# Patient Record
Sex: MALE | Race: WHITE | Employment: OTHER | URBAN - METROPOLITAN AREA
[De-identification: names, ages, dates, MRNs, and addresses within clinical notes are randomized per-mention and may not be internally consistent; named-entity substitution may affect disease eponyms.]

---

## 2017-07-18 PROBLEM — K64.4 EXTERNAL HEMORRHOID: Status: ACTIVE | Noted: 2017-07-18

## 2017-10-16 PROBLEM — J40 BRONCHITIS: Status: ACTIVE | Noted: 2017-10-16

## 2017-10-16 PROBLEM — J01.00 ACUTE NON-RECURRENT MAXILLARY SINUSITIS: Status: ACTIVE | Noted: 2017-10-16

## 2018-04-16 ENCOUNTER — TELEPHONE (OUTPATIENT)
Dept: FAMILY MEDICINE CLINIC | Age: 48
End: 2018-04-16

## 2018-04-16 DIAGNOSIS — Z95.2 S/P AVR (AORTIC VALVE REPLACEMENT): ICD-10-CM

## 2018-04-16 DIAGNOSIS — Z95.2 S/P MVR (MITRAL VALVE REPLACEMENT): Primary | ICD-10-CM

## 2018-04-17 ENCOUNTER — NURSE ONLY (OUTPATIENT)
Dept: FAMILY MEDICINE CLINIC | Age: 48
End: 2018-04-17
Payer: COMMERCIAL

## 2018-04-17 DIAGNOSIS — Z95.2 S/P AVR (AORTIC VALVE REPLACEMENT): ICD-10-CM

## 2018-04-17 DIAGNOSIS — Z95.2 S/P MVR (MITRAL VALVE REPLACEMENT): Primary | ICD-10-CM

## 2018-04-17 LAB
INTERNATIONAL NORMALIZATION RATIO, POC: 2.4
PROTHROMBIN TIME, POC: NORMAL

## 2018-04-17 PROCEDURE — 85610 PROTHROMBIN TIME: CPT | Performed by: FAMILY MEDICINE

## 2018-05-21 ENCOUNTER — TELEPHONE (OUTPATIENT)
Dept: FAMILY MEDICINE CLINIC | Age: 48
End: 2018-05-21

## 2018-05-21 RX ORDER — MOXIFLOXACIN 5 MG/ML
1 SOLUTION/ DROPS OPHTHALMIC 3 TIMES DAILY
Qty: 3 ML | Refills: 0 | Status: SHIPPED | OUTPATIENT
Start: 2018-05-21 | End: 2018-05-28

## 2018-06-13 ENCOUNTER — TELEPHONE (OUTPATIENT)
Dept: FAMILY MEDICINE CLINIC | Age: 48
End: 2018-06-13

## 2018-06-20 ENCOUNTER — NURSE ONLY (OUTPATIENT)
Dept: FAMILY MEDICINE CLINIC | Age: 48
End: 2018-06-20
Payer: COMMERCIAL

## 2018-06-20 DIAGNOSIS — Z95.2 S/P MVR (MITRAL VALVE REPLACEMENT): Primary | ICD-10-CM

## 2018-06-20 DIAGNOSIS — Z95.2 S/P AVR (AORTIC VALVE REPLACEMENT): ICD-10-CM

## 2018-06-20 LAB
INTERNATIONAL NORMALIZATION RATIO, POC: 1.9
PROTHROMBIN TIME, POC: NORMAL

## 2018-06-20 PROCEDURE — 85610 PROTHROMBIN TIME: CPT | Performed by: FAMILY MEDICINE

## 2018-06-20 PROCEDURE — 93793 ANTICOAG MGMT PT WARFARIN: CPT | Performed by: FAMILY MEDICINE

## 2018-07-27 ENCOUNTER — TELEPHONE (OUTPATIENT)
Dept: FAMILY MEDICINE CLINIC | Age: 48
End: 2018-07-27

## 2018-08-28 ENCOUNTER — TELEPHONE (OUTPATIENT)
Dept: FAMILY MEDICINE CLINIC | Age: 48
End: 2018-08-28

## 2018-09-19 ENCOUNTER — NURSE ONLY (OUTPATIENT)
Dept: FAMILY MEDICINE CLINIC | Age: 48
End: 2018-09-19
Payer: COMMERCIAL

## 2018-09-19 DIAGNOSIS — Z95.2 S/P MVR (MITRAL VALVE REPLACEMENT): Primary | ICD-10-CM

## 2018-09-19 DIAGNOSIS — Z95.2 S/P AVR (AORTIC VALVE REPLACEMENT): ICD-10-CM

## 2018-09-19 LAB
INTERNATIONAL NORMALIZATION RATIO, POC: 2.3
PROTHROMBIN TIME, POC: 27.1

## 2018-09-19 PROCEDURE — 85610 PROTHROMBIN TIME: CPT | Performed by: FAMILY MEDICINE

## 2018-09-19 PROCEDURE — 93793 ANTICOAG MGMT PT WARFARIN: CPT | Performed by: FAMILY MEDICINE

## 2018-10-04 ENCOUNTER — TELEPHONE (OUTPATIENT)
Dept: FAMILY MEDICINE CLINIC | Age: 48
End: 2018-10-04

## 2018-10-04 RX ORDER — AMOXICILLIN 875 MG/1
875 TABLET, COATED ORAL 2 TIMES DAILY
Qty: 20 TABLET | Refills: 0 | Status: SHIPPED | OUTPATIENT
Start: 2018-10-04 | End: 2018-10-14

## 2018-10-16 ENCOUNTER — NURSE ONLY (OUTPATIENT)
Dept: FAMILY MEDICINE CLINIC | Age: 48
End: 2018-10-16
Payer: COMMERCIAL

## 2018-10-16 DIAGNOSIS — Z95.2 S/P MVR (MITRAL VALVE REPLACEMENT): ICD-10-CM

## 2018-10-16 DIAGNOSIS — Z95.2 S/P AVR (AORTIC VALVE REPLACEMENT): ICD-10-CM

## 2018-10-16 LAB
INTERNATIONAL NORMALIZATION RATIO, POC: 3.3
PROTHROMBIN TIME, POC: NORMAL

## 2018-10-16 PROCEDURE — 85610 PROTHROMBIN TIME: CPT | Performed by: FAMILY MEDICINE

## 2018-10-16 PROCEDURE — 93793 ANTICOAG MGMT PT WARFARIN: CPT | Performed by: FAMILY MEDICINE

## 2018-11-14 ENCOUNTER — HOSPITAL ENCOUNTER (OUTPATIENT)
Age: 48
Discharge: HOME | End: 2018-11-16
Payer: COMMERCIAL

## 2018-11-14 ENCOUNTER — NURSE ONLY (OUTPATIENT)
Dept: FAMILY MEDICINE CLINIC | Age: 48
End: 2018-11-14
Payer: COMMERCIAL

## 2018-11-14 DIAGNOSIS — Z23 NEED FOR INFLUENZA VACCINATION: Primary | ICD-10-CM

## 2018-11-14 DIAGNOSIS — I10 ESSENTIAL HYPERTENSION: ICD-10-CM

## 2018-11-14 DIAGNOSIS — Z95.2 S/P AVR (AORTIC VALVE REPLACEMENT): ICD-10-CM

## 2018-11-14 DIAGNOSIS — Z12.5 SCREENING FOR PROSTATE CANCER: ICD-10-CM

## 2018-11-14 DIAGNOSIS — E78.5 HYPERLIPIDEMIA, UNSPECIFIED HYPERLIPIDEMIA TYPE: ICD-10-CM

## 2018-11-14 DIAGNOSIS — Z95.2 S/P MVR (MITRAL VALVE REPLACEMENT): ICD-10-CM

## 2018-11-14 LAB
ALBUMIN SERPL-MCNC: 4.2 G/DL (ref 3.5–5.2)
ALP BLD-CCNC: 57 U/L (ref 40–129)
ALT SERPL-CCNC: 30 U/L (ref 0–40)
ANION GAP SERPL CALCULATED.3IONS-SCNC: 13 MMOL/L (ref 7–16)
AST SERPL-CCNC: 28 U/L (ref 0–39)
BILIRUB SERPL-MCNC: 0.4 MG/DL (ref 0–1.2)
BUN BLDV-MCNC: 16 MG/DL (ref 6–20)
CALCIUM SERPL-MCNC: 9 MG/DL (ref 8.6–10.2)
CHLORIDE BLD-SCNC: 105 MMOL/L (ref 98–107)
CHOLESTEROL, TOTAL: 196 MG/DL (ref 0–199)
CO2: 24 MMOL/L (ref 22–29)
CREAT SERPL-MCNC: 1.1 MG/DL (ref 0.7–1.2)
GFR AFRICAN AMERICAN: >60
GFR NON-AFRICAN AMERICAN: >60 ML/MIN/1.73
GLUCOSE BLD-MCNC: 94 MG/DL (ref 74–99)
HCT VFR BLD CALC: 47.7 % (ref 37–54)
HDLC SERPL-MCNC: 59 MG/DL
HEMOGLOBIN: 15.1 G/DL (ref 12.5–16.5)
INTERNATIONAL NORMALIZATION RATIO, POC: 2.5
LDL CHOLESTEROL CALCULATED: 120 MG/DL (ref 0–99)
MCH RBC QN AUTO: 28.1 PG (ref 26–35)
MCHC RBC AUTO-ENTMCNC: 31.7 % (ref 32–34.5)
MCV RBC AUTO: 88.8 FL (ref 80–99.9)
PDW BLD-RTO: 13.4 FL (ref 11.5–15)
PLATELET # BLD: 280 E9/L (ref 130–450)
PMV BLD AUTO: 11 FL (ref 7–12)
POTASSIUM SERPL-SCNC: 4.5 MMOL/L (ref 3.5–5)
PROSTATE SPECIFIC ANTIGEN: 1.3 NG/ML (ref 0–4)
PROTHROMBIN TIME, POC: NORMAL
RBC # BLD: 5.37 E12/L (ref 3.8–5.8)
SODIUM BLD-SCNC: 142 MMOL/L (ref 132–146)
TOTAL PROTEIN: 7.3 G/DL (ref 6.4–8.3)
TRIGL SERPL-MCNC: 84 MG/DL (ref 0–149)
TSH SERPL DL<=0.05 MIU/L-ACNC: 0.81 UIU/ML (ref 0.27–4.2)
VLDLC SERPL CALC-MCNC: 17 MG/DL
WBC # BLD: 7.2 E9/L (ref 4.5–11.5)

## 2018-11-14 PROCEDURE — 80053 COMPREHEN METABOLIC PANEL: CPT

## 2018-11-14 PROCEDURE — 84443 ASSAY THYROID STIM HORMONE: CPT

## 2018-11-14 PROCEDURE — 90471 IMMUNIZATION ADMIN: CPT | Performed by: FAMILY MEDICINE

## 2018-11-14 PROCEDURE — 36415 COLL VENOUS BLD VENIPUNCTURE: CPT | Performed by: FAMILY MEDICINE

## 2018-11-14 PROCEDURE — 85610 PROTHROMBIN TIME: CPT | Performed by: FAMILY MEDICINE

## 2018-11-14 PROCEDURE — 90686 IIV4 VACC NO PRSV 0.5 ML IM: CPT | Performed by: FAMILY MEDICINE

## 2018-11-14 PROCEDURE — G0103 PSA SCREENING: HCPCS

## 2018-11-14 PROCEDURE — 85027 COMPLETE CBC AUTOMATED: CPT

## 2018-11-14 PROCEDURE — 80061 LIPID PANEL: CPT

## 2018-11-14 NOTE — PROGRESS NOTES
Vaccine Information Sheet, \"Influenza - Inactivated\"  given to Kingsley Navas, or parent/legal guardian of  Kingsley Navas and verbalized understanding. Patient responses:    Have you ever had a reaction to a flu vaccine? No  Are you able to eat eggs without adverse effects? Yes  Do you have any current illness? No  Have you ever had [place] Syndrome? No    Flu vaccine given per order. Please see immunization tab.

## 2018-12-18 ENCOUNTER — OFFICE VISIT (OUTPATIENT)
Dept: FAMILY MEDICINE CLINIC | Age: 48
End: 2018-12-18
Payer: COMMERCIAL

## 2018-12-18 VITALS
RESPIRATION RATE: 16 BRPM | SYSTOLIC BLOOD PRESSURE: 134 MMHG | HEART RATE: 77 BPM | BODY MASS INDEX: 30.61 KG/M2 | HEIGHT: 72 IN | OXYGEN SATURATION: 97 % | DIASTOLIC BLOOD PRESSURE: 72 MMHG | WEIGHT: 226 LBS

## 2018-12-18 DIAGNOSIS — Z12.11 SCREENING FOR MALIGNANT NEOPLASM OF COLON: ICD-10-CM

## 2018-12-18 DIAGNOSIS — Z00.01 ENCOUNTER FOR GENERAL ADULT MEDICAL EXAMINATION WITH ABNORMAL FINDINGS: Primary | ICD-10-CM

## 2018-12-18 DIAGNOSIS — I10 ESSENTIAL HYPERTENSION: ICD-10-CM

## 2018-12-18 DIAGNOSIS — Z95.2 S/P MVR (MITRAL VALVE REPLACEMENT): ICD-10-CM

## 2018-12-18 DIAGNOSIS — Z95.2 S/P AVR (AORTIC VALVE REPLACEMENT): ICD-10-CM

## 2018-12-18 PROBLEM — J01.00 ACUTE NON-RECURRENT MAXILLARY SINUSITIS: Status: RESOLVED | Noted: 2017-10-16 | Resolved: 2018-12-18

## 2018-12-18 PROBLEM — K64.4 EXTERNAL HEMORRHOID: Status: RESOLVED | Noted: 2017-07-18 | Resolved: 2018-12-18

## 2018-12-18 PROBLEM — J40 BRONCHITIS: Status: RESOLVED | Noted: 2017-10-16 | Resolved: 2018-12-18

## 2018-12-18 LAB
INTERNATIONAL NORMALIZATION RATIO, POC: 2.6
PROTHROMBIN TIME, POC: NORMAL

## 2018-12-18 PROCEDURE — 85610 PROTHROMBIN TIME: CPT | Performed by: FAMILY MEDICINE

## 2018-12-18 PROCEDURE — 99396 PREV VISIT EST AGE 40-64: CPT | Performed by: FAMILY MEDICINE

## 2018-12-18 ASSESSMENT — ENCOUNTER SYMPTOMS
WHEEZING: 0
CONSTIPATION: 0
SORE THROAT: 0
EYE REDNESS: 0
DIARRHEA: 0
BLOOD IN STOOL: 0
VOMITING: 0
EYE ITCHING: 0
EYE PAIN: 0
RHINORRHEA: 0
SHORTNESS OF BREATH: 0
APNEA: 0
NAUSEA: 0
ABDOMINAL PAIN: 0
COLOR CHANGE: 0
SINUS PRESSURE: 0
BACK PAIN: 0
COUGH: 0
CHEST TIGHTNESS: 0

## 2018-12-18 ASSESSMENT — PATIENT HEALTH QUESTIONNAIRE - PHQ9
SUM OF ALL RESPONSES TO PHQ QUESTIONS 1-9: 0
SUM OF ALL RESPONSES TO PHQ9 QUESTIONS 1 & 2: 0
1. LITTLE INTEREST OR PLEASURE IN DOING THINGS: 0
SUM OF ALL RESPONSES TO PHQ QUESTIONS 1-9: 0
2. FEELING DOWN, DEPRESSED OR HOPELESS: 0

## 2018-12-18 NOTE — PROGRESS NOTES
to person, place, and time. He appears well-developed and well-nourished. HENT:   Head: Normocephalic and atraumatic. Right Ear: External ear normal.   Left Ear: External ear normal.   Nose: Nose normal.   Mouth/Throat: Oropharynx is clear and moist.   Eyes: Pupils are equal, round, and reactive to light. Conjunctivae and EOM are normal. No scleral icterus. Neck: Normal range of motion. Neck supple. No thyromegaly present. Cardiovascular: Normal rate and regular rhythm. Murmur (with audible click) heard. Pulmonary/Chest: Effort normal and breath sounds normal. No respiratory distress. He has no wheezes. He has no rales. Abdominal: Soft. Bowel sounds are normal. He exhibits no distension. There is no tenderness. Musculoskeletal: Normal range of motion. He exhibits no edema or tenderness. Lymphadenopathy:     He has no cervical adenopathy. Neurological: He is alert and oriented to person, place, and time. He has normal reflexes. He displays normal reflexes. No cranial nerve deficit. Skin: Skin is warm and dry. No rash noted. No erythema. Psychiatric: He has a normal mood and affect. Nursing note and vitals reviewed. ASSESSMENT/PLAN:    Patient Active Problem List   Diagnosis    S/P MVR (mitral valve replacement)    S/P AVR (aortic valve replacement)    Hypertension    Hyperlipidemia    Encounter for general adult medical examination with abnormal findings       Braeden Wadsworth was seen today for hypertension.     Diagnoses and all orders for this visit:    Encounter for general adult medical examination with abnormal findings    S/P MVR (mitral valve replacement)  -     POCT INR    S/P AVR (aortic valve replacement)  -     POCT INR    Essential hypertension    Screening for malignant neoplasm of colon  -     Pardeep Lynch MD          Return in about 6 months (around 6/18/2019) for Follow up HTN, Recheck labs, Recheck

## 2019-01-14 ENCOUNTER — NURSE ONLY (OUTPATIENT)
Dept: FAMILY MEDICINE CLINIC | Age: 49
End: 2019-01-14
Payer: COMMERCIAL

## 2019-01-14 DIAGNOSIS — Z95.2 S/P MVR (MITRAL VALVE REPLACEMENT): ICD-10-CM

## 2019-01-14 DIAGNOSIS — Z95.2 S/P AVR (AORTIC VALVE REPLACEMENT): ICD-10-CM

## 2019-01-14 LAB
INTERNATIONAL NORMALIZATION RATIO, POC: 2.3
PROTHROMBIN TIME, POC: NORMAL

## 2019-01-14 PROCEDURE — 85610 PROTHROMBIN TIME: CPT | Performed by: FAMILY MEDICINE

## 2019-01-15 ENCOUNTER — OFFICE VISIT (OUTPATIENT)
Dept: SURGERY | Age: 49
End: 2019-01-15

## 2019-01-15 VITALS
HEIGHT: 72 IN | DIASTOLIC BLOOD PRESSURE: 87 MMHG | HEART RATE: 76 BPM | RESPIRATION RATE: 18 BRPM | WEIGHT: 233 LBS | SYSTOLIC BLOOD PRESSURE: 135 MMHG | OXYGEN SATURATION: 97 % | TEMPERATURE: 98.7 F | BODY MASS INDEX: 31.56 KG/M2

## 2019-01-15 DIAGNOSIS — Z12.11 ENCOUNTER FOR SCREENING COLONOSCOPY: Primary | ICD-10-CM

## 2019-01-15 PROCEDURE — 99999 PR OFFICE/OUTPT VISIT,PROCEDURE ONLY: CPT | Performed by: SURGERY

## 2019-01-16 ENCOUNTER — TELEPHONE (OUTPATIENT)
Dept: SURGERY | Age: 49
End: 2019-01-16

## 2019-01-17 PROBLEM — Z00.01 ENCOUNTER FOR GENERAL ADULT MEDICAL EXAMINATION WITH ABNORMAL FINDINGS: Status: RESOLVED | Noted: 2018-12-18 | Resolved: 2019-01-17

## 2019-01-29 ENCOUNTER — NURSE ONLY (OUTPATIENT)
Dept: FAMILY MEDICINE CLINIC | Age: 49
End: 2019-01-29
Payer: COMMERCIAL

## 2019-01-29 DIAGNOSIS — Z95.2 S/P AVR (AORTIC VALVE REPLACEMENT): ICD-10-CM

## 2019-01-29 DIAGNOSIS — Z95.2 S/P MVR (MITRAL VALVE REPLACEMENT): ICD-10-CM

## 2019-01-29 LAB
INTERNATIONAL NORMALIZATION RATIO, POC: 3.1
PROTHROMBIN TIME, POC: NORMAL

## 2019-01-29 PROCEDURE — 85610 PROTHROMBIN TIME: CPT | Performed by: FAMILY MEDICINE

## 2019-01-29 PROCEDURE — 93793 ANTICOAG MGMT PT WARFARIN: CPT | Performed by: FAMILY MEDICINE

## 2019-01-30 ENCOUNTER — OFFICE VISIT (OUTPATIENT)
Dept: FAMILY MEDICINE CLINIC | Age: 49
End: 2019-01-30
Payer: COMMERCIAL

## 2019-01-30 VITALS
DIASTOLIC BLOOD PRESSURE: 80 MMHG | HEART RATE: 85 BPM | SYSTOLIC BLOOD PRESSURE: 138 MMHG | BODY MASS INDEX: 31.56 KG/M2 | RESPIRATION RATE: 16 BRPM | TEMPERATURE: 99 F | HEIGHT: 72 IN | WEIGHT: 233 LBS | OXYGEN SATURATION: 97 %

## 2019-01-30 DIAGNOSIS — B96.89 ACUTE BACTERIAL SINUSITIS: Primary | ICD-10-CM

## 2019-01-30 DIAGNOSIS — J01.90 ACUTE BACTERIAL SINUSITIS: Primary | ICD-10-CM

## 2019-01-30 PROCEDURE — 99213 OFFICE O/P EST LOW 20 MIN: CPT | Performed by: FAMILY MEDICINE

## 2019-01-30 RX ORDER — AZITHROMYCIN 250 MG/1
TABLET, FILM COATED ORAL
Qty: 6 TABLET | Refills: 0 | Status: SHIPPED | OUTPATIENT
Start: 2019-01-30 | End: 2019-02-13 | Stop reason: ALTCHOICE

## 2019-01-30 ASSESSMENT — PATIENT HEALTH QUESTIONNAIRE - PHQ9
SUM OF ALL RESPONSES TO PHQ QUESTIONS 1-9: 0
1. LITTLE INTEREST OR PLEASURE IN DOING THINGS: 0
SUM OF ALL RESPONSES TO PHQ QUESTIONS 1-9: 0
SUM OF ALL RESPONSES TO PHQ9 QUESTIONS 1 & 2: 0
2. FEELING DOWN, DEPRESSED OR HOPELESS: 0

## 2019-01-30 ASSESSMENT — ENCOUNTER SYMPTOMS
RESPIRATORY NEGATIVE: 1
RHINORRHEA: 1
EYES NEGATIVE: 1
SORE THROAT: 1
SINUS COMPLAINT: 1
TROUBLE SWALLOWING: 0
SINUS PRESSURE: 1
FACIAL SWELLING: 0

## 2019-02-07 ENCOUNTER — TELEPHONE (OUTPATIENT)
Dept: SURGERY | Age: 49
End: 2019-02-07

## 2019-02-12 ENCOUNTER — OFFICE VISIT (OUTPATIENT)
Dept: CARDIOLOGY CLINIC | Age: 49
End: 2019-02-12
Payer: COMMERCIAL

## 2019-02-12 VITALS
HEART RATE: 66 BPM | RESPIRATION RATE: 16 BRPM | HEIGHT: 72 IN | WEIGHT: 230 LBS | DIASTOLIC BLOOD PRESSURE: 88 MMHG | SYSTOLIC BLOOD PRESSURE: 132 MMHG | BODY MASS INDEX: 31.15 KG/M2

## 2019-02-12 DIAGNOSIS — I10 ESSENTIAL HYPERTENSION: Primary | ICD-10-CM

## 2019-02-12 PROCEDURE — 99213 OFFICE O/P EST LOW 20 MIN: CPT | Performed by: INTERNAL MEDICINE

## 2019-02-12 PROCEDURE — 93000 ELECTROCARDIOGRAM COMPLETE: CPT | Performed by: INTERNAL MEDICINE

## 2019-02-26 ENCOUNTER — NURSE ONLY (OUTPATIENT)
Dept: FAMILY MEDICINE CLINIC | Age: 49
End: 2019-02-26
Payer: COMMERCIAL

## 2019-02-26 DIAGNOSIS — Z95.2 S/P MVR (MITRAL VALVE REPLACEMENT): ICD-10-CM

## 2019-02-26 DIAGNOSIS — Z95.2 S/P AVR (AORTIC VALVE REPLACEMENT): ICD-10-CM

## 2019-02-26 LAB
INTERNATIONAL NORMALIZATION RATIO, POC: 3.1
PROTHROMBIN TIME, POC: 37.2

## 2019-02-26 PROCEDURE — 85610 PROTHROMBIN TIME: CPT | Performed by: FAMILY MEDICINE

## 2019-03-13 ENCOUNTER — TELEPHONE (OUTPATIENT)
Dept: SURGERY | Age: 49
End: 2019-03-13

## 2019-03-26 ENCOUNTER — NURSE ONLY (OUTPATIENT)
Dept: FAMILY MEDICINE CLINIC | Age: 49
End: 2019-03-26
Payer: COMMERCIAL

## 2019-03-26 DIAGNOSIS — Z95.2 S/P AVR (AORTIC VALVE REPLACEMENT): ICD-10-CM

## 2019-03-26 DIAGNOSIS — Z95.2 S/P MVR (MITRAL VALVE REPLACEMENT): ICD-10-CM

## 2019-03-26 LAB
INTERNATIONAL NORMALIZATION RATIO, POC: 2.2
PROTHROMBIN TIME, POC: NORMAL

## 2019-03-26 PROCEDURE — 85610 PROTHROMBIN TIME: CPT | Performed by: FAMILY MEDICINE

## 2019-03-26 PROCEDURE — 93793 ANTICOAG MGMT PT WARFARIN: CPT | Performed by: FAMILY MEDICINE

## 2019-04-30 ENCOUNTER — NURSE ONLY (OUTPATIENT)
Dept: FAMILY MEDICINE CLINIC | Age: 49
End: 2019-04-30
Payer: COMMERCIAL

## 2019-04-30 DIAGNOSIS — Z95.2 S/P MVR (MITRAL VALVE REPLACEMENT): ICD-10-CM

## 2019-04-30 DIAGNOSIS — Z95.2 S/P AVR (AORTIC VALVE REPLACEMENT): ICD-10-CM

## 2019-04-30 LAB
INTERNATIONAL NORMALIZATION RATIO, POC: 2.4
PROTHROMBIN TIME, POC: NORMAL

## 2019-04-30 PROCEDURE — 85610 PROTHROMBIN TIME: CPT | Performed by: FAMILY MEDICINE

## 2019-04-30 PROCEDURE — 93793 ANTICOAG MGMT PT WARFARIN: CPT | Performed by: FAMILY MEDICINE

## 2019-06-05 ENCOUNTER — NURSE ONLY (OUTPATIENT)
Dept: FAMILY MEDICINE CLINIC | Age: 49
End: 2019-06-05
Payer: COMMERCIAL

## 2019-06-05 DIAGNOSIS — E78.5 HYPERLIPIDEMIA, UNSPECIFIED HYPERLIPIDEMIA TYPE: ICD-10-CM

## 2019-06-05 DIAGNOSIS — Z95.2 S/P MVR (MITRAL VALVE REPLACEMENT): ICD-10-CM

## 2019-06-05 DIAGNOSIS — I10 ESSENTIAL HYPERTENSION: Primary | ICD-10-CM

## 2019-06-05 DIAGNOSIS — Z00.01 ENCOUNTER FOR GENERAL ADULT MEDICAL EXAMINATION WITH ABNORMAL FINDINGS: ICD-10-CM

## 2019-06-05 DIAGNOSIS — Z95.2 S/P AVR (AORTIC VALVE REPLACEMENT): ICD-10-CM

## 2019-06-05 LAB
INTERNATIONAL NORMALIZATION RATIO, POC: 2.4
PROTHROMBIN TIME, POC: ABNORMAL

## 2019-06-05 PROCEDURE — 85610 PROTHROMBIN TIME: CPT | Performed by: FAMILY MEDICINE

## 2019-10-09 ENCOUNTER — NURSE ONLY (OUTPATIENT)
Dept: PRIMARY CARE CLINIC | Age: 49
End: 2019-10-09
Payer: COMMERCIAL

## 2019-10-09 DIAGNOSIS — Z95.2 S/P AVR (AORTIC VALVE REPLACEMENT): ICD-10-CM

## 2019-10-09 DIAGNOSIS — Z95.2 S/P MVR (MITRAL VALVE REPLACEMENT): ICD-10-CM

## 2019-10-09 DIAGNOSIS — Z23 NEED FOR INFLUENZA VACCINATION: Primary | ICD-10-CM

## 2019-10-09 LAB
INTERNATIONAL NORMALIZATION RATIO, POC: 2.6
PROTHROMBIN TIME, POC: NORMAL

## 2019-10-09 PROCEDURE — 85610 PROTHROMBIN TIME: CPT | Performed by: FAMILY MEDICINE

## 2019-10-09 PROCEDURE — 90471 IMMUNIZATION ADMIN: CPT | Performed by: FAMILY MEDICINE

## 2019-10-09 PROCEDURE — 90686 IIV4 VACC NO PRSV 0.5 ML IM: CPT | Performed by: FAMILY MEDICINE

## 2019-10-09 PROCEDURE — 93793 ANTICOAG MGMT PT WARFARIN: CPT | Performed by: FAMILY MEDICINE

## 2019-10-21 RX ORDER — LISINOPRIL 10 MG/1
TABLET ORAL
Qty: 30 TABLET | Refills: 3 | Status: SHIPPED
Start: 2019-10-21 | End: 2020-03-04

## 2019-10-23 ENCOUNTER — TELEPHONE (OUTPATIENT)
Dept: PRIMARY CARE CLINIC | Age: 49
End: 2019-10-23

## 2019-10-23 RX ORDER — AMOXICILLIN 875 MG/1
875 TABLET, COATED ORAL 2 TIMES DAILY
Qty: 20 TABLET | Refills: 0 | Status: SHIPPED | OUTPATIENT
Start: 2019-10-23 | End: 2019-11-02

## 2019-11-18 RX ORDER — WARFARIN SODIUM 10 MG/1
TABLET ORAL
Qty: 60 TABLET | Refills: 0 | Status: SHIPPED
Start: 2019-11-18 | End: 2020-02-11 | Stop reason: SDUPTHER

## 2020-01-31 ENCOUNTER — NURSE ONLY (OUTPATIENT)
Dept: PRIMARY CARE CLINIC | Age: 50
End: 2020-01-31
Payer: COMMERCIAL

## 2020-01-31 LAB
INTERNATIONAL NORMALIZATION RATIO, POC: 2.1
PROTHROMBIN TIME, POC: NORMAL

## 2020-01-31 PROCEDURE — 85610 PROTHROMBIN TIME: CPT | Performed by: FAMILY MEDICINE

## 2020-02-11 RX ORDER — WARFARIN SODIUM 10 MG/1
TABLET ORAL
Qty: 30 TABLET | Refills: 1 | Status: SHIPPED
Start: 2020-02-11 | End: 2021-04-12

## 2020-03-04 ENCOUNTER — NURSE ONLY (OUTPATIENT)
Dept: PRIMARY CARE CLINIC | Age: 50
End: 2020-03-04
Payer: COMMERCIAL

## 2020-03-04 LAB
INTERNATIONAL NORMALIZATION RATIO, POC: 2.1
PROTHROMBIN TIME, POC: 25.5

## 2020-03-04 PROCEDURE — 93793 ANTICOAG MGMT PT WARFARIN: CPT | Performed by: FAMILY MEDICINE

## 2020-03-04 PROCEDURE — 85610 PROTHROMBIN TIME: CPT | Performed by: FAMILY MEDICINE

## 2020-03-04 RX ORDER — LISINOPRIL 10 MG/1
TABLET ORAL
Qty: 30 TABLET | Refills: 0 | Status: SHIPPED
Start: 2020-03-04 | End: 2020-04-23

## 2020-04-23 RX ORDER — LISINOPRIL 10 MG/1
TABLET ORAL
Qty: 30 TABLET | Refills: 0 | Status: SHIPPED
Start: 2020-04-23 | End: 2020-05-01

## 2020-05-01 ENCOUNTER — OFFICE VISIT (OUTPATIENT)
Dept: PRIMARY CARE CLINIC | Age: 50
End: 2020-05-01
Payer: COMMERCIAL

## 2020-05-01 VITALS
HEIGHT: 73 IN | OXYGEN SATURATION: 96 % | WEIGHT: 239 LBS | TEMPERATURE: 97.4 F | RESPIRATION RATE: 20 BRPM | SYSTOLIC BLOOD PRESSURE: 168 MMHG | DIASTOLIC BLOOD PRESSURE: 116 MMHG | BODY MASS INDEX: 31.68 KG/M2 | HEART RATE: 82 BPM

## 2020-05-01 LAB
INTERNATIONAL NORMALIZATION RATIO, POC: 2.9
PROTHROMBIN TIME, POC: 34.7

## 2020-05-01 PROCEDURE — 85610 PROTHROMBIN TIME: CPT | Performed by: FAMILY MEDICINE

## 2020-05-01 PROCEDURE — 99213 OFFICE O/P EST LOW 20 MIN: CPT | Performed by: FAMILY MEDICINE

## 2020-05-01 RX ORDER — LISINOPRIL 20 MG/1
20 TABLET ORAL DAILY
Qty: 90 TABLET | Refills: 1 | Status: SHIPPED
Start: 2020-05-01 | End: 2020-10-06 | Stop reason: SDUPTHER

## 2020-05-01 ASSESSMENT — PATIENT HEALTH QUESTIONNAIRE - PHQ9
SUM OF ALL RESPONSES TO PHQ9 QUESTIONS 1 & 2: 0
SUM OF ALL RESPONSES TO PHQ QUESTIONS 1-9: 0
1. LITTLE INTEREST OR PLEASURE IN DOING THINGS: 0
SUM OF ALL RESPONSES TO PHQ QUESTIONS 1-9: 0
2. FEELING DOWN, DEPRESSED OR HOPELESS: 0

## 2020-05-01 ASSESSMENT — ENCOUNTER SYMPTOMS
BACK PAIN: 0
CONSTIPATION: 0
SORE THROAT: 0
CHEST TIGHTNESS: 0
NAUSEA: 0
RHINORRHEA: 0
COUGH: 0
WHEEZING: 0
SWOLLEN GLANDS: 0
BLOOD IN STOOL: 0
DIARRHEA: 0
ABDOMINAL PAIN: 0
SINUS PRESSURE: 0
EYE REDNESS: 0
EYE PAIN: 0
VOMITING: 0
EYE ITCHING: 0
COLOR CHANGE: 0
SHORTNESS OF BREATH: 0
APNEA: 0
VISUAL CHANGE: 0
CHANGE IN BOWEL HABIT: 0

## 2020-05-01 NOTE — PROGRESS NOTES
Chief Complaint:     Chief Complaint   Patient presents with    Office Visit for Anticoagulation Management     ck up    Heart Problem    Hypertension         Heart Problem   This is a chronic (VALVULAR HEART DISEASE) problem. The current episode started more than 1 year ago. The problem occurs daily. The problem has been unchanged. Pertinent negatives include no abdominal pain, arthralgias, change in bowel habit, chest pain, chills, congestion, coughing, diaphoresis, fatigue, fever, headaches, joint swelling, myalgias, nausea, neck pain, numbness, rash, sore throat, swollen glands, urinary symptoms, vertigo, visual change, vomiting or weakness. Nothing aggravates the symptoms. He has tried nothing for the symptoms. The treatment provided no relief. Hypertension   This is a chronic problem. The current episode started more than 1 year ago. The problem has been waxing and waning since onset. The problem is controlled. Pertinent negatives include no anxiety, chest pain, headaches, malaise/fatigue, neck pain, palpitations, peripheral edema or shortness of breath. There are no associated agents to hypertension. Risk factors for coronary artery disease include male gender. Past treatments include ACE inhibitors. The current treatment provides significant improvement. There are no compliance problems. There is no history of CAD/MI, CVA or PVD.        Patient Active Problem List   Diagnosis    S/P MVR (mitral valve replacement)    S/P AVR (aortic valve replacement)    Hypertension    Hyperlipidemia       Past Medical History:   Diagnosis Date    Chest pain 2/21/2013    Hypertension     LONG TERM ANTICOAGULENT USE     Psoriasis     S/P AVR (aortic valve replacement) 2/21/2013    S/P MVR (mitral valve replacement) 2/21/2013       Past Surgical History:   Procedure Laterality Date    AORTIC VALVE REPLACEMENT  1/2006    Dr Cassie Coats  01--2006    Dr Collet[facility]       Current Outpatient Medications   Medication Sig Dispense Refill    lisinopril (PRINIVIL;ZESTRIL) 20 MG tablet Take 1 tablet by mouth daily 90 tablet 1    warfarin (JANTOVEN) 10 MG tablet Take one tablet by mouth daily or as directed by doctor 30 tablet 1    Multiple Vitamins-Minerals (THERAPEUTIC MULTIVITAMIN-MINERALS) tablet Take 1 tablet by mouth daily  2-22-19       No current facility-administered medications for this visit. No Known Allergies    Social History     Socioeconomic History    Marital status:      Spouse name: None    Number of children: None    Years of education: None    Highest education level: None   Occupational History     Comment: [email or web address]   Social Needs    Financial resource strain: None    Food insecurity     Worry: None     Inability: None    Transportation needs     Medical: None     Non-medical: None   Tobacco Use    Smoking status: Never Smoker    Smokeless tobacco: Never Used   Substance and Sexual Activity    Alcohol use: Yes     Comment: 1-2 beers per month    Drug use: No    Sexual activity: None   Lifestyle    Physical activity     Days per week: None     Minutes per session: None    Stress: None   Relationships    Social connections     Talks on phone: None     Gets together: None     Attends Orthodoxy service: None     Active member of club or organization: None     Attends meetings of clubs or organizations: None     Relationship status: None    Intimate partner violence     Fear of current or ex partner: None     Emotionally abused: None     Physically abused: None     Forced sexual activity: None   Other Topics Concern    None   Social History Narrative    None       History reviewed. No pertinent family history. Review of Systems   Constitutional: Negative for activity change, appetite change, chills, diaphoresis, fatigue, fever and malaise/fatigue.    HENT: Negative for congestion, ear pain, hearing loss, nosebleeds, rhinorrhea, sinus pressure and sore throat. Eyes: Negative for pain, redness, itching and visual disturbance. Respiratory: Negative for apnea, cough, chest tightness, shortness of breath and wheezing. Cardiovascular: Negative for chest pain, palpitations and leg swelling. Gastrointestinal: Negative for abdominal pain, blood in stool, change in bowel habit, constipation, diarrhea, nausea and vomiting. Endocrine: Negative. Genitourinary: Negative for decreased urine volume, difficulty urinating, dysuria, frequency, hematuria and urgency. Musculoskeletal: Negative for arthralgias, back pain, gait problem, joint swelling, myalgias and neck pain. Skin: Negative for color change and rash. Allergic/Immunologic: Negative for environmental allergies and food allergies. Neurological: Negative for dizziness, vertigo, weakness, light-headedness, numbness and headaches. Hematological: Negative for adenopathy. Does not bruise/bleed easily. Psychiatric/Behavioral: Negative for behavioral problems, dysphoric mood and sleep disturbance. The patient is not nervous/anxious and is not hyperactive. All other systems reviewed and are negative. BP (!) 168/116   Pulse 82   Temp 97.4 °F (36.3 °C) (Temporal)   Resp 20   Ht 6' 1\" (1.854 m)   Wt 239 lb (108.4 kg)   SpO2 96%   BMI 31.53 kg/m²     Physical Exam  Vitals signs and nursing note reviewed. Constitutional:       General: He is not in acute distress. Appearance: Normal appearance. He is well-developed. HENT:      Head: Normocephalic and atraumatic. Right Ear: Hearing, tympanic membrane and external ear normal. No tenderness. No middle ear effusion. Left Ear: Hearing, tympanic membrane and external ear normal. No tenderness. No middle ear effusion. Nose: Nose normal. No congestion or rhinorrhea. Right Turbinates: Not enlarged. Left Turbinates: Not enlarged.       Mouth/Throat:      Mouth: Mucous membranes are moist.      Tongue: No lesions. Pharynx: Oropharynx is clear. No oropharyngeal exudate or posterior oropharyngeal erythema. Eyes:      General: No scleral icterus. Conjunctiva/sclera: Conjunctivae normal.      Pupils: Pupils are equal, round, and reactive to light. Neck:      Musculoskeletal: Normal range of motion and neck supple. No neck rigidity or muscular tenderness. Thyroid: No thyromegaly. Cardiovascular:      Rate and Rhythm: Normal rate and regular rhythm. Heart sounds: No murmur. Pulmonary:      Effort: Pulmonary effort is normal. No respiratory distress. Breath sounds: Normal breath sounds. No wheezing or rales. Abdominal:      General: Bowel sounds are normal. There is no distension. Palpations: Abdomen is soft. Tenderness: There is no abdominal tenderness. Musculoskeletal: Normal range of motion. General: No tenderness. Lymphadenopathy:      Cervical: No cervical adenopathy. Skin:     General: Skin is warm and dry. Findings: No erythema or rash. Neurological:      General: No focal deficit present. Mental Status: He is alert and oriented to person, place, and time. Cranial Nerves: No cranial nerve deficit. Deep Tendon Reflexes: Reflexes are normal and symmetric. Reflexes normal.   Psychiatric:         Mood and Affect: Mood normal.                                 ASSESSMENT/PLAN:    Patient Active Problem List   Diagnosis    S/P MVR (mitral valve replacement)    S/P AVR (aortic valve replacement)    Hypertension    Hyperlipidemia       Joeline Fillers was seen today for office visit for anticoagulation management, heart problem and hypertension. Diagnoses and all orders for this visit:    Anticoagulation goal of INR 2 to 3  -     POCT INR    S/P AVR (aortic valve replacement)  -     POCT INR    S/P MVR (mitral valve replacement)  -     POCT INR    Other orders  -     lisinopril (PRINIVIL;ZESTRIL) 20 MG tablet;  Take 1 tablet by mouth

## 2020-06-02 ENCOUNTER — HOSPITAL ENCOUNTER (OUTPATIENT)
Age: 50
Discharge: HOME | End: 2020-06-04
Payer: COMMERCIAL

## 2020-06-02 ENCOUNTER — OFFICE VISIT (OUTPATIENT)
Dept: PRIMARY CARE CLINIC | Age: 50
End: 2020-06-02
Payer: COMMERCIAL

## 2020-06-02 VITALS
TEMPERATURE: 97.7 F | HEART RATE: 85 BPM | SYSTOLIC BLOOD PRESSURE: 138 MMHG | BODY MASS INDEX: 32.05 KG/M2 | HEIGHT: 73 IN | WEIGHT: 241.8 LBS | RESPIRATION RATE: 16 BRPM | DIASTOLIC BLOOD PRESSURE: 84 MMHG | OXYGEN SATURATION: 96 %

## 2020-06-02 LAB
ALBUMIN SERPL-MCNC: 4.6 G/DL (ref 3.5–5.2)
ALP BLD-CCNC: 63 U/L (ref 40–129)
ALT SERPL-CCNC: 32 U/L (ref 0–40)
ANION GAP SERPL CALCULATED.3IONS-SCNC: 20 MMOL/L (ref 7–16)
AST SERPL-CCNC: 37 U/L (ref 0–39)
BILIRUB SERPL-MCNC: 0.6 MG/DL (ref 0–1.2)
BUN BLDV-MCNC: 17 MG/DL (ref 6–20)
CALCIUM SERPL-MCNC: 9.6 MG/DL (ref 8.6–10.2)
CHLORIDE BLD-SCNC: 102 MMOL/L (ref 98–107)
CHOLESTEROL, TOTAL: 225 MG/DL (ref 0–199)
CO2: 20 MMOL/L (ref 22–29)
CREAT SERPL-MCNC: 1.1 MG/DL (ref 0.7–1.2)
GFR AFRICAN AMERICAN: >60
GFR NON-AFRICAN AMERICAN: >60 ML/MIN/1.73
GLUCOSE BLD-MCNC: 115 MG/DL (ref 74–99)
HCT VFR BLD CALC: 49.4 % (ref 37–54)
HDLC SERPL-MCNC: 55 MG/DL
HEMOGLOBIN: 16 G/DL (ref 12.5–16.5)
INTERNATIONAL NORMALIZATION RATIO, POC: 2.6
LDL CHOLESTEROL CALCULATED: 140 MG/DL (ref 0–99)
MCH RBC QN AUTO: 28.6 PG (ref 26–35)
MCHC RBC AUTO-ENTMCNC: 32.4 % (ref 32–34.5)
MCV RBC AUTO: 88.2 FL (ref 80–99.9)
PDW BLD-RTO: 13.5 FL (ref 11.5–15)
PLATELET # BLD: 272 E9/L (ref 130–450)
PMV BLD AUTO: 10.8 FL (ref 7–12)
POTASSIUM SERPL-SCNC: 3.7 MMOL/L (ref 3.5–5)
PROTHROMBIN TIME, POC: NORMAL
RBC # BLD: 5.6 E12/L (ref 3.8–5.8)
SODIUM BLD-SCNC: 142 MMOL/L (ref 132–146)
TOTAL PROTEIN: 8.3 G/DL (ref 6.4–8.3)
TRIGL SERPL-MCNC: 149 MG/DL (ref 0–149)
TSH SERPL DL<=0.05 MIU/L-ACNC: 0.73 UIU/ML (ref 0.27–4.2)
VLDLC SERPL CALC-MCNC: 30 MG/DL
WBC # BLD: 8.6 E9/L (ref 4.5–11.5)

## 2020-06-02 PROCEDURE — 36415 COLL VENOUS BLD VENIPUNCTURE: CPT

## 2020-06-02 PROCEDURE — 80061 LIPID PANEL: CPT

## 2020-06-02 PROCEDURE — 85610 PROTHROMBIN TIME: CPT | Performed by: FAMILY MEDICINE

## 2020-06-02 PROCEDURE — 93793 ANTICOAG MGMT PT WARFARIN: CPT | Performed by: FAMILY MEDICINE

## 2020-06-02 PROCEDURE — 85027 COMPLETE CBC AUTOMATED: CPT

## 2020-06-02 PROCEDURE — 99214 OFFICE O/P EST MOD 30 MIN: CPT | Performed by: FAMILY MEDICINE

## 2020-06-02 PROCEDURE — 84443 ASSAY THYROID STIM HORMONE: CPT

## 2020-06-02 PROCEDURE — 80053 COMPREHEN METABOLIC PANEL: CPT

## 2020-06-02 ASSESSMENT — ENCOUNTER SYMPTOMS
EYE ITCHING: 0
VOMITING: 0
SWOLLEN GLANDS: 0
EYE REDNESS: 0
BLOOD IN STOOL: 0
EYE PAIN: 0
BACK PAIN: 0
WHEEZING: 0
NAUSEA: 0
CHEST TIGHTNESS: 0
COUGH: 0
DIARRHEA: 0
CONSTIPATION: 0
SHORTNESS OF BREATH: 0
SORE THROAT: 0
CHANGE IN BOWEL HABIT: 0
ABDOMINAL PAIN: 0
COLOR CHANGE: 0
RHINORRHEA: 0
APNEA: 0
VISUAL CHANGE: 0
SINUS PRESSURE: 0

## 2020-06-02 NOTE — PROGRESS NOTES
Chief Complaint:     Chief Complaint   Patient presents with    Blood Pressure Check    Hypertension    Heart Problem         Hypertension   This is a chronic problem. The current episode started more than 1 year ago. The problem has been waxing and waning since onset. The problem is controlled. Pertinent negatives include no anxiety, chest pain, headaches, malaise/fatigue, neck pain, palpitations, peripheral edema or shortness of breath. There are no associated agents to hypertension. Risk factors for coronary artery disease include male gender. Past treatments include ACE inhibitors. The current treatment provides significant improvement. There are no compliance problems. There is no history of CAD/MI, CVA or PVD. There is no history of a hypertension causing med, pheochromocytoma, renovascular disease, sleep apnea or a thyroid problem. Heart Problem   This is a chronic (VALVULAR HEART DISEASE) problem. The current episode started more than 1 year ago. The problem occurs daily. The problem has been unchanged. Pertinent negatives include no abdominal pain, arthralgias, change in bowel habit, chest pain, chills, congestion, coughing, diaphoresis, fatigue, fever, headaches, joint swelling, myalgias, nausea, neck pain, numbness, rash, sore throat, swollen glands, urinary symptoms, vertigo, visual change, vomiting or weakness. Nothing aggravates the symptoms. He has tried nothing for the symptoms. The treatment provided no relief.        Patient Active Problem List   Diagnosis    S/P MVR (mitral valve replacement)    S/P AVR (aortic valve replacement)    Hypertension    Hyperlipidemia       Past Medical History:   Diagnosis Date    Chest pain 2/21/2013    Hypertension     LONG TERM ANTICOAGULENT USE     Psoriasis     S/P AVR (aortic valve replacement) 2/21/2013    S/P MVR (mitral valve replacement) 2/21/2013       Past Surgical History:   Procedure Laterality Date    AORTIC VALVE REPLACEMENT  1/2006

## 2020-08-21 ENCOUNTER — APPOINTMENT (OUTPATIENT)
Dept: CT IMAGING | Age: 50
DRG: 314 | End: 2020-08-21
Payer: COMMERCIAL

## 2020-08-21 ENCOUNTER — HOSPITAL ENCOUNTER (INPATIENT)
Age: 50
LOS: 3 days | Discharge: ANOTHER HOSPITAL | DRG: 314 | End: 2020-08-25
Attending: EMERGENCY MEDICINE | Admitting: INTERNAL MEDICINE
Payer: COMMERCIAL

## 2020-08-21 ENCOUNTER — APPOINTMENT (OUTPATIENT)
Dept: GENERAL RADIOLOGY | Age: 50
DRG: 314 | End: 2020-08-21
Payer: COMMERCIAL

## 2020-08-21 LAB
ACETAMINOPHEN LEVEL: <5 MCG/ML (ref 10–30)
APTT: 27.3 SEC (ref 24.5–35.1)
CHP ED QC CHECK: NORMAL
ETHANOL: <10 MG/DL (ref 0–0.08)
GFR AFRICAN AMERICAN: 51
GFR NON-AFRICAN AMERICAN: 43 ML/MIN/1.73
GLUCOSE BLD-MCNC: 156 MG/DL (ref 74–99)
INR BLD: 2.2
LACTIC ACID, SEPSIS: 1.8 MMOL/L (ref 0.5–1.9)
PERFORMED ON: ABNORMAL
POC CHLORIDE: 104 MMOL/L (ref 100–108)
POC CREATININE: 1.7 MG/DL (ref 0.7–1.2)
POC POTASSIUM: 3.8 MMOL/L (ref 3.5–5)
POC SODIUM: 134 MMOL/L (ref 132–146)
PROTHROMBIN TIME: 24.8 SEC (ref 9.3–12.4)
SALICYLATE, SERUM: <0.3 MG/DL (ref 0–30)
TRICYCLIC ANTIDEPRESSANTS SCREEN SERUM: NEGATIVE NG/ML

## 2020-08-21 PROCEDURE — 99283 EMERGENCY DEPT VISIT LOW MDM: CPT

## 2020-08-21 PROCEDURE — 84132 ASSAY OF SERUM POTASSIUM: CPT

## 2020-08-21 PROCEDURE — 71045 X-RAY EXAM CHEST 1 VIEW: CPT

## 2020-08-21 PROCEDURE — 93005 ELECTROCARDIOGRAM TRACING: CPT | Performed by: STUDENT IN AN ORGANIZED HEALTH CARE EDUCATION/TRAINING PROGRAM

## 2020-08-21 PROCEDURE — 82947 ASSAY GLUCOSE BLOOD QUANT: CPT

## 2020-08-21 PROCEDURE — 85025 COMPLETE CBC W/AUTO DIFF WBC: CPT

## 2020-08-21 PROCEDURE — G0480 DRUG TEST DEF 1-7 CLASSES: HCPCS

## 2020-08-21 PROCEDURE — 87040 BLOOD CULTURE FOR BACTERIA: CPT

## 2020-08-21 PROCEDURE — 82550 ASSAY OF CK (CPK): CPT

## 2020-08-21 PROCEDURE — 83605 ASSAY OF LACTIC ACID: CPT

## 2020-08-21 PROCEDURE — 84295 ASSAY OF SERUM SODIUM: CPT

## 2020-08-21 PROCEDURE — 70450 CT HEAD/BRAIN W/O DYE: CPT

## 2020-08-21 PROCEDURE — 82435 ASSAY OF BLOOD CHLORIDE: CPT

## 2020-08-21 PROCEDURE — 85730 THROMBOPLASTIN TIME PARTIAL: CPT

## 2020-08-21 PROCEDURE — 87186 SC STD MICRODIL/AGAR DIL: CPT

## 2020-08-21 PROCEDURE — 85610 PROTHROMBIN TIME: CPT

## 2020-08-21 PROCEDURE — 84484 ASSAY OF TROPONIN QUANT: CPT

## 2020-08-21 PROCEDURE — 87150 DNA/RNA AMPLIFIED PROBE: CPT

## 2020-08-21 PROCEDURE — 82565 ASSAY OF CREATININE: CPT

## 2020-08-21 PROCEDURE — 99284 EMERGENCY DEPT VISIT MOD MDM: CPT

## 2020-08-21 PROCEDURE — 80053 COMPREHEN METABOLIC PANEL: CPT

## 2020-08-21 PROCEDURE — 80307 DRUG TEST PRSMV CHEM ANLYZR: CPT

## 2020-08-21 RX ORDER — SODIUM CHLORIDE 9 MG/ML
INJECTION, SOLUTION INTRAVENOUS CONTINUOUS
Status: DISCONTINUED | OUTPATIENT
Start: 2020-08-21 | End: 2020-08-26 | Stop reason: HOSPADM

## 2020-08-21 RX ORDER — ACETAMINOPHEN 650 MG/1
650 SUPPOSITORY RECTAL ONCE
Status: DISCONTINUED | OUTPATIENT
Start: 2020-08-21 | End: 2020-08-26 | Stop reason: HOSPADM

## 2020-08-22 ENCOUNTER — APPOINTMENT (OUTPATIENT)
Dept: ULTRASOUND IMAGING | Age: 50
DRG: 314 | End: 2020-08-22
Payer: COMMERCIAL

## 2020-08-22 PROBLEM — N17.9 AKI (ACUTE KIDNEY INJURY) (HCC): Status: ACTIVE | Noted: 2020-08-22

## 2020-08-22 PROBLEM — I63.9 CVA (CEREBRAL VASCULAR ACCIDENT) (HCC): Status: ACTIVE | Noted: 2020-08-22

## 2020-08-22 LAB
ACINETOBACTER BAUMANNII BY PCR: NOT DETECTED
ALBUMIN SERPL-MCNC: 3.7 G/DL (ref 3.5–5.2)
ALBUMIN SERPL-MCNC: 3.8 G/DL (ref 3.5–5.2)
ALP BLD-CCNC: 53 U/L (ref 40–129)
ALP BLD-CCNC: 61 U/L (ref 40–129)
ALT SERPL-CCNC: 39 U/L (ref 0–40)
ALT SERPL-CCNC: 42 U/L (ref 0–40)
AMPHETAMINE SCREEN, URINE: NOT DETECTED
ANION GAP SERPL CALCULATED.3IONS-SCNC: 16 MMOL/L (ref 7–16)
ANION GAP SERPL CALCULATED.3IONS-SCNC: 16 MMOL/L (ref 7–16)
APTT: 27.3 SEC (ref 24.5–35.1)
APTT: 27.8 SEC (ref 24.5–35.1)
AST SERPL-CCNC: 46 U/L (ref 0–39)
AST SERPL-CCNC: 56 U/L (ref 0–39)
BACTERIA: ABNORMAL /HPF
BARBITURATE SCREEN URINE: NOT DETECTED
BASOPHILS ABSOLUTE: 0 E9/L (ref 0–0.2)
BASOPHILS ABSOLUTE: 0.02 E9/L (ref 0–0.2)
BASOPHILS RELATIVE PERCENT: 0.1 % (ref 0–2)
BASOPHILS RELATIVE PERCENT: 0.1 % (ref 0–2)
BENZODIAZEPINE SCREEN, URINE: NOT DETECTED
BILIRUB SERPL-MCNC: 1.2 MG/DL (ref 0–1.2)
BILIRUB SERPL-MCNC: 1.3 MG/DL (ref 0–1.2)
BILIRUBIN DIRECT: 0.4 MG/DL (ref 0–0.3)
BILIRUBIN URINE: NEGATIVE
BILIRUBIN, INDIRECT: 0.8 MG/DL (ref 0–1)
BLOOD, URINE: ABNORMAL
BOTTLE TYPE: ABNORMAL
BUN BLDV-MCNC: 30 MG/DL (ref 6–20)
BUN BLDV-MCNC: 38 MG/DL (ref 6–20)
BURR CELLS: ABNORMAL
BURR CELLS: ABNORMAL
CALCIUM SERPL-MCNC: 8.6 MG/DL (ref 8.6–10.2)
CALCIUM SERPL-MCNC: 9.1 MG/DL (ref 8.6–10.2)
CANDIDA ALBICANS BY PCR: NOT DETECTED
CANDIDA GLABRATA BY PCR: NOT DETECTED
CANDIDA KRUSEI BY PCR: NOT DETECTED
CANDIDA PARAPSILOSIS BY PCR: NOT DETECTED
CANDIDA TROPICALIS BY PCR: NOT DETECTED
CANNABINOID SCREEN URINE: NOT DETECTED
CHLORIDE BLD-SCNC: 98 MMOL/L (ref 98–107)
CHLORIDE BLD-SCNC: 99 MMOL/L (ref 98–107)
CLARITY: CLEAR
CO2: 19 MMOL/L (ref 22–29)
CO2: 20 MMOL/L (ref 22–29)
COCAINE METABOLITE SCREEN URINE: NOT DETECTED
COLOR: YELLOW
CREAT SERPL-MCNC: 1.6 MG/DL (ref 0.7–1.2)
CREAT SERPL-MCNC: 1.8 MG/DL (ref 0.7–1.2)
EKG ATRIAL RATE: 88 BPM
EKG P AXIS: 63 DEGREES
EKG P-R INTERVAL: 200 MS
EKG Q-T INTERVAL: 378 MS
EKG QRS DURATION: 92 MS
EKG QTC CALCULATION (BAZETT): 457 MS
EKG R AXIS: 59 DEGREES
EKG T AXIS: 81 DEGREES
EKG VENTRICULAR RATE: 88 BPM
ENTEROBACTER CLOACAE COMPLEX BY PCR: NOT DETECTED
ENTEROBACTERALES BY PCR: NOT DETECTED
ENTEROCOCCUS BY PCR: NOT DETECTED
EOSINOPHILS ABSOLUTE: 0 E9/L (ref 0.05–0.5)
EOSINOPHILS ABSOLUTE: 0 E9/L (ref 0.05–0.5)
EOSINOPHILS RELATIVE PERCENT: 0 % (ref 0–6)
EOSINOPHILS RELATIVE PERCENT: 0 % (ref 0–6)
ESCHERICHIA COLI BY PCR: NOT DETECTED
FENTANYL SCREEN, URINE: NOT DETECTED
GFR AFRICAN AMERICAN: 48
GFR AFRICAN AMERICAN: 55
GFR NON-AFRICAN AMERICAN: 40 ML/MIN/1.73
GFR NON-AFRICAN AMERICAN: 46 ML/MIN/1.73
GLUCOSE BLD-MCNC: 145 MG/DL (ref 74–99)
GLUCOSE BLD-MCNC: 151 MG/DL (ref 74–99)
GLUCOSE URINE: NEGATIVE MG/DL
HAEMOPHILUS INFLUENZAE BY PCR: NOT DETECTED
HCT VFR BLD CALC: 40.2 % (ref 37–54)
HCT VFR BLD CALC: 42.4 % (ref 37–54)
HCT VFR BLD CALC: 43.6 % (ref 37–54)
HEMOGLOBIN: 13.5 G/DL (ref 12.5–16.5)
HEMOGLOBIN: 14.4 G/DL (ref 12.5–16.5)
HEMOGLOBIN: 14.5 G/DL (ref 12.5–16.5)
IMMATURE GRANULOCYTES #: 0.26 E9/L
IMMATURE GRANULOCYTES %: 1.3 % (ref 0–5)
INR BLD: 1.9
KETONES, URINE: NEGATIVE MG/DL
KLEBSIELLA OXYTOCA BY PCR: NOT DETECTED
KLEBSIELLA PNEUMONIAE GROUP BY PCR: NOT DETECTED
LACTIC ACID: 1.9 MMOL/L (ref 0.5–2.2)
LEUKOCYTE ESTERASE, URINE: NEGATIVE
LISTERIA MONOCYTOGENES BY PCR: NOT DETECTED
LYMPHOCYTES ABSOLUTE: 0.17 E9/L (ref 1.5–4)
LYMPHOCYTES ABSOLUTE: 0.32 E9/L (ref 1.5–4)
LYMPHOCYTES RELATIVE PERCENT: 0.8 % (ref 20–42)
LYMPHOCYTES RELATIVE PERCENT: 1.7 % (ref 20–42)
Lab: NORMAL
MCH RBC QN AUTO: 28.8 PG (ref 26–35)
MCH RBC QN AUTO: 29.1 PG (ref 26–35)
MCH RBC QN AUTO: 29.3 PG (ref 26–35)
MCHC RBC AUTO-ENTMCNC: 33 % (ref 32–34.5)
MCHC RBC AUTO-ENTMCNC: 33.6 % (ref 32–34.5)
MCHC RBC AUTO-ENTMCNC: 34.2 % (ref 32–34.5)
MCV RBC AUTO: 85.1 FL (ref 80–99.9)
MCV RBC AUTO: 87.2 FL (ref 80–99.9)
MCV RBC AUTO: 87.4 FL (ref 80–99.9)
METAMYELOCYTES RELATIVE PERCENT: 0.9 % (ref 0–1)
METHADONE SCREEN, URINE: NOT DETECTED
METHICILLIN RESISTANCE MECA/C  BY PCR: NOT DETECTED
MONOCYTES ABSOLUTE: 0.69 E9/L (ref 0.1–0.95)
MONOCYTES ABSOLUTE: 1.11 E9/L (ref 0.1–0.95)
MONOCYTES RELATIVE PERCENT: 3.4 % (ref 2–12)
MONOCYTES RELATIVE PERCENT: 7 % (ref 2–12)
NEISSERIA MENINGITIDIS BY PCR: NOT DETECTED
NEUTROPHILS ABSOLUTE: 14.38 E9/L (ref 1.8–7.3)
NEUTROPHILS ABSOLUTE: 19.05 E9/L (ref 1.8–7.3)
NEUTROPHILS RELATIVE PERCENT: 90.4 % (ref 43–80)
NEUTROPHILS RELATIVE PERCENT: 94.4 % (ref 43–80)
NITRITE, URINE: POSITIVE
OPIATE SCREEN URINE: NOT DETECTED
ORDER NUMBER: ABNORMAL
OVALOCYTES: ABNORMAL
OXYCODONE URINE: NOT DETECTED
PDW BLD-RTO: 13.5 FL (ref 11.5–15)
PDW BLD-RTO: 13.7 FL (ref 11.5–15)
PDW BLD-RTO: 13.8 FL (ref 11.5–15)
PH UA: 6 (ref 5–9)
PHENCYCLIDINE SCREEN URINE: NOT DETECTED
PLATELET # BLD: 115 E9/L (ref 130–450)
PLATELET # BLD: 138 E9/L (ref 130–450)
PLATELET # BLD: 161 E9/L (ref 130–450)
PMV BLD AUTO: 10.5 FL (ref 7–12)
PMV BLD AUTO: 11 FL (ref 7–12)
PMV BLD AUTO: 11.1 FL (ref 7–12)
POIKILOCYTES: ABNORMAL
POIKILOCYTES: ABNORMAL
POLYCHROMASIA: ABNORMAL
POTASSIUM REFLEX MAGNESIUM: 3.7 MMOL/L (ref 3.5–5)
POTASSIUM SERPL-SCNC: 3.9 MMOL/L (ref 3.5–5)
PROTEIN UA: 100 MG/DL
PROTEUS BY PCR: NOT DETECTED
PROTHROMBIN TIME: 21.3 SEC (ref 9.3–12.4)
PSEUDOMONAS AERUGINOSA BY PCR: NOT DETECTED
RBC # BLD: 4.6 E12/L (ref 3.8–5.8)
RBC # BLD: 4.98 E12/L (ref 3.8–5.8)
RBC # BLD: 5 E12/L (ref 3.8–5.8)
RBC UA: ABNORMAL /HPF (ref 0–2)
SERRATIA MARCESCENS BY PCR: NOT DETECTED
SODIUM BLD-SCNC: 134 MMOL/L (ref 132–146)
SODIUM BLD-SCNC: 134 MMOL/L (ref 132–146)
SOURCE OF BLOOD CULTURE: ABNORMAL
SPECIFIC GRAVITY UA: >=1.03 (ref 1–1.03)
STAPHYLOCOCCUS AUREUS BY PCR: DETECTED
STAPHYLOCOCCUS SPECIES BY PCR: DETECTED
STREPTOCOCCUS AGALACTIAE BY PCR: NOT DETECTED
STREPTOCOCCUS PNEUMONIAE BY PCR: NOT DETECTED
STREPTOCOCCUS PYOGENES  BY PCR: NOT DETECTED
STREPTOCOCCUS SPECIES BY PCR: NOT DETECTED
TOTAL CK: 1331 U/L (ref 20–200)
TOTAL CK: 1791 U/L (ref 20–200)
TOTAL CK: 596 U/L (ref 20–200)
TOTAL PROTEIN: 7 G/DL (ref 6.4–8.3)
TOTAL PROTEIN: 7.1 G/DL (ref 6.4–8.3)
TROPONIN: 0.01 NG/ML (ref 0–0.03)
TROPONIN: 0.04 NG/ML (ref 0–0.03)
TROPONIN: <0.01 NG/ML (ref 0–0.03)
UROBILINOGEN, URINE: 1 E.U./DL
VACUOLATED NEUTROPHILS: ABNORMAL
WBC # BLD: 15.6 E9/L (ref 4.5–11.5)
WBC # BLD: 15.8 E9/L (ref 4.5–11.5)
WBC # BLD: 20.2 E9/L (ref 4.5–11.5)
WBC UA: ABNORMAL /HPF (ref 0–5)

## 2020-08-22 PROCEDURE — 2060000000 HC ICU INTERMEDIATE R&B

## 2020-08-22 PROCEDURE — 93880 EXTRACRANIAL BILAT STUDY: CPT

## 2020-08-22 PROCEDURE — 2580000003 HC RX 258: Performed by: INTERNAL MEDICINE

## 2020-08-22 PROCEDURE — 97162 PT EVAL MOD COMPLEX 30 MIN: CPT

## 2020-08-22 PROCEDURE — 87040 BLOOD CULTURE FOR BACTERIA: CPT

## 2020-08-22 PROCEDURE — 99221 1ST HOSP IP/OBS SF/LOW 40: CPT | Performed by: PSYCHIATRY & NEUROLOGY

## 2020-08-22 PROCEDURE — 6360000002 HC RX W HCPCS: Performed by: INTERNAL MEDICINE

## 2020-08-22 PROCEDURE — 6360000002 HC RX W HCPCS: Performed by: EMERGENCY MEDICINE

## 2020-08-22 PROCEDURE — 51702 INSERT TEMP BLADDER CATH: CPT

## 2020-08-22 PROCEDURE — 82550 ASSAY OF CK (CPK): CPT

## 2020-08-22 PROCEDURE — 85730 THROMBOPLASTIN TIME PARTIAL: CPT

## 2020-08-22 PROCEDURE — 97530 THERAPEUTIC ACTIVITIES: CPT

## 2020-08-22 PROCEDURE — 97166 OT EVAL MOD COMPLEX 45 MIN: CPT

## 2020-08-22 PROCEDURE — 80048 BASIC METABOLIC PNL TOTAL CA: CPT

## 2020-08-22 PROCEDURE — 92523 SPEECH SOUND LANG COMPREHEN: CPT

## 2020-08-22 PROCEDURE — 2500000003 HC RX 250 WO HCPCS: Performed by: INTERNAL MEDICINE

## 2020-08-22 PROCEDURE — 85027 COMPLETE CBC AUTOMATED: CPT

## 2020-08-22 PROCEDURE — 83605 ASSAY OF LACTIC ACID: CPT

## 2020-08-22 PROCEDURE — 36415 COLL VENOUS BLD VENIPUNCTURE: CPT

## 2020-08-22 PROCEDURE — 2580000003 HC RX 258: Performed by: EMERGENCY MEDICINE

## 2020-08-22 PROCEDURE — 80307 DRUG TEST PRSMV CHEM ANLYZR: CPT

## 2020-08-22 PROCEDURE — 2580000003 HC RX 258: Performed by: NURSE PRACTITIONER

## 2020-08-22 PROCEDURE — 81001 URINALYSIS AUTO W/SCOPE: CPT

## 2020-08-22 PROCEDURE — 80076 HEPATIC FUNCTION PANEL: CPT

## 2020-08-22 PROCEDURE — 85610 PROTHROMBIN TIME: CPT

## 2020-08-22 PROCEDURE — 97535 SELF CARE MNGMENT TRAINING: CPT

## 2020-08-22 PROCEDURE — 85025 COMPLETE CBC W/AUTO DIFF WBC: CPT

## 2020-08-22 PROCEDURE — 93010 ELECTROCARDIOGRAM REPORT: CPT | Performed by: INTERNAL MEDICINE

## 2020-08-22 PROCEDURE — 84484 ASSAY OF TROPONIN QUANT: CPT

## 2020-08-22 RX ORDER — HEPARIN SODIUM 1000 [USP'U]/ML
60 INJECTION, SOLUTION INTRAVENOUS; SUBCUTANEOUS PRN
Status: DISCONTINUED | OUTPATIENT
Start: 2020-08-22 | End: 2020-08-26 | Stop reason: HOSPADM

## 2020-08-22 RX ORDER — WARFARIN SODIUM 10 MG/1
10 TABLET ORAL
Status: DISPENSED | OUTPATIENT
Start: 2020-08-22 | End: 2020-08-23

## 2020-08-22 RX ORDER — KETOROLAC TROMETHAMINE 30 MG/ML
15 INJECTION, SOLUTION INTRAMUSCULAR; INTRAVENOUS ONCE
Status: COMPLETED | OUTPATIENT
Start: 2020-08-22 | End: 2020-08-22

## 2020-08-22 RX ORDER — ACETAMINOPHEN 325 MG/1
650 TABLET ORAL EVERY 6 HOURS PRN
Status: DISCONTINUED | OUTPATIENT
Start: 2020-08-22 | End: 2020-08-26 | Stop reason: HOSPADM

## 2020-08-22 RX ORDER — HEPARIN SODIUM 1000 [USP'U]/ML
60 INJECTION, SOLUTION INTRAVENOUS; SUBCUTANEOUS ONCE
Status: COMPLETED | OUTPATIENT
Start: 2020-08-22 | End: 2020-08-22

## 2020-08-22 RX ORDER — SODIUM CHLORIDE 0.9 % (FLUSH) 0.9 %
10 SYRINGE (ML) INJECTION PRN
Status: DISCONTINUED | OUTPATIENT
Start: 2020-08-22 | End: 2020-08-26 | Stop reason: HOSPADM

## 2020-08-22 RX ORDER — M-VIT,TX,IRON,MINS/CALC/FOLIC 27MG-0.4MG
1 TABLET ORAL DAILY
Status: DISCONTINUED | OUTPATIENT
Start: 2020-08-22 | End: 2020-08-26 | Stop reason: HOSPADM

## 2020-08-22 RX ORDER — HEPARIN SODIUM 1000 [USP'U]/ML
30 INJECTION, SOLUTION INTRAVENOUS; SUBCUTANEOUS PRN
Status: DISCONTINUED | OUTPATIENT
Start: 2020-08-22 | End: 2020-08-26 | Stop reason: HOSPADM

## 2020-08-22 RX ORDER — PROMETHAZINE HYDROCHLORIDE 25 MG/1
12.5 TABLET ORAL EVERY 6 HOURS PRN
Status: DISCONTINUED | OUTPATIENT
Start: 2020-08-22 | End: 2020-08-26 | Stop reason: HOSPADM

## 2020-08-22 RX ORDER — POLYETHYLENE GLYCOL 3350 17 G/17G
17 POWDER, FOR SOLUTION ORAL DAILY PRN
Status: DISCONTINUED | OUTPATIENT
Start: 2020-08-22 | End: 2020-08-26 | Stop reason: HOSPADM

## 2020-08-22 RX ORDER — ATORVASTATIN CALCIUM 40 MG/1
40 TABLET, FILM COATED ORAL NIGHTLY
Status: DISCONTINUED | OUTPATIENT
Start: 2020-08-22 | End: 2020-08-26 | Stop reason: HOSPADM

## 2020-08-22 RX ORDER — ONDANSETRON 2 MG/ML
4 INJECTION INTRAMUSCULAR; INTRAVENOUS EVERY 6 HOURS PRN
Status: DISCONTINUED | OUTPATIENT
Start: 2020-08-22 | End: 2020-08-26 | Stop reason: HOSPADM

## 2020-08-22 RX ORDER — HEPARIN SODIUM 10000 [USP'U]/100ML
12 INJECTION, SOLUTION INTRAVENOUS CONTINUOUS
Status: DISCONTINUED | OUTPATIENT
Start: 2020-08-22 | End: 2020-08-26 | Stop reason: HOSPADM

## 2020-08-22 RX ORDER — SODIUM CHLORIDE 0.9 % (FLUSH) 0.9 %
10 SYRINGE (ML) INJECTION EVERY 12 HOURS SCHEDULED
Status: DISCONTINUED | OUTPATIENT
Start: 2020-08-22 | End: 2020-08-26 | Stop reason: HOSPADM

## 2020-08-22 RX ORDER — ACETAMINOPHEN 650 MG/1
650 SUPPOSITORY RECTAL EVERY 6 HOURS PRN
Status: DISCONTINUED | OUTPATIENT
Start: 2020-08-22 | End: 2020-08-26 | Stop reason: HOSPADM

## 2020-08-22 RX ADMIN — VANCOMYCIN HYDROCHLORIDE 2000 MG: 10 INJECTION, POWDER, LYOPHILIZED, FOR SOLUTION INTRAVENOUS at 09:51

## 2020-08-22 RX ADMIN — NAFCILLIN SODIUM 2 G: 2 INJECTION, POWDER, LYOPHILIZED, FOR SOLUTION INTRAMUSCULAR; INTRAVENOUS at 19:54

## 2020-08-22 RX ADMIN — SODIUM CHLORIDE 3 G: 900 INJECTION INTRAVENOUS at 03:26

## 2020-08-22 RX ADMIN — SODIUM CHLORIDE: 9 INJECTION, SOLUTION INTRAVENOUS at 00:07

## 2020-08-22 RX ADMIN — HEPARIN SODIUM 6550 UNITS: 1000 INJECTION INTRAVENOUS; SUBCUTANEOUS at 19:58

## 2020-08-22 RX ADMIN — SODIUM CHLORIDE: 9 INJECTION, SOLUTION INTRAVENOUS at 02:54

## 2020-08-22 RX ADMIN — SODIUM CHLORIDE 3 G: 900 INJECTION INTRAVENOUS at 08:00

## 2020-08-22 RX ADMIN — HEPARIN SODIUM 12 UNITS/KG/HR: 10000 INJECTION, SOLUTION INTRAVENOUS at 19:58

## 2020-08-22 RX ADMIN — SODIUM CHLORIDE, PRESERVATIVE FREE 10 ML: 5 INJECTION INTRAVENOUS at 19:56

## 2020-08-22 RX ADMIN — GENTAMICIN SULFATE 300 MG: 40 INJECTION, SOLUTION INTRAMUSCULAR; INTRAVENOUS at 17:19

## 2020-08-22 RX ADMIN — NAFCILLIN SODIUM 2 G: 2 INJECTION, POWDER, LYOPHILIZED, FOR SOLUTION INTRAMUSCULAR; INTRAVENOUS at 15:46

## 2020-08-22 RX ADMIN — KETOROLAC TROMETHAMINE 15 MG: 30 INJECTION, SOLUTION INTRAMUSCULAR; INTRAVENOUS at 00:23

## 2020-08-22 ASSESSMENT — PAIN SCALES - GENERAL
PAINLEVEL_OUTOF10: 0
PAINLEVEL_OUTOF10: 0

## 2020-08-22 NOTE — H&P
[address] [facility]  History and Physical      CHIEF COMPLAINT: AMS      HISTORY OF PRESENT ILLNESS:      The patient is a 44 y.o. male patient of Dr. Linh Armstrong history of MVR, AVR, chronic Coumadin who presents with AMS. Brought to ED by EMS. Last known well time was 24 hours ago. Patient was found facedown by his girlfriend. Reported to have right-sided weakness. Pt is a poor historian. History is largely obtained from chart review and discussions with staff/family as available. In ED patient's found to have suspected stroke on CT scan. He is admitted for further evaluation and treatment. Patient denies any fevers or chills. Denies IV drug use. Past Medical History:    Past Medical History:   Diagnosis Date    Chest pain 2/21/2013    Hypertension     LONG TERM ANTICOAGULENT USE     Psoriasis     S/P AVR (aortic valve replacement) 2/21/2013    S/P MVR (mitral valve replacement) 2/21/2013       Past Surgical History:    Past Surgical History:   Procedure Laterality Date    AORTIC VALVE REPLACEMENT  1/2006    Dr Ousmane Noel   [address] [place] [address]  01--2006    Dr Ousmane Noel  [facility]       Medications Prior to Admission:    Medications Prior to Admission: lisinopril (PRINIVIL;ZESTRIL) 20 MG tablet, Take 1 tablet by mouth daily  warfarin (JANTOVEN) 10 MG tablet, Take one tablet by mouth daily or as directed by doctor  Multiple Vitamins-Minerals (THERAPEUTIC MULTIVITAMIN-MINERALS) tablet, Take 1 tablet by mouth daily  2-22-19    Allergies:    Patient has no known allergies. Social History:    reports that he has never smoked. He has never used smokeless tobacco. He reports current alcohol use. He reports that he does not use drugs.     Family History:   limited by mental status     REVIEW OF SYSTEMS:  As above in the HPI, otherwise negative    PHYSICAL EXAM:    Vitals:  /74   Pulse 94   Temp 98.4 °F (36.9 °C) (Temporal)   Resp 18   Ht 6' 1\" (1.854 m)   Wt 241 lb 12.8 oz (109.7 kg) SpO2 94%   BMI 31.90 kg/m²     General:  Awake, alert, oriented X 3. Well developed, well nourished, well groomed. No apparent distress. HEENT:  Normocephalic, atraumatic. Pupils equal, round, reactive to light. No scleral icterus. No conjunctival injection. Normal lips, teeth, and gums. No nasal discharge. Neck:  Supple  Heart:  RRR, no murmurs, gallops, rubs  Lungs:  CTA bilaterally, bilat symmetrical expansion, no wheeze, rales, or rhonchi  Abdomen:   Bowel sounds present, soft, nontender, no masses, no organomegaly, no peritoneal signs  Extremities:  No clubbing, cyanosis, or edema  Skin:  Warm and dry, no open lesions or rash  Neuro: Decreased mental status, right-sided weakness  Breast: deferred  Rectal: deferred  Genitalia:  deferred    LABS:    CBC with Differential:    Lab Results   Component Value Date    WBC 15.8 08/22/2020    RBC 5.00 08/22/2020    HGB 14.4 08/22/2020    HCT 43.6 08/22/2020     08/22/2020    MCV 87.2 08/22/2020    MCH 28.8 08/22/2020    MCHC 33.0 08/22/2020    RDW 13.7 08/22/2020    SEGSPCT 76 02/26/2011    METASPCT 0.9 08/22/2020    LYMPHOPCT 1.7 08/22/2020    MONOPCT 7.0 08/22/2020    BASOPCT 0.1 08/22/2020    MONOSABS 1.11 08/22/2020    LYMPHSABS 0.32 08/22/2020    EOSABS 0.00 08/22/2020    BASOSABS 0.00 08/22/2020     CMP:    Lab Results   Component Value Date     08/22/2020    K 3.7 08/22/2020    CL 99 08/22/2020    CO2 19 08/22/2020    BUN 38 08/22/2020    CREATININE 1.8 08/22/2020    GFRAA 48 08/22/2020    LABGLOM 40 08/22/2020    GLUCOSE 145 08/22/2020    GLUCOSE 105 02/26/2011    PROT 7.0 08/22/2020    LABALBU 3.7 08/22/2020    CALCIUM 8.6 08/22/2020    BILITOT 1.2 08/22/2020    ALKPHOS 61 08/22/2020    AST 56 08/22/2020    ALT 39 08/22/2020     Magnesium:  No results found for: MG  Phosphorus:  No results found for: PHOS  PT/INR:    Lab Results   Component Value Date    PROTIME 21.3 08/22/2020    PROTIME 34.7 05/01/2020    INR 1.9 08/22/2020     Last 3 Troponin:    Lab Results   Component Value Date    TROPONINI 0.01 08/22/2020    TROPONINI <0.01 08/21/2020    TROPONINI <0.01 12/17/2017     U/A:  No results found for: NITRITE, COLORU, PROTEINU, PHUR, LABCAST, WBCUA, RBCUA, MUCUS, TRICHOMONAS, YEAST, BACTERIA, CLARITYU, SPECGRAV, LEUKOCYTESUR, UROBILINOGEN, BILIRUBINUR, BLOODU, GLUCOSEU, AMORPHOUS  ABG:  No results found for: PH, PCO2, PO2, HCO3, BE, THGB, TCO2, O2SAT  HgBA1c:  No results found for: LABA1C  FLP:    Lab Results   Component Value Date    TRIG 149 06/02/2020    HDL 55 06/02/2020    LDLCALC 140 06/02/2020    LABVLDL 30 06/02/2020     TSH:    Lab Results   Component Value Date    TSH 0.732 06/02/2020       CT HEAD WO CONTRAST   Final Result      Subacute infarct in the left anterior cerebral artery territory. This critical findings were relayed to the referring physician in the   emergency room at [facility] August 22, 2020. XR CHEST PORTABLE   Final Result      Elevated hemidiaphragm with atelectasis versus infiltrate of the right   lung base. US CAROTID ARTERY BILATERAL    (Results Pending)       ASSESSMENT:      Principal Problem:    CVA (cerebral vascular accident) ([address]  Active Problems:    S/P MVR (mitral valve replacement)    S/P AVR (aortic valve replacement)    Hypertension    Hyperlipidemia    MAIRA (acute kidney injury) ([address]    LONG TERM ANTICOAGULENT USE    Aspiration pneumonitis (HCC)    Sepsis (HCC)    Gram-positive bacteremia  Resolved Problems:    * No resolved hospital problems.  *      PLAN:    Admit Tele  IV antibiotics  2 of 2 blood cultures + GPC in clusters  Repeat blood culture  ID consult  Continue warfarin  Statin  MRI brain  carotid US  Echo  Medications for other co morbidities cont as appropriate w dosage adjustments as necessary   PT/OT  D/C planning        Electronically signed by Harsha Morales MD on 8/22/2020 at 7:32 AM

## 2020-08-22 NOTE — PROGRESS NOTES
Pharmacy Consultation Note  (Antibiotic Dosing and Monitoring)    Initial consult date: 8/22/2020  Consulting physician: Dr. Rianna Davalos  Drug(s): Vancomycin  Indication: Bacteremia    Ht Readings from Last 1 Encounters:   08/21/20 6' 1\" (1.854 m)     Wt Readings from Last 1 Encounters:   08/21/20 241 lb 12.8 oz (109.7 kg)     Age/  Gender IBW DW  Allergy Information   44 y.o.   male 59.9 kg 79.8 kg  Patient has no known allergies. Date  Tmax WBC BUN/CR UOP  (mL/kg/hr) Drug/Dose Time   Given Level(s)   (Time) Comments   8/22  (#1) afebrile 15.8 38/1.8 -- Vancomycin 2000 mg IV x 1 <0930>     8/23  (#2)       Rm level @ <0600> =       (#3)             (#4)             (#5)             (#6)             (#7)             Estimated Creatinine Clearance: 62 mL/min (A) (based on SCr of 1.8 mg/dL (H)). UOP over the past 24 hours:     No intake or output data in the 24 hours ending 08/22/20 0817    Other anti-infectives: Anti-infective Dose Date Initiated Date Stopped   Amp/sulb 3g IV q6hr 8/22      Cultures:  available culture and sensitivity results were reviewed in EPIC  Cultures sent and are pending. Culture Date Result    Blood cx 1 8/21 GPC in clusters   Blood cx 2 8/21 GPC in clusters                    Assessment:  · Consulted by Dr. Rianna Davalos to dose/monitor vancomycin  · Goal trough level:  15-20 mcg/mL  · Pt is a 44 yoM being initiated on vancomycin for positive blood cultures. ID also consulted.   · Serum creatinine today: 1.8 mg/dL; CrCl ~ 62 mL/min; baseline Scr ~ 1.1 mg/dL    Plan:  · Vancomycin 2000 mg IV x 1  · Random level tomorrow with AM labs  · Follow renal function  · Pharmacist will follow and monitor/adjust dosing as necessary      Thank you for the consult,    Rosalind Gresham, PharmD, BCPS 8/22/2020 8:17 AM

## 2020-08-22 NOTE — PROGRESS NOTES
SPOKE WITH RN,ROXANN.  WAS TOLD PATIENT IS NOT A STROKE TEAM AND JUST SEND FOR PATIENT THRU TRANSPORT. PT PUT IN TRANSPORT AT THIS TIME.

## 2020-08-22 NOTE — PROGRESS NOTES
Low Complexity PT evaluation 27083  [x] Moderate Complexity PT evaluation 79038  [] High Complexity PT evaluation [id number]  [] PT Re-evaluation [id number]  [] Gait training 00457 - minutes  [] Manual therapy 89183 - minutes  [x] Therapeutic activities 25406 10 minutes  [] Therapeutic exercises 21477 - minutes  [] Neuromuscular reeducation 63601 - minutes     Tao Kruse, PT, DPT  [id number]

## 2020-08-22 NOTE — PROGRESS NOTES
Perfect serve out to cardiology regarding patient being NPO and unable to receive coumadin. Orders for heparin gtt.

## 2020-08-22 NOTE — CONSULTS
Department of Internal Medicine  Infectious Diseases   Consult Note      Reason for Consult:  MSSA bacteremia , sepsis       Requesting Physician:  Dr Alise Bergeron:                The patient is a 44 y.o. male with hx of mechanical aortic valve and mitral valve replacement surgery presented to the ER with change in mental status X 1 day - he had fever 101.4 F . No respiratory distress . WBC was 20 K .   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       Past Medical History:      Past Medical History:   Diagnosis Date    Chest pain 2/21/2013    Hypertension     LONG TERM ANTICOAGULENT USE     Psoriasis     S/P AVR (aortic valve replacement) 2/21/2013    S/P MVR (mitral valve replacement) 2/21/2013         Past Surgical History:      Past Surgical History:   Procedure Laterality Date    AORTIC VALVE REPLACEMENT  1/2006    Dr Jaycob Cooper   [address] [place] [address]  01--2006    Dr Jaycob Cooper  [facility]         Current Medications:      Current Facility-Administered Medications   Medication Dose Route Frequency Provider Last Rate Last Dose    therapeutic multivitamin-minerals 1 tablet  1 tablet Oral Daily CELSO Torres - CNP        sodium chloride flush 0.9 % injection 10 mL  10 mL Intravenous 2 times per day [facility], CELSO - CNP        sodium chloride flush 0.9 % injection 10 mL  10 mL Intravenous PRN CELSO Torres - RAY        acetaminophen (TYLENOL) tablet 650 mg  650 mg Oral Q6H PRN CELSO Torres - RAY        Or    acetaminophen (TYLENOL) suppository 650 mg  650 mg Rectal Q6H PRN Abi Ybarra APRN - RAY        polyethylene glycol (GLYCOLAX) packet 17 g  17 g Oral Daily PRN Abi Ybarra APRN - RAY        promethazine (PHENERGAN) tablet 12.5 mg  12.5 mg Oral Q6H PRN CELSO Torres - RAY        Or    ondansetron (ZOFRAN) injection 4 mg  4 mg Intravenous Q6H PRN Jennifer Massing Masters, APRN - CNP        perflutren lipid microspheres (DEFINITY) injection 1.65 mg  1.5 mL Intravenous ONCE PRN Paolo Anne MD        vancomycin [facility]) intermittent dosing (placeholder)   Other RX Placeholder Paolo Anne MD        warfarin (COUMADIN) daily dosing (placeholder)   Other RX Placeholder Jennifer Massing Masters, APRN - CNP        warfarin (COUMADIN) tablet 10 mg  10 mg Oral Once Jennifer Massing Masters, APRN - CNP        nafcillin 2 g in dextrose 5 % 100 mL IVPB (mini-bag)  2 g Intravenous Q4H Sander Allen  mL/hr at 08/22/20 1546 2 g at 08/22/20 1546    0.9 % sodium chloride infusion   Intravenous Continuous Jennifer Massing Masters, APRN -  mL/hr at 08/22/20 0254      acetaminophen (TYLENOL) suppository 650 mg  650 mg Rectal Once Jennifer Massing Masters, APRN - CNP           Allergies:  Patient has no known allergies.     Social History:      Social History     Socioeconomic History    Marital status:      Spouse name: Not on file    Number of children: Not on file    Years of education: Not on file    Highest education level: Not on file   Occupational History     Comment: [email or web address]   Social Needs    Financial resource strain: Not on file    Food insecurity     Worry: Not on file     Inability: Not on file   [employer] needs     Medical: Not on file     Non-medical: Not on file   Tobacco Use    Smoking status: Never Smoker    Smokeless tobacco: Never Used   Substance and Sexual Activity    Alcohol use: Yes     Comment: 1-2 beers per month    Drug use: No    Sexual activity: Not on file   Lifestyle    Physical activity     Days per week: Not on file     Minutes per session: Not on file    Stress: Not on file   Relationships    Social connections     Talks on phone: Not on file     Gets together: Not on file     Attends Hindu service: Not on file     Active member of club or organization: Not on file     Attends meetings of clubs 145 08/22/2020    GLUCOSE 105 02/26/2011    PROT 7.0 08/22/2020    LABALBU 3.7 08/22/2020    CALCIUM 8.6 08/22/2020    BILITOT 1.2 08/22/2020    ALKPHOS 61 08/22/2020    AST 56 08/22/2020    ALT 39 08/22/2020         Hepatic Function Panel:    Lab Results   Component Value Date    ALKPHOS 61 08/22/2020    ALT 39 08/22/2020    AST 56 08/22/2020    PROT 7.0 08/22/2020    BILITOT 1.2 08/22/2020    BILIDIR 0.4 08/22/2020    IBILI 0.8 08/22/2020    LABALBU 3.7 08/22/2020       PT/INR:    Lab Results   Component Value Date    PROTIME 21.3 08/22/2020    PROTIME 34.7 05/01/2020    INR 1.9 08/22/2020       TSH:    Lab Results   Component Value Date    TSH 0.732 06/02/2020       U/A:    Lab Results   Component Value Date    COLORU Yellow 08/22/2020    PHUR 6.0 08/22/2020    WBCUA NONE 08/22/2020    RBCUA 10-20 08/22/2020    BACTERIA NONE SEEN 08/22/2020    CLARITYU Clear 08/22/2020    SPECGRAV >=1.030 08/22/2020    LEUKOCYTESUR Negative 08/22/2020    UROBILINOGEN 1.0 08/22/2020    BILIRUBINUR Negative 08/22/2020    BLOODU LARGE 08/22/2020    GLUCOSEU Negative 08/22/2020       ABG:  No results found for: Francisca Vidales, F0RGDTSB, PHART, THGBART, VVP7NBO, PO2ART, HDB1NDL    MICROBIOLOGY:    Blood culture -    Source of Blood Culture  Antecubital-Rig   Final  08/21/2020 10:45 PM  [facility]    Order Number  [id number]   Final  08/21/2020 10:45 PM  [facility]    Enterobacter cloacae complex by PCR  Not Detected  Not Detected  Final  08/21/2020 10:45 PM  [facility]    Escherichia coli by PCR  Not Detected  Not Detected  Final  08/21/2020 10:45 PM  [facility]    Klebsiella oxytoca by PCR  Not Detected  Not Detected  Final  08/21/2020 10:45 PM  [facility]    Klebsiella pneumoniae by PCR  Not Detected  Not Detected  Final  08/21/2020 10:45 PM  [facility]    Proteus by PCR  Not Detected  Not Detected  Final  08/21/2020 10:45 PM  [address] [facility]    Streptococcus agalactiae by PCR  Not Detected  Not Detected  Final  08/21/2020 10:45 PM   - [facility]    Staphylococcus aureus by PCR  DETECTEDPanic    Not Detected  Final  08/21/2020 10:45 PM   - [facility]    Serratia marcescens by PCR  Not Detected  Not Detected  Final  08/21/2020 10:45 PM  [address] - [facility]    Streptococcus pneumoniae by PCR  Not Detected  Not Detected  Final  08/21/2020 10:45 PM  [address] - [facility]    Streptococcus pyogenes  by PCR  Not Detected  Not Detected  Final  08/21/2020 10:45 PM  [address] [place] [address] Lab    Acinetobacter baumannii by PCR  Not Detected  Not Detected  Final  08/21/2020 10:45 PM  [address] - [facility]    Candida albicans by PCR  Not Detected  Not Detected  Final  08/21/2020 10:45 PM  [address] [facility]    Candida glabrata by PCR  Not Detected  Not Detected  Final  08/21/2020 10:45 PM  [address] - [facility]    Candida krusei by PCR  Not Detected  Not Detected  Final  08/21/2020 10:45 PM  [address] [facility]    Candida parapsilosis by PCR  Not Detected  Not Detected  Final  08/21/2020 10:45 PM  [address] [facility]    Candida tropicalis by PCR  Not Detected  Not Detected  Final  08/21/2020 10:45 PM  [address] [facility]    Enterobacteriaceae by PCR  Not Detected  Not Detected  Final  08/21/2020 10:45 PM  [address] [facility]    Enterococcus by PCR  Not Detected  Not Detected  Final  08/21/2020 10:45 PM  [address] [facility]    Haemophilus Influenzae by PCR  Not Detected  Not Detected  Final  08/21/2020 10:45 PM  [address] [facility]    Listeria monocytogenes by PCR  Not Detected  Not Detected  Final  08/21/2020 10:45 PM   - [facility]    Neisseria meningitidis by PCR  Not Detected  Not Detected  Final  08/21/2020 10:45 PM  [address] [facility]    Pseudomonas aeruginosa by PCR  Not Detected  Not Detected  Final  08/21/2020 10:45 PM  [address] [facility]    Staphylococcus by PCR  DETECTEDPanic    Not Detected  Final  08/21/2020 10:45 PM  [address] [facility]    Streptococcus by PCR  Not Detected  Not Detected  Final  08/21/2020 10:45 PM  [address] [facility]    Methicillin Resistant by PCR  Not Detected  Not Detected  Final  08/21/2020 10:45 PM  [address] [facility]        Radiology :      CT scan of head -    Subacute infarct in the left anterior cerebral artery territory. IMPRESSION:     1. 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 Fever , Leukocytosis sec to above     RECOMMENDATIONS:      1. Nafcillin 2 grams IV q 4 hrs, gentamicin 300 mgm X 1 , rifampin 300 mg po q 8 hrs   2. Follow up blood cx   3. Cardiology consult - VANESA     Thank you Dr Sherin Lion for the consult.

## 2020-08-22 NOTE — PROGRESS NOTES
Patient is having trouble speaking- but appropriately nods head yes/no. Med rec completed with patient and girlfriend. Girlfriend unaware of med list, but pt nodded head yes/no.

## 2020-08-22 NOTE — PROGRESS NOTES
Pharmacy Consultation Note  (Anticoagulant Dosing and Monitoring)    Initial consult date: 8/22/2020  Consulting physician: Kell Gregory Masters, APRN - CNP    Allergies:  Patient has no known allergies. 44 y.o. male    Ht Readings from Last 1 Encounters:   08/21/20 6' 1\" (1.854 m)     Wt Readings from Last 1 Encounters:   08/21/20 241 lb 12.8 oz (109.7 kg)     Warfarin Indication Target   INR [employer]   Dose  (if applicable) Diet/Feeding Tube   Hx of aortic and mitral valve replacements 2-3 Unknown NPO       Vitamin K or Blood product  Administration Date                 Warfarin drug-drug interactions  Start  Stop Home Med? Comments                                 TSH:    Lab Results   Component Value Date    TSH 0.732 06/02/2020        Hepatic Function Panel:                            Lab Results   Component Value Date    ALKPHOS 61 08/22/2020    ALT 39 08/22/2020    AST 56 08/22/2020    PROT 7.0 08/22/2020    BILITOT 1.2 08/22/2020    BILIDIR 0.4 08/22/2020    IBILI 0.8 08/22/2020    LABALBU 3.7 08/22/2020       Date Warfarin Dose INR Heparin or LMWH HBG/  HCT PLT Comment   8/22 <10 mg> 1.9  14.4/43.6 138                                                   Assessment and Plan:  · 44 yoM who presented with AMS and stroke. He is on chronic warfarin therapy for history of mitral and aortic valve replacements. · Home dose is unable to be determined (unable to verify with patient 2/2 AMS).  Most recent order for 10 mg tablets \"one tablet daily or as directed by doctor\"  · Per outpatient visit notes with Dr. Allan Harper, INR goal = 2-3  · INR today = 1.9  · Warfarin 10 mg tonight  · Suggest Lovenox bridge to INR > 2  · Daily PT/INR until the INR is stable within the therapeutic range  · Pharmacist will follow and monitor/adjust dosing as necessary    Ethan Julian PharmD, BCPS 8/22/2020 8:42 AM

## 2020-08-22 NOTE — PROGRESS NOTES
OCCUPATIONAL THERAPY INITIAL EVALUATION      Date:2020  Patient Name: Anaya Walls  MRN: [id number]  : 1970  Room: [facility]    Evaluating OT: ALIREZA Robles, OTR/L [id number]    AM-PAC Daily Activity Raw Score:   Recommended Adaptive Equipment: TBD     Modified Craig Scale (MRS)  Score     Description  0             No symptoms  1             No significant disability despite symptoms  2             Slight disability; able to look after own affairs  3             Moderate disability; able to ambulate without assist/ requires assist with ADLs  4             Moderate/Severe disability;requires assist to ambulate/assist with ADLs  5             Severe disability;bedridden/incontinent   6               Score: 4    Diagnosis: CVA   Referring Practitioner: Alina Ybarra, APRN - CNP   Surgery: n/a   Pertinent Medical History: ARV, MVR, HTN   Precautions:  Falls, R hemiparesis with tone, aphasia     Home Living: Prior living limited due to pt's aphasia. Pt lives with girlfriend in a 1 story home; bed/bath on main level   Bathroom setup: walk in shower   Equipment owned: none  Prior Level of Function: assist with ADLs/IADLs; using no AD for functional mobility   Driving: yes  Occupation: limited by aphasia and pt unable to write it down    Pain Level: 0/10  Cognition: A&O: 3/4 (with options provided);  Follows 1 step directions, with repetition and increased time   Memory:  fair    Sequencing:  poor   Problem solving:  poor   Judgement/safety:  poor     Functional Assessment:   Initial Eval Status  Date: 2020 Treatment Status  Date: Short Term Goals  Treatment frequency: 2-5x/wk   Feeding Min A   SUP   Grooming Min A (compelted oral hygiene bed level)   SUP   UB Dressing Max A   Min A   LB Dressing Dep (assist with socks)  Min A    Bathing Max A (simulated)  Min A    Toileting dep  Min A   Bed Mobility  Log roll: dep  Supine to sit: dep (therapist only able to partially get pt to sit EOB due to pt's weakness and physical assistance required)   Sit to supine: dep   Log roll Mod A  Supine to sit: Mod A   Sit to supine: Mod A   Functional Transfers Sit to stand:NT   Stand to sit:NT  Commode: NT  Mod A   Functional Mobility nt  Mod A   Balance Sitting: NT  Standing: NT     Activity Tolerance Poor+ (pt demonstrates difficulty following commands and requires vc's and physical assist for sequencing tasks)     Visual/  Perceptual Glasses: ?    pt able to reach clock on wall    Therapist unable to complete visual tracking due to limited sitting balance                Hand dominance: R  UE ROM: RUE: pt only able to slightly grasp with digits  LUE:  WFL  Strength: RUE: grossly 0/5 except with gross grasp LUE: grossly 4/5   Strength: R<L  Fine Motor Coordination: poor R hand, pt unable to follow commands to use pen to attempt writing using L nondominant hand    Hearing: WFL  Sensation:  No c/o numbness/tingling   Tone:  RLE  Edema: none noted                            Comments:Cleared by RN to see pt. Upon arrival, patient supine in bed and agreeable to OT session. At end of session, patient supine in bed with call light and phone within reach, all lines and tubes intact. Pt would benefit from continued OT to increase functional independence and quality of life. Treatment:  Pt required vc's for proper technique/safety with hand placement/body mechanics/posture for bed mobility/ADLs. Pt required vc's for sequencing/initiation of ADLs/functional transfers. Pt's limiting factor appears to be his R hemiparesis, tone, problem solving, strength, and aphasia. Pt appeared to have tolerated session fairly. Pt was pleasant and cooperative. Pt instructed on use of call light for assistance and fall prevention. Pt demo'ing fair understanding of education provided. Continue to educate.      Eval Complexity: moderate  · History: Expanded chart review of medical records and additional review of physical, cognitive, or psychosocial history related to current functional performance  · Exam: 3+ performance deficits  · Assistance/Modification: mod/max assistance or modifications required to perform tasks. May have comorbidities that affect occupational performance. Assessment of current deficits   Functional mobility [x]  ADLs [x] Strength [x]  Cognition [x]  Functional transfers  [x] IADLs [x] Safety Awareness [x]  Endurance [x]  Fine Motor Coordination [] Balance [x] Vision/perception [] Sensation []   Gross Motor Coordination [] ROM [] Delirium []                  Motor Control []    Plan of Care:   ADL retraining [x]   Equipment needs [x]   Neuromuscular re-education [x] Energy Conservation Techniques [x]  Functional Transfer training [x] Patient and/or Family Education [x]  Functional Mobility training [x]  Environmental Modifications [x]  Cognitive re-training [x]   Compensatory techniques for ADLs [x]  Splinting Needs []   Positioning to improve overall function [x]   Therapeutic Activity [x]  Therapeutic Exercise  [x]  Visual/Perceptual: []    Delirium prevention/treatment  []   Other:  []    Rehab Potential: Good for established goals, pt. assisted in establishment of goals. LTG: maximize independence with ADLs to return to PLOF    Patient  instructed on diagnosis, prognosis/goals and plan of care. Demonstrated fair understanding. [] Malnutrition indicators have been identified and nursing has been notified to ensure a dietitian consult is ordered. Evaluation time includes thorough review of current medical information, gathering information on past medical & social history & PLOF, completion of standardized testing, informal observation of tasks, consultation with other medical professions/disciplines, assessment of data & development of POC/goals.      moderate Evaluation +     Treatment Time In: 10:25        Treatment Time Out: 10:35           Treatment Charges: Mins Units   Ther Ex  [id number]       Manual Therapy [address]       ADL/Home Mgt [id number] 10 1   Neuro Re-ed [address] manage/training  [id number]       Non-Billable Time       Total Timed Treatment 10 [address] [place], [place], OTR/L [id number]

## 2020-08-22 NOTE — CONSULTS
NEUROLOGY CONSULT NOTE      Requesting Physician: Fazal Guevara MD    Reason for Consult:  Evaluate for acute L ALIYAH stroke    History of Present Illness:  Kelsea Ken is a 44 y.o. male admitted to [address] [place] [address] on 8/21/2020     44year old man with hx of MRV, AVR on warfarin and being compliance with his medication, he was found face down with right sided weakness by his girlfriend and police yesterday night, last known time anywhere from 12 to 24 hours ago before he came to the ED. In the ED, CT head showed an acute L ALIYAH stroke. His blood culture showed gram positive cocci. Patient is expressively aphasic and plegic on the right side. Girlfriend is at bedside and sister Silvia Persaud is on the phone open speaker. Reports no chest pain. No shortness of breath with exertion. Reports no neck pain. No vision changes. No dysphagia. No fever. No rash. No weight loss.     Past Medical History:        Diagnosis Date    Chest pain 2/21/2013    Hypertension     LONG TERM ANTICOAGULENT USE     Psoriasis     S/P AVR (aortic valve replacement) 2/21/2013    S/P MVR (mitral valve replacement) 2/21/2013           Procedure Laterality Date    AORTIC VALVE REPLACEMENT  1/2006    Dr Isabel Hale   [facility]  01--2006    Dr Isabel Hale  [facility]       Allergies:    No Known Allergies     Current Medications:   therapeutic multivitamin-minerals 1 tablet, Daily  sodium chloride flush 0.9 % injection 10 mL, 2 times per day  sodium chloride flush 0.9 % injection 10 mL, PRN  acetaminophen (TYLENOL) tablet 650 mg, Q6H PRN    Or  acetaminophen (TYLENOL) suppository 650 mg, Q6H PRN  polyethylene glycol (GLYCOLAX) packet 17 g, Daily PRN  promethazine (PHENERGAN) tablet 12.5 mg, Q6H PRN    Or  ondansetron (ZOFRAN) injection 4 mg, Q6H PRN  perflutren lipid microspheres (DEFINITY) injection 1.65 mg, ONCE PRN  vancomycin (VANCOCIN) intermittent dosing (placeholder), RX Placeholder  warfarin (COUMADIN) daily dosing (placeholder), RX Placeholder  warfarin (COUMADIN) tablet 10 mg, Once  nafcillin 2 g in dextrose 5 % 100 mL IVPB (mini-bag), Q4H  0.9 % sodium chloride infusion, Continuous  acetaminophen (TYLENOL) suppository 650 mg, Once         Social History:  Social History     Tobacco Use   Smoking Status Never Smoker   Smokeless Tobacco Never Used     Social History     Substance and Sexual Activity   Alcohol Use Yes    Comment: 1-2 beers per month     Social History     Substance and Sexual Activity   Drug Use No         Family History:   History reviewed. No pertinent family history. Review of Systems:  All systems reviewed and are all negative except what is mentioned in history of present illness. Physical Exam:  /74   Pulse 94   Temp 98.4 °F (36.9 °C) (Temporal)   Resp 18   Ht 6' 1\" (1.854 m)   Wt 241 lb 12.8 oz (109.7 kg)   SpO2 94%   BMI 31.90 kg/m²  I Body mass index is 31.9 kg/m². I   Wt Readings from Last 1 Encounters:   08/21/20 241 lb 12.8 oz (109.7 kg)           General appearance - alert, well appearing, and in no distress, patient is muted, but able to mimic and follow 2 steps commands. Mental status -Level of Alertness: awake    Attention/Concentration: normal  Language: abnormal - muted, able to mimics. Mood is normal  Neck - supple, no neck adenopathy, carotids upstroke normal bilaterally, no carotid bruits. There is no LAP in the neck. There is no thyroid enlargement.      Neurological -   Cranial Nhhcvd-NL-AIU:   Cranial nerve II: Normal. There is full visual fields  Cranial nerve III: Pupils: equal, round, reactive to light   Cranial nerves III, IV, VI: Extraocular Movements: intact   Cranial nerve V: Facial sensation: intact   Cranial nerve VII:Facial strength: intact   Cranial nerve VIII: Hearing: intact   Cranial nerve IX: Palate Elevation intact bilaterally  Cranial nerve XI: Shoulder shrug intact bilaterally  Cranial nerve XII: Tongue midline   neck supple without rigidity  Motor exam is 5/5 in the left  upper and lower extremities, right arm is 1/5 and right leg is 0/5. Normal muscle tone except right arm is slightly spastic. There is no muscle atrophy. There is no muscle fasciculation  Sensory is intact   Coordination: finger to nose intact on the left  Gait and station not tested      Abnormal movement none. Long tracts are intact  Skin - no rashes or lesions, warm and dry to touch  Superficial temporal artery pulses are normal.   Musculoskeletal: Has no hand arthritis, no limitation of ROM in any of the four extremities, . no joint tenderness, deformity or swelling. There is no leg edema. The Heart was regular in rate and rhythm. Chest- Clear  Abdomen: soft, intact bowel sounds. Labs:    CBC:   Recent Labs     08/21/20 2245 08/22/20  0516   WBC 20.2* 15.8*   HGB 14.5 14.4    138   MCV 85.1 87.2   MCH 29.1 28.8   MCHC 34.2 33.0   RDW 13.5 13.7     CMP:  Recent Labs     08/21/20 2245 08/21/20 2256 08/22/20  0516     --  134   K 3.9  --  3.7   CL 98  --  99   CO2 20*  --  19*   BUN 30*  --  38*   CREATININE 1.6* 1.7* 1.8*   GFRAA 55 51 48   LABGLOM 46 43 40   GLUCOSE 151*  --  145*   CALCIUM 9.1  --  8.6     Liver:   Recent Labs     08/22/20  0516   AST 56*   ALT 39   ALKPHOS 61   PROT 7.0   LABALBU 3.7   BILITOT 1.2     MRI BRAIN:  No results found for this or any previous visit. No results found for this or any previous visit. No results found for this or any previous visit. No results found for this or any previous visit. No results found for this or any previous visit. No results found for this or any previous visit. No results found for this or any previous visit. Results for orders placed during the hospital encounter of 08/21/20   CT HEAD WO CONTRAST    Narrative Clinical indications: Altered mental status. COMPARISON:    None. Exposure control:     This examination and all examinations utilizing ionizing radiation at  this facility done so according to the ALARA (as low as reasonably  achievable) principal for radiation dose reduction. Technique:    Axial, sagittal and coronal computed tomography of the brain and  calvarium was performed without contrast.    FINDINGS:    There is loss of gray-white matter differentiation and sulcal  effacement of the anterior aspect of the left frontal lobe medially  and the redistribution of the anterior cerebral artery. There is no evidence for acute intracranial hemorrhage, midline shift  or mass effect. There is enlargement of the ventricles, sulci and cisterns  bilaterally. There is minimal patchy hypoattenuation elsewhere in the  periventricular deep white matter bilaterally. Otherwise the brain parenchyma, CSF spaces, paranasal sinuses and  mastoid air cells and surrounding soft tissue and osseous structures  have a satisfactory appearance. Impression Subacute infarct in the left anterior cerebral artery territory. This critical findings were relayed to the referring physician in the  emergency room at [facility] hours August 22, 2020. We reviewed the patient records and available information in the EHR       Impression:    Principal Problem:    CVA (cerebral vascular accident) ([address]  Active Problems:    S/P MVR (mitral valve replacement)    S/P AVR (aortic valve replacement)    Hypertension    Hyperlipidemia    MAIRA (acute kidney injury) ([address]    LONG TERM ANTICOAGULENT USE    Aspiration pneumonitis (HCC)    Sepsis (HCC)    Gram-positive bacteremia  Resolved Problems:    * No resolved hospital problems. *      52 year old man with hx of MVR, AVR, on coumadin, was found to have acute L ALIYAH stroke in the setting of therpeutic INR and now with gram positive cocci bacteremia. Recommendations:    - patient has a stroke while his INR is therapeutic, given his valves replacement, recommend getting a VANESA to look for valve vegetation.   - recommend consulting cardiology to be on board  - this is a cardioembolic stroke however, patient is already on coumadin, therefore, vegetation on the valve is a very high possibility  - con't coumadin with INR 2-3  - pt is on vanco, naficilin and gentamicin, defer choice of abx to primary and ID team  - as his stroke is most likely related to his valves and being cardioembolic, patient can be on moderate dose of lipitor 40mg instead of high intensity lipitor  - send lipid panel and A1C  - consult PT/OT/SLP and acute rehab                                              1. Discussed with primary service. 2. Please call if any questions. It was my pleasure to evaluate Karin Plummer today. Please call with questions.       Electronically signed by Cassidy Arana MD on 8/22/2020 at 2:06 Rae Frankel MD  Attending Neurologist/Neurointensivist

## 2020-08-22 NOTE — PROGRESS NOTES
Attempted to straight cath patient. Had no urine come out, only blood. Bladder scanned patient and had 560 ml.

## 2020-08-22 NOTE — PROGRESS NOTES
Impaired   Sentences Impaired    Naming:  (Modality used:  Verbal)  Confrontation Naming  Impaired  Functional Description  Impaired  Response Naming: Impaired    Conversation:      Anomia was present    COGNITION     Attention/Orientation  Attention: Sustained attention   Orientation:  Oriented to Person through yes/no questions    Memory   Immediate Recall: Repeated 0/3    Delayed Recall:   Recalled 0/3      Long Term Recall:   Recalled -CNT due to severity of language deficits    Organization/Problem Solving/Reasoning   Verbal Sequencing:   Impaired        Verbal Problem solving:   Impaired          CLINICAL OBSERVATIONS NOTED DURING THE EVALUATION  Latent responses, Inconsistent responses, Anomic errors and Cueing was required                  The Speech Language Pathologist (SLP) completed education with the patient regarding results of evaluation. Explained that Speech Pathology intervention is warranted  at this time   Prognosis for improvements is good     This plan will be re-evaluated and revised in 1 week  if warranted. Patient stated goals: Could not state,   Treatment goals discussed with Patient   The Patient did not demonstrate understanding of the diagnosis, prognosis and plan of care       CPT code:    97854  eval speech sound lang comprehension      The admitting diagnosis and active problem list, as listed below have been reviewed prior to initiation of this evaluation. ACTIVE PROBLEM LIST:   Patient Active Problem List   Diagnosis    S/P MVR (mitral valve replacement)    S/P AVR (aortic valve replacement)    Hypertension    Hyperlipidemia    CVA (cerebral vascular accident) ([address]    MAIRA (acute kidney injury) ([address]    LONG TERM ANTICOAGULENT USE    Aspiration pneumonitis ([address]    Sepsis ([address]    Gram-positive bacteremia         Sravanthi Vo M.S., CCC-SLP/L  Speech-Language Pathologist    CPT CODE:       79989  eval speech sound lang comprehension

## 2020-08-22 NOTE — ED PROVIDER NOTES
61-year-old male with a past surgical history of mitral valve replacement, and aortic valve replacement on long-term anticoagulation presents with altered mental status. Last known well was over 24 hours ago. Patient's girlfriend found the patient face down upstairs in his home with right-sided weakness. Has no base line neuro deficits. Family says he had no complaints the last time they saw him. He is currently aphasic and altered an unable to provide further history. Review of systems was unable to be obtained due to patient's condition. Review of Systems   Unable to perform ROS: Mental status change        Physical Exam  Constitutional:       General: He is not in acute distress. Appearance: He is not ill-appearing or diaphoretic. HENT:      Head: Normocephalic and atraumatic. Nose: Nose normal. No congestion or rhinorrhea. Mouth/Throat:      Mouth: Mucous membranes are moist.      Pharynx: Oropharynx is clear. Eyes:      General: No scleral icterus. Extraocular Movements: Extraocular movements intact. Pupils: Pupils are equal, round, and reactive to light. Pupils are equal.   Neck:      Musculoskeletal: Normal range of motion. No neck rigidity. Meningeal: Brudzinski's sign and Kernig's sign absent. Cardiovascular:      Rate and Rhythm: Normal rate and regular rhythm. Pulses: Normal pulses. Heart sounds: Normal heart sounds. Pulmonary:      Effort: Pulmonary effort is normal. No respiratory distress. Breath sounds: Normal breath sounds. No stridor. No wheezing, rhonchi or rales. Chest:      Chest wall: No tenderness. Abdominal:      General: Bowel sounds are normal. There is no distension. Palpations: Abdomen is soft. There is no mass. Tenderness: There is no abdominal tenderness. There is no guarding or rebound. Hernia: No hernia is present. Musculoskeletal: Normal range of motion. General: No swelling or tenderness. Lymphadenopathy:      Cervical: No cervical adenopathy. Skin:     General: Skin is warm and dry. Capillary Refill: Capillary refill takes less than 2 seconds. Coloration: Skin is not cyanotic or pale. Findings: No erythema or rash. Neurological:      Mental Status: He is alert. Psychiatric:      Comments: Patient has difficulty finding words. He is also having difficulty with his speaking. NIH Stroke Scale/Score at time of initial evaluation:  1A: Level of Consciousness 1 - not alert but arousable by minor stimulation to obey, answer or respond   1B: Ask Month and Age 1 - answers one question correctly   1C: Tell Patient To Open and Close Eyes, then Hand  Squeeze 0 - performs both tasks correctly   2: Test Horizontal Extraocular Movements 0 - normal   3: Test Visual Fields 0 - no visual loss   4: Test Facial Palsy 2 - partial paralysis (total or near total paralysis of the lower face)   5A: Test Left Arm Motor Drift 0 - no drift, limb holds 90 (or 45) degrees for full 10 seconds   5B: Test Right Arm Motor Drift 3 - no effort against gravity, limb falls   6A: Test Left Leg Motor Drift 0 - no drift; leg holds 30 degree position for full 5 seconds   6B: Test Right Leg Motor Drift 3 - no effort against gravity; leg falls to bed immediately   7: Test Limb Ataxia   (FNF/Heel-Shin) 1 - present in one limb   8: Test Sensation 1 - mild to moderate sensory loss; patient feels pinprick is less sharp or is dull on the affected side; there is a loss of superficial pain with pinprick but patient is aware of being touched    9: Test Language/Aphasia 1 - mild to moderate aphasia; some obvious loss of fluency or facility of comprehension without significant limitation on ideas expressed or form of expression. Reduction of speech and/or comprehension, however, makes conversation about provided materials difficult or impossible.   For example, in conversation about provided materials, examiner can identify picture or naming card content from patient's response. 10: Test Dysarthria 0 - normal   11: Test Extinction/Inattention 0 - no abnormality   Total 13       Procedures     MDM  Number of Diagnoses or Management Options  Aspiration into airway, initial encounter:   Cerebrovascular accident (CVA), unspecified mechanism ([facility]):   Septicemia [facility]):   Diagnosis management comments: 57-year-old male past medical history of aortic valve replacement on warfarin presents with altered mental status. Patient's last known well was 9 AM this morning from a phone call from his girlfriend where he stated that he just did not feel well. Patient was found at his home at 10 PM for a wellness check by police facedown in his upstairs of his home unable to move the right side of his body. Patient's NIH was 15. On physical exam patient is unable to move his entire right side. Point-of-care glucose was 156. Diagnostic labs are concerning for an elevated leukocytosis. There is consideration of the patient possibly could have aspirated while he was down on his floor. Patient had a temperature in our department. Was given rectal Tylenol, and started on Unasyn. Patient's imaging shows a subacute infarct in the left anterior cerebral artery. Patient has been hemodynamically stable throughout his entire emergency room stay. Patient will be admitted to telemetry. Patient and his partner were informed of all the results of the evaluation. Patient was placed on n.p.o., that was elevated 30 degrees, gave Toradol for pain.                    --------------------------------------------- PAST HISTORY ---------------------------------------------  Past Medical History:  has a past medical history of Chest pain, Hypertension, LONG TERM ANTICOAGULENT USE, Psoriasis, S/P AVR (aortic valve replacement), and S/P MVR (mitral valve replacement).     Past Surgical History:  has a past surgical history that includes Mitral valve replacement (01--2006) and Aortic valve replacement (1/2006). Social History:  reports that he has never smoked. He has never used smokeless tobacco. He reports current alcohol use. He reports that he does not use drugs. Family History: family history is not on file. The patients home medications have been reviewed. Allergies: Patient has no known allergies.     -------------------------------------------------- RESULTS -------------------------------------------------    Lab  Results for orders placed or performed during the hospital encounter of 08/21/20   CBC Auto Differential   Result Value Ref Range    WBC 20.2 (H) 4.5 - 11.5 E9/L    RBC 4.98 3.80 - 5.80 E12/L    Hemoglobin 14.5 12.5 - 16.5 g/dL    Hematocrit 42.4 37.0 - 54.0 %    MCV 85.1 80.0 - 99.9 fL    MCH 29.1 26.0 - 35.0 pg    MCHC 34.2 32.0 - 34.5 %    RDW 13.5 11.5 - 15.0 fL    Platelets 977 613 - 331 E9/L    MPV 10.5 7.0 - 12.0 fL    Neutrophils % 94.4 (H) 43.0 - 80.0 %    Immature Granulocytes % 1.3 0.0 - 5.0 %    Lymphocytes % 0.8 (L) 20.0 - 42.0 %    Monocytes % 3.4 2.0 - 12.0 %    Eosinophils % 0.0 0.0 - 6.0 %    Basophils % 0.1 0.0 - 2.0 %    Neutrophils Absolute 19.05 (H) 1.80 - 7.30 E9/L    Immature Granulocytes # 0.26 E9/L    Lymphocytes Absolute 0.17 (L) 1.50 - 4.00 E9/L    Monocytes Absolute 0.69 0.10 - 0.95 E9/L    Eosinophils Absolute 0.00 (L) 0.05 - 0.50 E9/L    Basophils Absolute 0.02 0.00 - 0.20 E9/L    Polychromasia 1+     Poikilocytes 1+     Odilon Cells 1+     Ovalocytes 1+    Comprehensive Metabolic Panel   Result Value Ref Range    Sodium 134 132 - 146 mmol/L    Potassium 3.9 3.5 - 5.0 mmol/L    Chloride 98 98 - 107 mmol/L    CO2 20 (L) 22 - 29 mmol/L    Anion Gap 16 7 - 16 mmol/L    Glucose 151 (H) 74 - 99 mg/dL    BUN 30 (H) 6 - 20 mg/dL    CREATININE 1.6 (H) 0.7 - 1.2 mg/dL    GFR Non-African American 46 >=60 mL/min/1.73    GFR African American 55     Calcium 9.1 8.6 - 10.2 mg/dL    Total Protein 7.1 6.4 - 8.3 g/dL airway, initial encounter          Disposition  Patient's disposition: Admit to telemetry  Patient's condition is fair. Yolis Decker MD  Resident  08/22/20 [id number]    ATTENDING PROVIDER ATTESTATION:     Yary Wilkins presented to the emergency department for evaluation of Altered Mental Status (Pt found face down at home by girlfriend. Unknown last known well. Right sided facial droop and right sided weakness per EMS. )   and was initially evaluated by the Medical Resident. See Original ED Note for H&P and ED course above. I have reviewed and discussed the case, including pertinent history (medical, surgical, family and social) and exam findings with the Medical Resident assigned to Yaryrock Wilkins. I have personally performed and/or participated in the history, exam, medical decision making, and procedures and agree with all pertinent clinical information. I have reviewed my findings and recommendations with the assigned Medical Resident, Yary Wilkins and members of family present at the time of disposition. My findings/plan: The primary encounter diagnosis was Cerebrovascular accident (CVA), unspecified mechanism ([address] Diagnoses of Septicemia ([address] and Aspiration into airway, initial encounter were also pertinent to this visit.   Discharge Medication List as of 8/25/2020  7:43 PM        Critical Care Time: 32 minutes     MD Tina Bradshaw MD  08/26/20 1043

## 2020-08-23 ENCOUNTER — APPOINTMENT (OUTPATIENT)
Dept: GENERAL RADIOLOGY | Age: 50
DRG: 314 | End: 2020-08-23
Payer: COMMERCIAL

## 2020-08-23 LAB
APTT: 31.2 SEC (ref 24.5–35.1)
APTT: 32.2 SEC (ref 24.5–35.1)
APTT: 33.7 SEC (ref 24.5–35.1)
APTT: 33.8 SEC (ref 24.5–35.1)
CHOLESTEROL, TOTAL: 138 MG/DL (ref 0–199)
HBA1C MFR BLD: 5.9 % (ref 4–5.6)
HDLC SERPL-MCNC: 20 MG/DL
INR BLD: 1.6
LDL CHOLESTEROL CALCULATED: 51 MG/DL (ref 0–99)
PROTHROMBIN TIME: 18.5 SEC (ref 9.3–12.4)
TRIGL SERPL-MCNC: 336 MG/DL (ref 0–149)
VANCOMYCIN RANDOM: 5 MCG/ML (ref 5–40)
VLDLC SERPL CALC-MCNC: 67 MG/DL

## 2020-08-23 PROCEDURE — 2580000003 HC RX 258: Performed by: NURSE PRACTITIONER

## 2020-08-23 PROCEDURE — 2580000003 HC RX 258: Performed by: INTERNAL MEDICINE

## 2020-08-23 PROCEDURE — 2500000003 HC RX 250 WO HCPCS: Performed by: INTERNAL MEDICINE

## 2020-08-23 PROCEDURE — 6370000000 HC RX 637 (ALT 250 FOR IP): Performed by: INTERNAL MEDICINE

## 2020-08-23 PROCEDURE — 6370000000 HC RX 637 (ALT 250 FOR IP): Performed by: PSYCHIATRY & NEUROLOGY

## 2020-08-23 PROCEDURE — 74018 RADEX ABDOMEN 1 VIEW: CPT

## 2020-08-23 PROCEDURE — 99233 SBSQ HOSP IP/OBS HIGH 50: CPT | Performed by: CLINICAL NURSE SPECIALIST

## 2020-08-23 PROCEDURE — 99223 1ST HOSP IP/OBS HIGH 75: CPT | Performed by: INTERNAL MEDICINE

## 2020-08-23 PROCEDURE — 2060000000 HC ICU INTERMEDIATE R&B

## 2020-08-23 PROCEDURE — 36415 COLL VENOUS BLD VENIPUNCTURE: CPT

## 2020-08-23 PROCEDURE — 6360000002 HC RX W HCPCS: Performed by: INTERNAL MEDICINE

## 2020-08-23 PROCEDURE — 80202 ASSAY OF VANCOMYCIN: CPT

## 2020-08-23 PROCEDURE — 2709999900 HC NON-CHARGEABLE SUPPLY

## 2020-08-23 PROCEDURE — 85610 PROTHROMBIN TIME: CPT

## 2020-08-23 PROCEDURE — 6370000000 HC RX 637 (ALT 250 FOR IP): Performed by: NURSE PRACTITIONER

## 2020-08-23 PROCEDURE — 83036 HEMOGLOBIN GLYCOSYLATED A1C: CPT

## 2020-08-23 PROCEDURE — 80061 LIPID PANEL: CPT

## 2020-08-23 PROCEDURE — 85730 THROMBOPLASTIN TIME PARTIAL: CPT

## 2020-08-23 RX ORDER — HEPARIN SODIUM (PORCINE) LOCK FLUSH IV SOLN 100 UNIT/ML 100 UNIT/ML
3 SOLUTION INTRAVENOUS EVERY 12 HOURS SCHEDULED
Status: DISCONTINUED | OUTPATIENT
Start: 2020-08-23 | End: 2020-08-26 | Stop reason: HOSPADM

## 2020-08-23 RX ORDER — SODIUM CHLORIDE 0.9 % (FLUSH) 0.9 %
10 SYRINGE (ML) INJECTION PRN
Status: DISCONTINUED | OUTPATIENT
Start: 2020-08-23 | End: 2020-08-26 | Stop reason: HOSPADM

## 2020-08-23 RX ORDER — HEPARIN SODIUM (PORCINE) LOCK FLUSH IV SOLN 100 UNIT/ML 100 UNIT/ML
3 SOLUTION INTRAVENOUS PRN
Status: DISCONTINUED | OUTPATIENT
Start: 2020-08-23 | End: 2020-08-26 | Stop reason: HOSPADM

## 2020-08-23 RX ORDER — WARFARIN SODIUM 10 MG/1
10 TABLET ORAL
Status: COMPLETED | OUTPATIENT
Start: 2020-08-23 | End: 2020-08-23

## 2020-08-23 RX ORDER — LIDOCAINE HYDROCHLORIDE 10 MG/ML
5 INJECTION, SOLUTION EPIDURAL; INFILTRATION; INTRACAUDAL; PERINEURAL ONCE
Status: DISCONTINUED | OUTPATIENT
Start: 2020-08-23 | End: 2020-08-26 | Stop reason: HOSPADM

## 2020-08-23 RX ADMIN — NAFCILLIN SODIUM 2 G: 2 INJECTION, POWDER, LYOPHILIZED, FOR SOLUTION INTRAMUSCULAR; INTRAVENOUS at 03:32

## 2020-08-23 RX ADMIN — WARFARIN SODIUM 10 MG: 10 TABLET ORAL at 18:36

## 2020-08-23 RX ADMIN — HEPARIN SODIUM 3270 UNITS: 1000 INJECTION INTRAVENOUS; SUBCUTANEOUS at 12:28

## 2020-08-23 RX ADMIN — HEPARIN SODIUM 3270 UNITS: 1000 INJECTION INTRAVENOUS; SUBCUTANEOUS at 00:57

## 2020-08-23 RX ADMIN — NAFCILLIN SODIUM 2 G: 2 INJECTION, POWDER, LYOPHILIZED, FOR SOLUTION INTRAMUSCULAR; INTRAVENOUS at 23:33

## 2020-08-23 RX ADMIN — HEPARIN SODIUM 3270 UNITS: 1000 INJECTION INTRAVENOUS; SUBCUTANEOUS at 20:14

## 2020-08-23 RX ADMIN — NAFCILLIN SODIUM 2 G: 2 INJECTION, POWDER, LYOPHILIZED, FOR SOLUTION INTRAMUSCULAR; INTRAVENOUS at 11:34

## 2020-08-23 RX ADMIN — NAFCILLIN SODIUM 2 G: 2 INJECTION, POWDER, LYOPHILIZED, FOR SOLUTION INTRAMUSCULAR; INTRAVENOUS at 08:03

## 2020-08-23 RX ADMIN — HEPARIN SODIUM 18 UNITS/KG/HR: 10000 INJECTION, SOLUTION INTRAVENOUS at 12:30

## 2020-08-23 RX ADMIN — NAFCILLIN SODIUM 2 G: 2 INJECTION, POWDER, LYOPHILIZED, FOR SOLUTION INTRAMUSCULAR; INTRAVENOUS at 00:04

## 2020-08-23 RX ADMIN — SODIUM CHLORIDE, PRESERVATIVE FREE 10 ML: 5 INJECTION INTRAVENOUS at 11:33

## 2020-08-23 RX ADMIN — NAFCILLIN SODIUM 2 G: 2 INJECTION, POWDER, LYOPHILIZED, FOR SOLUTION INTRAMUSCULAR; INTRAVENOUS at 20:48

## 2020-08-23 RX ADMIN — RIFAMPIN 300 MG: 300 CAPSULE ORAL at 18:36

## 2020-08-23 RX ADMIN — NAFCILLIN SODIUM 2 G: 2 INJECTION, POWDER, LYOPHILIZED, FOR SOLUTION INTRAMUSCULAR; INTRAVENOUS at 15:50

## 2020-08-23 RX ADMIN — HEPARIN SODIUM 3270 UNITS: 1000 INJECTION INTRAVENOUS; SUBCUTANEOUS at 06:05

## 2020-08-23 RX ADMIN — DESMOPRESSIN ACETATE 40 MG: 0.2 TABLET ORAL at 20:48

## 2020-08-23 ASSESSMENT — PAIN SCALES - GENERAL
PAINLEVEL_OUTOF10: 0

## 2020-08-23 NOTE — PROGRESS NOTES
MD Arthur   3,270 Units at 08/23/20 1228    heparin 25,000 units in dextrose 5% 250 mL infusion  12 Units/kg/hr Intravenous Continuous Xu Aguirre MD 19.6 mL/hr at 08/23/20 1230 18 Units/kg/hr at 08/23/20 1230    0.9 % sodium chloride infusion   Intravenous Continuous CELSO Platt  mL/hr at 08/22/20 0254      acetaminophen (TYLENOL) suppository 650 mg  650 mg Rectal Once CELSO Platt CNP           REVIEW OF SYSTEMS:  Limited due to mental status   CONSTITUTIONAL:  Fever   RESPIRATORY:Denies SOB   NEUROLOGICAL:  Change in mental status     PHYSICAL EXAM:      Vitals:     Vitals:    08/23/20 0745   BP: 134/82   Pulse: 82   Resp: 16   Temp: 98.3 °F (36.8 °C)   SpO2: 93%       General Appearance:    Awake, no distress . Head:    Normocephalic, atraumatic   Eyes:    No pallor, no icterus,   Ears:    No obvious deformity or drainage.    Nose:   No nasal drainage   Throat:   Mucosa very dry    Neck:   Supple, no lymphadenopathy   Lungs:     Clear to auscultation bilaterally,    Heart:    Regular rate and rhythm, no murmur   Abdomen:     Soft, non-tender, bowel sounds present    Extremities:   No edema, no cyanosis   Pulses:   Dorsalis pedis palpable    Skin:   no rashes or lesions     CBC with Differential:      Lab Results   Component Value Date    WBC 15.6 08/22/2020    RBC 4.60 08/22/2020    HGB 13.5 08/22/2020    HCT 40.2 08/22/2020     08/22/2020    MCV 87.4 08/22/2020    MCH 29.3 08/22/2020    MCHC 33.6 08/22/2020    RDW 13.8 08/22/2020    SEGSPCT 76 02/26/2011    METASPCT 0.9 08/22/2020    LYMPHOPCT 1.7 08/22/2020    MONOPCT 7.0 08/22/2020    BASOPCT 0.1 08/22/2020    MONOSABS 1.11 08/22/2020    LYMPHSABS 0.32 08/22/2020    EOSABS 0.00 08/22/2020    BASOSABS 0.00 08/22/2020       CMP     Lab Results   Component Value Date     08/22/2020    K 3.7 08/22/2020    CL 99 08/22/2020    CO2 19 08/22/2020    BUN 38 08/22/2020    CREATININE 1.8 08/22/2020    GFRAA 48 08/22/2020    LABGLOM 40 08/22/2020    GLUCOSE 145 08/22/2020    GLUCOSE 105 02/26/2011    PROT 7.0 08/22/2020    LABALBU 3.7 08/22/2020    CALCIUM 8.6 08/22/2020    BILITOT 1.2 08/22/2020    ALKPHOS 61 08/22/2020    AST 56 08/22/2020    ALT 39 08/22/2020         Hepatic Function Panel:    Lab Results   Component Value Date    ALKPHOS 61 08/22/2020    ALT 39 08/22/2020    AST 56 08/22/2020    PROT 7.0 08/22/2020    BILITOT 1.2 08/22/2020    BILIDIR 0.4 08/22/2020    IBILI 0.8 08/22/2020    LABALBU 3.7 08/22/2020       PT/INR:    Lab Results   Component Value Date    PROTIME 18.5 08/23/2020    PROTIME 34.7 05/01/2020    INR 1.6 08/23/2020       TSH:    Lab Results   Component Value Date    TSH 0.732 06/02/2020       U/A:    Lab Results   Component Value Date    COLORU Yellow 08/22/2020    PHUR 6.0 08/22/2020    WBCUA NONE 08/22/2020    RBCUA 10-20 08/22/2020    BACTERIA NONE SEEN 08/22/2020    CLARITYU Clear 08/22/2020    SPECGRAV >=1.030 08/22/2020    LEUKOCYTESUR Negative 08/22/2020    UROBILINOGEN 1.0 08/22/2020    BILIRUBINUR Negative 08/22/2020    BLOODU LARGE 08/22/2020    GLUCOSEU Negative 08/22/2020       ABG:  No results found for: Katelyn Simmons, [id number], PHART, THGBART, OME2ECO, PO2ART, RHC8HXD    MICROBIOLOGY:    Blood culture -    Source of Blood Culture  Antecubital-Rig   Final  08/21/2020 10:45 PM  [facility]    Order Number  [id number]   Final  08/21/2020 10:45 PM  [address] [facility]    Enterobacter cloacae complex by PCR  Not Detected  Not Detected  Final  08/21/2020 10:45 PM  [facility]    Escherichia coli by PCR  Not Detected  Not Detected  Final  08/21/2020 10:45 PM  [address] [facility]    Klebsiella oxytoca by PCR  Not Detected  Not Detected  Final  08/21/2020 10:45 PM  [facility]    Klebsiella pneumoniae by PCR  Not Detected  Not Detected  Final  08/21/2020 10:45 PM  [facility]    Proteus by PCR  Not Detected  Not Detected  Final  08/21/2020 10:45 PM  [address] [facility]    Streptococcus agalactiae by PCR  Not Detected  Not Detected  Final  08/21/2020 10:45 PM   - [facility]    Staphylococcus aureus by PCR  DETECTEDPanic    Not Detected  Final  08/21/2020 10:45 PM   - [facility]    Serratia marcescens by PCR  Not Detected  Not Detected  Final  08/21/2020 10:45 PM  [address] [facility]    Streptococcus pneumoniae by PCR  Not Detected  Not Detected  Final  08/21/2020 10:45 PM  [address] [facility]    Streptococcus pyogenes  by PCR  Not Detected  Not Detected  Final  08/21/2020 10:45 PM  [address] [facility]    Acinetobacter baumannii by PCR  Not Detected  Not Detected  Final  08/21/2020 10:45 PM  [address] [facility] [place] [facility]    Candida albicans by PCR  Not Detected  Not Detected  Final  08/21/2020 10:45 PM  [address] [facility]    Candida glabrata by PCR  Not Detected  Not Detected  Final  08/21/2020 10:45 PM  [address] [facility] [place] [facility]    Candida krusei by PCR  Not Detected  Not Detected  Final  08/21/2020 10:45 PM  [address] [facility]    Candida parapsilosis by PCR  Not Detected  Not Detected  Final  08/21/2020 10:45 PM  [address] [facility]    Candida tropicalis by PCR  Not Detected  Not Detected  Final  08/21/2020 10:45 PM  [address] [facility]    Enterobacteriaceae by PCR  Not Detected  Not Detected  Final  08/21/2020 10:45 PM  [address] [facility]    Enterococcus by PCR  Not Detected  Not Detected  Final  08/21/2020 10:45 PM  [address] [facility]    Haemophilus Influenzae by PCR  Not Detected  Not Detected  Final  08/21/2020 10:45 PM  [address] [facility]    Listeria monocytogenes by PCR  Not Detected  Not Detected  Final  08/21/2020 10:45 PM  [address] [facility]

## 2020-08-23 NOTE — PROGRESS NOTES
Sister, Tyshawn Garsia, updated.  All questions and concerns answered     Updated pt this RN spoke with pt sister

## 2020-08-23 NOTE — PROGRESS NOTES
Pharmacy Consultation Note  (Antibiotic Dosing and Monitoring)    Initial consult date: 8/22/2020  Consulting physician: Dr. Fish Cain  Drug(s): Vancomycin  Indication: Bacteremia    Ht Readings from Last 1 Encounters:   08/23/20 6' 1\" (1.854 m)     Wt Readings from Last 1 Encounters:   08/22/20 240 lb 9 oz (109.1 kg)     Age/  Gender IBW DW  Allergy Information   44 y.o.   male 59.9 kg 79.8 kg  Patient has no known allergies. Date  Tmax WBC BUN/CR UOP  (mL/kg/hr) Drug/Dose Time   Given Level(s)   (Time) Comments   8/22  (#1) afebrile 15.8 38/1.8 -- Vancomycin 2000 mg IV x 1 0951     8/23  (#2) afebrile -- -- -- Vancomycin 2000 mg IV x 1 <1200> Rm level @ 0518 = 5 mcg/mL    8/24  (#3)       Rm AM level =       (#4)             (#5)             (#6)             (#7)             Estimated Creatinine Clearance: 62 mL/min (A) (based on SCr of 1.8 mg/dL (H)). UOP over the past 24 hours:       Intake/Output Summary (Last 24 hours) at 8/23/2020 1116  Last data filed at 8/23/2020 0830  Gross per 24 hour   Intake 3515.08 ml   Output 2250 ml   Net 1265.08 ml       Other anti-infectives: Anti-infective Dose Date Initiated Date Stopped   Amp/sulb 3g IV q6hr 8/22 8/22   Gentamicin 300 mg IV x 1 8/22 8/22   Rifampin 300 mg PO q8hr 8/22    Nafcillin 2g IV q4hr 8/22      Cultures:  available culture and sensitivity results were reviewed in EPIC  Cultures sent and are pending. Culture Date Result    Blood cx 1 8/21 GPC in clusters   Blood cx 2 8/21 GPC in clusters                    Assessment:  · Consulted by Dr. Fish Cain to dose/monitor vancomycin  · Goal trough level:  15-20 mcg/mL  · Pt is a 44 yoM being initiated on vancomycin for positive blood cultures. ID also consulted.   · Serum creatinine yesterday: 1.8 mg/dL; CrCl ~ 62 mL/min; baseline Scr ~ 1.1 mg/dL  · 8/23: Rm AM level = 5 mcg/mL    Plan:  · Vancomycin 2000 mg IV x 1 again today  · Random level tomorrow with AM labs  · Clarify with ID if vancomycin is to continue  · Follow renal function  · Pharmacist will follow and monitor/adjust dosing as necessary      Thank you for the consult,    Merced Haddad PharmD, BCPS 8/23/2020 11:16 AM     Addendum    Vancomycin has been discontinued; pharmacy will sign-off. Please reconsult if needed.     Thank you,  Merced Haddad PharmD, BCPS 8/23/2020 12:01 PM

## 2020-08-23 NOTE — PROGRESS NOTES
Patient's sister called and is asking for patient to have labs drawn to check patient for factor 5. Patient's sister states \"I have factor 5 and I told him to get checked but I don't know if he ever got checked. And we share the same parents. We look alike and everything. We are basically the same person so he really has a good probability of having it. And I think if he had it tested for he would have told me. So if you could ask the Dr or maybe he did order it already if you could check and see if it resulted. \" Patient's sister continually rambling on and on and this nurse barely able to get any words in. Explained finally that a not to the Dr would be put in to request it be ordered.

## 2020-08-23 NOTE — PROGRESS NOTES
12 fr small bowel feeding tube placed with [employer] guide system via right nares. Tube placed 111 cm. . 29 cm exposed. Bridal placed with ease. .. KUB ordered for placement. . pt. Tolerated well.

## 2020-08-23 NOTE — PROGRESS NOTES
Subjective:  Feeling better still weak still having trouble talking  No CP or SOB  No fever or chills   No uncontrolled pain  No vomiting or diarrhea   Family at bedside all questions answered  Objective:    BP (!) 145/71   Pulse 76   Temp 97.5 °F (36.4 °C) (Temporal)   Resp 18   Ht 6' 1\" (1.854 m)   Wt 240 lb 9 oz (109.1 kg)   SpO2 93%   BMI 31.74 kg/m²     24HR INTAKE/OUTPUT:      Intake/Output Summary (Last 24 hours) at 8/23/2020 0720  Last data filed at 8/23/2020 0523  Gross per 24 hour   Intake 3415.08 ml   Output 2100 ml   Net 1315.08 ml       General appearance: NAD, dysarthria  Neck: FROM, supple   Lungs: Clear bilaterally no wheezes, no rhonchi, no crackles  CV: RRR, no MRGs; normal carotid upstroke and amplitude without Bruits  Abdomen: Soft, non-tender; no masses or HSM  Extremities: No edema, no cyanosis, no clubbing  Skin: Intact no rash, no lesions, no ulcers    Psych: Alert and oriented normal affect  Neuro: Right-sided weakness dysarthria  Most Recent Labs  Lab Results   Component Value Date    WBC 15.6 (H) 08/22/2020    HGB 13.5 08/22/2020    HCT 40.2 08/22/2020     (L) 08/22/2020     08/22/2020    K 3.7 08/22/2020    CL 99 08/22/2020    CREATININE 1.8 (H) 08/22/2020    BUN 38 (H) 08/22/2020    CO2 19 (L) 08/22/2020    GLUCOSE 145 (H) 08/22/2020    ALT 39 08/22/2020    AST 56 (H) 08/22/2020    INR 1.6 08/23/2020    TSH 0.732 06/02/2020    LABA1C 5.9 (H) 08/23/2020     No results for input(s): MG in the last 72 hours. Lab Results   Component Value Date    CALCIUM 8.6 08/22/2020        US CAROTID ARTERY BILATERAL   Final Result      No evidence for hemodynamically significant stenosis of the carotid   arteries based on NASCET criteria (0-49%)      No evidence for subclavian steal.      CT HEAD WO CONTRAST   Final Result      Subacute infarct in the left anterior cerebral artery territory.       This critical findings were relayed to the referring physician in the   emergency room at [address] [facility] hours August 22, 2020. XR CHEST PORTABLE   Final Result      Elevated hemidiaphragm with atelectasis versus infiltrate of the right   lung base. Assessment    Principal Problem:    CVA (cerebral vascular accident) ([facility] [address]  Active Problems:    S/P MVR (mitral valve replacement)    S/P AVR (aortic valve replacement)    Hypertension    Hyperlipidemia    MAIRA (acute kidney injury) ([facility] [address]    LONG TERM ANTICOAGULENT USE    Aspiration pneumonitis (HCC)    Sepsis ([facility] [address]    MSSA bacteremia  Resolved Problems:    * No resolved hospital problems.  *      Plan:    Admit Tele  IV antibiotics nafcillin  2 of 2 blood cultures + MSSA  Repeat blood culture NGTD  Continue warfarin  Heparin drip while subtherapeutic  Statin  carotid US negative  VANESA tomorrow  ID Consult appreciated   Neurology Consult appreciated   Cardiology consult appreciated  PMR consult  Medications for other co morbidities cont as appropriate w dosage adjustments as necessary   PT/OT  D/C planning  Extensive discussion with family regarding diagnosis, prognosis and plan of care     Electronically signed by Suraj Porter MD on 8/23/2020 at 7:20 AM

## 2020-08-23 NOTE — PROGRESS NOTES
Kandace Frankel is a 44 y.o. right handed male     Patient was admitted 8/21/20 with right sided weakness    He has a history of MVR and AVR -- mechanical with chronic warfarin use.  INR on admission was 2.2     His LKW was anywhere from 12 hours to 24 hours PTA    In ED, CT Head demonstrated an acute left ALIYAH stroke and his blood cx showed Gram + cocci -- fever 101.4    ID following  VANESA planned for tomorrow    Because he is NPO and unable to take warfarin, heparin was started     Allergies as of 08/21/2020    (No Known Allergies)       Objective:     /82   Pulse 82   Temp 98.3 °F (36.8 °C) (Temporal)   Resp 16   Ht 6' 1\" (1.854 m)   Wt 240 lb 9 oz (109.1 kg)   SpO2 93%   BMI 31.74 kg/m²      General appearance: alert - looking around room   Head: Normocephalic, without obvious abnormality, atraumatic  Extremities: no cyanosis or edema  Pulses: 2+ and symmetric  Skin: no rashes or lesions    Mental Status: Awake - nodding at times appropriately     Oral apraxia     Trouble following some verbal commands     Cranial Nerves:  I: smell    II: visual acuity     II: visual fields Full   II: pupils JANE   III,VII: ptosis None   III,IV,VI: extraocular muscles  EOMI without nystagmus    V: mastication Normal   V: facial light touch sensation  Normal   V,VII: corneal reflex  Present   VII: facial muscle function - upper     VII: facial muscle function - lower Normal   VIII: hearing Normal   IX: soft palate elevation  Normal   IX,X: gag reflex    XI: trapezius strength     XI: sternocleidomastoid strength    XI: neck extension strength     XII: tongue strength  Normal     Motor:  Right hemiparesis    Right arm 3/5  and bicep/tricep   1/5 right IPS    Sensory:  Appreciates noxious stim in all limbs     Coordination:   FN, FFM and LITTLE decreased right relative to weakness -- normal left     DTR:   No reflexes    No Babinski  No Ferraro's     Laboratory/Radiology:     CBC with Differential:    Lab Results Component Value Date    WBC 15.6 08/22/2020    RBC 4.60 08/22/2020    HGB 13.5 08/22/2020    HCT 40.2 08/22/2020     08/22/2020    MCV 87.4 08/22/2020    MCH 29.3 08/22/2020    MCHC 33.6 08/22/2020    RDW 13.8 08/22/2020    SEGSPCT 76 02/26/2011    METASPCT 0.9 08/22/2020    LYMPHOPCT 1.7 08/22/2020    MONOPCT 7.0 08/22/2020    BASOPCT 0.1 08/22/2020    MONOSABS 1.11 08/22/2020    LYMPHSABS 0.32 08/22/2020    EOSABS 0.00 08/22/2020    BASOSABS 0.00 08/22/2020     CMP:    Lab Results   Component Value Date     08/22/2020    K 3.7 08/22/2020    CL 99 08/22/2020    CO2 19 08/22/2020    BUN 38 08/22/2020    CREATININE 1.8 08/22/2020    GFRAA 48 08/22/2020    LABGLOM 40 08/22/2020    GLUCOSE 145 08/22/2020    GLUCOSE 105 02/26/2011    PROT 7.0 08/22/2020    LABALBU 3.7 08/22/2020    CALCIUM 8.6 08/22/2020    BILITOT 1.2 08/22/2020    ALKPHOS 61 08/22/2020    AST 56 08/22/2020    ALT 39 08/22/2020     HgBA1c:    Lab Results   Component Value Date    LABA1C 5.9 08/23/2020     FLP:    Lab Results   Component Value Date    TRIG 336 08/23/2020    HDL 20 08/23/2020    LDLCALC 51 08/23/2020    LABVLDL 67 08/23/2020       CT Head:  Subacute infarct in the left anterior cerebral artery territory. Carotid US  No evidence for hemodynamically significant stenosis of the carotid   arteries based on NASCET criteria (0-49%)     I personally reviewed the patient's lab and imaging studies at this time.     Assessment:     Patient admitted with a right hemiparesis and a PMH of MVR and AVR -- chronic warfarin and admitted with a minimally subtherapeutic INR   Gram + cocci identified and fever on admission -- strong suspicion of endocarditis and being tx as such        Plan:     VANESA tomorrow    Continue heparin gtt for now per cardiology and patient NPO status   Discussed risk/benefit ratio at this time with cardio     Will continue to follow     Divya March  11:50 AM  8/23/2020

## 2020-08-23 NOTE — PROGRESS NOTES
Patient refusing to be turned every 2 hours. Educated patient and wife at bedside the important of turning every 2 hours.    Askepi Sneed

## 2020-08-23 NOTE — PLAN OF CARE
Problem: Falls - Risk of:  Goal: Will remain free from falls  Description: Will remain free from falls  Outcome: Met This Shift  Goal: Absence of physical injury  Description: Absence of physical injury  Outcome: Met This Shift     Problem: Skin Integrity:  Goal: Will show no infection signs and symptoms  Description: Will show no infection signs and symptoms  Outcome: Met This Shift

## 2020-08-23 NOTE — CONSULTS
INPATIENT CARDIOLOGY CONSULT    Name: Tevin Medina    Age: 44 y.o. Date of Admission: 8/21/2020 10:23 PM    Date of Service: 8/23/2020    Reason for Consultation: CVA, mechanical valves, bacteremia    Referring Physician: Sheba Rasheed MD    History of Present Illness:  Tevin Medina is a 44 y.o. male (known to Dr. Keegan Patten) who presented to [facility] on 8/21/2020 for further evaluation of expressive aphasia and right-sided motor deficits (+acute CVA). Unable to obtain an adequate history from the patient (still with speech deficit). His PMH is outlined in detail below (see A/P below). No apparent chest pain or respiratory distress. He is currently with no acute distress at rest. SR on EKG and telemetry. Review of Systems:   Unable to obtain an adequate history from the patient (+expressive aphasia)    Past Medical History:  Past Medical History:   Diagnosis Date    Chest pain 2/21/2013    Hypertension     LONG TERM ANTICOAGULENT USE     Psoriasis     S/P AVR (aortic valve replacement) 2/21/2013    S/P MVR (mitral valve replacement) 2/21/2013       Past Surgical History:  Past Surgical History:   Procedure Laterality Date    AORTIC VALVE REPLACEMENT  1/2006    Dr Noble Score   [address] [place] [address]  01--2006    Dr Noble Score  [facility]       Family History:  History reviewed. No pertinent family history.     Social History:  Social History     Socioeconomic History    Marital status:      Spouse name: Not on file    Number of children: Not on file    Years of education: Not on file    Highest education level: Not on file   Occupational History     Comment: [email or web address]   Social Needs    Financial resource strain: Not on file    Food insecurity     Worry: Not on file     Inability: Not on file   [employer] needs     Medical: Not on file     Non-medical: Not on file   Tobacco Use    Smoking status: Never Smoker    Smokeless tobacco: Never Used   Substance and Sexual Activity    Alcohol oz (109.1 kg)   06/02/20 241 lb 12.8 oz (109.7 kg)   05/01/20 239 lb (108.4 kg)     Appearance: Awake, no acute respiratory distress  Skin: Intact, no rash  Head: Normocephalic, atraumatic  Eyes: EOMI, no conjunctival erythema  ENMT: MMM, no rhinorrhea  Neck: Supple, no carotid bruits  Lungs: Decreased BS B/L, no wheezing  Cardiac: Regular rate and rhythm, 2/6 systolic murmur, +mechanical valve clicks  Abdomen: Soft, +bowel sounds  Extremities: Decreased ROM RUE/RLE, no lower extremity edema  Neurologic: +expressive aphasia, +RUE/RLE motor deficits    Intake/Output:    Intake/Output Summary (Last 24 hours) at 8/23/2020 1428  Last data filed at 8/23/2020 1421  Gross per 24 hour   Intake 4427.67 ml   Output 2250 ml   Net 2177.67 ml     I/O this shift:  In: 1012.6 [I.V.:755.6; NG/GT:57; IV Piggyback:200]  Out: -     Laboratory Tests:  Recent Labs     08/21/20 2245 08/21/20 2256 08/22/20  0516     --  134   K 3.9  --  3.7   CL 98  --  99   CO2 20*  --  19*   BUN 30*  --  38*   CREATININE 1.6* 1.7* 1.8*   GLUCOSE 151*  --  145*   CALCIUM 9.1  --  8.6     No results found for: MG  Recent Labs     08/21/20 2245 08/22/20  0516   ALKPHOS 53 61   ALT 42* 39   AST 46* 56*   PROT 7.1 7.0   BILITOT 1.3* 1.2   BILIDIR  --  0.4*   LABALBU 3.8 3.7     Recent Labs     08/21/20 2245 08/22/20  0516 08/22/20  1919   WBC 20.2* 15.8* 15.6*   RBC 4.98 5.00 4.60   HGB 14.5 14.4 13.5   HCT 42.4 43.6 40.2   MCV 85.1 87.2 87.4   MCH 29.1 28.8 29.3   MCHC 34.2 33.0 33.6   RDW 13.5 13.7 13.8    138 115*   MPV 10.5 11.1 11.0     Lab Results   Component Value Date    CKTOTAL 1,791 (H) 08/22/2020    TROPONINI 0.04 (H) 08/22/2020    TROPONINI 0.01 08/22/2020    TROPONINI <0.01 08/21/2020     Lab Results   Component Value Date    INR 1.6 08/23/2020    INR 1.9 08/22/2020    INR 2.2 08/21/2020    PROTIME 18.5 (H) 08/23/2020    PROTIME 21.3 (H) 08/22/2020    PROTIME 24.8 (H) 08/21/2020     Lab Results   Component Value Date    TSH 0.732 06/02/2020     Lab Results   Component Value Date    LABA1C 5.9 (H) 08/23/2020     No results found for: EAG  Lab Results   Component Value Date    CHOL 138 08/23/2020    CHOL 225 (H) 06/02/2020    CHOL 196 11/14/2018     Lab Results   Component Value Date    TRIG 336 (H) 08/23/2020    TRIG 149 06/02/2020    TRIG 84 11/14/2018     Lab Results   Component Value Date    HDL 20 08/23/2020    HDL 55 06/02/2020    HDL 59 11/14/2018     Lab Results   Component Value Date    LDLCALC 51 08/23/2020    LDLCALC 140 (H) 06/02/2020    LDLCALC 120 (H) 11/14/2018     Lab Results   Component Value Date    LABVLDL 67 08/23/2020    LABVLDL 30 06/02/2020    LABVLDL 17 11/14/2018     No results found for: CHOLHDLRATIO  No results for input(s): PROBNP in the last 72 hours. Cardiac Tests:  ECG: SR, NSSTT changes    Telemetry findings reviewed: SR, rate 80's    - Echo, 08/24/2010 with normal LV size, thickness and systolic function. Stage II diastolic dysfunction, mild LAE, normally functioning mechanical aortic and mitral valve prostheses. Peak aortic gradient 25 mmHg, mean aortic gradient 14 mmHg. Normal RVSP. - Echo, 08/07/2012. Normal LV size and function. Stage II diastolic dysfunction. Normal RV. Normal mitral and aortic mechanical prostheses. AV gradient 35 and 22 increased when compared to 2010. Mild PHTN. RVSP estimated at 42 mmHg. - Exercise EKG, 02/28/2013. 11.5 METS, 85% MPHR. No pain. 1.3 mm ST segment depression aVF resolving rapidly during recovery. Low probability.    - Echo, 04/28/2015. Normal EF. Mild LAE. Normal function of mechanical prostheses in the aortic and mitral positions. ANAHI 1.0 cm². Mean gradient 25. Mean mitral gradient 3.84 and maximal 14.28. RVSP 49. AGUSTÍN 38.    ASSESSMENT / PLAN:  1. Acute left ALIYAH stroke  2. Staph aureus bacteremia (MSSA) / febrile illness (T 101.4 --> 98.3)  3. S/p mechanical MVR and mechanical AVR in 2006 (endocarditis at that time)   4.  Chronic anticoagulation with coumadin (INR 2.2 --> 1.9 --> 1.6)  5. MAIRA -- most recent Cr 1.8  6. HLD    - Add heparin drip (INR subtherapeutic)  - Echocardiogram ordered by primary service (pending)  - VANESA tomorrow (will order)  - Monitor INR and BMP  - Care per ID and neurology (case discussed with neurology)    Thank you for allowing me to participate in your patient's care. Please feel free to contact me if you have any questions or concerns.     Damien Forte MD  [facility]) Cardiology

## 2020-08-23 NOTE — PROGRESS NOTES
Pharmacy Consultation Note  (Anticoagulant Dosing and Monitoring)    Initial consult date: 8/22/2020  Consulting physician: Justice Monterroso Masters, APRN - CNP    Allergies:  Patient has no known allergies. 44 y.o. male    Ht Readings from Last 1 Encounters:   08/23/20 6' 1\" (1.854 m)     Wt Readings from Last 1 Encounters:   08/22/20 240 lb 9 oz (109.1 kg)     Warfarin Indication Target   INR Range Home   Dose  (if applicable) Diet/Feeding Tube   Hx of aortic and mitral valve replacements 2-3 Unknown NPO       Vitamin K or Blood product  Administration Date                 Warfarin drug-drug interactions  Start  Stop Home Med? Comments   Rifampin  8/22  No                          TSH:    Lab Results   Component Value Date    TSH 0.732 06/02/2020        Hepatic Function Panel:                            Lab Results   Component Value Date    ALKPHOS 61 08/22/2020    ALT 39 08/22/2020    AST 56 08/22/2020    PROT 7.0 08/22/2020    BILITOT 1.2 08/22/2020    BILIDIR 0.4 08/22/2020    IBILI 0.8 08/22/2020    LABALBU 3.7 08/22/2020       Date Warfarin Dose INR Heparin or LMWH HBG/  HCT PLT Comment   8/22   (Not given - NPO) 1.9 Heparin gtt 14.4/43.6 138    8/23 <10 mg> 1.6 Heparin gtt -- --                                          Assessment and Plan:  · 44 yoM who presented with AMS and stroke. He is on chronic warfarin therapy for history of mitral and aortic valve replacements. · Home dose is unable to be determined (unable to verify with patient 2/2 AMS).  Most recent order for 10 mg tablets \"one tablet daily or as directed by doctor\"  · Per outpatient visit notes with Dr. Linh Armstrong, INR goal = 2-3  · INR today = 1.6  · Warfarin 10 mg dose not administered yesterday 2/2 NPO status  · Will re-order warfarin 10 mg x 1 tonight if patient is able to take warfarin  · Heparin gtt initiated  · Daily PT/INR until the INR is stable within the therapeutic range  · Pharmacist will follow and monitor/adjust dosing as necessary    Ean Morin, PharmD, BCPS 8/23/2020 10:40 AM

## 2020-08-24 ENCOUNTER — ANESTHESIA (OUTPATIENT)
Dept: CARDIAC CATH/INVASIVE PROCEDURES | Age: 50
DRG: 314 | End: 2020-08-24
Payer: COMMERCIAL

## 2020-08-24 ENCOUNTER — ANESTHESIA EVENT (OUTPATIENT)
Dept: CARDIAC CATH/INVASIVE PROCEDURES | Age: 50
DRG: 314 | End: 2020-08-24
Payer: COMMERCIAL

## 2020-08-24 VITALS
SYSTOLIC BLOOD PRESSURE: 129 MMHG | DIASTOLIC BLOOD PRESSURE: 97 MMHG | OXYGEN SATURATION: 95 % | RESPIRATION RATE: 26 BRPM

## 2020-08-24 LAB
APTT: 34.6 SEC (ref 24.5–35.1)
APTT: 42.2 SEC (ref 24.5–35.1)
APTT: 46.6 SEC (ref 24.5–35.1)
CULTURE, BLOOD 2: ABNORMAL
HCT VFR BLD CALC: 41.2 % (ref 37–54)
HEMOGLOBIN: 13.7 G/DL (ref 12.5–16.5)
INR BLD: 2.1
LV EF: 63 %
LVEF MODALITY: NORMAL
MCH RBC QN AUTO: 28.7 PG (ref 26–35)
MCHC RBC AUTO-ENTMCNC: 33.3 % (ref 32–34.5)
MCV RBC AUTO: 86.4 FL (ref 80–99.9)
ORGANISM: ABNORMAL
ORGANISM: ABNORMAL
PDW BLD-RTO: 14 FL (ref 11.5–15)
PLATELET # BLD: 123 E9/L (ref 130–450)
PMV BLD AUTO: 11.1 FL (ref 7–12)
PROTHROMBIN TIME: 24 SEC (ref 9.3–12.4)
RBC # BLD: 4.77 E12/L (ref 3.8–5.8)
VANCOMYCIN RANDOM: <4 MCG/ML (ref 5–40)
WBC # BLD: 12.8 E9/L (ref 4.5–11.5)

## 2020-08-24 PROCEDURE — 85730 THROMBOPLASTIN TIME PARTIAL: CPT

## 2020-08-24 PROCEDURE — 97535 SELF CARE MNGMENT TRAINING: CPT

## 2020-08-24 PROCEDURE — 97530 THERAPEUTIC ACTIVITIES: CPT

## 2020-08-24 PROCEDURE — 99232 SBSQ HOSP IP/OBS MODERATE 35: CPT | Performed by: STUDENT IN AN ORGANIZED HEALTH CARE EDUCATION/TRAINING PROGRAM

## 2020-08-24 PROCEDURE — 2580000003 HC RX 258: Performed by: INTERNAL MEDICINE

## 2020-08-24 PROCEDURE — 97110 THERAPEUTIC EXERCISES: CPT

## 2020-08-24 PROCEDURE — 85027 COMPLETE CBC AUTOMATED: CPT

## 2020-08-24 PROCEDURE — 2580000003 HC RX 258

## 2020-08-24 PROCEDURE — 93325 DOPPLER ECHO COLOR FLOW MAPG: CPT

## 2020-08-24 PROCEDURE — 93312 ECHO TRANSESOPHAGEAL: CPT

## 2020-08-24 PROCEDURE — 36415 COLL VENOUS BLD VENIPUNCTURE: CPT

## 2020-08-24 PROCEDURE — 2580000003 HC RX 258: Performed by: NURSE PRACTITIONER

## 2020-08-24 PROCEDURE — B246ZZ4 ULTRASONOGRAPHY OF RIGHT AND LEFT HEART, TRANSESOPHAGEAL: ICD-10-PCS | Performed by: STUDENT IN AN ORGANIZED HEALTH CARE EDUCATION/TRAINING PROGRAM

## 2020-08-24 PROCEDURE — 93321 DOPPLER ECHO F-UP/LMTD STD: CPT

## 2020-08-24 PROCEDURE — 6370000000 HC RX 637 (ALT 250 FOR IP): Performed by: INTERNAL MEDICINE

## 2020-08-24 PROCEDURE — 97112 NEUROMUSCULAR REEDUCATION: CPT

## 2020-08-24 PROCEDURE — 6360000002 HC RX W HCPCS: Performed by: INTERNAL MEDICINE

## 2020-08-24 PROCEDURE — 81241 F5 GENE: CPT

## 2020-08-24 PROCEDURE — 2709999900 HC NON-CHARGEABLE SUPPLY

## 2020-08-24 PROCEDURE — 2060000000 HC ICU INTERMEDIATE R&B

## 2020-08-24 PROCEDURE — 6370000000 HC RX 637 (ALT 250 FOR IP): Performed by: PSYCHIATRY & NEUROLOGY

## 2020-08-24 PROCEDURE — 85610 PROTHROMBIN TIME: CPT

## 2020-08-24 PROCEDURE — 99222 1ST HOSP IP/OBS MODERATE 55: CPT | Performed by: THORACIC SURGERY (CARDIOTHORACIC VASCULAR SURGERY)

## 2020-08-24 PROCEDURE — 6360000002 HC RX W HCPCS

## 2020-08-24 PROCEDURE — 2500000003 HC RX 250 WO HCPCS: Performed by: INTERNAL MEDICINE

## 2020-08-24 PROCEDURE — 80202 ASSAY OF VANCOMYCIN: CPT

## 2020-08-24 PROCEDURE — 6370000000 HC RX 637 (ALT 250 FOR IP): Performed by: NURSE PRACTITIONER

## 2020-08-24 RX ORDER — SODIUM CHLORIDE 9 MG/ML
INJECTION, SOLUTION INTRAVENOUS CONTINUOUS PRN
Status: DISCONTINUED | OUTPATIENT
Start: 2020-08-24 | End: 2020-08-24 | Stop reason: SDUPTHER

## 2020-08-24 RX ORDER — WARFARIN SODIUM 10 MG/1
10 TABLET ORAL
Status: COMPLETED | OUTPATIENT
Start: 2020-08-24 | End: 2020-08-24

## 2020-08-24 RX ORDER — PROPOFOL 10 MG/ML
INJECTION, EMULSION INTRAVENOUS PRN
Status: DISCONTINUED | OUTPATIENT
Start: 2020-08-24 | End: 2020-08-24 | Stop reason: SDUPTHER

## 2020-08-24 RX ADMIN — PROPOFOL 20 MG: 10 INJECTION, EMULSION INTRAVENOUS at 10:44

## 2020-08-24 RX ADMIN — PROPOFOL 20 MG: 10 INJECTION, EMULSION INTRAVENOUS at 10:47

## 2020-08-24 RX ADMIN — NAFCILLIN SODIUM 2 G: 2 INJECTION, POWDER, LYOPHILIZED, FOR SOLUTION INTRAMUSCULAR; INTRAVENOUS at 08:52

## 2020-08-24 RX ADMIN — GENTAMICIN SULFATE 110 MG: 40 INJECTION, SOLUTION INTRAMUSCULAR; INTRAVENOUS at 21:12

## 2020-08-24 RX ADMIN — HEPARIN SODIUM 20 UNITS/KG/HR: 10000 INJECTION, SOLUTION INTRAVENOUS at 01:16

## 2020-08-24 RX ADMIN — PROPOFOL 20 MG: 10 INJECTION, EMULSION INTRAVENOUS at 10:50

## 2020-08-24 RX ADMIN — HEPARIN SODIUM 3270 UNITS: 1000 INJECTION INTRAVENOUS; SUBCUTANEOUS at 12:59

## 2020-08-24 RX ADMIN — DESMOPRESSIN ACETATE 40 MG: 0.2 TABLET ORAL at 21:12

## 2020-08-24 RX ADMIN — MULTIPLE VITAMINS W/ MINERALS TAB 1 TABLET: TAB at 08:56

## 2020-08-24 RX ADMIN — RIFAMPIN 300 MG: 300 CAPSULE ORAL at 08:56

## 2020-08-24 RX ADMIN — NAFCILLIN SODIUM 2 G: 2 INJECTION, POWDER, LYOPHILIZED, FOR SOLUTION INTRAMUSCULAR; INTRAVENOUS at 12:53

## 2020-08-24 RX ADMIN — RIFAMPIN 300 MG: 300 CAPSULE ORAL at 17:24

## 2020-08-24 RX ADMIN — HEPARIN SODIUM 24 UNITS/KG/HR: 10000 INJECTION, SOLUTION INTRAVENOUS at 13:04

## 2020-08-24 RX ADMIN — WARFARIN SODIUM 10 MG: 10 TABLET ORAL at 17:24

## 2020-08-24 RX ADMIN — SODIUM CHLORIDE: 9 INJECTION, SOLUTION INTRAVENOUS at 03:26

## 2020-08-24 RX ADMIN — PROPOFOL 20 MG: 10 INJECTION, EMULSION INTRAVENOUS at 10:56

## 2020-08-24 RX ADMIN — PROPOFOL 20 MG: 10 INJECTION, EMULSION INTRAVENOUS at 10:53

## 2020-08-24 RX ADMIN — HEPARIN SODIUM 3270 UNITS: 1000 INJECTION INTRAVENOUS; SUBCUTANEOUS at 20:35

## 2020-08-24 RX ADMIN — PROPOFOL 20 MG: 10 INJECTION, EMULSION INTRAVENOUS at 10:34

## 2020-08-24 RX ADMIN — HEPARIN SODIUM 26.03 UNITS/KG/HR: 10000 INJECTION, SOLUTION INTRAVENOUS at 20:35

## 2020-08-24 RX ADMIN — PROPOFOL 20 MG: 10 INJECTION, EMULSION INTRAVENOUS at 10:41

## 2020-08-24 RX ADMIN — PROPOFOL 110 MG: 10 INJECTION, EMULSION INTRAVENOUS at 10:32

## 2020-08-24 RX ADMIN — HEPARIN SODIUM 3270 UNITS: 1000 INJECTION INTRAVENOUS; SUBCUTANEOUS at 03:22

## 2020-08-24 RX ADMIN — NAFCILLIN SODIUM 2 G: 2 INJECTION, POWDER, LYOPHILIZED, FOR SOLUTION INTRAMUSCULAR; INTRAVENOUS at 17:24

## 2020-08-24 RX ADMIN — NAFCILLIN SODIUM 2 G: 2 INJECTION, POWDER, LYOPHILIZED, FOR SOLUTION INTRAMUSCULAR; INTRAVENOUS at 21:12

## 2020-08-24 RX ADMIN — PROPOFOL 20 MG: 10 INJECTION, EMULSION INTRAVENOUS at 10:37

## 2020-08-24 RX ADMIN — SODIUM CHLORIDE, PRESERVATIVE FREE 10 ML: 5 INJECTION INTRAVENOUS at 15:03

## 2020-08-24 RX ADMIN — NAFCILLIN SODIUM 2 G: 2 INJECTION, POWDER, LYOPHILIZED, FOR SOLUTION INTRAMUSCULAR; INTRAVENOUS at 04:00

## 2020-08-24 RX ADMIN — HEPARIN SODIUM 24 UNITS/KG/HR: 10000 INJECTION, SOLUTION INTRAVENOUS at 14:42

## 2020-08-24 RX ADMIN — SODIUM CHLORIDE: 9 INJECTION, SOLUTION INTRAVENOUS at 10:30

## 2020-08-24 ASSESSMENT — PAIN SCALES - GENERAL
PAINLEVEL_OUTOF10: 0
PAINLEVEL_OUTOF10: 0

## 2020-08-24 NOTE — CONSULTS
CTS Consult    Patient name: Chichi Robertson    Reason for consult: Prosthetic valve endocarditis    Referring Physician: Dr. Jason Toscano    Primary Care Physician: Nasreen Waldrop DO    Date of service: 8/24/2020    Chief Complaint: AMS    HPI: 44year old man who is S/P AVR/MVR by Dr. Aniyah Quinn in 2006 with mechanical prostheses who presents with AMS. He was found face down by his girlfriend and had right sided weakness. The majority of the history is obtained from the chart as he is confused. He is a poor historian. 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 He intermittently seems to understand my questions but does not answer all appropriately.     Allergies: No Known Allergies    Home medications:    Current Facility-Administered Medications   Medication Dose Route Frequency Provider Last Rate Last Dose    gentamicin (GARAMYCIN) 110 mg in dextrose 5 % 100 mL IVPB  110 mg Intravenous Q8H Sander Allen MD        lidocaine PF 1 % injection 5 mL  5 mL Intradermal Once Sander Allen MD   Stopped at 08/24/20 0700    sodium chloride flush 0.9 % injection 10 mL  10 mL Intravenous PRN Sander Allen MD        heparin flush 100 UNIT/ML injection 300 Units  3 mL Intravenous 2 times per day Holly Patton MD   Stopped at 08/24/20 0700    heparin flush 100 UNIT/ML injection 300 Units  3 mL Intracatheter PRN Holly Patton MD        therapeutic multivitamin-minerals 1 tablet  1 tablet Oral Daily CELSO Yancey CNP   1 tablet at 08/24/20 0856    sodium chloride flush 0.9 % injection 10 mL  10 mL Intravenous 2 times per day CELSO Wagner CNP   10 mL at 08/22/20 1956    sodium chloride flush 0.9 % injection 10 mL  10 mL Intravenous PRN CELSO Yancey CNP   10 mL at 08/24/20 1503    acetaminophen (TYLENOL) tablet 650 mg  650 mg Oral Q6H PRN CELSO Yancey CNP        Or    acetaminophen (TYLENOL) suppository 650 mg  650 mg Rectal Q6H PRN CELSO Miller - RAY        polyethylene glycol [facility]) packet 17 g  17 g Oral Daily PRN CELSO Miller CNP        promethazine (PHENERGAN) tablet 12.5 mg  12.5 mg Oral Q6H PRN CELSO Miller CNP        Or    [facility] injection 4 mg  4 mg Intravenous Q6H PRN CELSO Miller CNP        perflutren lipid microspheres (DEFINITY) injection 1.65 mg  1.5 mL Intravenous ONCE PRN Sonu Jack MD        warfarin (COUMADIN) daily dosing (placeholder)   Other RX Placeholder CELSO Miller CNP        nafcillin 2 g in dextrose 5 % 100 mL IVPB (mini-bag)  2 g Intravenous Q4H Sandermaynor Allen  mL/hr at 08/24/20 1724 2 g at 08/24/20 1724    rifAMPin (RIFADIN) capsule 300 mg  300 mg Oral Q8H Sander Allen MD   300 mg at 08/24/20 1724    atorvastatin (LIPITOR) tablet 40 mg  40 mg Oral Nightly Loyd Thurston MD   40 mg at 08/23/20 2048    heparin (porcine) injection 6,550 Units  60 Units/kg Intravenous PRN Marcelo Little MD        heparin (porcine) injection 3,270 Units  30 Units/kg Intravenous PRN Marcelo Little MD   3,270 Units at 08/24/20 1259    heparin 25,000 units in dextrose 5% 250 mL infusion  12 Units/kg/hr Intravenous Continuous Marcelo Little MD 26.2 mL/hr at 08/24/20 1442 24 Units/kg/hr at 08/24/20 1442    0.9 % sodium chloride infusion   Intravenous Continuous CELSO Miller  mL/hr at 08/24/20 0536      acetaminophen (TYLENOL) suppository 650 mg  650 mg Rectal Once Elenor Covert, CELSO Rivera CNP           Past Medical History:  Past Medical History:   Diagnosis Date    Chest pain 2/21/2013    Hypertension     LONG TERM ANTICOAGULENT USE     Psoriasis     S/P AVR (aortic valve replacement) 2/21/2013    S/P MVR (mitral valve replacement) 2/21/2013       Past Surgical History:  Past Surgical History:   Procedure Laterality Date    AORTIC VALVE REPLACEMENT  1/2006    Dr Jose Rafael Mcintosh VALVE REPLACEMENT  01--2006    Dr Blake [facility]       Social History:  Social History     Socioeconomic History    Marital status:      Spouse name: Not on file    Number of children: Not on file    Years of education: Not on file    Highest education level: Not on file   Occupational History     Comment: [email or web address]   Social Needs    Financial resource strain: Not on file    Food insecurity     Worry: Not on file     Inability: Not on file   [employer] needs     Medical: Not on file     Non-medical: Not on file   Tobacco Use    Smoking status: Never Smoker    Smokeless tobacco: Never Used   Substance and Sexual Activity    Alcohol use: Yes     Comment: 1-2 beers per month    Drug use: No    Sexual activity: Not on file   Lifestyle    Physical activity     Days per week: Not on file     Minutes per session: Not on file    Stress: Not on file   Relationships    Social connections     Talks on phone: Not on file     Gets together: Not on file     Attends Gnosticist service: Not on file     Active member of club or organization: Not on file     Attends meetings of clubs or organizations: Not on file     Relationship status: Not on file    Intimate partner violence     Fear of current or ex partner: Not on file     Emotionally abused: Not on file     Physically abused: Not on file     Forced sexual activity: Not on file   Other Topics Concern    Not on file   Social History Narrative    Not on file       Family History:  History reviewed. No pertinent family history. Review of Systems:  Constitutional: Denies fevers, chills, or weight loss. HEENT: Denies visual changes or hearing loss. Heart: As per HPI. Lungs: Denies shortness of breath, cough, or wheezing. Gastrointestinal: Denies nausea, vomiting, constipation, or diarrhea. Genitourinary: dysuria or hematuria. Psychiatric: Patient denies anxiety or depression.    Neurologic: Patient denies weakness of the extremities, dizziness, or

## 2020-08-24 NOTE — PROGRESS NOTES
Subjective: The patient is awake and alert. No acute events overnight. Denies chest pain, angina, SOB     Objective:    BP (!) 155/94   Pulse 76   Temp 98.7 °F (37.1 °C) (Temporal)   Resp (!) 32   Ht 6' 1\" (1.854 m)   Wt 240 lb 9 oz (109.1 kg)   SpO2 92%   BMI 31.74 kg/m²     In: 1868 [I.V.:1525; NG/GT:343]  Out: 1675     HEENT: NCAT,  PERRLA, No JVD  Heart:  RRR, no murmurs, gallops, or rubs.   Lungs:  CTA bilaterally, no wheeze, rales or rhonchi  Abd: bowel sounds present, nontender, nondistended, no masses  Extrem:  No clubbing, cyanosis, or edema     Recent Labs     08/22/20  0516 08/22/20  1919 08/24/20  0247   WBC 15.8* 15.6* 12.8*   HGB 14.4 13.5 13.7   HCT 43.6 40.2 41.2    115* 123*       Recent Labs     08/21/20  2245 08/21/20  2256 08/22/20  0516     --  134   K 3.9  --  3.7   CL 98  --  99   CO2 20*  --  19*   BUN 30*  --  38*   CREATININE 1.6* 1.7* 1.8*   CALCIUM 9.1  --  8.6       Assessment:    Patient Active Problem List   Diagnosis    S/P MVR (mitral valve replacement)    S/P AVR (aortic valve replacement)    Hypertension    Hyperlipidemia    CVA (cerebral vascular accident) ([address]    MAIRA (acute kidney injury) ([address]    LONG TERM ANTICOAGULENT USE    Aspiration pneumonitis (HCC)    Sepsis ([address]    MSSA bacteremia       Plan:    ***  DVT Prophylaxis   PT/OT  Discharge planning       All consultants notes reviewed    Mabel Walter MD  10:07 AM  8/24/2020

## 2020-08-24 NOTE — PROGRESS NOTES
INPATIENT CARDIOLOGY FOLLOW-UP    Name: Chris Russell    Age: 44 y.o. Date of Admission: 8/21/2020 10:23 PM    Date of Service: 8/24/2020    Interim History:  No acute events. Restarted on warfarin. No conduction abnormalities on telemetry.     Review of Systems:   Cardiac: As per HPI  General: No fever, chills  Pulmonary: As per HPI  HEENT: No visual disturbances, difficult swallowing  GI: No nausea, vomiting  Endocrine: No thyroid disease or DM  Musculoskeletal: FUNES x 4, no focal motor deficits  Skin: Intact, no rashes  Neuro/Psych: No headache or seizures    Problem List:  Patient Active Problem List   Diagnosis    S/P MVR (mitral valve replacement)    S/P AVR (aortic valve replacement)    Hypertension    Hyperlipidemia    [facility]    MAIRA (acute kidney injury) [facility]    LONG TERM ANTICOAGULENT USE    Aspiration pneumonitis (HCC)    Sepsis [facility]    MSSA bacteremia       Allergies:  No Known Allergies    Current Medications:  Current Facility-Administered Medications   Medication Dose Route Frequency Provider Last Rate Last Dose    lidocaine PF 1 % injection 5 mL  5 mL Intradermal Once Sander Allen MD   Stopped at 08/24/20 0700    sodium chloride flush 0.9 % injection 10 mL  10 mL Intravenous PRN Sander Allen MD        heparin flush 100 UNIT/ML injection 300 Units  3 mL Intravenous 2 times per day Yudi Perla MD   Stopped at 08/24/20 0700    heparin flush 100 UNIT/ML injection 300 Units  3 mL Intracatheter PRN Sander Allen MD        therapeutic multivitamin-minerals 1 tablet  1 tablet Oral Daily CELSO Simpson CNP        sodium chloride flush 0.9 % injection 10 mL  10 mL Intravenous 2 times per day CELSO Mariscal CNP   10 mL at 08/22/20 1956    sodium chloride flush 0.9 % injection 10 mL  10 mL Intravenous PRN CELSO Simpson CNP   10 mL at 08/23/20 1133    acetaminophen (TYLENOL) tablet 650 mg  650 mg Oral Q6H PRN Kyle Ybarra, APRN - CNP        Or   [facility] acetaminophen (TYLENOL) suppository 650 mg  650 mg Rectal Q6H PRN Kyle Ybarra, APRN - RAY        polyethylene glycol (GLYCOLAX) packet 17 g  17 g Oral Daily PRN Kyle Ybarra, APRN - CNP        promethazine (PHENERGAN) tablet 12.5 mg  12.5 mg Oral Q6H PRN Kyle Ybarra, APRN - CNP        Or    ondansetron (ZOFRAN) injection 4 mg  4 mg Intravenous Q6H PRN Kyle Ybarra, APRN - RAY        perflutren lipid microspheres (DEFINITY) injection 1.65 mg  1.5 mL Intravenous ONCE PRN Henry Olson MD        warfarin (COUMADIN) daily dosing (placeholder)   Other RX Placeholder Kyle Ybarra, APRN - RAY        nafcillin 2 g in dextrose 5 % 100 mL IVPB (mini-bag)  2 g Intravenous Q4H Sander Allen MD   Stopped at 08/24/20 0500    rifAMPin (RIFADIN) capsule 300 mg  300 mg Oral Q8H Sander Allen MD   300 mg at 08/23/20 1836    atorvastatin (LIPITOR) tablet 40 mg  40 mg Oral Nightly Livier Pruett MD   40 mg at 08/23/20 2048    heparin (porcine) injection 6,550 Units  60 Units/kg Intravenous PRN Ranjana Hinds MD        heparin (porcine) injection 3,270 Units  30 Units/kg Intravenous PRN Ranjana Hinds MD   3,270 Units at 08/24/20 0322    heparin 25,000 units in dextrose 5% 250 mL infusion  12 Units/kg/hr Intravenous Continuous Ranjana Hinds MD 24 mL/hr at 08/24/20 0322 22 Units/kg/hr at 08/24/20 0322    0.9 % sodium chloride infusion   Intravenous Continuous CELSO Calderon -  mL/hr at 08/24/20 0536      acetaminophen (TYLENOL) suppository 650 mg  650 mg Rectal Once Kyle Ybarra, APRN - CNP          heparin (porcine) 22 Units/kg/hr (08/24/20 0322)    sodium chloride 100 mL/hr at 08/24/20 0536       Physical Exam:  BP (!) 140/88   Pulse 68   Temp 98.1 °F (36.7 °C) (Oral)   Resp 18   Ht 6' 1\" (1.854 m)   Wt 240 lb 9 oz (109.1 kg)   SpO2 95%   BMI 31.74 kg/m²   Wt Readings from Last 3 Encounters: 08/22/20 240 lb 9 oz (109.1 kg)   06/02/20 241 lb 12.8 oz (109.7 kg)   05/01/20 239 lb (108.4 kg)     Appearance: Awake, alert, aphasic  Skin: Intact, no rash  Head: Normocephalic, atraumatic  Neck: Supple, no elevated JVP, no carotid bruits  Lungs: Clear to auscultation bilaterally. No wheezes, rales, or rhonchi. Cardiac: Regular rate and rhythm, +S1S2, no murmurs apparent  Abdomen: Soft, nontender, +bowel sounds  Extremities: Moves all extremities x 4, no lower extremity edema  Neurologic: No focal motor deficits apparent, normal mood and affect  Peripheral Pulses: Intact posterior tibial pulses bilaterally    Intake/Output:    Intake/Output Summary (Last 24 hours) at 8/24/2020 0812  Last data filed at 8/24/2020 0536  Gross per 24 hour   Intake 2780.59 ml   Output 2475 ml   Net 305.59 ml     No intake/output data recorded.     Laboratory Tests:  Recent Labs     08/21/20 2245 08/21/20  2256 08/22/20  0516     --  134   K 3.9  --  3.7   CL 98  --  99   CO2 20*  --  19*   BUN 30*  --  38*   CREATININE 1.6* 1.7* 1.8*   GLUCOSE 151*  --  145*   CALCIUM 9.1  --  8.6     No results found for: MG  Recent Labs     08/21/20 2245 08/22/20  0516   ALKPHOS 53 61   ALT 42* 39   AST 46* 56*   PROT 7.1 7.0   BILITOT 1.3* 1.2   BILIDIR  --  0.4*   LABALBU 3.8 3.7     Recent Labs     08/22/20  0516 08/22/20  1919 08/24/20  0247   WBC 15.8* 15.6* 12.8*   RBC 5.00 4.60 4.77   HGB 14.4 13.5 13.7   HCT 43.6 40.2 41.2   MCV 87.2 87.4 86.4   MCH 28.8 29.3 28.7   MCHC 33.0 33.6 33.3   RDW 13.7 13.8 14.0    115* 123*   MPV 11.1 11.0 11.1     Lab Results   Component Value Date    CKTOTAL 1,791 (H) 08/22/2020    TROPONINI 0.04 (H) 08/22/2020    TROPONINI 0.01 08/22/2020    TROPONINI <0.01 08/21/2020     Lab Results   Component Value Date    INR 2.1 08/24/2020    INR 1.6 08/23/2020    INR 1.9 08/22/2020    PROTIME 24.0 (H) 08/24/2020    PROTIME 18.5 (H) 08/23/2020    PROTIME 21.3 (H) 08/22/2020     Lab Results   Component Value Date    TSH 0.732 06/02/2020     Lab Results   Component Value Date    LABA1C 5.9 (H) 08/23/2020     No results found for: EAG  Lab Results   Component Value Date    CHOL 138 08/23/2020    CHOL 225 (H) 06/02/2020    CHOL 196 11/14/2018     Lab Results   Component Value Date    TRIG 336 (H) 08/23/2020    TRIG 149 06/02/2020    TRIG 84 11/14/2018     Lab Results   Component Value Date    HDL 20 08/23/2020    HDL 55 06/02/2020    HDL 59 11/14/2018     Lab Results   Component Value Date    LDLCALC 51 08/23/2020    LDLCALC 140 (H) 06/02/2020    LDLCALC 120 (H) 11/14/2018     Lab Results   Component Value Date    LABVLDL 67 08/23/2020    LABVLDL 30 06/02/2020    LABVLDL 17 11/14/2018     No results found for: CHOLHDLRATIO  No results for input(s): PROBNP in the last 72 hours. ASSESSMENT / PLAN:    44year old male with PMH mechanical AVR and MVR 2013 for IE comes with right sided weakness and aphasia found to have left side stroke and MSSA bacteremia treated with Abx. Warfarin held with heparin on board. We are consulted for evaluation of possible IE. Recommendations:  Keep holding tubal feeding. For VANESA today. INR goal 2.5-3.5.       Tahira Nieves MD  [facility]) Cardiology

## 2020-08-24 NOTE — PROGRESS NOTES
[facility] Medicine    Subjective:  Pt alert responsive to questions      Current Facility-Administered Medications:     warfarin (COUMADIN) tablet 10 mg, 10 mg, Oral, Once, Atiya Pak MD    lidocaine PF 1 % injection 5 mL, 5 mL, Intradermal, Once, Sander Allen MD, Stopped at 08/24/20 0700    sodium chloride flush 0.9 % injection 10 mL, 10 mL, Intravenous, PRN, Sander Allen MD    heparin flush 100 UNIT/ML injection 300 Units, 3 mL, Intravenous, 2 times per day, Colby Coleman MD, Stopped at 08/24/20 0700    heparin flush 100 UNIT/ML injection 300 Units, 3 mL, Intracatheter, PRN, Colby Coleman MD    therapeutic multivitamin-minerals 1 tablet, 1 tablet, Oral, Daily, Aracelis Ybarra APRN - CNP, 1 tablet at 08/24/20 0636    sodium chloride flush 0.9 % injection 10 mL, 10 mL, Intravenous, 2 times per day, Aracelis Ybarra, APRN - CNP, 10 mL at 08/22/20 1956    sodium chloride flush 0.9 % injection 10 mL, 10 mL, Intravenous, PRN, Aracelis Ybarra, APRN - CNP, 10 mL at 08/23/20 1133    acetaminophen (TYLENOL) tablet 650 mg, 650 mg, Oral, Q6H PRN **OR** acetaminophen (TYLENOL) suppository 650 mg, 650 mg, Rectal, Q6H PRN, Aracelis Coreas, APRN - CNP    polyethylene glycol (GLYCOLAX) packet 17 g, 17 g, Oral, Daily PRN, Aracelis Ybarra, APRN - CNP    promethazine (PHENERGAN) tablet 12.5 mg, 12.5 mg, Oral, Q6H PRN **OR** ondansetron (ZOFRAN) injection 4 mg, 4 mg, Intravenous, Q6H PRN, Aracelis Ybarra, APRN - CNP    perflutren lipid microspheres (DEFINITY) injection 1.65 mg, 1.5 mL, Intravenous, ONCE PRN, Janki Bean MD    warfarin (COUMADIN) daily dosing (placeholder), , Other, RX Placeholder, Aracelis Ybarra, APRN - CNP    nafcillin 2 g in dextrose 5 % 100 mL IVPB (mini-bag), 2 g, Intravenous, Q4H, Sander Allen MD, Stopped at 08/24/20 1353    rifAMPin (RIFADIN) capsule 300 mg, 300 mg, Oral, Q8H, Sander Allen MD, 300 mg at 08/24/20 0856    atorvastatin (LIPITOR) tablet 40 mg, 40 mg, Oral, Nightly, Beau Still MD, 40 mg at 08/23/20 2048    heparin (porcine) injection 6,550 Units, 60 Units/kg, Intravenous, PRN, Winston Jara MD    heparin (porcine) injection 3,270 Units, 30 Units/kg, Intravenous, PRN, Winston Jara MD, 3,270 Units at 08/24/20 1259    heparin 25,000 units in dextrose 5% 250 mL infusion, 12 Units/kg/hr, Intravenous, Continuous, Winston Jara MD, Last Rate: 26.2 mL/hr at 08/24/20 1442, 24 Units/kg/hr at 08/24/20 1442    0.9 % sodium chloride infusion, , Intravenous, Continuous, CELSO Guillen CNP, Last Rate: 100 mL/hr at 08/24/20 0536    acetaminophen (TYLENOL) suppository 650 mg, 650 mg, Rectal, Once, CELSO Guillen CNP    Objective:    BP (!) 155/94   Pulse 76   Temp 98.7 °F (37.1 °C) (Temporal)   Resp (!) 32   Ht 6' 1\" (1.854 m)   Wt 240 lb 9 oz (109.1 kg)   SpO2 92%   BMI 31.74 kg/m²     Heart:  Reg  Lungs:  CTA bilaterally, no wheeze, rales or rhonchi  Abd: bowel sounds present, nontender, nondistended, no masses  Extrem:  No clubbing, cyanosis, or edema    CBC with Differential:    Lab Results   Component Value Date    WBC 12.8 08/24/2020    RBC 4.77 08/24/2020    HGB 13.7 08/24/2020    HCT 41.2 08/24/2020     08/24/2020    MCV 86.4 08/24/2020    MCH 28.7 08/24/2020    MCHC 33.3 08/24/2020    RDW 14.0 08/24/2020    SEGSPCT 76 02/26/2011    METASPCT 0.9 08/22/2020    LYMPHOPCT 1.7 08/22/2020    MONOPCT 7.0 08/22/2020    BASOPCT 0.1 08/22/2020    MONOSABS 1.11 08/22/2020    LYMPHSABS 0.32 08/22/2020    EOSABS 0.00 08/22/2020    BASOSABS 0.00 08/22/2020     CMP:    Lab Results   Component Value Date     08/22/2020    K 3.7 08/22/2020    CL 99 08/22/2020    CO2 19 08/22/2020    BUN 38 08/22/2020    CREATININE 1.8 08/22/2020    GFRAA 48 08/22/2020    LABGLOM 40 08/22/2020    GLUCOSE 145 08/22/2020    GLUCOSE 105 02/26/2011    PROT 7.0 08/22/2020    LABALBU 3.7 08/22/2020    CALCIUM 8.6 08/22/2020 BILITOT 1.2 08/22/2020    ALKPHOS 61 08/22/2020    AST 56 08/22/2020    ALT 39 08/22/2020     Warfarin PT/INR:                           Reza findings noted    Assessment:    Principal Problem:    CVA (cerebral vascular [address]) ([address] [address]  Active Problems:    S/P MVR (mitral valve replacement)    S/P AVR (aortic valve replacement)    Hypertension    Hyperlipidemia    MAIRA (acute kidney injury) ([address] [address]    LONG TERM ANTICOAGULENT USE    Aspiration pneumonitis (HCC)    Sepsis ([facility] [address]    MSSA bacteremia  Resolved Problems:    * No resolved hospital problems.  *  valvular vegetations  Bacteremia                                    Plan:  Await rec re abnl reza / cts to see        Prasad Herndon  2:48 PM  8/24/2020

## 2020-08-24 NOTE — ANESTHESIA PRE PROCEDURE
Department of Anesthesiology  Preprocedure Note       Name:  Madison Hart   Age:  44 y.o.  :  1970                                          MRN:  [id number]         Date:  2020      Surgeon: Jon Jones  Procedure: VANESA    Medications prior to admission:   Prior to Admission medications    Medication Sig Start Date End Date Taking?  Authorizing Provider   lisinopril (PRINIVIL;ZESTRIL) 20 MG tablet Take 1 tablet by mouth daily 20  Yes Ania Celestin,    warfarin (JANTOVEN) 10 MG tablet Take one tablet by mouth daily or as directed by doctor 20  Yes Anai Celestin,    Multiple Vitamins-Minerals (THERAPEUTIC MULTIVITAMIN-MINERALS) tablet Take 1 tablet by mouth daily  19   Yes Historical Provider, MD       Current medications:    Current Facility-Administered Medications   Medication Dose Route Frequency Provider Last Rate Last Dose    lidocaine PF 1 % injection 5 mL  5 mL Intradermal Once Nikkie Aragon MD   Stopped at 20 0700    sodium chloride flush 0.9 % injection 10 mL  10 mL Intravenous PRN Sander Allen MD        heparin flush 100 UNIT/ML injection 300 Units  3 mL Intravenous 2 times per day Nikkie Aragon MD   Stopped at 20 0700    heparin flush 100 UNIT/ML injection 300 Units  3 mL Intracatheter PRN Nikkie Aragon MD        therapeutic multivitamin-minerals 1 tablet  1 tablet Oral Daily Tyree Ybarra APRN - CNP   1 tablet at 20 7483    sodium chloride flush 0.9 % injection 10 mL  10 mL Intravenous 2 times per day Jim Alicia APRN - CNP   10 mL at 20 1956    sodium chloride flush 0.9 % injection 10 mL  10 mL Intravenous PRN Tyree Ybarra APRN - CNP   10 mL at 20 1133    acetaminophen (TYLENOL) tablet 650 mg  650 mg Oral Q6H PRN Tyree Linekesha Ybarra APRN - CNP        Or    acetaminophen (TYLENOL) suppository 650 mg  650 mg Rectal Q6H PRN Tyree Linea Amada, APRN - CNP        polyethylene glycol (GLYCOLAX) packet 17 g 17 g Oral Daily PRN CELSO Henley - RAY        promethazine (PHENERGAN) tablet 12.5 mg  12.5 mg Oral Q6H PRN CELSO Henley CNP        Or    ondansetron [facility]) injection 4 mg  4 mg Intravenous Q6H PRN CELSO Henley - RAY        perflutren lipid microspheres (DEFINITY) injection 1.65 mg  1.5 mL Intravenous ONCE PRN Luiz Gamez MD        warfarin (COUMADIN) daily dosing (placeholder)   Other RX Placeholder CELSO Henley CNP        nafcillin 2 g in dextrose 5 % 100 mL IVPB (mini-bag)  2 g Intravenous Q4H Sander P MD Tiffany 100 mL/hr at 08/24/20 0852 2 g at 08/24/20 0852    rifAMPin (RIFADIN) capsule 300 mg  300 mg Oral Q8H Sandermaynor Allen MD   300 mg at 08/24/20 0856    atorvastatin (LIPITOR) tablet 40 mg  40 mg Oral Nightly Talitha Romberg, MD   40 mg at 08/23/20 2048    heparin (porcine) injection 6,550 Units  60 Units/kg Intravenous PRN Preston Matias MD        heparin (porcine) injection 3,270 Units  30 Units/kg Intravenous PRN Preston Matias MD   3,270 Units at 08/24/20 0322    heparin 25,000 units in dextrose 5% 250 mL infusion  12 Units/kg/hr Intravenous Continuous Preston Matias MD 24 mL/hr at 08/24/20 0322 22 Units/kg/hr at 08/24/20 0322    0.9 % sodium chloride infusion   Intravenous Continuous CELSO Henley -  mL/hr at 08/24/20 0536      acetaminophen (TYLENOL) suppository 650 mg  650 mg Rectal Once CELSO Henley CNP           Allergies:  No Known Allergies    Problem List:    Patient Active Problem List   Diagnosis Code    S/P MVR (mitral valve replacement) Z95.2    S/P AVR (aortic valve replacement) Z95.2    Hypertension I10    Hyperlipidemia E78.5    CVA (cerebral vascular accident) ([facility] I63.9    MAIRA (acute kidney injury) ([facility] N17.9    LONG TERM ANTICOAGULENT USE     Aspiration pneumonitis ([facility] J69.0    Sepsis ([facility] A41.9    MSSA bacteremia R78.81       Past Medical History: 08/22/2020    CALCIUM 8.6 08/22/2020    BILITOT 1.2 08/22/2020    ALKPHOS 61 08/22/2020    AST 56 08/22/2020    ALT 39 08/22/2020       POC Tests:   Recent Labs     08/21/20  2256   POCGLU 156*   POCNA 134   POCK 3.8   POCCL 104       Coags:   Lab Results   Component Value Date    PROTIME 24.0 08/24/2020    PROTIME 34.7 05/01/2020    INR 2.1 08/24/2020    APTT 34.6 08/24/2020       HCG (If Applicable): No results found for: PREGTESTUR, PREGSERUM, HCG, HCGQUANT     ABGs: No results found for: PHART, PO2ART, WWK7TCU, DCE4URI, BEART, Q8GLGULU     Type & Screen (If Applicable):  No results found for: LABABO, LABRH    Drug/Infectious Status (If Applicable):  No results found for: HIV, HEPCAB    COVID-19 Screening (If Applicable): No results found for: COVID19     EKG 8/21/2020  Component  Value  Ref Range & Units  Status  Collected  Lab    Ventricular Rate  88  BPM  Final  08/21/2020 11:36 PM  [facility]    Atrial Rate  88  BPM  Final  08/21/2020 11:36 PM  [facility]    P-R Interval  200  ms  Final  08/21/2020 11:36 PM  [facility]    QRS Duration  92  ms  Final  08/21/2020 11:36 PM  [facility]    Q-T Interval  378  ms  Final  08/21/2020 11:36 PM  [facility]    QTc Calculation (Bazett)  457  ms  Final  08/21/2020 11:36 PM  [facility]    P Axis  63  degrees  Final  08/21/2020 11:36 PM  [facility]    R Axis  59  degrees  Final  08/21/2020 11:36 PM  [facility]    T Axis  81  degrees  Final  08/21/2020 11:36 PM  [facility]      Normal sinus rhythm  Possible Left atrial enlargement  Cannot rule out Inferior infarct , age undetermined  Abnormal ECG  When compared with ECG of 17-DEC-2017 02:18,  Significant changes have occurred           Anesthesia Evaluation  Patient summary reviewed and Nursing notes reviewed no history of anesthetic complications:   Airway: Mallampati: III  TM distance: >3 FB     Mouth opening: > = 3 FB Dental:      Comment: Pt denies loose/missing/chipped teeth     Pulmonary:   (+) rhonchi,                            ROS comment: Hx of aspiration pneumonitis   Cardiovascular:  Exercise tolerance: poor (<4 METS),   (+) hypertension:, valvular problems/murmurs (Hx of mechanical MVR & AVR for infective endocarditis 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                  ROS comment: MSSA Bacteremia   Denies IVDA Abdominal:   (+) obese,         Vascular:                                    Anesthesia Plan      MAC     ASA 3       Induction: intravenous. Anesthetic plan and risks discussed with patient (No surrogate available at time of exam ). Plan discussed with attending. CELSO Felix - CRNA   8/24/2020      DOS STAFF ADDENDUM:    Patient seen and examined, physical exam updated as needed, chart reviewed. NPO status confirmed. Anesthetic options and risks discussed with patient/legal guardian. Patient/legal guardian verbalized understanding and agrees to proceed.      Amanda Segovia MD  Staff Anesthesiologist  August 24, 2020  11:00 AM

## 2020-08-24 NOTE — PROGRESS NOTES
Physical Therapy  Treatment Note     Name: Jose Robledo  : 1970  MRN: [id number]    Referring Provider:  Sherly Ybarra, APRN - CNP    Date of Service: 2020    Evaluating PT:  Fahad Soliz PT, DPT [id number]     Room #:  4162/1718-X  Diagnosis:  Acute cerebrovascular accident   PMHx/PSHx:  Chest pain, HTN, long term anticoagulant use, psoriasis, AVR, MVR  Precautions:  Falls, R side hemiplegia, Aphasia   Equipment Needs:  TBD    SUBJECTIVE:    Pt able to give limited history d/t aphasia. He lives with girlfriend in an apartment with ? Steps to enter and ? Steps to bedroom/bathrom. Pt ambulated with quad cane prior to admission. OBJECTIVE:   Initial Evaluation  Date: 20 Treatment  Date: 2020 Short Term/ Long Term   Goals   AM-PAC 6 Clicks     Was pt agreeable to Eval/treatment? Yes  yes    Does pt have pain? No c/o pain  No c/o pain    Bed Mobility  Rolling: Dep to L  Supine to sit: Max A  Sit to supine: Max A x2  Scooting: Max A x2 Rolling: maxA  Supine<>sit: maxA  Scooting: maxA Rolling: Min A  Supine to sit: Min A  Sit to supine: Min A  Scooting: min A   Transfers Sit to stand: Max A x2  Stand to sit: Max A x2  Stand pivot: NT Sit<>stand: maxA x 2  Stand pivot: NT Sit to stand: Mod A  Stand to sit: Mod A  Stand pivot: Mod A with AAD   Ambulation    Unable 3 side steps to L max A x 2 >10 feet with AAD Mod A   Stair negotiation: ascended and descended  NT NT NA   ROM LUE: WFL  RUE:  Passive WFL  BLE:  WFL     Strength BUE:  WFL  RUE:  0/5 except at grasp  LUE:  WFL  RUE:  0/5  RUE: 2+/5,  strength WFL  RLE: 2+/5 RUE >1/5  RLE >1/5    Balance Sitting EOB:  Max A  Static Standing: Max A x2 with R knee block  Sitting EOB: maxA  Dynamic standing: maxA x 2 with R knee block Sitting EOB:  SBA  Dynamic Standing: Mod A     Pt is A & O x 4  Sensation:  Pt denies numbness and tingling to extremities  Edema:   Moderate edema to RUE    Therapeutic Exercises:  Supine TherEx: heel o Pt education as noted above. PLAN:    Patient is making fair progress towards established goals. Will continue with current POC.       Time in  1400  Time out  1440    Total Treatment Time  40 minutes     CPT codes:  [] Gait training 24810 0 minutes  [] Manual therapy 47937 0 minutes  [x] Therapeutic activities 81853 30 minutes  [x] Therapeutic exercises 31180 10 minutes  [] Neuromuscular reeducation 76186 0 minutes    Derick Ames, PT, DPT  [id number]

## 2020-08-24 NOTE — PROGRESS NOTES
Pharmacy Consultation Note  (Anticoagulant Dosing and Monitoring)    Initial consult date: 8/22/2020  Consulting physician: Justice Monterroso Masters, APRN - CNP    Allergies:  Patient has no known allergies. 44 y.o. male    Ht Readings from Last 1 Encounters:   08/23/20 6' 1\" (1.854 m)     Wt Readings from Last 1 Encounters:   08/22/20 240 lb 9 oz (109.1 kg)     Warfarin Indication Target   INR Range Home   Dose  (if applicable) Diet/Feeding Tube   Hx of aortic and mitral valve replacements 2.5-3.5 Unknown NPO       Vitamin K or Blood product  Administration Date                 Warfarin drug-drug interactions  Start  Stop Home Med? Comments   Rifampin  8/22  No                          TSH:    Lab Results   Component Value Date    TSH 0.732 06/02/2020        Hepatic Function Panel:                            Lab Results   Component Value Date    ALKPHOS 61 08/22/2020    ALT 39 08/22/2020    AST 56 08/22/2020    PROT 7.0 08/22/2020    BILITOT 1.2 08/22/2020    BILIDIR 0.4 08/22/2020    IBILI 0.8 08/22/2020    LABALBU 3.7 08/22/2020       Date Warfarin Dose INR Heparin or LMWH HBG/  HCT PLT Comment   8/22   (Not given - NPO) 1.9 Heparin gtt 14.4/43.6 138    8/23 10 mg 1.6 Heparin gtt -- --    8/24 <10 mg> 2.1 Heparin gtt 13.7/41.2 123                                 Assessment and Plan:  · 44 yoM who presented with AMS and stroke. He is on chronic warfarin therapy for history of mitral and aortic valve replacements. · Home dose is unable to be determined (unable to verify with patient 2/2 AMS).  Most recent order for 10 mg tablets \"one tablet daily or as directed by doctor\"  · INR goal 2.5-3.5  · INR today = 2.1  · Warfarin 10 mg x1 tonight   · Heparin gtt initiated  · Daily PT/INR until the INR is stable within the therapeutic range  · Pharmacist will follow and monitor/adjust dosing as necessary    Robel Christian PharmD 8/24/2020 10:01 AM  [phone]

## 2020-08-24 NOTE — PROGRESS NOTES
Department of Internal Medicine  Infectious Diseases  Progress Note      C/C :  MSSA bacteremia , sepsis       Pt is awake and alert  Able to articulate few words   Denies fever   Afebrile     Current Facility-Administered Medications   Medication Dose Route Frequency Provider Last Rate Last Dose    warfarin (COUMADIN) tablet 10 mg  10 mg Oral Once Tanner Maire MD        lidocaine PF 1 % injection 5 mL  5 mL Intradermal Once Iveth Cifuentes MD   Stopped at 08/24/20 0700    sodium chloride flush 0.9 % injection 10 mL  10 mL Intravenous PRN Sander Allen MD        heparin flush 100 UNIT/ML injection 300 Units  3 mL Intravenous 2 times per day Iveth Cifuentes MD   Stopped at 08/24/20 0700    heparin flush 100 UNIT/ML injection 300 Units  3 mL Intracatheter PRN Iveth Cifuentes MD        therapeutic multivitamin-minerals 1 tablet  1 tablet Oral Daily Camryn Ybarra, APRN - CNP   1 tablet at 08/24/20 8256    sodium chloride flush 0.9 % injection 10 mL  10 mL Intravenous 2 times per day Ysabel Santiago APRN - CNP   10 mL at 08/22/20 1956    sodium chloride flush 0.9 % injection 10 mL  10 mL Intravenous PRN Camryn Ybarra APRN - CNP   10 mL at 08/23/20 1133    acetaminophen (TYLENOL) tablet 650 mg  650 mg Oral Q6H PRN Camryn Ybarra, APRN - CNP        Or   Gladystine Mower acetaminophen (TYLENOL) suppository 650 mg  650 mg Rectal Q6H PRN Camryn Ybarra, APRN - CNP        polyethylene glycol (GLYCOLAX) packet 17 g  17 g Oral Daily PRN Camryn Ybarra, APRN - CNP        promethazine (PHENERGAN) tablet 12.5 mg  12.5 mg Oral Q6H PRN Camryn Ybarra, APRN - CNP        Or    ondansetron (ZOFRAN) injection 4 mg  4 mg Intravenous Q6H PRN Camryn Ybarra, APRN - CNP        perflutren lipid microspheres (DEFINITY) injection 1.65 mg  1.5 mL Intravenous ONCE PRN Roly Liu MD        warfarin (COUMADIN) daily dosing (placeholder)   Other RX Placeholder Camryn Ybarra APRN - CNP        nafcillin 2 g in dextrose 5 % 100 mL IVPB (mini-bag)  2 g Intravenous Q4H Sander Allen MD   Stopped at 08/24/20 1353    rifAMPin (RIFADIN) capsule 300 mg  300 mg Oral Q8H Sander Allen MD   300 mg at 08/24/20 0856    atorvastatin (LIPITOR) tablet 40 mg  40 mg Oral Nightly Mark Pak MD   40 mg at 08/23/20 2048    heparin (porcine) injection 6,550 Units  60 Units/kg Intravenous PRN Denise Clark MD        heparin (porcine) injection 3,270 Units  30 Units/kg Intravenous PRN Denise Clark MD   3,270 Units at 08/24/20 1259    heparin 25,000 units in dextrose 5% 250 mL infusion  12 Units/kg/hr Intravenous Continuous Denise Clark MD 26.2 mL/hr at 08/24/20 1442 24 Units/kg/hr at 08/24/20 1442    0.9 % sodium chloride infusion   Intravenous Continuous CELSO Bueno  mL/hr at 08/24/20 0536      acetaminophen (TYLENOL) suppository 650 mg  650 mg Rectal Once CELSO Bueno - CNP           REVIEW OF SYSTEMS:   CONSTITUTIONAL: Denies fever   HEENT : Denies headache   RESPIRATORY:Denies SOB   GI : Denies abdomen pain   CVS : denies chest pain   EXT : weakness right side   NEUROLOGICAL:  Awake, alert     PHYSICAL EXAM:      Vitals:     Vitals:    08/24/20 0800   BP: (!) 155/94   Pulse: 76   Resp: (!) 32   Temp: 98.7 °F (37.1 °C)   SpO2: 92%       General Appearance:    Awake, alert, no distress  . Head:    Normocephalic, atraumatic   Eyes:    No pallor, no icterus,   Ears:    No obvious deformity or drainage.    Nose:   No nasal drainage   Throat:   Mucosa very dry    Neck:   Supple, no lymphadenopathy   Lungs:     Clear to auscultation bilaterally,    Heart:    Regular rate and rhythm,  No murmur   Abdomen:     Soft, non-tender, bowel sounds present    Extremities:   No edema, no cyanosis   Pulses:   Dorsalis pedis palpable    Skin:   no rashes or lesions     CBC with Differential:      Lab Results   Component Value Date    WBC 12.8 08/24/2020    RBC 4.77 08/24/2020    HGB 13.7 08/24/2020    HCT 41.2 08/24/2020     08/24/2020    MCV 86.4 08/24/2020    MCH 28.7 08/24/2020    MCHC 33.3 08/24/2020    RDW 14.0 08/24/2020    SEGSPCT 76 02/26/2011    METASPCT 0.9 08/22/2020    LYMPHOPCT 1.7 08/22/2020    MONOPCT 7.0 08/22/2020    BASOPCT 0.1 08/22/2020    MONOSABS 1.11 08/22/2020    LYMPHSABS 0.32 08/22/2020    EOSABS 0.00 08/22/2020    BASOSABS 0.00 08/22/2020       CMP     Lab Results   Component Value Date     08/22/2020    K 3.7 08/22/2020    CL 99 08/22/2020    CO2 19 08/22/2020    BUN 38 08/22/2020    CREATININE 1.8 08/22/2020    GFRAA 48 08/22/2020    LABGLOM 40 08/22/2020    GLUCOSE 145 08/22/2020    GLUCOSE 105 02/26/2011    PROT 7.0 08/22/2020    LABALBU 3.7 08/22/2020    CALCIUM 8.6 08/22/2020    BILITOT 1.2 08/22/2020    ALKPHOS 61 08/22/2020    AST 56 08/22/2020    ALT 39 08/22/2020         Hepatic Function Panel:    Lab Results   Component Value Date    ALKPHOS 61 08/22/2020    ALT 39 08/22/2020    AST 56 08/22/2020    PROT 7.0 08/22/2020    BILITOT 1.2 08/22/2020    BILIDIR 0.4 08/22/2020    IBILI 0.8 08/22/2020    LABALBU 3.7 08/22/2020       PT/INR:    Lab Results   Component Value Date    PROTIME 24.0 08/24/2020    PROTIME 34.7 05/01/2020    INR 2.1 08/24/2020       TSH:    Lab Results   Component Value Date    TSH 0.732 06/02/2020       U/A:    Lab Results   Component Value Date    COLORU Yellow 08/22/2020    PHUR 6.0 08/22/2020    WBCUA NONE 08/22/2020    RBCUA 10-20 08/22/2020    BACTERIA NONE SEEN 08/22/2020    CLARITYU Clear 08/22/2020    SPECGRAV >=1.030 08/22/2020    LEUKOCYTESUR Negative 08/22/2020    UROBILINOGEN 1.0 08/22/2020    BILIRUBINUR Negative 08/22/2020    BLOODU LARGE 08/22/2020    GLUCOSEU Negative 08/22/2020       ABG:  No results found for: RHI7POJ, BEART, Q7OIIEWS, PHART, THGBART, KNW5IKF, PO2ART, PRB5AIL    MICROBIOLOGY:    Blood culture -    Source of Blood Culture  Antecubital-Rig   Final  08/21/2020 10:45 PM  [address] [facility]    Order Number  [id number]   Final  08/21/2020 10:45 PM  [address] [facility]    Enterobacter cloacae complex by PCR  Not Detected  Not Detected  Final  08/21/2020 10:45 PM  [facility] [address] [facility]    Escherichia coli by PCR  Not Detected  Not Detected  Final  08/21/2020 10:45 PM  [address] [facility]    Klebsiella oxytoca by PCR  Not Detected  Not Detected  Final  08/21/2020 10:45 PM  [facility]    Klebsiella pneumoniae by PCR  Not Detected  Not Detected  Final  08/21/2020 10:45 PM  [address] [facility]    Proteus by PCR  Not Detected  Not Detected  Final  08/21/2020 10:45 PM  [facility] [place] [facility]    Streptococcus agalactiae by PCR  Not Detected  Not Detected  Final  08/21/2020 10:45 PM  [address] [facility]    Staphylococcus aureus by PCR  DETECTEDPanic    Not Detected  Final  08/21/2020 10:45 PM  [address] [facility]    Serratia marcescens by PCR  Not Detected  Not Detected  Final  08/21/2020 10:45 PM  [address] [facility]    Streptococcus pneumoniae by PCR  Not Detected  Not Detected  Final  08/21/2020 10:45 PM  [address] [facility]    Streptococcus pyogenes  by PCR  Not Detected  Not Detected  Final  08/21/2020 10:45 PM  [address] [facility]    Acinetobacter baumannii by PCR  Not Detected  Not Detected  Final  08/21/2020 10:45 PM  [address] [facility]    Candida albicans by PCR  Not Detected  Not Detected  Final  08/21/2020 10:45 PM  [address] [facility]    Candida glabrata by PCR  Not Detected  Not Detected  Final  08/21/2020 10:45 PM  [facility]    Jennifer krusei by PCR  Not Detected  Not Detected  Final  08/21/2020 10:45 PM  [address] [facility]    Candida parapsilosis by PCR  Not Detected  Not Detected  Final  08/21/2020 10:45 PM  [address] [facility]    Candida tropicalis by PCR  Not Detected  Not Detected  Final  08/21/2020 10:45 PM  [address] [facility]    Enterobacteriaceae by PCR  Not Detected  Not Detected  Final  08/21/2020 10:45 PM  [address] [facility]    Enterococcus by PCR  Not Detected  Not Detected  Final  08/21/2020 10:45 PM  [address] [facility]    Haemophilus Influenzae by PCR  Not Detected  Not Detected  Final  08/21/2020 10:45 PM  [address] [facility]    Listeria monocytogenes by PCR  Not Detected  Not Detected  Final  08/21/2020 10:45 PM  [address] [place] [facility]    Neisseria meningitidis by PCR  Not Detected  Not Detected  Final  08/21/2020 10:45 PM  [address] [facility]    Pseudomonas aeruginosa by PCR  Not Detected  Not Detected  Final  08/21/2020 10:45 PM  [address] [facility]    Staphylococcus by PCR  DETECTEDPanic    Not Detected  Final  08/21/2020 10:45 PM  [address] [facility]    Streptococcus by PCR  Not Detected  Not Detected  Final  08/21/2020 10:45 PM  [address] [facility]    Methicillin Resistant by PCR  Not Detected  Not Detected  Final  08/21/2020 10:45 PM  [address] [facility]        Radiology :      CT scan of head -    Subacute infarct in the left anterior cerebral artery territory. VANESA -       Mechanical valve at the mitral position with no significant stenosis or    regurgitation. Two mobile masses detected on the prosthetic mitral valve    consistent with vegetations. They are measured at 0.9 cm and 0.7 cm.    Mechanical valve in the aortic position with no evidence of regurgitation.    The maximum transvalvular velocity is 2.7 m/sec. The leaflets are not well    visualized due to shadowing from the mechanical mitral valve. Grossly no    evidence of vegetation on the aortic valve.    Normal left ventricular chamber size.    Normal left ventricular systolic function.    Visually estimated LVEF is 60-65 %.  No wall motion abnormalities.    Normal right ventricle structure and function.       IMPRESSION:     1. 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 Fever , Leukocytosis sec to above - improving     RECOMMENDATIONS:      1. Nafcillin 2 grams IV q 4 hrs,  rifampin 300 mg po q 8 hrs , gentamicin 1 mg / kg IV q 8hrs   2. Follow up blood cx - neg to date   3. CT surgery consult   4.  PICC line insertion

## 2020-08-24 NOTE — PLAN OF CARE
Problem: Falls - Risk of:  Goal: Will remain free from falls  Description: Will remain free from falls  8/24/2020 0156 by Gilford Fleck, RN  Outcome: Met This Shift  8/23/2020 1521 by Cyrus Serrano RN  Outcome: Met This Shift  Goal: Absence of physical injury  Description: Absence of physical injury  8/24/2020 0156 by Gilford Fleck, RN  Outcome: Met This Shift  8/23/2020 1521 by Cyrus Serrano RN  Outcome: Met This Shift     Problem: Skin Integrity:  Goal: Will show no infection signs and symptoms  Description: Will show no infection signs and symptoms  8/24/2020 0156 by Gilford Fleck, RN  Outcome: Met This Shift  8/23/2020 1521 by Cyrus Serrano RN  Outcome: Met This Shift  Goal: Absence of new skin breakdown  Description: Absence of new skin breakdown  8/24/2020 0156 by Gilford Fleck, RN  Outcome: Met This Shift  8/23/2020 1521 by Cyrus Serrano RN  Outcome: Met This Shift

## 2020-08-25 ENCOUNTER — APPOINTMENT (OUTPATIENT)
Dept: GENERAL RADIOLOGY | Age: 50
DRG: 314 | End: 2020-08-25
Payer: COMMERCIAL

## 2020-08-25 VITALS
TEMPERATURE: 98.2 F | RESPIRATION RATE: 18 BRPM | DIASTOLIC BLOOD PRESSURE: 94 MMHG | HEART RATE: 78 BPM | OXYGEN SATURATION: 94 % | WEIGHT: 240.56 LBS | BODY MASS INDEX: 31.88 KG/M2 | SYSTOLIC BLOOD PRESSURE: 152 MMHG | HEIGHT: 73 IN

## 2020-08-25 LAB
APTT: 51 SEC (ref 24.5–35.1)
INR BLD: 2.5
PROTHROMBIN TIME: 28.4 SEC (ref 9.3–12.4)
SARS-COV-2, NAAT: NOT DETECTED

## 2020-08-25 PROCEDURE — 97530 THERAPEUTIC ACTIVITIES: CPT

## 2020-08-25 PROCEDURE — 6370000000 HC RX 637 (ALT 250 FOR IP): Performed by: NURSE PRACTITIONER

## 2020-08-25 PROCEDURE — 6360000002 HC RX W HCPCS: Performed by: INTERNAL MEDICINE

## 2020-08-25 PROCEDURE — 85610 PROTHROMBIN TIME: CPT

## 2020-08-25 PROCEDURE — U0002 COVID-19 LAB TEST NON-CDC: HCPCS

## 2020-08-25 PROCEDURE — 2580000003 HC RX 258: Performed by: NURSE PRACTITIONER

## 2020-08-25 PROCEDURE — 6370000000 HC RX 637 (ALT 250 FOR IP): Performed by: INTERNAL MEDICINE

## 2020-08-25 PROCEDURE — 2580000003 HC RX 258: Performed by: INTERNAL MEDICINE

## 2020-08-25 PROCEDURE — 97110 THERAPEUTIC EXERCISES: CPT

## 2020-08-25 PROCEDURE — 97535 SELF CARE MNGMENT TRAINING: CPT

## 2020-08-25 PROCEDURE — 99232 SBSQ HOSP IP/OBS MODERATE 35: CPT | Performed by: STUDENT IN AN ORGANIZED HEALTH CARE EDUCATION/TRAINING PROGRAM

## 2020-08-25 PROCEDURE — 36415 COLL VENOUS BLD VENIPUNCTURE: CPT

## 2020-08-25 PROCEDURE — 6370000000 HC RX 637 (ALT 250 FOR IP): Performed by: PSYCHIATRY & NEUROLOGY

## 2020-08-25 PROCEDURE — 97112 NEUROMUSCULAR REEDUCATION: CPT

## 2020-08-25 PROCEDURE — 85730 THROMBOPLASTIN TIME PARTIAL: CPT

## 2020-08-25 PROCEDURE — 2500000003 HC RX 250 WO HCPCS: Performed by: INTERNAL MEDICINE

## 2020-08-25 RX ORDER — WARFARIN SODIUM 10 MG/1
10 TABLET ORAL
Status: COMPLETED | OUTPATIENT
Start: 2020-08-25 | End: 2020-08-25

## 2020-08-25 RX ADMIN — SODIUM CHLORIDE: 9 INJECTION, SOLUTION INTRAVENOUS at 03:55

## 2020-08-25 RX ADMIN — GENTAMICIN SULFATE 110 MG: 40 INJECTION, SOLUTION INTRAMUSCULAR; INTRAVENOUS at 05:00

## 2020-08-25 RX ADMIN — NAFCILLIN SODIUM 2 G: 2 INJECTION, POWDER, LYOPHILIZED, FOR SOLUTION INTRAMUSCULAR; INTRAVENOUS at 04:00

## 2020-08-25 RX ADMIN — WARFARIN SODIUM 10 MG: 10 TABLET ORAL at 17:56

## 2020-08-25 RX ADMIN — MULTIPLE VITAMINS W/ MINERALS TAB 1 TABLET: TAB at 11:36

## 2020-08-25 RX ADMIN — NAFCILLIN SODIUM 2 G: 2 INJECTION, POWDER, LYOPHILIZED, FOR SOLUTION INTRAMUSCULAR; INTRAVENOUS at 17:56

## 2020-08-25 RX ADMIN — DESMOPRESSIN ACETATE 40 MG: 0.2 TABLET ORAL at 21:22

## 2020-08-25 RX ADMIN — NAFCILLIN SODIUM 2 G: 2 INJECTION, POWDER, LYOPHILIZED, FOR SOLUTION INTRAMUSCULAR; INTRAVENOUS at 11:36

## 2020-08-25 RX ADMIN — RIFAMPIN 300 MG: 300 CAPSULE ORAL at 17:56

## 2020-08-25 RX ADMIN — NAFCILLIN SODIUM 2 G: 2 INJECTION, POWDER, LYOPHILIZED, FOR SOLUTION INTRAMUSCULAR; INTRAVENOUS at 19:48

## 2020-08-25 RX ADMIN — HEPARIN SODIUM 26.03 UNITS/KG/HR: 10000 INJECTION, SOLUTION INTRAVENOUS at 14:15

## 2020-08-25 RX ADMIN — HEPARIN SODIUM 26.03 UNITS/KG/HR: 10000 INJECTION, SOLUTION INTRAVENOUS at 03:06

## 2020-08-25 RX ADMIN — RIFAMPIN 300 MG: 300 CAPSULE ORAL at 11:36

## 2020-08-25 RX ADMIN — GENTAMICIN SULFATE 110 MG: 40 INJECTION, SOLUTION INTRAMUSCULAR; INTRAVENOUS at 14:40

## 2020-08-25 RX ADMIN — GENTAMICIN SULFATE 110 MG: 40 INJECTION, SOLUTION INTRAMUSCULAR; INTRAVENOUS at 21:22

## 2020-08-25 RX ADMIN — HEPARIN SODIUM 26.03 UNITS/KG/HR: 10000 INJECTION, SOLUTION INTRAVENOUS at 21:39

## 2020-08-25 RX ADMIN — NAFCILLIN SODIUM 2 G: 2 INJECTION, POWDER, LYOPHILIZED, FOR SOLUTION INTRAMUSCULAR; INTRAVENOUS at 00:00

## 2020-08-25 RX ADMIN — RIFAMPIN 300 MG: 300 CAPSULE ORAL at 01:52

## 2020-08-25 ASSESSMENT — PAIN SCALES - GENERAL
PAINLEVEL_OUTOF10: 0
PAINLEVEL_OUTOF10: 0

## 2020-08-25 NOTE — PROGRESS NOTES
Received call from [facility] with bed assignment. [address] 12 Nurse to nurse [phone]    Call placed to [facility] to set up transportation to [address] [place] [address]. Await return call.      Paged Dr. Sherly Artis for discharge

## 2020-08-25 NOTE — PROGRESS NOTES
Pharmacy Consultation Note  (Anticoagulant Dosing and Monitoring)    Initial consult date: 8/22/2020  Consulting physician: Camryn Coreas, APRN - CNP    Allergies:  Patient has no known allergies. 44 y.o. male    Ht Readings from Last 1 Encounters:   08/23/20 6' 1\" (1.854 m)     Wt Readings from Last 1 Encounters:   08/22/20 240 lb 9 oz (109.1 kg)     Warfarin Indication Target   INR Range Home   Dose  (if applicable) Diet/Feeding Tube   Hx of aortic and mitral valve replacements 2.5-3.5 Unknown NPO       Vitamin K or Blood product  Administration Date                 Warfarin drug-drug interactions  Start  Stop Home Med? Comments   Rifampin  8/22  No                          TSH:    Lab Results   Component Value Date    TSH 0.732 06/02/2020        Hepatic Function Panel:                            Lab Results   Component Value Date    ALKPHOS 61 08/22/2020    ALT 39 08/22/2020    AST 56 08/22/2020    PROT 7.0 08/22/2020    BILITOT 1.2 08/22/2020    BILIDIR 0.4 08/22/2020    IBILI 0.8 08/22/2020    LABALBU 3.7 08/22/2020       Date Warfarin Dose INR Heparin or LMWH HBG/  HCT PLT Comment   8/22   (Not given - NPO) 1.9 Heparin gtt 14.4/43.6 138    8/23 10 mg 1.6 Heparin gtt -- --    8/24 10 mg 2.1 Heparin gtt 13.7/41.2 123    8/25 <01 mg> 2.5 Heparin gtt --- ---                        Assessment and Plan:  · 44 yoM who presented with AMS and stroke. He is on chronic warfarin therapy for history of mitral and aortic valve replacements. · Home dose is unable to be determined (unable to verify with patient 2/2 AMS).  Most recent order for 10 mg tablets \"one tablet daily or as directed by doctor\"  · INR goal 2.5-3.5  · INR today = 2.5  · Warfarin 10 mg x1 tonight   · Heparin gtt initiated  · Daily PT/INR until the INR is stable within the therapeutic range  · Pharmacist will follow and monitor/adjust dosing as necessary    Syed Duarte PharmD 8/25/2020 10:50 AM  [phone]

## 2020-08-25 NOTE — DISCHARGE INSTR - COC
Continuity of Care Form    Patient Name: Lizet Mancini   :  1970  MRN:  [id number]    Admit date:  2020  Discharge date: 2020    Code Status Order: Full Code   Advance Directives:   Advance Care Flowsheet Documentation     Date/Time Healthcare Directive Type of Healthcare Directive Copy in [address] Agent's Name Healthcare Agent's Phone Number    20 [phone]  No, patient does not have an advance directive for healthcare treatment -- -- -- -- --          Admitting Physician:  Noe Parisi DO  PCP: Paige Spangler DO    Discharging Nurse: Patty Ray RN  [address] Unit/Room#: 8083/5734-A  Discharging Unit Phone Number: [phone]    Emergency Contact:   Extended Emergency Contact Information  Primary Emergency Contact: Susy Quach, Ernst Hale Phone: [phone]  Relation: Brother/Sister  Secondary Emergency Contact: Wendi [address] Phone: [phone]  Relation: None    Past Surgical History:  Past Surgical History:   Procedure Laterality Date    AORTIC VALVE REPLACEMENT  2006    Dr Latia Callejas   [address] [place] [address]  --    Dr Jeffery [facility]       Immunization History:   Immunization History   Administered Date(s) Administered    Influenza Vaccine, unspecified formulation 10/09/2015    Influenza, Hui Like, IM, (6 mo and older Fluzone, Flulaval, Fluarix and 3 yrs and older Afluria) 2016    Influenza, Hui Like, IM, PF (6 mo and older Fluzone, Flulaval, Fluarix, and 3 yrs and older Afluria) 10/03/2017, 2018, 10/09/2019       Active Problems:  Patient Active Problem List   Diagnosis Code    S/P MVR (mitral valve replacement) Z95.2    S/P AVR (aortic valve replacement) Z95.2    Hypertension I10    Hyperlipidemia E78.5    CVA (cerebral vascular accident) ([address] I63.9    MAIRA (acute kidney injury) ([address] N17.9    LONG TERM ANTICOAGULENT USE     Aspiration pneumonitis ([address] J69.0    Sepsis ([address] A41.9  MSSA bacteremia R78.81       Isolation/Infection:   Isolation          No Isolation        Patient Infection Status     Infection Onset Added Last Indicated Last Indicated By Review Planned Expiration Resolved Resolved By    COVID-19 Rule Out 08/25/20 08/25/20 08/25/20 COVID-19 (Ordered) 09/01/20 09/08/20            Nurse Assessment:  Last Vital Signs: BP (!) 152/94   Pulse 78   Temp 98.2 °F (36.8 °C) (Temporal)   Resp 18   Ht 6' 1\" (1.854 m)   Wt 240 lb 9 oz (109.1 kg)   SpO2 94%   BMI 31.74 kg/m²     Last documented pain score (0-10 scale): Pain Level: 0  Last Weight:   Wt Readings from Last 1 Encounters:   08/22/20 240 lb 9 oz (109.1 kg)     Mental Status:  oriented and alert    IV Access:  - PICC - site  L Basilic, insertion date: 08/25/2020    Nursing Mobility/ADLs:  Walking   Dependent  Transfer  Dependent  Bathing  Dependent  Dressing  Dependent  Toileting  Dependent  Feeding  Dependent  Med Admin  Dependent  Med Delivery   [place]    Wound Care Documentation and Therapy:        Elimination:  Continence:   · Bowel: No  · Bladder: No  Urinary Catheter: Insertion Date: 08/21/2020   Colostomy/Ileostomy/Ileal Conduit: No       Date of Last BM: 08/25/2020    Intake/Output Summary (Last 24 hours) at 8/25/2020 1937  Last data filed at 8/25/2020 1537  Gross per 24 hour   Intake 825 ml   Output 2550 ml   Net -1725 ml     I/O last 3 completed shifts:   In: 825 [NG/GT:825]  Out: 2300 [Urine:2300]    Safety Concerns:     History of Falls (last 30 days), At Risk for Falls and Aspiration Risk    Impairments/Disabilities:      Speech    Nutrition Therapy:  Current Nutrition Therapy:   - Tube Feedings:  Standard with fiber    Routes of Feeding: Nasogastric  Liquids: No Liquids  Daily Fluid Restriction: no  Last Modified Barium Swallow with Video (Video Swallowing Test): not done    Treatments at the Time of Hospital Discharge:   Respiratory Treatments: none  Oxygen Therapy:  is not on home oxygen therapy. Ventilator:    - No ventilator support    Rehab Therapies: Physical Therapy, Occupational Therapy and Speech/Language Therapy  Weight Bearing Status/Restrictions: No weight bearing restirctions  Other Medical Equipment (for information only, NOT a DME order):  walker  Other Treatments: ***    Patient's personal belongings (please select all that are sent with patient):  underwear and cell phone     RN SIGNATURE:  Electronically signed by Colten Sahu RN on 20 at 7:42 PM EDT    CASE MANAGEMENT/SOCIAL WORK SECTION    Inpatient Status Date: ***    Readmission Risk Assessment Score:  Readmission Risk              Risk of Unplanned Readmission:        10           Discharging to Facility/ Agency   · Name:   · Address:  · Phone:  · Fax:    Dialysis Facility (if applicable)   · Name:  · Address:  · Dialysis Schedule:  · Phone:  · Fax:    / signature: {Esignature:[id number]}    PHYSICIAN SECTION    Prognosis: {Prognosis:5710538658}    Condition at Discharge: 8 [facility] Patient Condition:265303584}    Rehab Potential (if transferring to Rehab): {Prognosis:9575440735}    Recommended Labs or Other Treatments After Discharge: ***    Physician Certification: I certify the above information and transfer of Maria Isabel Prather  is necessary for the continuing treatment of the diagnosis listed and that he requires {Admit to Appropriate Level of Care:13323} for {GREATER/LESS:341613011} 30 days.      Update Admission H&P: {CHP DME Changes in [facility]}    PHYSICIAN SIGNATURE:  {Esignature:[id number]}

## 2020-08-25 NOTE — PROGRESS NOTES
Tube feed on hold for now due to moist frequent cough and discomfort. Will continue to monitor-Patient placed on 2 liters NC and O2 SAT is 96% -will continue to monitor.

## 2020-08-25 NOTE — PROGRESS NOTES
picc Placement 8/25/2020    Product number: [id number]   Lot Number: [id number]      Ultrasound: yes   L Basilic:                Upper Arm Circumference: 34cm    Size: 5fr    Exposed Length: 6cm,    Internal Length: 44cm   Cut: 0cm   Vein Measurement: 0.54cm    Lu bray achieved   picc in 1/3 svc or atrial caval junction.    8/25/2020  1:32 PM

## 2020-08-25 NOTE — PROGRESS NOTES
Hospital Medicine    Subjective:  Pt alert responsive follows commands      Current Facility-Administered Medications:     gentamicin (GARAMYCIN) 110 mg in dextrose 5 % 100 mL IVPB, 110 mg, Intravenous, Q8H, Sander Allen MD, Stopped at 08/25/20 0604    lidocaine PF 1 % injection 5 mL, 5 mL, Intradermal, Once, Sander Allen MD, Stopped at 08/24/20 0700    sodium chloride flush 0.9 % injection 10 mL, 10 mL, Intravenous, PRN, Sander Allen MD    heparin flush 100 UNIT/ML injection 300 Units, 3 mL, Intravenous, 2 times per day, Edmond Diamond MD, Stopped at 08/24/20 0700    heparin flush 100 UNIT/ML injection 300 Units, 3 mL, Intracatheter, PRN, Edmond Diamond MD    therapeutic multivitamin-minerals 1 tablet, 1 tablet, Oral, Daily, CELSO Bueno - CNP, 1 tablet at 08/24/20 5380    sodium chloride flush 0.9 % injection 10 mL, 10 mL, Intravenous, 2 times per day, Shawanda Ybarra APRN - CNP, 10 mL at 08/22/20 1956    sodium chloride flush 0.9 % injection 10 mL, 10 mL, Intravenous, PRN, CELSO Bueno - CNP, 10 mL at 08/24/20 1503    acetaminophen (TYLENOL) tablet 650 mg, 650 mg, Oral, Q6H PRN **OR** acetaminophen (TYLENOL) suppository 650 mg, 650 mg, Rectal, Q6H PRN, Shawanda Ybarra APRN - CNP    polyethylene glycol (GLYCOLAX) packet 17 g, 17 g, Oral, Daily PRN, Shawanda Ybarra APRN - CNP    promethazine (PHENERGAN) tablet 12.5 mg, 12.5 mg, Oral, Q6H PRN **OR** ondansetron (ZOFRAN) injection 4 mg, 4 mg, Intravenous, Q6H PRN, Shawanda Ybarra APRN - CNP    perflutren lipid microspheres (DEFINITY) injection 1.65 mg, 1.5 mL, Intravenous, ONCE PRN, Reanna Carrera MD    warfarin (COUMADIN) daily dosing (placeholder), , Other, RX Placeholder, Shawanda Serrano Masters, APRN - CNP    nafcillin 2 g in dextrose 5 % 100 mL IVPB (mini-bag), 2 g, Intravenous, Q4H, Sander Allen MD, Stopped at 08/25/20 0500    rifAMPin (RIFADIN) capsule 300 mg, 300 mg, Oral, Q8H, Sander P 08/22/2020    LABALBU 3.7 08/22/2020    CALCIUM 8.6 08/22/2020    BILITOT 1.2 08/22/2020    ALKPHOS 61 08/22/2020    AST 56 08/22/2020    ALT 39 08/22/2020     Warfarin PT/INR:    Lab Results   Component Value Date    INR 2.5 08/25/2020    INR 2.1 08/24/2020    INR 1.6 08/23/2020    PROTIME 28.4 (H) 08/25/2020    PROTIME 24.0 (H) 08/24/2020    PROTIME 18.5 (H) 08/23/2020       Assessment:    Principal Problem:    CVA (cerebral vascular accident) ([facility]  Active Problems:    S/P MVR (mitral valve replacement)    S/P AVR (aortic valve replacement)    Hypertension    Hyperlipidemia    MAIRA (acute kidney injury) ([facility]    LONG TERM ANTICOAGULENT USE    Aspiration pneumonitis (HCC)    Sepsis ([facility]    MSSA bacteremia  Resolved Problems:    * No resolved hospital problems.  *  valvular vegetation    Plan:  Cont atb / cts rec transfer will arrange        Nate Ortega  8:36 AM  8/25/2020

## 2020-08-25 NOTE — PROGRESS NOTES
Called nurse to nurse report to [facility].  Gave report to nurse Janny.

## 2020-08-25 NOTE — PROGRESS NOTES
Call received from patients sister, David Rivas, very upset and concerned regarding her brother having a very moist cough while she spoke to him on the phone. I had a very long and detailed conversation about the patients health status and the plan of care for him. I did notify her that he would be transferred to [facility] if she was okay with that per Dr. Keerthi Elias recommendation. She was upset that she was just now finding out that the patient was transferring to [facility]. I did tell her that the process was just initiated today and he received a bed quickly. She is in agreement with the transfer to [facility] and the nurse did discuss the transfer with the patient as well. I notified her that the patient would be picked up at about 6:30 pm this evening. [facility] was notified to call David Rivas once the patient was admitted.

## 2020-08-25 NOTE — PROGRESS NOTES
OT BEDSIDE TREATMENT NOTE      Date:2020  Patient Name: Mariana Stevenson  MRN: [id number]  : 1970  Room: [facility]-A     Per OT Eval:    Evaluating OT: ALIREZA Ribera, OTR/L [id number]     AM-PAC Daily Activity Raw Score: 10/24  Recommended Adaptive Equipment: TBD      Modified Warren Scale (MRS)  Score     Description  0             No symptoms  1             No significant disability despite symptoms  2             Slight disability; able to look after own affairs  3             Moderate disability; able to ambulate without assist/ requires assist with ADLs  4             Moderate/Severe disability;requires assist to ambulate/assist with ADLs  5             Severe disability;bedridden/incontinent   6               Score: 4     Diagnosis: CVA   Referring Practitioner: Linda Ybarra, APRN - CNP   Surgery: n/a   Pertinent Medical History: ARV, MVR, HTN   Precautions:  Falls, R hemiparesis with tone, aphasia     Home Living: Prior living limited due to pt's aphasia. Pt lives with girlfriend in a 1 story home; bed/bath on main level   Bathroom setup: walk in shower   Equipment owned: none  Prior Level of Function: assist with ADLs/IADLs; using no AD for functional mobility   Driving: yes  Occupation: limited by aphasia and pt unable to write it down     Pain Level: 0/10  Cognition: A&O: 3/4, pleasant & cooperative, Follows 1-2 step directions, motivated   Pt verbal, able to state month, year, place & name. Pt able to answer questions & follow commands.               Memory:  fair decreased STM noted              Sequencing: fair-              Problem solving: fair-              Judgement/safety: fair-             Functional Assessment:    Initial Eval Status  Date: 2020 Treatment Status  Date:  20 Short Term Goals  Treatment frequency: 2-5x/wk   Feeding Min A   NPO  NG tube  SUP   Grooming Min A (compelted oral hygiene bed level)   Min A  Simple task, oral hygiene  SUP   UB Dressing Max A  Mod/Max A  Instructed on ericka-dressing technique  Requires support seated at EOB Min A   LB Dressing Dep (assist with socks) Max A  Jonnathan socks, instructing pt on one handed sock, bed level   Min A    Bathing Max A (simulated) Max A  Simulated  Min A    Toileting dep  Dep  Reid  Min A   Bed Mobility  Log roll: dep  Supine to sit: dep (therapist only able to partially get pt to sit EOB due to pt's weakness and physical assistance required)   Sit to supine: dep   Max A  Supine < > sit   Cues for technique & use of bed rail  Log roll Mod A  Supine to sit: Mod A   Sit to supine: Mod A   Functional Transfers Sit to stand:NT   Stand to sit:NT  Commode: NT  Mod x 2  Completed x 2 blocking R LE  Max cues for upright posture  & body alignment    Mod A   Functional Mobility nt  Max A x 2  2 side steps with side by side assist, assist with weight shifting & advancing R LE Mod A   Balance Sitting: NT  Standing: NT  Sitting: Mod progressing quickly Min A  R lateral lean with cues able to correct this date  Standing: Mod x 2 Static  Max x 2 dynamic        Activity Tolerance Poor+ (pt demonstrates difficulty following commands and requires vc's and physical assist for sequencing tasks)  Fair  Tolerated sitting at EOB 25-30 denied dizziness this date, improved tolerance this date      Visual/  Perceptual Glasses: ?     pt able to reach clock on wall     Therapist unable to complete visual tracking due to limited sitting balance    WFL        Education:  Pt was educated through out treatment regarding proper technique & safety with bed mobility, functional transfers & mobility, instructed on ericka-dressing techniques/compensatory strategies to ease tasks to improve safety & prevent falls and allow pt to return home safely. Also instructed pt on importance of awareness & location of R UE at all times & SROM exercises with Fair results, continued education needed overall.  AAROM completed in R UE in all planes, proximal ROM exercises increased assist needed due to weakness, continues to be able to make full fist, able to complete wrist extension/flexion & elbow flex/ext with support. Comments: Upon arrival pt was in bed & agreeable for therapy, nsg approved. At end of session pt was returned to bed, HOB upright, B UE elevated, felicity R UE due to edema, B heels off loaded, all lines and tubes intact, call light within reach. · Pt has made Fair+ progress towards set goals. · Continue with current plan of care    Treatment Time In: 10:26          Treatment Time Out: 11:05               Treatment Charges: Mins Units   Ther Ex  46975     Manual Therapy [id number]     Thera Activities 99071 15 1   ADL/Home Mgt 95219 10 1   Neuro Re-ed 38474 56 1   Group Therapy      Orthotic manage/training  22406     Non-Billable Time     Total Timed Treatment 39 3       Tyesha CONNORS  [address] [place] [address], [address] [place] [address]

## 2020-08-25 NOTE — CARE COORDINATION
Cardiothoracic recommending transfer to tertiary care. Charge RN spoke with Dr Natacha Carr who will make referral.  Await acceptance to tertiary care.

## 2020-08-25 NOTE — PROGRESS NOTES
1.65 mg  1.5 mL Intravenous ONCE PRN Jonah Garcia MD        warfarin (COUMADIN) daily dosing (placeholder)   Other RX Placeholder Anastacia Nurse CELSO Ybarra CNP        nafcillin 2 g in dextrose 5 % 100 mL IVPB (mini-bag)  2 g Intravenous Q4H Chitra Castellanos MD   Stopped at 08/25/20 1236    rifAMPin (RIFADIN) capsule 300 mg  300 mg Oral Q8H Sander Allen MD   300 mg at 08/25/20 1136    atorvastatin (LIPITOR) tablet 40 mg  40 mg Oral Nightly Shahrzad Matthews MD   40 mg at 08/24/20 2112    heparin (porcine) injection 6,550 Units  60 Units/kg Intravenous PRN Lyndia Sever, MD        heparin (porcine) injection 3,270 Units  30 Units/kg Intravenous PRN Lyndia Sever, MD   3,270 Units at 08/24/20 2035    heparin 25,000 units in dextrose 5% 250 mL infusion  12 Units/kg/hr Intravenous Continuous Lyndia Sever, MD 28.4 mL/hr at 08/25/20 1415 26.031 Units/kg/hr at 08/25/20 1415    0.9 % sodium chloride infusion   Intravenous Continuous Anastacia CELSO Eteinne  mL/hr at 08/25/20 0355      acetaminophen (TYLENOL) suppository 650 mg  650 mg Rectal Once Anastacia CELSO Etienne - RAY           REVIEW OF SYSTEMS:     CONSTITUTIONAL: Denies fever   HEENT : Denies headache   RESPIRATORY:Cough   GI : Denies abdomen pain   CVS : denies chest pain   EXT : weakness right side   NEUROLOGICAL:  Awake, alert     PHYSICAL EXAM:      Vitals:     BP (!) 152/94   Pulse 78   Temp 98.2 °F (36.8 °C) (Temporal)   Resp 18   Ht 6' 1\" (1.854 m)   Wt 240 lb 9 oz (109.1 kg)   SpO2 94%   BMI 31.74 kg/m²     General Appearance:    Awake, alert, no distress  . Head:    Normocephalic, atraumatic   Eyes:    No pallor, no icterus,   Ears:    No obvious deformity or drainage.    Nose:   No nasal drainage   Throat:   Mucosa very dry    Neck:   Supple, no lymphadenopathy   Lungs:     Coarse rhonchi right lung    Heart:    Regular rate and rhythm,  No murmur   Abdomen:     Soft, non-tender, bowel sounds present Not Detected  Not Detected  Final  08/21/2020 10:45 PM   - [facility]    Candida parapsilosis by PCR  Not Detected  Not Detected  Final  08/21/2020 10:45 PM  [facility]    Candida tropicalis by PCR  Not Detected  Not Detected  Final  08/21/2020 10:45 PM  [place] [address] - [facility]    Enterobacteriaceae by PCR  Not Detected  Not Detected  Final  08/21/2020 10:45 PM  [place] [address] - [facility]    Enterococcus by PCR  Not Detected  Not Detected  Final  08/21/2020 10:45 PM  [place] [address] - [facility]    Haemophilus Influenzae by PCR  Not Detected  Not Detected  Final  08/21/2020 10:45 PM  [place] [address] - [facility]    Listeria monocytogenes by PCR  Not Detected  Not Detected  Final  08/21/2020 10:45 PM  [place] [address] - [facility]    Neisseria meningitidis by PCR  Not Detected  Not Detected  Final  08/21/2020 10:45 PM  [facility]    Pseudomonas aeruginosa by PCR  Not Detected  Not Detected  Final  08/21/2020 10:45 PM  [facility]    Staphylococcus by PCR  DETECTEDPanic    Not Detected  Final  08/21/2020 10:45 PM  [facility] [address] [facility]    Streptococcus by PCR  Not Detected  Not Detected  Final  08/21/2020 10:45 PM  [place] [address] - [facility]    Methicillin Resistant by PCR  Not Detected  Not Detected  Final  08/21/2020 10:45 PM  [address] [place] [address] Lab        Radiology :      CT scan of head -    Subacute infarct in the left anterior cerebral artery territory. VANESA -       Mechanical valve at the mitral position with no significant stenosis or    regurgitation. Two mobile masses detected on the prosthetic mitral valve    consistent with vegetations. They are measured at 0.9 cm and 0.7 cm.    Mechanical valve in the aortic position with no evidence of regurgitation.    The maximum transvalvular velocity is 2.7 m/sec.  The leaflets are not well    visualized due to

## 2020-08-25 NOTE — PROGRESS NOTES
Acute Rehab Pre-Admission Screen      Referral date: 8/24/20  Onset/Hospital Admit Date: 8/21/2020 10:23 PM    Current Location: [address] [place] [address]    Name: Phyllis Castañeda  YOB: 1970  Age: 44 y.o. Admitting Diagnosis: CVA   Address: [facility] [address]  Home Phone: [phone] (home)  Chris Starr #:     Sex: male  Race:   Marital Status:    Ethnic/Cultural/Scientology Considerations: Confucianist    Advanced Directives: [x] Full Code  [] [facility] [] Medications only       [] Living Will  [] DPOA      []Organ donor      [] No mechanical breathing or ventilation     [] no tube feeding, nutrition or hydration      [x] Patient does not have advanced directives or living will        COVERAGE INFORMATION   Primary Insurer: [employer]  Payor Contact:   Phone:   FAX:  Authorization #:   Secondary Insurer:   Medicare #:   Medicaid #:   Verified coverage: [] Patient  [] Family/caregiver    [x] financial department [] insurance carrier    MEDICAL UPDATE:  History of present admission: 44year old male admitted 8/21/20 after presenting with altered mental status and right sided weakness. CT showed CVA. Outside of TPA window. Blood cx showed +GPC in clusters. IV abx initiated. ID consulted. Confused. 8/24/20- VANESA showed mitral prosthetic endocarditis.        PHYSICIAN / REFERRAL INFORMATION  Referring Physician: John Borja DO  Attending Physician: Casper Calvo DO  Primary Care Physician: Kurtis Aldridge DO  Consultants/Opinions (see full consult notes on chart): ID- endocarditis  Thoracic surgery- endocarditis    SOCIAL INFORMATION  Primary  Contact: Binu Kahn  Relationship: sister  Primary Phone: [phone]    Secondary  Contact: Lexx Perez  Relationship: other  Primary Phone: [phone]      Previous Community Services: none  Caregiver available: [] Yes [] No Hours per day available: tbd  Patient previously employed:  [x] Yes [] Part Time [] Full Time [] No [] Retired  Occupation/Profession: Ham  Prior living arrangements: [] Home  [] Assisted living  [] SNF [] Other  Lived with:  [] Alone  [] Spouse  [] Family  [x] Other  Lived with:   Contact phone:   Home:   story home   entry steps  Rails:   Steps:   to 2nd floor  Rails:     Bedroom: [] 1st floor  [] 2nd floor    Bathroom:  [] 1st floor  [] 2nd floor    Prior Functional Level: Independent for: all, drove  Assistance for:   Dependent for:   Dominant hand: [] Right  [] Left    Previous Home Equipment:  [] Cane [] Grab bars [] Orthotic / prosthetic   [] Shower chair [] Tub bench  [] 3-in-1 Commode [] Long handle sponge   [] Oxygen [] Sock aide  [] Wheelchair  [] motorized wc/scooter  [] Wheelchair cushion   [] Crutches [] Long handle shoehorn  [] Reachers [] Toilet seat elevator [] Rollator  [] Walker(wheeled)   [] Walker(standard) [] Mechanical lift    [x] None of the above     Has patient had 2 or more falls in the past year or any fall with injury in the past year? [] yes   [x] no   []unknown    Has patient had major surgery during past 100 days prior to admission?    [] yes   [] no Type/ Date:     Surgical History:  Past Surgical History:   Procedure Laterality Date    AORTIC VALVE REPLACEMENT  1/2006    Dr Prince Cisneros   [address] [place] [address]  01--2006    Dr [facility]       Past Medical:  Past Medical History:   Diagnosis Date    Chest pain 2/21/2013    Hypertension     LONG TERM ANTICOAGULENT USE     Psoriasis     S/P AVR (aortic valve replacement) 2/21/2013    S/P MVR (mitral valve replacement) 2/21/2013       Current Co-morbidities:  [] Alzheimer's   [] Dysphasia     [] Parkinsonism  [] Amputation   [x] Endocarditis  [] Peripheral artery disease   [x] AVR      [] Encephalopathy  [] Peripheral vascular disease  [] Anxiety   [] Gangrene   [] Pneumonia  [] Aphasia   [] Gout    [] Polyneuropathy  [] Asthma   [] Heart Failure (diastolic) [] Post-polio syndrome  [] Atrial fibrillation  [] Heart Failure (left-sided) [] Pseudomonas enteritis   [] Blind    [] Heart Failure (right-sided) [] Pulmonary embolism  [] Cellulitis     [] Heart Failure (systolic) [] Renal dialysis  [] Clostridium difficile  [] Hemiparesis   [] Renal failure  [] Congestive heart failure [x] Hypertension   [] Rheumatoid arthritis  [] COPD   [] Hypotension   [] Seizure disorder   [] Coronary Artery Disease [] Hypothyroidism  [] Septicemia   [] Deaf    [] Hyperlipidemia   [] Sleep apnea  [] Depression   [] Morbid obesity  [] Spinal cord injury  [] Diabetes   [x] MVR   [x] Stroke  [] Diabetic nephropathy  [] Myocardial infarction  [] Tracheostomy  [] Diabetic neuropathy  [] Osteoarthritis  [] Traumatic brain injury   [] Diabetic retinopathy  [] Osteoporosis   [] Urinary tract infection  [] DVT    [] Pancytopenia  [] Vocal cord paralysis  []  Spinal stenosis   []  kidney disease [] VRE  [] Post op    [x]  Prosthetic valve endocarditis   []        Medical/Functional Conditions requiring inpatient rehabilitation:     Risk for Medical/Clinical Complications:     CLINICAL DATA:     Height : 6'1     Weight:  240 lbs   BMI: 31.74       Date: 8/25/20 Date:  Date:    temperature      pulse      respirations      Blood pressure      Pulse oximeter         ALLERGIES: Patient has no known allergies.     DIET : DIET TUBE FEED CONTINUOUS/CYCLIC NPO; 1.5 Calorie with Fiber; Nasoenteric; Continuous; 25; 60; 24; Exceptions are: Sips with Meds    Current Lab and Diagnostic Tests:   Recent Results (from the past 24 hour(s))   APTT    Collection Time: 08/24/20  8:59 AM   Result Value Ref Range    aPTT 46.6 (H) 24.5 - 35.1 sec   APTT    Collection Time: 08/24/20  7:25 PM   Result Value Ref Range    aPTT 42.2 (H) 24.5 - 35.1 sec   Protime-INR    Collection Time: 08/25/20  3:17 AM   Result Value Ref Range    Protime 28.4 (H) 9.3 - 12.4 sec    INR 2.5    APTT    Collection Time: 08/25/20  3:17 AM   Result Value Ref Range    aPTT 51.0 (H) 24.5 - 35.1 sec     Ct Head Wo Contrast    Result Date: 8/22/2020  Clinical indications: Altered mental status. COMPARISON: None. Exposure control: This examination and all examinations utilizing ionizing radiation at this facility done so according to the ALARA (as low as reasonably achievable) principal for radiation dose reduction. Technique: Axial, sagittal and coronal computed tomography of the brain and calvarium was performed without contrast. FINDINGS: There is loss of gray-white matter differentiation and sulcal effacement of the anterior aspect of the left frontal lobe medially and the redistribution of the anterior cerebral artery. There is no evidence for acute intracranial hemorrhage, midline shift or mass effect. There is enlargement of the ventricles, sulci and cisterns bilaterally. There is minimal patchy hypoattenuation elsewhere in the periventricular deep white matter bilaterally. Otherwise the brain parenchyma, CSF spaces, paranasal sinuses and mastoid air cells and surrounding soft tissue and osseous structures have a satisfactory appearance. Subacute infarct in the left anterior cerebral artery territory. This critical findings were relayed to the referring physician in the emergency room at [facility] hours August 22, 2020. Xr Chest Portable    Result Date: 8/21/2020  Clinical indications: Cough. Shortness of breath. TECHNIQUE: Single frontal projection of the chest (1 view). COMPARISON: None. FINDINGS: The heart is enlarged. There is calcification within thoracic aorta. Acromioclavicular arthropathy. Prior median sternotomy wires and cardiac valvular device. Atelectasis versus infiltrate within the right lung base with elevated right hemidiaphragm. The heart, lungs, mediastinum and regional skeleton are otherwise unremarkable. Elevated hemidiaphragm with atelectasis versus infiltrate of the right lung base.      Us Carotid Artery Bilateral    Result Date: 8/22/2020  Real-time and Doppler sonography of the carotid and vertebrobasilar arterial systems. Grayscale, color Doppler, and spectral Doppler analysis. Aram Gama FINDINGS: There is atherosclerotic plaque within the distal common and proximal internal carotid arteries bilaterally. There is no visible evidence for hemodynamically significant stenosis. Antegrade flow is seen within the vertebral arteries bilaterally. Evaluation of peak systolic velocities and ratios of ICA and CCA reveals no numeric evidence for hemodynamically significant stenosis. Peak systolic velocity of ICA to CCA on the right is 1.3, on the left, 1.3.      No evidence for hemodynamically significant stenosis of the carotid arteries based on NASCET criteria (0-49%) No evidence for subclavian steal.      Additional labs or diagnostic studies needed before admission to rehabilitation unit:      Medications:   gentamicin  110 mg Intravenous Q8H    lidocaine PF  5 mL Intradermal Once    heparin flush  3 mL Intravenous 2 times per day    therapeutic multivitamin-minerals  1 tablet Oral Daily    sodium chloride flush  10 mL Intravenous 2 times per day    warfarin (COUMADIN) daily dosing (placeholder)   Other RX Placeholder    nafcillin  2 g Intravenous Q4H    rifAMPin  300 mg Oral Q8H    atorvastatin  40 mg Oral Nightly    acetaminophen  650 mg Rectal Once      heparin (porcine) 26.031 Units/kg/hr (08/25/20 0306)    sodium chloride 100 mL/hr at 08/25/20 0355     sodium chloride flush, heparin flush, sodium chloride flush, acetaminophen **OR** acetaminophen, polyethylene glycol, promethazine **OR** ondansetron, perflutren lipid microspheres, heparin (porcine), heparin (porcine)    SPECIAL PRECAUTIONS: [] No current precautions  [] Cardiac  [] Renal [] Sternal [] Respiratory      [] Neurological           [] Hip  [] Spinal [] Seizure  [] Aspiration  [] Isolation precautions:    [] Contact   [] Respiratory   [] Protective     [] Droplet    [] Weight Bearing precautions:         [] Non Weight Bearing :         [] Toe Touch Weight Bearing :        [] Partial Weight Bearing :         [] Weight Bearing as Tolerated :         [] Fall Risk:   [] Recent history of falls [] Falls risk level (Reyes Scale):       [] Bed Alarm    [] Do not leave alone in the bathroom    [] Chair Alarm    [] Cognitive impairment      [] One to One supervision  [] Sitter / Tele sitter   [] Safety enclosure bed  [] Decreased balance     SPECIAL REHABILITATION NEEDS:   [] IV Therapy: [] PRN Adapter  [] Midline  [] PICC      [] Central Line    [] TPN       [] Oxygen: [] Trach [] Bi-PAP [] CPAP  [] Nasal cannula  [] Liters:      [] Wound Care:   [] Pressure ulcers(stage and location) -    [] Wound vac   [] Wound or incision care    [] Pain Management (level of pain, meds):      [] Incontinence Bladder [] Reid  Insertion date:    []Hemodialysis and  Frequency:   [] Incontinence Bowel    [] Last bowel movement :     Substance use history: [] Yes  [] No   [] Tobacco  [] Alcohol  [] Other     [] Ethnic  [] Cultural  [] Spiritual  [] Language [] Needs  [] Other than English  [] Hearing Impaired  [] Visually Impaired  [] Speaking Impaired  [] Blind  [] Special equipment:  [] Devices/Splints  [] Type   [] Brace   [] Type  [] Bariatric bed  [] Extra wide commode  [] Extra wide wheelchair [] Extra wide walker  [] Giorgio walker  [] Giorgio wheelchair  [] Transfer lift    [] Other equipment     FUNCTIONAL STATUS PT / [place] / Gloria Glez:  FIM / EVAL Discipline Initial:  Follow Up:  Current:    Eating OT      Grooming OT      Bathing OT      Dressing Upper Extremity OT      Dressing Lower Extremity OT      Toileting OT      Toilet Transfers OT      Tub/Shower Transfers OT      Homemaking OT      Bed Mobility PT      Bed/Wheelchair Transfers PT      Locomotion Walk / Wheelchair  Device:  Distance: PT      Endurance PT      Expression SP      Social Interaction SP      Problem Solving SP      Memory SP      Comprehension SP      Swallowing SP      Bowel Management NSG      Bladder Management NSG        Comments on Functional Status:     [x] Able to participate a minimum of 3 hours per day of therapy intervention    Required treatments/services: [x] Rehabilitation nursing [] Dietitian / nurtition                 [x] Case management  [] Respiratory Therapy      [x] Social work   [] Other     Required Therapy:  Therapy Hours per Day Days per Week Therapeutic Interventions Required   [x] Physical Therapy  5-7    [x] Occupational Therapy  5-7    [] Speech Pathology      [] Prosthetics / Orthotics       []         Anticipated Discharge Plan:   Anticipated DME Needs:  [] Home     [] Commode   [] Alone    [] Wheelchair   [] Supervised    [] Walker   [] Assist    [] Oxygen        [] Hospital Bed  [] Assisted Living    [] Ramp        [] To Be Determined    Anticipated Home Health Services:  Anticipated Outpatient Services:  [] PT       [] PT  [] OT      [] OT  [] Speech     [] Speech  [] Nursing     [] Dialysis  [] Aide      [x] To Be Determined  [x] To Be Determined    Anticipated support group:  [] Amputation  [] Multiple Sclerosis  [] Stroke  [] Brain Injury  [] Spinal cord injury  [] Other     Barriers to discharge:     Discharge Support: [] Patient lives alone and does not have a caregiver available     [] Patient has a caregiver available     [] Discharge plan has been verified with patient's caregiver      [] Caregiver is in agreement with the discharge plan     Expected functional status for safe discharge:     Patient/support person goals:     Expected length of stay:     Discussed expected length of stay and agreeable to IRF plan: [] Yes   [] No    Impairment Group Category:     Etiological Diagnosis:     Primary Rehabilitation Diagnosis:     Electronically signed by Armin Damon RN on 8/25/2020 at 7:47 AM    Prescreen completed __________________________________ (signature of prescreener)    Date:    Time:      JUSTIFICATION FOR ADMISSION TO ACUTE REHABILITATION:          RECOMMEND LEVEL OF CARE  Recommend inpatient rehabilitation: [] Yes   [x] No  If no indicate reason:  [] Functional level too high  [] Unmotivated  [] No insurance carrier approval [] Unlikely to return to community  [] No medical necessity  [x] Patient or family chose other facility  [] Too medically complex  [] Inadequate discharge plan  [] Rehabilitation bed unavailable [] Functional level too low  [] patient or family refused ARU    If patient not accepted for IRF admission, recommended level of care:  [] [address] [place] [address]  [] [address]  [] [place]   [] Home Care  [] Other      [] LTAC       Physician Assigned:  [] Dr. Galileo Daigle         [] Dr. Feliciano Ramirez              [x] Dr. David Wallace [] Dr. Andrew Bishop  [] Dr. Timmy Swift:    ____________________________________________________________________  ____________________________________________________________________  ____________________________________________________________________  ____________________________________________________________________  ____________________________________________________________________      Physician Signature:_____________________________________    Print Signature:_________________________________________    Date:    Time:        PRE-SCREEN ASSESSMENT UPDATE (if not admitted within 48 hours of initial pre-screen)    Medical Update/Changes:     Functional Update/Changes:     Reviewer Signature:_____________________________________    Date:   Time:     PHYSICIAN ADMISSION DETERMINATION AND REVIEW UPDATE: ____________________________________________________________________  ____________________________________________________________________  ____________________________________________________________________  ____________________________________________________________________  ____________________________________________________________________    Physician Signature:_____________________________________    Print Signature:_________________________________________    Date:     Time:

## 2020-08-25 NOTE — PROGRESS NOTES
Called to update new nurse Avinash Velez at [facility]. Informed her of possible 8pm pickup time and to call the sister as soon as he arrives.

## 2020-08-25 NOTE — PROGRESS NOTES
Physical Therapy  Treatment Note     Name: Yudelka Tang  : 1970  MRN: [id number]    Referring Provider:  Sita Ybarra, APRN - CNP    Date of Service: 2020    Evaluating PT:  Ana Porter PT, DPT [id number]     Room #:  5246/4448-K  Diagnosis:  Acute cerebrovascular accident   PMHx/PSHx:  Chest pain, HTN, long term anticoagulant use, psoriasis, AVR, MVR  Precautions:  Falls, R side hemiplegia, Aphasia   Equipment Needs:  TBD    SUBJECTIVE:    Pt able to give limited history d/t aphasia. He lives with girlfriend in an apartment with ? Steps to enter and ? Steps to bedroom/bathrom. Pt ambulated with quad cane prior to admission. OBJECTIVE:   Initial Evaluation  Date: 20 Treatment  Date: 2020 Short Term/ Long Term   Goals   AM-PAC 6 Clicks     Was pt agreeable to Eval/treatment? Yes  yes    Does pt have pain? No c/o pain  No c/o pain    Bed Mobility  Rolling: Dep to L  Supine to sit: Max A  Sit to supine: Max A x2  Scooting: Max A x2 Rolling: maxA  Supine<>sit: maxA  Scooting: maxA Rolling: Min A  Supine to sit: Min A  Sit to supine: Min A  Scooting: min A   Transfers Sit to stand: Max A x2  Stand to sit: Max A x2  Stand pivot: NT Sit<>stand: maxA with R knee block  Stand pivot: NT Sit to stand: Mod A  Stand to sit: Mod A  Stand pivot: Mod A with AAD   Ambulation    Unable 3 side steps to L max A x 2 >10 feet with AAD Mod A   Stair negotiation: ascended and descended  NT NT NA   ROM LUE: WFL  RUE:  Passive WFL  BLE:  WFL     Strength BUE:  WFL  RUE:  0/5 except at grasp  LUE:  WFL  RUE:  0/5  RUE: 2+/5,  strength WFL  RLE: 2+/5 RUE >1/5  RLE >1/5    Balance Sitting EOB:  Max A  Static Standing: Max A x2 with R knee block  Sitting EOB: maxA  Dynamic standing: maxA x 2 with R knee block Sitting EOB:  SBA  Dynamic Standing: Mod A     Pt is A & O x 4  Sensation:  Pt denies numbness and tingling to extremities  Edema:   Moderate edema to RUE    Therapeutic Exercises:  Supine TherEx: heel slide, SAQ, ankle df/pf, hip abd/add x 10 reps RLE. Seated: hip flexion, LAQ AAROM x 10 reps RLE    Patient education  Pt educated on role of PT intervention. Pt educated on safety in room with utilization of call light for assistance with mobility. Pt educated on importance of independent performance of therapeutic exercises designated above for improved strength, activity tolerance, and ROM. Patient response to education:   Pt verbalized understanding Pt demonstrated skill Pt requires further education in this area   yes yes yes     ASSESSMENT:    Comments:  RN cleared pt for activity prior to session. Pt received supine in bed and agreeable to PT intervention with OT collaboration at this time. Pt performed all functional mobility as noted above. R hemibody strength continues to improve. At end of session, pt returned to supine and left with all needs met and call light in reach. Pt requires continued skilled PT intervention for the purposes of maximizing functional mobility and independence. Treatment:  Patient practiced and was instructed in the following treatment:     Therapeutic Activities Completed:  o Functional mobility as noted above:   - Max cueing and assistance throughout to reach EOB. At EOB pt sat x 30 minutes while given cues and assistance to improve seated balance. Pt had minor R sided and posterior lean but progressed to SBA level for static seated balance. Pt has flexed cervical posturing requiring constant cueing to correct. At EOB pt performed lateral lean onto elbows and subsequent return to upright position x 10 reps to L and x 5 reps to R to improve orientation to midline, trunk strength, and seated balance. - Sit<>stand xax A with R knee block. 3 side steps towards HOB with maxA x 2 for balance, R knee block, and RLE advancement.   o Skilled repositioning in supine with HOB elevated for comfort. BUE and BLE propped on pillows for pressure relief. o Pt education as noted above. PLAN:    Patient is making fair progress towards established goals. Will continue with current POC.       Time in  1026  Time out  1105    Total Treatment Time  39 minutes     CPT codes:  [] Gait training 94126 0 minutes  [] Manual therapy 38993 0 minutes  [x] Therapeutic activities 79898 30 minutes  [x] Therapeutic exercises 84490 9 minutes  [] Neuromuscular reeducation 92965 0 minutes    Erin Echeverria, PT, DPT  [id number]

## 2020-08-25 NOTE — PROGRESS NOTES
INPATIENT CARDIOLOGY FOLLOW-UP    Name: Perri Bass    Age: 44 y.o. Date of Admission: 8/21/2020 10:23 PM    Date of Service: 8/25/2020    Interim History:  VANESA showed vegetations on the mitral prosthetic valve.     Review of Systems:   Cardiac: As per HPI  General: No fever, chills  Pulmonary: As per HPI  HEENT: No visual disturbances, difficult swallowing  GI: No nausea, vomiting  Endocrine: No thyroid disease or DM  Musculoskeletal: FUNES x 4, no focal motor deficits  Skin: Intact, no rashes  Neuro/Psych: No headache or seizures    Problem List:  Patient Active Problem List   Diagnosis    S/P MVR (mitral valve replacement)    S/P AVR (aortic valve replacement)    Hypertension    Hyperlipidemia    [facility]    MAIRA [facility]acute kidney [facility]) [facility]    LONG TERM ANTICOAGULENT USE    Aspiration pneumonitis [facility]    Sepsis ([facility]    MSSA bacteremia       Allergies:  No Known Allergies    Current Medications:  Current Facility-Administered Medications   Medication Dose Route Frequency Provider Last Rate Last Dose    warfarin (COUMADIN) tablet 10 mg  10 mg Oral Once Mesfin Jefferson DO        gentamicin (GARAMYCIN) 110 mg in dextrose 5 % 100 mL IVPB  110 mg Intravenous Q8H Sander Allen MD   Stopped at 08/25/20 0604    lidocaine PF 1 % injection 5 mL  5 mL Intradermal Once Todd Duvall MD   Stopped at 08/24/20 0700    sodium chloride flush 0.9 % injection 10 mL  10 mL Intravenous PRN Sander Allen MD        heparin flush 100 UNIT/ML injection 300 Units  3 mL Intravenous 2 times per day Todd Duvall MD   Stopped at 08/24/20 0700    heparin flush 100 UNIT/ML injection 300 Units  3 mL Intracatheter PRN Todd Duvall MD        therapeutic multivitamin-minerals 1 tablet  1 tablet Oral Daily CELSO Hwang - CNP   1 tablet at 08/25/20 1136    sodium chloride flush 0.9 % injection 10 mL  10 mL Intravenous 2 times per day CELSO Rubi CNP   10 mL at 08/22/20 1956    sodium chloride flush 0.9 % injection 10 mL  10 mL Intravenous PRN CELSO Lazcano - CNP   10 mL at 08/24/20 1503    acetaminophen (TYLENOL) tablet 650 mg  650 mg Oral Q6H PRN CELSO Lazcano CNP        Or   [facility] acetaminophen (TYLENOL) suppository 650 mg  650 mg Rectal Q6H PRN Gregory Ybarra, CELSO - RAY        polyethylene glycol (GLYCOLAX) packet 17 g  17 g Oral Daily PRN Gregory Ybarra, CELSO - RAY        promethazine (PHENERGAN) tablet 12.5 mg  12.5 mg Oral Q6H PRN CELSO Lazcano CNP        Or    ondansetron (ZOFRAN) injection 4 mg  4 mg Intravenous Q6H PRN Gregory Ybarra, CELSO - RAY        perflutren lipid microspheres (DEFINITY) injection 1.65 mg  1.5 mL Intravenous ONCE PRN Shan Duran MD        warfarin (COUMADIN) daily dosing (placeholder)   Other RX Placeholder CELSO Lazcano CNP        nafcillin 2 g in dextrose 5 % 100 mL IVPB (mini-bag)  2 g Intravenous Q4H Sander Allen  mL/hr at 08/25/20 1136 2 g at 08/25/20 1136    rifAMPin (RIFADIN) capsule 300 mg  300 mg Oral Q8H Sander Allen MD   300 mg at 08/25/20 1136    atorvastatin (LIPITOR) tablet 40 mg  40 mg Oral Nightly Anthony Ulrich MD   40 mg at 08/24/20 2112    heparin (porcine) injection 6,550 Units  60 Units/kg Intravenous PRN Lolita Valente MD        heparin (porcine) injection 3,270 Units  30 Units/kg Intravenous PRN Lolita Valente MD   3,270 Units at 08/24/20 2035    heparin 25,000 units in dextrose 5% 250 mL infusion  12 Units/kg/hr Intravenous Continuous Lolita Valente MD 28.4 mL/hr at 08/25/20 0306 26.031 Units/kg/hr at 08/25/20 0306    0.9 % sodium chloride infusion   Intravenous Continuous CELSO Lazcano -  mL/hr at 08/25/20 0355      acetaminophen (TYLENOL) suppository 650 mg  650 mg Rectal Once Gregory Ybarra APRN - CNP          heparin (porcine) 26.031 Units/kg/hr (08/25/20 0306)    sodium chloride 100 06/02/2020     Lab Results   Component Value Date    LABA1C 5.9 (H) 08/23/2020     No results found for: EAG  Lab Results   Component Value Date    CHOL 138 08/23/2020    CHOL 225 (H) 06/02/2020    CHOL 196 11/14/2018     Lab Results   Component Value Date    TRIG 336 (H) 08/23/2020    TRIG 149 06/02/2020    TRIG 84 11/14/2018     Lab Results   Component Value Date    HDL 20 08/23/2020    HDL 55 06/02/2020    HDL 59 11/14/2018     Lab Results   Component Value Date    LDLCALC 51 08/23/2020    LDLCALC 140 (H) 06/02/2020    LDLCALC 120 (H) 11/14/2018     Lab Results   Component Value Date    LABVLDL 67 08/23/2020    LABVLDL 30 06/02/2020    LABVLDL 17 11/14/2018     No results found for: CHOLHDLRATIO  No results for input(s): PROBNP in the last 72 hours. ASSESSMENT / PLAN:    44year old male with PMH mechanical AVR and MVR 2013 for IE comes with right sided weakness and aphasia found to have left side stroke and MSSA bacteremia treated with Abx. Warfarin held with heparin on board. We are consulted for evaluation of possible IE. Recommendations:  VANESA showed vegetations on the prosthetic mitral valve. 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 Recommend replacing both valves. No emergent need for replacement given that there is no structural complications of the valves yet. INR goal 2.5-3.5. Antibiotics per ID.     Ugo Barry MD  [facility]) Cardiology

## 2020-08-26 NOTE — PROGRESS NOTES
Spoke with Ramona Enriquez (sister) in regard to patient leaving behind his cellphone,  and a pair of boxers. Sister states she will get in touch with the patients friend, Elvin Ganser to come get the patients belongings. Awaiting call back from [facility] on a time Duyen Mendes will be available to  belongings.

## 2020-08-27 LAB — BLOOD CULTURE, ROUTINE: NORMAL

## 2020-08-29 LAB
FACTOR V LEIDEN: ABNORMAL
SPECIMEN: ABNORMAL

## 2020-08-29 NOTE — CONSULTS
Chart reviewed and case discussed with rehab admission coordinator. Please see her note for details. Patient did meet criteria for inpatient rehab stay. Concerns for discharge planning, however, will preclude him from admission at this time.     Sammi Saravia MD

## 2020-09-02 NOTE — DISCHARGE SUMMARY
[address]                  [address] [facility],  [facility]                               DISCHARGE SUMMARY    PATIENT NAME: Jyotsna Ott                    :        1970  MED REC NO:   [id number]                            ROOM:       8505  ACCOUNT NO:   [de-identified]                           ADMIT DATE: 2020  PROVIDER:     Laura Glasgow DO                  DISCHARGE DATE:  2020    DISCHARGE DIAGNOSES:  1. Acute CVA. 2.  Bacteremia. 3.  Endocarditis. 4.  Hypertension. 5.  Hyperlipidemia. 6.  Chronic anticoagulation. 7.  Status post mitral valve replacement. 8.  Aortic valve replacement. 9.  Acute kidney injury. 10.  Cardiac valvular vegetation. HOSPITAL COURSE:  The patient is a 78-year-old  male presented  to the hospital with altered mental status. He was admitted to the  hospital.  He had bacteremia. He was seen by Cardiology, Infectious  Disease. He underwent VANESA which revealed two mobile masses on the  prosthetic mitral valve consistent with vegetations. He was transferred  to [facility] for further evaluation and treatment in stable  condition on 2020. MEDICATIONS AT THE TIME OF TRANSFER:  1.  Garamycin IV. 2.  Multivitamin. 3.  Tylenol p.r.n.  4.  Phenergan p.r.n.  5.  Warfarin. 6.  Nafcillin. 7.  Rifampin. 8.  Lipitor. 9.  Heparin IV.         Kaleigh Michael DO    D: 2020 10:02:22       T: 2020 10:11:52     [email or web address]  Job#: [id number]     Doc#: [id number]    CC:  Kamini Allison DO

## 2020-09-17 ENCOUNTER — TELEPHONE (OUTPATIENT)
Dept: PRIMARY CARE CLINIC | Age: 50
End: 2020-09-17

## 2020-09-17 NOTE — TELEPHONE ENCOUNTER
andrew from [facility] called and was asking if dr Marlo Martin will follow for home care. Called back and notified by machine that he will follow.

## 2020-09-28 LAB
ALBUMIN SERPL-MCNC: 3.3 G/DL
ALP BLD-CCNC: 126 U/L
ALT SERPL-CCNC: 36 U/L
ANION GAP SERPL CALCULATED.3IONS-SCNC: 12 MMOL/L
AST SERPL-CCNC: 29 U/L
BASOPHILS ABSOLUTE: 0.03 /ΜL
BASOPHILS RELATIVE PERCENT: 0.4 %
BILIRUB SERPL-MCNC: 0.5 MG/DL (ref 0.1–1.4)
BUN BLDV-MCNC: 26 MG/DL
CALCIUM SERPL-MCNC: 9.2 MG/DL
CHLORIDE BLD-SCNC: 103 MMOL/L
CO2: 27 MMOL/L
CREAT SERPL-MCNC: 1.54 MG/DL
EOSINOPHILS ABSOLUTE: 0.16 /ΜL
EOSINOPHILS RELATIVE PERCENT: 2 %
GFR CALCULATED: 51
GLUCOSE BLD-MCNC: 85 MG/DL
HCT VFR BLD CALC: 35.3 % (ref 41–53)
HEMOGLOBIN: 11.3 G/DL (ref 13.5–17.5)
LYMPHOCYTES ABSOLUTE: 1.37 /ΜL
LYMPHOCYTES RELATIVE PERCENT: 17.1 %
MCH RBC QN AUTO: 29.5 PG
MCHC RBC AUTO-ENTMCNC: 32 G/DL
MCV RBC AUTO: 89.1 FL
MONOCYTES ABSOLUTE: 0.69 /ΜL
MONOCYTES RELATIVE PERCENT: 8.6 %
NEUTROPHILS ABSOLUTE: 5.75 /ΜL
NEUTROPHILS RELATIVE PERCENT: 71.8 %
PLATELET # BLD: 327 K/ΜL
PMV BLD AUTO: 10.1 FL
POTASSIUM SERPL-SCNC: 3.8 MMOL/L
RBC # BLD: 3.96 10^6/ΜL
SODIUM BLD-SCNC: 138 MMOL/L
TOTAL PROTEIN: 8.6
WBC # BLD: 8 10^3/ML

## 2020-10-05 ENCOUNTER — HOSPITAL ENCOUNTER (OUTPATIENT)
Age: 50
Discharge: HOME | End: 2020-10-07
Payer: COMMERCIAL

## 2020-10-05 ENCOUNTER — ANTI-COAG VISIT (OUTPATIENT)
Dept: PRIMARY CARE CLINIC | Age: 50
End: 2020-10-05

## 2020-10-05 LAB
ALBUMIN SERPL-MCNC: 3.8 G/DL (ref 3.5–5.2)
ALP BLD-CCNC: 122 U/L (ref 40–129)
ALT SERPL-CCNC: 21 U/L (ref 0–40)
AST SERPL-CCNC: 23 U/L (ref 0–39)
BASOPHILS ABSOLUTE: 0.04 E9/L (ref 0–0.2)
BASOPHILS RELATIVE PERCENT: 0.5 % (ref 0–2)
BILIRUB SERPL-MCNC: 0.4 MG/DL (ref 0–1.2)
BILIRUBIN DIRECT: <0.2 MG/DL (ref 0–0.3)
BILIRUBIN, INDIRECT: NORMAL MG/DL (ref 0–1)
C-REACTIVE PROTEIN: 1.5 MG/DL (ref 0–0.4)
CREAT SERPL-MCNC: 1.3 MG/DL (ref 0.7–1.2)
EOSINOPHILS ABSOLUTE: 0.25 E9/L (ref 0.05–0.5)
EOSINOPHILS RELATIVE PERCENT: 2.9 % (ref 0–6)
GFR AFRICAN AMERICAN: >60
GFR NON-AFRICAN AMERICAN: 58 ML/MIN/1.73
HCT VFR BLD CALC: 39 % (ref 37–54)
HEMOGLOBIN: 11.9 G/DL (ref 12.5–16.5)
IMMATURE GRANULOCYTES #: 0.02 E9/L
IMMATURE GRANULOCYTES %: 0.2 % (ref 0–5)
INR BLD: 1.9
LYMPHOCYTES ABSOLUTE: 1.69 E9/L (ref 1.5–4)
LYMPHOCYTES RELATIVE PERCENT: 19.4 % (ref 20–42)
MCH RBC QN AUTO: 28.1 PG (ref 26–35)
MCHC RBC AUTO-ENTMCNC: 30.5 % (ref 32–34.5)
MCV RBC AUTO: 92.2 FL (ref 80–99.9)
MONOCYTES ABSOLUTE: 0.61 E9/L (ref 0.1–0.95)
MONOCYTES RELATIVE PERCENT: 7 % (ref 2–12)
NEUTROPHILS ABSOLUTE: 6.09 E9/L (ref 1.8–7.3)
NEUTROPHILS RELATIVE PERCENT: 70 % (ref 43–80)
PDW BLD-RTO: 14.6 FL (ref 11.5–15)
PLATELET # BLD: 320 E9/L (ref 130–450)
PMV BLD AUTO: 10.1 FL (ref 7–12)
RBC # BLD: 4.23 E12/L (ref 3.8–5.8)
TOTAL PROTEIN: 7.9 G/DL (ref 6.4–8.3)
WBC # BLD: 8.7 E9/L (ref 4.5–11.5)

## 2020-10-05 PROCEDURE — 86140 C-REACTIVE PROTEIN: CPT

## 2020-10-05 PROCEDURE — 80076 HEPATIC FUNCTION PANEL: CPT

## 2020-10-05 PROCEDURE — 85025 COMPLETE CBC W/AUTO DIFF WBC: CPT

## 2020-10-05 PROCEDURE — 82565 ASSAY OF CREATININE: CPT

## 2020-10-05 NOTE — PROGRESS NOTES
[facility] called to let you know about his inr but did want to let you know he did already take his 10 mg dose before they checked it. They are still going out Thursday to check it again.

## 2020-10-06 ENCOUNTER — OFFICE VISIT (OUTPATIENT)
Dept: PRIMARY CARE CLINIC | Age: 50
End: 2020-10-06
Payer: COMMERCIAL

## 2020-10-06 VITALS
TEMPERATURE: 97.7 F | SYSTOLIC BLOOD PRESSURE: 122 MMHG | HEIGHT: 73 IN | WEIGHT: 210.2 LBS | RESPIRATION RATE: 16 BRPM | BODY MASS INDEX: 27.86 KG/M2 | DIASTOLIC BLOOD PRESSURE: 74 MMHG | OXYGEN SATURATION: 97 % | HEART RATE: 101 BPM

## 2020-10-06 PROBLEM — I33.0 INFECTIVE ENDOCARDITIS: Status: ACTIVE | Noted: 2020-08-26

## 2020-10-06 PROBLEM — I48.92 ATRIAL FLUTTER (HCC): Status: ACTIVE | Noted: 2020-09-05

## 2020-10-06 PROCEDURE — 99215 OFFICE O/P EST HI 40 MIN: CPT | Performed by: FAMILY MEDICINE

## 2020-10-06 RX ORDER — PANTOPRAZOLE SODIUM 20 MG/1
20 TABLET, DELAYED RELEASE ORAL DAILY
COMMUNITY
Start: 2020-09-12 | End: 2021-10-12 | Stop reason: ALTCHOICE

## 2020-10-06 RX ORDER — ASPIRIN 81 MG/1
81 TABLET, CHEWABLE ORAL DAILY
Status: ON HOLD | COMMUNITY
Start: 2020-09-13 | End: 2021-12-11 | Stop reason: HOSPADM

## 2020-10-06 RX ORDER — SPIRONOLACTONE 25 MG/1
25 TABLET ORAL DAILY
COMMUNITY
Start: 2020-09-23 | End: 2021-12-16 | Stop reason: SDUPTHER

## 2020-10-06 RX ORDER — ALBUTEROL SULFATE 2.5 MG/3ML
2.5 SOLUTION RESPIRATORY (INHALATION)
COMMUNITY
Start: 2020-09-12 | End: 2021-10-12 | Stop reason: ALTCHOICE

## 2020-10-06 RX ORDER — POTASSIUM CHLORIDE 20 MEQ/1
20 TABLET, EXTENDED RELEASE ORAL 2 TIMES DAILY
COMMUNITY
Start: 2020-09-12 | End: 2021-12-16 | Stop reason: SDUPTHER

## 2020-10-06 RX ORDER — FUROSEMIDE 40 MG/1
40 TABLET ORAL 2 TIMES DAILY
COMMUNITY
Start: 2020-09-12 | End: 2021-10-12 | Stop reason: ALTCHOICE

## 2020-10-06 RX ORDER — LISINOPRIL 20 MG/1
20 TABLET ORAL DAILY
Qty: 90 TABLET | Refills: 1 | Status: SHIPPED
Start: 2020-10-06 | End: 2021-06-10

## 2020-10-06 RX ORDER — ACETYLCYSTEINE 200 MG/ML
200 SOLUTION ORAL; RESPIRATORY (INHALATION) EVERY 4 HOURS
COMMUNITY
Start: 2020-09-12 | End: 2021-10-12 | Stop reason: ALTCHOICE

## 2020-10-06 RX ORDER — ATORVASTATIN CALCIUM 40 MG/1
40 TABLET, FILM COATED ORAL NIGHTLY
COMMUNITY
Start: 2020-09-12 | End: 2021-10-12 | Stop reason: ALTCHOICE

## 2020-10-06 RX ORDER — OXACILLIN 2 G/50ML
2 INJECTION, SOLUTION INTRAVENOUS EVERY 4 HOURS
Status: ON HOLD | COMMUNITY
Start: 2020-09-12 | End: 2021-12-11 | Stop reason: HOSPADM

## 2020-10-06 ASSESSMENT — ENCOUNTER SYMPTOMS
EYE PAIN: 0
EYE REDNESS: 0
APNEA: 0
CONSTIPATION: 0
ABDOMINAL PAIN: 0
SORE THROAT: 0
BLOOD IN STOOL: 0
COUGH: 0
VOMITING: 0
WHEEZING: 0
EYE ITCHING: 0
NAUSEA: 0
CHEST TIGHTNESS: 0
BACK PAIN: 0
DIARRHEA: 0
SHORTNESS OF BREATH: 0
COLOR CHANGE: 0
RHINORRHEA: 0
CHANGE IN BOWEL HABIT: 0
SINUS PRESSURE: 0

## 2020-10-06 NOTE — PROGRESS NOTES
Chief Complaint:     Chief Complaint   Patient presents with    Follow-Up from [facility] 1998 Cerebrovascular Accident    Hyperlipidemia         Heart Problem   This is a recurrent (s/p valve replacement) problem. The current episode started 1 to 4 weeks ago. The problem occurs daily. The problem has been unchanged. Associated symptoms include fatigue, myalgias and weakness. Pertinent negatives include no abdominal pain, arthralgias, change in bowel habit, chest pain, chills, congestion, coughing, diaphoresis, fever, headaches, joint swelling, nausea, neck pain, numbness, rash, sore throat or vomiting. Nothing aggravates the symptoms. He has tried nothing for the symptoms. The treatment provided no relief. Cerebrovascular Accident   This is a new problem. The current episode started more than 1 month ago. The problem has been gradually improving. Associated symptoms include fatigue, myalgias and weakness. Pertinent negatives include no abdominal pain, arthralgias, change in bowel habit, chest pain, chills, congestion, coughing, diaphoresis, fever, headaches, joint swelling, nausea, neck pain, numbness, rash, sore throat or vomiting. Nothing aggravates the symptoms. He has tried nothing for the symptoms. The treatment provided no relief. Hyperlipidemia   This is a chronic problem. The current episode started more than 1 year ago. The problem is controlled. He has no history of chronic renal disease, diabetes, hypothyroidism or obesity. Associated symptoms include myalgias. Pertinent negatives include no chest pain or shortness of breath. Current antihyperlipidemic treatment includes statins. The current treatment provides significant improvement of lipids. There are no compliance problems. Risk factors for coronary artery disease include dyslipidemia and male sex.        Patient Active Problem List   Diagnosis    S/P MVR (mitral valve replacement)    S/P AVR (aortic valve replacement)    Hypertension    Hyperlipidemia    [facility]    MAIRA (acute kidney injury) ([facility]    MSSA bacteremia    Infective endocarditis    Atrial flutter ([facility]       Past Medical History:   Diagnosis Date    Chest pain 2/21/2013    Hypertension     LONG TERM ANTICOAGULENT USE     Psoriasis     S/P AVR (aortic valve replacement) 2/21/2013    S/P MVR (mitral valve replacement) 2/21/2013       Past Surgical History:   Procedure Laterality Date    AORTIC VALVE REPLACEMENT  1/2006    Dr Cortney Ponce  01--2006    Dr Alberto [facility]       Current Outpatient Medications   Medication Sig Dispense Refill    albuterol (PROVENTIL) (2.5 MG/3ML) 0.083% nebulizer solution Inhale 2.5 mg into the lungs      acetylcysteine (MUCOMYST) 20 % nebulizer solution Inhale 200 mg into the lungs every 4 hours      aspirin 81 MG chewable tablet Take 81 mg by mouth daily      atorvastatin (LIPITOR) 40 MG tablet Take 40 mg by mouth nightly      furosemide (LASIX) 40 MG tablet Take 40 mg by mouth 2 times daily      metoprolol tartrate (LOPRESSOR) 25 MG tablet Take 37.25 mg by mouth every 8 hours      oxacillin 2 GM/50ML Infuse 2 g intravenously every 4 hours      pantoprazole (PROTONIX) 20 MG tablet Take 20 mg by mouth daily      potassium chloride (KLOR-CON M) 20 MEQ extended release tablet Take 20 mEq by mouth 2 times daily      spironolactone (ALDACTONE) 25 MG tablet Take 25 mg by mouth daily      lisinopril (PRINIVIL;ZESTRIL) 20 MG tablet Take 1 tablet by mouth daily 90 tablet 1    warfarin (JANTOVEN) 10 MG tablet Take one tablet by mouth daily or as directed by doctor 30 tablet 1    Multiple Vitamins-Minerals (THERAPEUTIC MULTIVITAMIN-MINERALS) tablet Take 1 tablet by mouth daily LD 2-22-19       No current facility-administered medications for this visit.         No Known Allergies    Social History     Socioeconomic History    Marital status:      Spouse name: None    Number of children: None    Years of education: None    Highest education level: None   Occupational History     Comment: [employer]   Social Needs    Financial resource strain: None    Food insecurity     Worry: None     Inability: None    Transportation needs     Medical: None     Non-medical: None   Tobacco Use    Smoking status: Never Smoker    Smokeless tobacco: Never Used   Substance and Sexual Activity    Alcohol use: Yes     Comment: 1-2 beers per month    Drug use: No    Sexual activity: None   Lifestyle    Physical activity     Days per week: None     Minutes per session: None    Stress: None   Relationships    Social connections     Talks on phone: None     Gets together: None     Attends Protestant service: None     Active member of club or organization: None     Attends meetings of clubs or organizations: None     Relationship status: None    Intimate partner violence     Fear of current or ex partner: None     Emotionally abused: None     Physically abused: None     Forced sexual activity: None   Other Topics Concern    None   Social History Narrative    None       History reviewed. No pertinent family history. Review of Systems   Constitutional: Positive for fatigue. Negative for activity change, appetite change, chills, diaphoresis and fever. HENT: Negative for congestion, ear pain, hearing loss, nosebleeds, rhinorrhea, sinus pressure and sore throat. Eyes: Negative for pain, redness, itching and visual disturbance. Respiratory: Negative for apnea, cough, chest tightness, shortness of breath and wheezing. Cardiovascular: Negative for chest pain, palpitations and leg swelling. Gastrointestinal: Negative for abdominal pain, blood in stool, change in bowel habit, constipation, diarrhea, nausea and vomiting. Endocrine: Negative. Genitourinary: Negative for decreased urine volume, difficulty urinating, dysuria, frequency, hematuria and urgency.    Musculoskeletal: Positive for myalgias. Negative for arthralgias, back pain, gait problem, joint swelling and neck pain. Skin: Negative for color change and rash. Allergic/Immunologic: Negative for environmental allergies and food allergies. Neurological: Positive for weakness. Negative for dizziness, light-headedness, numbness and headaches. Hematological: Negative for adenopathy. Does not bruise/bleed easily. Psychiatric/Behavioral: Negative for behavioral problems, dysphoric mood and sleep disturbance. The patient is not nervous/anxious and is not hyperactive. All other systems reviewed and are negative. /74   Pulse 101   Temp 97.7 °F (36.5 °C)   Resp 16   Ht 6' 1\" (1.854 m)   Wt 210 lb 3.2 oz (95.3 kg)   SpO2 97%   BMI 27.73 kg/m²     Physical Exam  Vitals signs and nursing note reviewed. Constitutional:       General: He is not in acute distress. Appearance: Normal appearance. He is well-developed. HENT:      Head: Normocephalic and atraumatic. Right Ear: Hearing, tympanic membrane and external ear normal. No tenderness. No middle ear effusion. Left Ear: Hearing, tympanic membrane and external ear normal. No tenderness. No middle ear effusion. Nose: Nose normal. No congestion or rhinorrhea. Right Turbinates: Not enlarged. Left Turbinates: Not enlarged. Mouth/Throat:      Mouth: Mucous membranes are moist.      Tongue: No lesions. Pharynx: Oropharynx is clear. No oropharyngeal exudate or posterior oropharyngeal erythema. Eyes:      General: No scleral icterus. Conjunctiva/sclera: Conjunctivae normal.      Pupils: Pupils are equal, round, and reactive to light. Neck:      Musculoskeletal: Normal range of motion and neck supple. No neck rigidity or muscular tenderness. Thyroid: No thyromegaly. Cardiovascular:      Rate and Rhythm: Normal rate and regular rhythm. Heart sounds: Normal heart sounds. No murmur.    Pulmonary:      Effort: Pulmonary effort is normal. No respiratory distress. Breath sounds: Normal breath sounds. No wheezing or rales. Abdominal:      General: Bowel sounds are normal. There is no distension. Palpations: Abdomen is soft. Tenderness: There is no abdominal tenderness. Musculoskeletal: Normal range of motion. General: No tenderness. Lymphadenopathy:      Cervical: No cervical adenopathy. Skin:     General: Skin is warm and dry. Findings: No erythema or rash. Neurological:      General: No focal deficit present. Mental Status: He is alert and oriented to person, place, and time. Cranial Nerves: No cranial nerve deficit. Deep Tendon Reflexes: Reflexes are normal and symmetric. Reflexes normal.   Psychiatric:         Mood and Affect: Mood normal.                                 ASSESSMENT/PLAN:    Patient Active Problem List   Diagnosis    S/P MVR (mitral valve replacement)    S/P AVR (aortic valve replacement)    Hypertension    Hyperlipidemia    CVA (cerebral vascular accident) ([address]    MAIRA (acute kidney injury) ([address]    MSSA bacteremia    Infective endocarditis    Atrial flutter ([address]       Leslie Rowe was seen today for follow-up from hospital, heart problem, cerebrovascular accident and hyperlipidemia. Diagnoses and all orders for this visit:    Cerebrovascular accident (CVA) due to embolism of left anterior cerebral artery ([address]    Essential hypertension    MAIRA (acute kidney injury) ([address]    S/P MVR (mitral valve replacement)    S/P AVR (aortic valve replacement)    Hyperlipidemia, unspecified hyperlipidemia type    MSSA bacteremia    Atrial flutter, unspecified type ([address]    Acute bacterial endocarditis    Other orders  -     lisinopril (PRINIVIL;ZESTRIL) 20 MG tablet;  Take 1 tablet by mouth daily    [address] [facility] for nursing, PT, labs  We will continue to follow for [facility] records from [facility] reviewed in detail  Questions answered  Continue follow up with Cardiology, Neurology, Infectious Disease  Monitor labs, especially renal function and INR      Return in about 3 months (around 1/6/2021) for Recheck Meds, Recheck labs. I spent 45 minutes with this patient. I spent greater than 50% of the time counseling this patient.         Harpreet Vasquez DO  10/6/2020  8:53 AM

## 2020-10-08 ENCOUNTER — ANTI-COAG VISIT (OUTPATIENT)
Dept: PRIMARY CARE CLINIC | Age: 50
End: 2020-10-08

## 2020-10-08 LAB — INR BLD: 2.9

## 2020-10-08 NOTE — PROGRESS NOTES
Please advise thanks. Per homecare standing order is to go out on [address] [facility]. Wondering if you would like them to ck less frequently.

## 2020-10-12 ENCOUNTER — HOSPITAL ENCOUNTER (OUTPATIENT)
Age: 50
Discharge: HOME | End: 2020-10-14
Payer: COMMERCIAL

## 2020-10-12 LAB
ALBUMIN SERPL-MCNC: 3.9 G/DL (ref 3.5–5.2)
ALP BLD-CCNC: 113 U/L (ref 40–129)
ALT SERPL-CCNC: 34 U/L (ref 0–40)
AST SERPL-CCNC: 33 U/L (ref 0–39)
BASOPHILS ABSOLUTE: 0.03 E9/L (ref 0–0.2)
BASOPHILS RELATIVE PERCENT: 0.3 % (ref 0–2)
BILIRUB SERPL-MCNC: 0.3 MG/DL (ref 0–1.2)
BILIRUBIN DIRECT: <0.2 MG/DL (ref 0–0.3)
BILIRUBIN, INDIRECT: NORMAL MG/DL (ref 0–1)
C-REACTIVE PROTEIN: 0.8 MG/DL (ref 0–0.4)
CREAT SERPL-MCNC: 1.2 MG/DL (ref 0.7–1.2)
EOSINOPHILS ABSOLUTE: 0.26 E9/L (ref 0.05–0.5)
EOSINOPHILS RELATIVE PERCENT: 3 % (ref 0–6)
GFR AFRICAN AMERICAN: >60
GFR NON-AFRICAN AMERICAN: >60 ML/MIN/1.73
HCT VFR BLD CALC: 40.1 % (ref 37–54)
HEMOGLOBIN: 12.1 G/DL (ref 12.5–16.5)
IMMATURE GRANULOCYTES #: 0.02 E9/L
IMMATURE GRANULOCYTES %: 0.2 % (ref 0–5)
LYMPHOCYTES ABSOLUTE: 1.74 E9/L (ref 1.5–4)
LYMPHOCYTES RELATIVE PERCENT: 19.9 % (ref 20–42)
MCH RBC QN AUTO: 27.9 PG (ref 26–35)
MCHC RBC AUTO-ENTMCNC: 30.2 % (ref 32–34.5)
MCV RBC AUTO: 92.4 FL (ref 80–99.9)
MONOCYTES ABSOLUTE: 0.61 E9/L (ref 0.1–0.95)
MONOCYTES RELATIVE PERCENT: 7 % (ref 2–12)
NEUTROPHILS ABSOLUTE: 6.09 E9/L (ref 1.8–7.3)
NEUTROPHILS RELATIVE PERCENT: 69.6 % (ref 43–80)
PDW BLD-RTO: 14.2 FL (ref 11.5–15)
PLATELET # BLD: 314 E9/L (ref 130–450)
PMV BLD AUTO: 10.1 FL (ref 7–12)
RBC # BLD: 4.34 E12/L (ref 3.8–5.8)
TOTAL PROTEIN: 8.1 G/DL (ref 6.4–8.3)
WBC # BLD: 8.8 E9/L (ref 4.5–11.5)

## 2020-10-12 PROCEDURE — 85025 COMPLETE CBC W/AUTO DIFF WBC: CPT

## 2020-10-12 PROCEDURE — 86140 C-REACTIVE PROTEIN: CPT

## 2020-10-12 PROCEDURE — 80076 HEPATIC FUNCTION PANEL: CPT

## 2020-10-12 PROCEDURE — 82565 ASSAY OF CREATININE: CPT

## 2020-10-12 PROCEDURE — 36415 COLL VENOUS BLD VENIPUNCTURE: CPT

## 2020-10-15 ENCOUNTER — ANTI-COAG VISIT (OUTPATIENT)
Dept: PRIMARY CARE CLINIC | Age: 50
End: 2020-10-15

## 2020-10-15 LAB — INR BLD: 2.2

## 2020-10-15 NOTE — PROGRESS NOTES
Same dose of warfarin. Recheck INR 2 weeks.  No need to see him for follow up other than just needs INR

## 2020-10-19 ENCOUNTER — HOSPITAL ENCOUNTER (OUTPATIENT)
Age: 50
Discharge: HOME | End: 2020-10-21
Payer: COMMERCIAL

## 2020-10-19 LAB
BASOPHILS ABSOLUTE: 0.02 E9/L (ref 0–0.2)
BASOPHILS RELATIVE PERCENT: 0.2 % (ref 0–2)
EOSINOPHILS ABSOLUTE: 0.19 E9/L (ref 0.05–0.5)
EOSINOPHILS RELATIVE PERCENT: 2.3 % (ref 0–6)
HCT VFR BLD CALC: 39.3 % (ref 37–54)
HEMOGLOBIN: 12.1 G/DL (ref 12.5–16.5)
IMMATURE GRANULOCYTES #: 0.02 E9/L
IMMATURE GRANULOCYTES %: 0.2 % (ref 0–5)
LYMPHOCYTES ABSOLUTE: 1.62 E9/L (ref 1.5–4)
LYMPHOCYTES RELATIVE PERCENT: 19.4 % (ref 20–42)
MCH RBC QN AUTO: 28.2 PG (ref 26–35)
MCHC RBC AUTO-ENTMCNC: 30.8 % (ref 32–34.5)
MCV RBC AUTO: 91.6 FL (ref 80–99.9)
MONOCYTES ABSOLUTE: 0.61 E9/L (ref 0.1–0.95)
MONOCYTES RELATIVE PERCENT: 7.3 % (ref 2–12)
NEUTROPHILS ABSOLUTE: 5.87 E9/L (ref 1.8–7.3)
NEUTROPHILS RELATIVE PERCENT: 70.6 % (ref 43–80)
PDW BLD-RTO: 14.2 FL (ref 11.5–15)
PLATELET # BLD: 300 E9/L (ref 130–450)
PMV BLD AUTO: 10.6 FL (ref 7–12)
RBC # BLD: 4.29 E12/L (ref 3.8–5.8)
WBC # BLD: 8.3 E9/L (ref 4.5–11.5)

## 2020-10-19 PROCEDURE — 85025 COMPLETE CBC W/AUTO DIFF WBC: CPT

## 2020-10-27 ENCOUNTER — NURSE ONLY (OUTPATIENT)
Dept: PRIMARY CARE CLINIC | Age: 50
End: 2020-10-27
Payer: COMMERCIAL

## 2020-10-27 LAB
INTERNATIONAL NORMALIZATION RATIO, POC: >8
PROTHROMBIN TIME, POC: NORMAL

## 2020-10-27 PROCEDURE — 93793 ANTICOAG MGMT PT WARFARIN: CPT | Performed by: FAMILY MEDICINE

## 2020-10-27 PROCEDURE — 85610 PROTHROMBIN TIME: CPT | Performed by: FAMILY MEDICINE

## 2020-10-28 ENCOUNTER — ANTI-COAG VISIT (OUTPATIENT)
Dept: PRIMARY CARE CLINIC | Age: 50
End: 2020-10-28

## 2020-10-28 LAB — INR BLD: 8

## 2020-10-28 NOTE — PROGRESS NOTES
Does not need to come into office, but needs to check INR at home daily for now until his level comes down

## 2020-10-30 ENCOUNTER — NURSE ONLY (OUTPATIENT)
Dept: PRIMARY CARE CLINIC | Age: 50
End: 2020-10-30
Payer: COMMERCIAL

## 2020-10-30 LAB
INTERNATIONAL NORMALIZATION RATIO, POC: 2.8
PROTHROMBIN TIME, POC: NORMAL

## 2020-10-30 PROCEDURE — 85610 PROTHROMBIN TIME: CPT | Performed by: FAMILY MEDICINE

## 2020-11-02 ENCOUNTER — NURSE ONLY (OUTPATIENT)
Dept: PRIMARY CARE CLINIC | Age: 50
End: 2020-11-02
Payer: COMMERCIAL

## 2020-11-02 LAB
INTERNATIONAL NORMALIZATION RATIO, POC: 1.9
PROTHROMBIN TIME, POC: NORMAL

## 2020-11-02 PROCEDURE — 93793 ANTICOAG MGMT PT WARFARIN: CPT | Performed by: FAMILY MEDICINE

## 2020-11-02 PROCEDURE — 85610 PROTHROMBIN TIME: CPT | Performed by: FAMILY MEDICINE

## 2020-11-05 ENCOUNTER — NURSE ONLY (OUTPATIENT)
Dept: PRIMARY CARE CLINIC | Age: 50
End: 2020-11-05
Payer: COMMERCIAL

## 2020-11-05 LAB
INTERNATIONAL NORMALIZATION RATIO, POC: 3
PROTHROMBIN TIME, POC: NORMAL

## 2020-11-05 PROCEDURE — 85610 PROTHROMBIN TIME: CPT | Performed by: FAMILY MEDICINE

## 2020-11-06 ENCOUNTER — ANTI-COAG VISIT (OUTPATIENT)
Dept: PRIMARY CARE CLINIC | Age: 50
End: 2020-11-06

## 2020-11-06 LAB — INR BLD: 3.4

## 2020-11-09 ENCOUNTER — NURSE ONLY (OUTPATIENT)
Dept: PRIMARY CARE CLINIC | Age: 50
End: 2020-11-09
Payer: COMMERCIAL

## 2020-11-09 LAB
INTERNATIONAL NORMALIZATION RATIO, POC: 2.5
PROTHROMBIN TIME, POC: NORMAL

## 2020-11-09 PROCEDURE — 85610 PROTHROMBIN TIME: CPT | Performed by: FAMILY MEDICINE

## 2020-11-16 ENCOUNTER — OFFICE VISIT (OUTPATIENT)
Dept: PRIMARY CARE CLINIC | Age: 50
End: 2020-11-16
Payer: COMMERCIAL

## 2020-11-16 VITALS
OXYGEN SATURATION: 96 % | SYSTOLIC BLOOD PRESSURE: 138 MMHG | DIASTOLIC BLOOD PRESSURE: 84 MMHG | BODY MASS INDEX: 29.42 KG/M2 | HEART RATE: 122 BPM | HEIGHT: 73 IN | TEMPERATURE: 97.5 F | WEIGHT: 222 LBS | RESPIRATION RATE: 16 BRPM

## 2020-11-16 LAB
INTERNATIONAL NORMALIZATION RATIO, POC: 2.8
PROTHROMBIN TIME, POC: NORMAL

## 2020-11-16 PROCEDURE — 99214 OFFICE O/P EST MOD 30 MIN: CPT | Performed by: FAMILY MEDICINE

## 2020-11-16 PROCEDURE — 85610 PROTHROMBIN TIME: CPT | Performed by: FAMILY MEDICINE

## 2020-11-16 ASSESSMENT — ENCOUNTER SYMPTOMS
APNEA: 0
SINUS PRESSURE: 0
CHEST TIGHTNESS: 0
RHINORRHEA: 0
SORE THROAT: 0
COUGH: 0
CHANGE IN BOWEL HABIT: 0
DIARRHEA: 0
BACK PAIN: 0
WHEEZING: 0
EYE REDNESS: 0
ABDOMINAL PAIN: 0
BLOOD IN STOOL: 0
SHORTNESS OF BREATH: 0
COLOR CHANGE: 0
CONSTIPATION: 0
VOMITING: 0
NAUSEA: 0
EYE PAIN: 0
EYE ITCHING: 0

## 2020-11-16 NOTE — PROGRESS NOTES
Chief Complaint:     Chief Complaint   Patient presents with    Other     discuss procedure hes having possibly on thursday    Heart Problem    Cerebrovascular Accident         Other   Associated symptoms include fatigue, myalgias and weakness. Pertinent negatives include no abdominal pain, arthralgias, change in bowel habit, chest pain, chills, congestion, coughing, diaphoresis, fever, headaches, joint swelling, nausea, neck pain, numbness, rash, sore throat or vomiting. Heart Problem   This is a recurrent (s/p valve replacement) problem. The current episode started 1 to 4 weeks ago. The problem occurs daily. The problem has been unchanged. Associated symptoms include fatigue, myalgias and weakness. Pertinent negatives include no abdominal pain, arthralgias, change in bowel habit, chest pain, chills, congestion, coughing, diaphoresis, fever, headaches, joint swelling, nausea, neck pain, numbness, rash, sore throat or vomiting. Nothing aggravates the symptoms. He has tried nothing for the symptoms. The treatment provided no relief. Cerebrovascular Accident   This is a new problem. The current episode started more than 1 month ago. The problem has been gradually improving. Associated symptoms include fatigue, myalgias and weakness. Pertinent negatives include no abdominal pain, arthralgias, change in bowel habit, chest pain, chills, congestion, coughing, diaphoresis, fever, headaches, joint swelling, nausea, neck pain, numbness, rash, sore throat or vomiting. Nothing aggravates the symptoms. He has tried nothing for the symptoms. The treatment provided no relief. Hyperlipidemia   This is a chronic problem. The current episode started more than 1 year ago. The problem is controlled. He has no history of chronic renal disease, diabetes, hypothyroidism or obesity. Associated symptoms include myalgias. Pertinent negatives include no chest pain or shortness of breath.  Current antihyperlipidemic treatment includes statins. The current treatment provides significant improvement of lipids. There are no compliance problems. Risk factors for coronary artery disease include dyslipidemia and male sex.        Patient Active Problem List   Diagnosis    S/P MVR (mitral valve replacement)    S/P AVR (aortic valve replacement)    Hypertension    Hyperlipidemia    [facility]re[facility]    MAIRA (acute kidney injury) ([facility] [address]    MSSA bacteremia    Infective endocarditis    Atrial flutter ([facility] [address]       Past Medical History:   Diagnosis Date    Chest pain 2/21/2013    Hypertension     LONG TERM ANTICOAGULENT USE     Psoriasis     S/P AVR (aortic valve replacement) 2/21/2013    S/P MVR (mitral valve replacement) 2/21/2013       Past Surgical History:   Procedure Laterality Date    AORTIC VALVE REPLACEMENT  1/2006    Dr Minerva Luis  01--2006     [facility]       Current Outpatient Medications   Medication Sig Dispense Refill    albuterol (PROVENTIL) (2.5 MG/3ML) 0.083% nebulizer solution Inhale 2.5 mg into the lungs      acetylcysteine (MUCOMYST) 20 % nebulizer solution Inhale 200 mg into the lungs every 4 hours      aspirin 81 MG chewable tablet Take 81 mg by mouth daily      atorvastatin (LIPITOR) 40 MG tablet Take 40 mg by mouth nightly      furosemide (LASIX) 40 MG tablet Take 40 mg by mouth 2 times daily      metoprolol tartrate (LOPRESSOR) 25 MG tablet Take 37.25 mg by mouth every 8 hours      oxacillin 2 GM/50ML Infuse 2 g intravenously every 4 hours      pantoprazole (PROTONIX) 20 MG tablet Take 20 mg by mouth daily      potassium chloride (KLOR-CON M) 20 MEQ extended release tablet Take 20 mEq by mouth 2 times daily      spironolactone (ALDACTONE) 25 MG tablet Take 25 mg by mouth daily      lisinopril (PRINIVIL;ZESTRIL) 20 MG tablet Take 1 tablet by mouth daily 90 tablet 1    warfarin (JANTOVEN) 10 MG tablet Take one tablet by mouth daily or as directed by doctor 30 tablet 1    Multiple Vitamins-Minerals (THERAPEUTIC MULTIVITAMIN-MINERALS) tablet Take 1 tablet by mouth daily  2-22-19       No current facility-administered medications for this visit. No Known Allergies    Social History     Socioeconomic History    Marital status:      Spouse name: None    Number of children: None    Years of education: None    Highest education level: None   Occupational History     Comment: [email or web address]   Social Needs    Financial resource strain: None    Food insecurity     Worry: None     Inability: None    Transportation needs     Medical: None     Non-medical: None   Tobacco Use    Smoking status: Never Smoker    Smokeless tobacco: Never Used   Substance and Sexual Activity    Alcohol use: Yes     Comment: 1-2 beers per month    Drug use: No    Sexual activity: None   Lifestyle    Physical activity     Days per week: None     Minutes per session: None    Stress: None   Relationships    Social connections     Talks on phone: None     Gets together: None     Attends Taoist service: None     Active member of club or organization: None     Attends meetings of clubs or organizations: None     Relationship status: None    Intimate partner violence     Fear of current or ex partner: None     Emotionally abused: None     Physically abused: None     Forced sexual activity: None   Other Topics Concern    None   Social History Narrative    None       History reviewed. No pertinent family history. Review of Systems   Constitutional: Positive for fatigue. Negative for activity change, appetite change, chills, diaphoresis and fever. HENT: Negative for congestion, ear pain, hearing loss, nosebleeds, rhinorrhea, sinus pressure and sore throat. Eyes: Negative for pain, redness, itching and visual disturbance. Respiratory: Negative for apnea, cough, chest tightness, shortness of breath and wheezing.     Cardiovascular: Negative for chest pain, palpitations and leg swelling. Gastrointestinal: Negative for abdominal pain, blood in stool, change in bowel habit, constipation, diarrhea, nausea and vomiting. Endocrine: Negative. Genitourinary: Negative for decreased urine volume, difficulty urinating, dysuria, frequency, hematuria and urgency. Musculoskeletal: Positive for myalgias. Negative for arthralgias, back pain, gait problem, joint swelling and neck pain. Skin: Negative for color change and rash. Allergic/Immunologic: Negative for environmental allergies and food allergies. Neurological: Positive for weakness. Negative for dizziness, light-headedness, numbness and headaches. Hematological: Negative for adenopathy. Does not bruise/bleed easily. Psychiatric/Behavioral: Negative for behavioral problems, dysphoric mood and sleep disturbance. The patient is not nervous/anxious and is not hyperactive. All other systems reviewed and are negative. /84   Pulse 122   Temp 97.5 °F (36.4 °C)   Resp 16   Ht 6' 1\" (1.854 m)   Wt 222 lb (100.7 kg)   SpO2 96%   BMI 29.29 kg/m²     Physical Exam  Vitals signs and nursing note reviewed. Constitutional:       General: He is not in acute distress. Appearance: Normal appearance. He is well-developed. HENT:      Head: Normocephalic and atraumatic. Right Ear: Hearing, tympanic membrane and external ear normal. No tenderness. No middle ear effusion. Left Ear: Hearing, tympanic membrane and external ear normal. No tenderness. No middle ear effusion. Nose: Nose normal. No congestion or rhinorrhea. Right Turbinates: Not enlarged. Left Turbinates: Not enlarged. Mouth/Throat:      Mouth: Mucous membranes are moist.      Tongue: No lesions. Pharynx: Oropharynx is clear. No oropharyngeal exudate or posterior oropharyngeal erythema. Eyes:      General: No scleral icterus.      Conjunctiva/sclera: Conjunctivae normal.      Pupils: Pupils are equal, round, and reactive to light.   Neck:      Musculoskeletal: Normal range of motion and neck supple. No neck rigidity or muscular tenderness. Thyroid: No thyromegaly. Cardiovascular:      Rate and Rhythm: Normal rate and regular rhythm. Heart sounds: Normal heart sounds. No murmur. Pulmonary:      Effort: Pulmonary effort is normal. No respiratory distress. Breath sounds: Normal breath sounds. No wheezing or rales. Abdominal:      General: Bowel sounds are normal. There is no distension. Palpations: Abdomen is soft. Tenderness: There is no abdominal tenderness. Musculoskeletal: Normal range of motion. General: No tenderness. Lymphadenopathy:      Cervical: No cervical adenopathy. Skin:     General: Skin is warm and dry. Findings: No erythema or rash. Neurological:      General: No focal deficit present. Mental Status: He is alert and oriented to person, place, and time. Cranial Nerves: No cranial nerve deficit. Deep Tendon Reflexes: Reflexes are normal and symmetric. Reflexes normal.   Psychiatric:         Mood and Affect: Mood normal.                                 ASSESSMENT/PLAN:    Patient Active Problem List   Diagnosis    S/P MVR (mitral valve replacement)    S/P AVR (aortic valve replacement)    Hypertension    Hyperlipidemia    CVA (cerebral vascular accident) ([address]    MAIRA (acute kidney injury) ([address]    MSSA bacteremia    Infective endocarditis    Atrial flutter ([address]       Erick Dodd was seen today for other, heart problem and cerebrovascular accident.     Diagnoses and all orders for this visit:    Cerebrovascular accident (CVA) due to embolism of left anterior cerebral artery (HCC)    Anticoagulation goal of INR 2 to 3  -     POCT INR  -     Deidre Burton MD, Electrophysiology, [facility]    S/P AVR (aortic valve replacement)  -     POCT INR    S/P MVR (mitral valve replacement)  -     POCT INR    Atrial flutter, unspecified type ([address]  -

## 2020-11-19 LAB — INR BLD: 2.8

## 2020-11-20 ENCOUNTER — ANTI-COAG VISIT (OUTPATIENT)
Dept: PRIMARY CARE CLINIC | Age: 50
End: 2020-11-20

## 2020-11-23 ENCOUNTER — NURSE ONLY (OUTPATIENT)
Dept: PRIMARY CARE CLINIC | Age: 50
End: 2020-11-23
Payer: COMMERCIAL

## 2020-11-23 LAB
INTERNATIONAL NORMALIZATION RATIO, POC: 2.5
PROTHROMBIN TIME, POC: NORMAL

## 2020-11-23 PROCEDURE — 85610 PROTHROMBIN TIME: CPT | Performed by: FAMILY MEDICINE

## 2020-11-30 LAB — INR BLD: 2.9

## 2020-12-03 ENCOUNTER — ANTI-COAG VISIT (OUTPATIENT)
Dept: PRIMARY CARE CLINIC | Age: 50
End: 2020-12-03

## 2020-12-09 LAB — INR BLD: 2.3

## 2020-12-10 ENCOUNTER — ANTI-COAG VISIT (OUTPATIENT)
Dept: PRIMARY CARE CLINIC | Age: 50
End: 2020-12-10

## 2020-12-16 LAB — INR BLD: 2.7

## 2020-12-17 ENCOUNTER — ANTI-COAG VISIT (OUTPATIENT)
Dept: PRIMARY CARE CLINIC | Age: 50
End: 2020-12-17

## 2020-12-23 LAB — INR BLD: 2.2

## 2020-12-28 ENCOUNTER — ANTI-COAG VISIT (OUTPATIENT)
Dept: PRIMARY CARE CLINIC | Age: 50
End: 2020-12-28
Payer: COMMERCIAL

## 2020-12-28 PROCEDURE — 93793 ANTICOAG MGMT PT WARFARIN: CPT | Performed by: FAMILY MEDICINE

## 2020-12-30 LAB — INR BLD: 2.2

## 2020-12-31 ENCOUNTER — ANTI-COAG VISIT (OUTPATIENT)
Dept: PRIMARY CARE CLINIC | Age: 50
End: 2020-12-31

## 2021-01-09 LAB — INR BLD: 2.6

## 2021-01-11 ENCOUNTER — ANTI-COAG VISIT (OUTPATIENT)
Dept: PRIMARY CARE CLINIC | Age: 51
End: 2021-01-11

## 2021-01-11 DIAGNOSIS — Z95.2 S/P MVR (MITRAL VALVE REPLACEMENT): ICD-10-CM

## 2021-01-11 DIAGNOSIS — Z95.2 S/P AVR (AORTIC VALVE REPLACEMENT): ICD-10-CM

## 2021-01-13 LAB — INR BLD: 2

## 2021-01-14 ENCOUNTER — ANTI-COAG VISIT (OUTPATIENT)
Dept: PRIMARY CARE CLINIC | Age: 51
End: 2021-01-14

## 2021-01-14 DIAGNOSIS — Z95.2 S/P AVR (AORTIC VALVE REPLACEMENT): ICD-10-CM

## 2021-01-14 DIAGNOSIS — Z95.2 S/P MVR (MITRAL VALVE REPLACEMENT): ICD-10-CM

## 2021-01-18 LAB — INR BLD: 2.6

## 2021-01-24 LAB — INR BLD: 2.2

## 2021-01-25 ENCOUNTER — ANTI-COAG VISIT (OUTPATIENT)
Dept: PRIMARY CARE CLINIC | Age: 51
End: 2021-01-25

## 2021-01-25 DIAGNOSIS — Z95.2 S/P MVR (MITRAL VALVE REPLACEMENT): ICD-10-CM

## 2021-01-25 DIAGNOSIS — Z95.2 S/P AVR (AORTIC VALVE REPLACEMENT): ICD-10-CM

## 2021-02-02 LAB — INR BLD: 2

## 2021-02-03 ENCOUNTER — ANTI-COAG VISIT (OUTPATIENT)
Dept: PRIMARY CARE CLINIC | Age: 51
End: 2021-02-03

## 2021-02-03 DIAGNOSIS — Z95.2 S/P AVR (AORTIC VALVE REPLACEMENT): ICD-10-CM

## 2021-02-03 DIAGNOSIS — Z95.2 S/P MVR (MITRAL VALVE REPLACEMENT): ICD-10-CM

## 2021-02-09 LAB — INR BLD: 2.6

## 2021-02-10 ENCOUNTER — ANTI-COAG VISIT (OUTPATIENT)
Dept: PRIMARY CARE CLINIC | Age: 51
End: 2021-02-10

## 2021-02-10 DIAGNOSIS — Z95.2 S/P AVR (AORTIC VALVE REPLACEMENT): ICD-10-CM

## 2021-02-10 DIAGNOSIS — Z95.2 S/P MVR (MITRAL VALVE REPLACEMENT): ICD-10-CM

## 2021-02-17 LAB — INR BLD: 2.1

## 2021-02-18 ENCOUNTER — ANTI-COAG VISIT (OUTPATIENT)
Dept: PRIMARY CARE CLINIC | Age: 51
End: 2021-02-18

## 2021-02-18 DIAGNOSIS — Z95.2 S/P AVR (AORTIC VALVE REPLACEMENT): ICD-10-CM

## 2021-02-18 DIAGNOSIS — Z95.2 S/P MVR (MITRAL VALVE REPLACEMENT): ICD-10-CM

## 2021-02-27 LAB — INR BLD: 2.9

## 2021-03-01 ENCOUNTER — ANTI-COAG VISIT (OUTPATIENT)
Dept: PRIMARY CARE CLINIC | Age: 51
End: 2021-03-01

## 2021-03-01 DIAGNOSIS — Z95.2 S/P AVR (AORTIC VALVE REPLACEMENT): ICD-10-CM

## 2021-03-01 DIAGNOSIS — Z95.2 S/P MVR (MITRAL VALVE REPLACEMENT): ICD-10-CM

## 2021-03-10 ENCOUNTER — ANTI-COAG VISIT (OUTPATIENT)
Dept: PRIMARY CARE CLINIC | Age: 51
End: 2021-03-10

## 2021-03-10 DIAGNOSIS — Z95.2 S/P AVR (AORTIC VALVE REPLACEMENT): ICD-10-CM

## 2021-03-10 DIAGNOSIS — Z95.2 S/P MVR (MITRAL VALVE REPLACEMENT): ICD-10-CM

## 2021-03-10 LAB — INR BLD: 2

## 2021-03-16 LAB — INR BLD: 2.6

## 2021-03-17 ENCOUNTER — ANTI-COAG VISIT (OUTPATIENT)
Dept: PRIMARY CARE CLINIC | Age: 51
End: 2021-03-17

## 2021-03-17 DIAGNOSIS — Z95.2 S/P MVR (MITRAL VALVE REPLACEMENT): ICD-10-CM

## 2021-03-17 DIAGNOSIS — Z95.2 S/P AVR (AORTIC VALVE REPLACEMENT): ICD-10-CM

## 2021-03-22 LAB — INR BLD: 2.1

## 2021-03-23 ENCOUNTER — ANTI-COAG VISIT (OUTPATIENT)
Dept: PRIMARY CARE CLINIC | Age: 51
End: 2021-03-23

## 2021-03-23 DIAGNOSIS — Z95.2 S/P AVR (AORTIC VALVE REPLACEMENT): ICD-10-CM

## 2021-03-23 DIAGNOSIS — Z95.2 S/P MVR (MITRAL VALVE REPLACEMENT): ICD-10-CM

## 2021-03-29 LAB — INR BLD: 3

## 2021-03-30 ENCOUNTER — ANTI-COAG VISIT (OUTPATIENT)
Dept: PRIMARY CARE CLINIC | Age: 51
End: 2021-03-30

## 2021-03-30 DIAGNOSIS — Z95.2 S/P AVR (AORTIC VALVE REPLACEMENT): ICD-10-CM

## 2021-03-30 DIAGNOSIS — Z95.2 S/P MVR (MITRAL VALVE REPLACEMENT): ICD-10-CM

## 2021-03-31 LAB — INR BLD: 3

## 2021-04-01 ENCOUNTER — ANTI-COAG VISIT (OUTPATIENT)
Dept: PRIMARY CARE CLINIC | Age: 51
End: 2021-04-01

## 2021-04-01 DIAGNOSIS — Z95.2 S/P MVR (MITRAL VALVE REPLACEMENT): ICD-10-CM

## 2021-04-01 DIAGNOSIS — Z95.2 S/P AVR (AORTIC VALVE REPLACEMENT): ICD-10-CM

## 2021-04-07 LAB — INR BLD: 2.4

## 2021-04-08 ENCOUNTER — ANTI-COAG VISIT (OUTPATIENT)
Dept: PRIMARY CARE CLINIC | Age: 51
End: 2021-04-08

## 2021-04-08 DIAGNOSIS — Z95.2 S/P MVR (MITRAL VALVE REPLACEMENT): ICD-10-CM

## 2021-04-08 DIAGNOSIS — Z95.2 S/P AVR (AORTIC VALVE REPLACEMENT): ICD-10-CM

## 2021-04-12 ENCOUNTER — ANTI-COAG VISIT (OUTPATIENT)
Dept: PRIMARY CARE CLINIC | Age: 51
End: 2021-04-12

## 2021-04-12 LAB — INR BLD: 2

## 2021-04-12 RX ORDER — WARFARIN SODIUM 10 MG/1
TABLET ORAL
Qty: 30 TABLET | Refills: 0 | Status: SHIPPED
Start: 2021-04-12 | End: 2021-05-24 | Stop reason: SDUPTHER

## 2021-04-21 ENCOUNTER — ANTI-COAG VISIT (OUTPATIENT)
Dept: PRIMARY CARE CLINIC | Age: 51
End: 2021-04-21

## 2021-04-21 DIAGNOSIS — Z95.2 S/P MVR (MITRAL VALVE REPLACEMENT): ICD-10-CM

## 2021-04-21 DIAGNOSIS — Z95.2 S/P AVR (AORTIC VALVE REPLACEMENT): ICD-10-CM

## 2021-04-21 LAB — INR BLD: 3

## 2021-04-27 LAB — INR BLD: 2.5

## 2021-04-28 ENCOUNTER — ANTI-COAG VISIT (OUTPATIENT)
Dept: PRIMARY CARE CLINIC | Age: 51
End: 2021-04-28

## 2021-04-28 DIAGNOSIS — Z95.2 S/P MVR (MITRAL VALVE REPLACEMENT): ICD-10-CM

## 2021-04-28 DIAGNOSIS — Z95.2 S/P AVR (AORTIC VALVE REPLACEMENT): ICD-10-CM

## 2021-05-03 LAB — INR BLD: 3.1

## 2021-05-04 ENCOUNTER — ANTI-COAG VISIT (OUTPATIENT)
Dept: PRIMARY CARE CLINIC | Age: 51
End: 2021-05-04

## 2021-05-04 DIAGNOSIS — Z95.2 S/P MVR (MITRAL VALVE REPLACEMENT): ICD-10-CM

## 2021-05-04 DIAGNOSIS — Z95.2 S/P AVR (AORTIC VALVE REPLACEMENT): ICD-10-CM

## 2021-05-12 LAB — INR BLD: 3.3

## 2021-05-13 ENCOUNTER — ANTI-COAG VISIT (OUTPATIENT)
Dept: PRIMARY CARE CLINIC | Age: 51
End: 2021-05-13

## 2021-05-13 DIAGNOSIS — Z95.2 S/P MVR (MITRAL VALVE REPLACEMENT): ICD-10-CM

## 2021-05-13 DIAGNOSIS — Z95.2 S/P AVR (AORTIC VALVE REPLACEMENT): ICD-10-CM

## 2021-05-19 ENCOUNTER — ANTI-COAG VISIT (OUTPATIENT)
Dept: PRIMARY CARE CLINIC | Age: 51
End: 2021-05-19

## 2021-05-19 DIAGNOSIS — Z95.2 S/P MVR (MITRAL VALVE REPLACEMENT): ICD-10-CM

## 2021-05-19 DIAGNOSIS — Z95.2 S/P AVR (AORTIC VALVE REPLACEMENT): Primary | ICD-10-CM

## 2021-05-19 LAB — INR BLD: 3.1

## 2021-05-24 RX ORDER — WARFARIN SODIUM 10 MG/1
TABLET ORAL
Qty: 30 TABLET | Refills: 0 | Status: SHIPPED
Start: 2021-05-24 | End: 2021-06-10

## 2021-05-28 ENCOUNTER — ANTI-COAG VISIT (OUTPATIENT)
Dept: PRIMARY CARE CLINIC | Age: 51
End: 2021-05-28

## 2021-05-28 LAB — INR BLD: 2

## 2021-06-03 ENCOUNTER — ANTI-COAG VISIT (OUTPATIENT)
Dept: PRIMARY CARE CLINIC | Age: 51
End: 2021-06-03

## 2021-06-03 LAB — INR BLD: 2

## 2021-06-10 RX ORDER — WARFARIN SODIUM 10 MG/1
TABLET ORAL
Qty: 30 TABLET | Refills: 0 | Status: SHIPPED
Start: 2021-06-10 | End: 2021-06-28 | Stop reason: SDUPTHER

## 2021-06-10 RX ORDER — LISINOPRIL 20 MG/1
TABLET ORAL
Qty: 90 TABLET | Refills: 0 | Status: SHIPPED
Start: 2021-06-10 | End: 2021-09-07

## 2021-06-11 ENCOUNTER — ANTI-COAG VISIT (OUTPATIENT)
Dept: PRIMARY CARE CLINIC | Age: 51
End: 2021-06-11

## 2021-06-11 DIAGNOSIS — Z95.2 S/P MVR (MITRAL VALVE REPLACEMENT): ICD-10-CM

## 2021-06-11 DIAGNOSIS — Z95.2 S/P AVR (AORTIC VALVE REPLACEMENT): Primary | ICD-10-CM

## 2021-06-11 LAB
INR BLD: 1.9
INR BLD: 2.9

## 2021-06-18 ENCOUNTER — ANTI-COAG VISIT (OUTPATIENT)
Dept: PRIMARY CARE CLINIC | Age: 51
End: 2021-06-18

## 2021-06-18 DIAGNOSIS — Z95.2 S/P MVR (MITRAL VALVE REPLACEMENT): ICD-10-CM

## 2021-06-18 DIAGNOSIS — Z95.2 S/P AVR (AORTIC VALVE REPLACEMENT): Primary | ICD-10-CM

## 2021-06-25 LAB — INR BLD: 2

## 2021-06-28 ENCOUNTER — ANTI-COAG VISIT (OUTPATIENT)
Dept: PRIMARY CARE CLINIC | Age: 51
End: 2021-06-28

## 2021-06-28 DIAGNOSIS — Z95.2 S/P AVR (AORTIC VALVE REPLACEMENT): Primary | ICD-10-CM

## 2021-06-28 DIAGNOSIS — Z95.2 S/P MVR (MITRAL VALVE REPLACEMENT): ICD-10-CM

## 2021-06-28 RX ORDER — WARFARIN SODIUM 10 MG/1
10 TABLET ORAL DAILY
Qty: 30 TABLET | Refills: 5 | Status: ON HOLD
Start: 2021-06-28 | End: 2021-12-11 | Stop reason: HOSPADM

## 2021-07-12 LAB — INR BLD: 3.1

## 2021-07-13 ENCOUNTER — ANTI-COAG VISIT (OUTPATIENT)
Dept: PRIMARY CARE CLINIC | Age: 51
End: 2021-07-13

## 2021-07-13 DIAGNOSIS — Z95.2 S/P MVR (MITRAL VALVE REPLACEMENT): ICD-10-CM

## 2021-07-13 DIAGNOSIS — Z95.2 S/P AVR (AORTIC VALVE REPLACEMENT): Primary | ICD-10-CM

## 2021-07-22 LAB — INR BLD: 2.3

## 2021-07-23 ENCOUNTER — ANTI-COAG VISIT (OUTPATIENT)
Dept: PRIMARY CARE CLINIC | Age: 51
End: 2021-07-23

## 2021-07-23 DIAGNOSIS — Z95.2 S/P AVR (AORTIC VALVE REPLACEMENT): Primary | ICD-10-CM

## 2021-07-23 DIAGNOSIS — Z95.2 S/P MVR (MITRAL VALVE REPLACEMENT): ICD-10-CM

## 2021-08-03 ENCOUNTER — ANTI-COAG VISIT (OUTPATIENT)
Dept: PRIMARY CARE CLINIC | Age: 51
End: 2021-08-03

## 2021-08-03 DIAGNOSIS — Z95.2 S/P AVR (AORTIC VALVE REPLACEMENT): Primary | ICD-10-CM

## 2021-08-03 DIAGNOSIS — Z95.2 S/P MVR (MITRAL VALVE REPLACEMENT): ICD-10-CM

## 2021-08-03 LAB — INR BLD: 3.1

## 2021-08-28 LAB — INR BLD: 2.2

## 2021-08-30 ENCOUNTER — ANTI-COAG VISIT (OUTPATIENT)
Dept: PRIMARY CARE CLINIC | Age: 51
End: 2021-08-30

## 2021-08-30 DIAGNOSIS — Z95.2 S/P AVR (AORTIC VALVE REPLACEMENT): Primary | ICD-10-CM

## 2021-08-30 DIAGNOSIS — Z95.2 S/P MVR (MITRAL VALVE REPLACEMENT): ICD-10-CM

## 2021-09-04 LAB — INR BLD: 1.9

## 2021-09-07 ENCOUNTER — ANTI-COAG VISIT (OUTPATIENT)
Dept: PRIMARY CARE CLINIC | Age: 51
End: 2021-09-07

## 2021-09-07 DIAGNOSIS — Z95.2 S/P MVR (MITRAL VALVE REPLACEMENT): ICD-10-CM

## 2021-09-07 DIAGNOSIS — Z95.2 S/P AVR (AORTIC VALVE REPLACEMENT): Primary | ICD-10-CM

## 2021-09-07 RX ORDER — LISINOPRIL 20 MG/1
TABLET ORAL
Qty: 90 TABLET | Refills: 0 | Status: SHIPPED
Start: 2021-09-07 | End: 2021-12-16 | Stop reason: SDUPTHER

## 2021-09-13 LAB — INR BLD: 2.3

## 2021-09-14 ENCOUNTER — ANTI-COAG VISIT (OUTPATIENT)
Dept: PRIMARY CARE CLINIC | Age: 51
End: 2021-09-14

## 2021-09-14 DIAGNOSIS — Z95.2 S/P AVR (AORTIC VALVE REPLACEMENT): Primary | ICD-10-CM

## 2021-09-14 DIAGNOSIS — Z95.2 S/P MVR (MITRAL VALVE REPLACEMENT): ICD-10-CM

## 2021-09-22 LAB — INR BLD: 2

## 2021-09-23 ENCOUNTER — ANTI-COAG VISIT (OUTPATIENT)
Dept: PRIMARY CARE CLINIC | Age: 51
End: 2021-09-23

## 2021-09-23 DIAGNOSIS — Z95.2 S/P MVR (MITRAL VALVE REPLACEMENT): ICD-10-CM

## 2021-09-23 DIAGNOSIS — Z95.2 S/P AVR (AORTIC VALVE REPLACEMENT): Primary | ICD-10-CM

## 2021-10-01 LAB — INR BLD: 3

## 2021-10-04 ENCOUNTER — ANTI-COAG VISIT (OUTPATIENT)
Dept: PRIMARY CARE CLINIC | Age: 51
End: 2021-10-04

## 2021-10-04 DIAGNOSIS — Z95.2 S/P MVR (MITRAL VALVE REPLACEMENT): ICD-10-CM

## 2021-10-04 DIAGNOSIS — Z95.2 S/P AVR (AORTIC VALVE REPLACEMENT): Primary | ICD-10-CM

## 2021-10-08 ENCOUNTER — NURSE TRIAGE (OUTPATIENT)
Dept: OTHER | Facility: CLINIC | Age: 51
End: 2021-10-08

## 2021-10-08 NOTE — TELEPHONE ENCOUNTER
Received call from John Richards at [facility] with Red Flag Complaint. Brief description of triage: leg swelling and foot redness bilaterally, getting worse, would like blood work ordered    Triage indicates for patient to see within 3 days. Care advice provided, patient verbalizes understanding; denies any other questions or concerns; instructed to call back for any new or worsening symptoms. Writer provided warm transfer to [facility] for appointment scheduling. Attention Provider: Thank you for allowing me to participate in the care of your patient. The patient was connected to triage in response to information provided to the ECC/PSC. Please do not respond through this encounter as the response is not directed to a shared pool. Reason for Disposition   MILD swelling of both ankles (i.e., pedal edema) AND new onset or worsening    Answer Assessment - Initial Assessment Questions  1. ONSET: \"When did the swelling start? \" (e.g., minutes, hours, days)      Been ongoing, slowed down, but now feet red. 2. LOCATION: \"What part of the leg is swollen? \"  \"Are both legs swollen or just one leg? \"      Both legs slightly swelling    3. SEVERITY: \"How bad is the swelling? \" (e.g., localized; mild, moderate, severe)   - Localized - small area of swelling localized to one leg   - MILD pedal edema - swelling limited to foot and ankle, pitting edema < 1/4 inch (6 mm) deep, rest and elevation eliminate most or all swelling   - MODERATE edema - swelling of lower leg to knee, pitting edema > 1/4 inch (6 mm) deep, rest and elevation only partially reduce swelling   - SEVERE edema - swelling extends above knee, facial or hand swelling present       Mild    4. REDNESS: \"Does the swelling look red or infected? \"      Yes, does not look infected, blotchy red since CVA and heart surgery in August 2020, but the redness worse now. 5. PAIN: \"Is the swelling painful to touch? \" If so, ask: \"How painful is it? \"   (Scale 1-10; mild, moderate or severe)      No pain    6. FEVER: \"Do you have a fever? \" If so, ask: \"What is it, how was it measured, and when did it start? \"       Denies    7. CAUSE: \"What do you think is causing the leg swelling? \"      Unsure    8. MEDICAL HISTORY: \"Do you have a history of heart failure, kidney disease, liver failure, or cancer? \"      CVA, Open heart surgery. 9. RECURRENT SYMPTOM: \"Have you had leg swelling before? \" If so, ask: \"When was the last time? \" \"What happened that time? \"      For the past year. 10. OTHER SYMPTOMS: \"Do you have any other symptoms? \" (e.g., chest pain, difficulty breathing)        Denies    11. PREGNANCY: \"Is there any chance you are pregnant? \" \"When was your last menstrual period? \"        N/a    Protocols used: LEG SWELLING AND EDEMA-ADULT-[place]

## 2021-10-12 ENCOUNTER — OFFICE VISIT (OUTPATIENT)
Dept: PRIMARY CARE CLINIC | Age: 51
End: 2021-10-12
Payer: COMMERCIAL

## 2021-10-12 VITALS
HEART RATE: 89 BPM | TEMPERATURE: 97.4 F | WEIGHT: 241 LBS | SYSTOLIC BLOOD PRESSURE: 136 MMHG | BODY MASS INDEX: 32.64 KG/M2 | DIASTOLIC BLOOD PRESSURE: 78 MMHG | OXYGEN SATURATION: 97 % | HEIGHT: 72 IN

## 2021-10-12 DIAGNOSIS — I10 PRIMARY HYPERTENSION: Primary | ICD-10-CM

## 2021-10-12 DIAGNOSIS — E78.5 HYPERLIPIDEMIA, UNSPECIFIED HYPERLIPIDEMIA TYPE: ICD-10-CM

## 2021-10-12 DIAGNOSIS — Z95.2 S/P MVR (MITRAL VALVE REPLACEMENT): ICD-10-CM

## 2021-10-12 DIAGNOSIS — Z12.11 SCREENING FOR MALIGNANT NEOPLASM OF COLON: ICD-10-CM

## 2021-10-12 DIAGNOSIS — R60.0 BILATERAL LEG EDEMA: ICD-10-CM

## 2021-10-12 DIAGNOSIS — Z95.2 S/P AVR (AORTIC VALVE REPLACEMENT): ICD-10-CM

## 2021-10-12 PROBLEM — I63.9 CVA (CEREBRAL VASCULAR ACCIDENT) (HCC): Status: RESOLVED | Noted: 2020-08-22 | Resolved: 2021-10-12

## 2021-10-12 PROBLEM — N17.9 AKI (ACUTE KIDNEY INJURY) (HCC): Status: RESOLVED | Noted: 2020-08-22 | Resolved: 2021-10-12

## 2021-10-12 PROCEDURE — 99214 OFFICE O/P EST MOD 30 MIN: CPT | Performed by: FAMILY MEDICINE

## 2021-10-12 PROCEDURE — 90471 IMMUNIZATION ADMIN: CPT | Performed by: FAMILY MEDICINE

## 2021-10-12 PROCEDURE — 3017F COLORECTAL CA SCREEN DOC REV: CPT | Performed by: FAMILY MEDICINE

## 2021-10-12 PROCEDURE — 1036F TOBACCO NON-USER: CPT | Performed by: FAMILY MEDICINE

## 2021-10-12 PROCEDURE — G8417 CALC BMI ABV UP PARAM F/U: HCPCS | Performed by: FAMILY MEDICINE

## 2021-10-12 PROCEDURE — G8427 DOCREV CUR MEDS BY ELIG CLIN: HCPCS | Performed by: FAMILY MEDICINE

## 2021-10-12 PROCEDURE — G8482 FLU IMMUNIZE ORDER/ADMIN: HCPCS | Performed by: FAMILY MEDICINE

## 2021-10-12 PROCEDURE — 90674 CCIIV4 VAC NO PRSV 0.5 ML IM: CPT | Performed by: FAMILY MEDICINE

## 2021-10-12 ASSESSMENT — PATIENT HEALTH QUESTIONNAIRE - PHQ9
SUM OF ALL RESPONSES TO PHQ QUESTIONS 1-9: 0
1. LITTLE INTEREST OR PLEASURE IN DOING THINGS: 0
SUM OF ALL RESPONSES TO PHQ9 QUESTIONS 1 & 2: 0
SUM OF ALL RESPONSES TO PHQ QUESTIONS 1-9: 0
SUM OF ALL RESPONSES TO PHQ QUESTIONS 1-9: 0
2. FEELING DOWN, DEPRESSED OR HOPELESS: 0

## 2021-10-12 ASSESSMENT — ENCOUNTER SYMPTOMS
DIARRHEA: 0
BLOOD IN STOOL: 0
RHINORRHEA: 0
SINUS PRESSURE: 0
CONSTIPATION: 0
CHANGE IN BOWEL HABIT: 0
BACK PAIN: 0
EYE PAIN: 0
EYE ITCHING: 0
EYE REDNESS: 0
VOMITING: 0
SORE THROAT: 0
APNEA: 0
SHORTNESS OF BREATH: 0
CHEST TIGHTNESS: 0
WHEEZING: 0
COLOR CHANGE: 0
COUGH: 0
ABDOMINAL PAIN: 0
NAUSEA: 0

## 2021-10-12 NOTE — PROGRESS NOTES
Chief Complaint:     Chief Complaint   Patient presents with    Foot Swelling     both feet. Bruised and red.  Leg Pain         Leg Pain   Pertinent negatives include no numbness. Other  Associated symptoms include fatigue, myalgias and weakness. Pertinent negatives include no abdominal pain, arthralgias, change in bowel habit, chest pain, chills, congestion, coughing, diaphoresis, fever, headaches, joint swelling, nausea, neck pain, numbness, rash, sore throat or vomiting. Heart Problem  This is a recurrent (s/p valve replacement) problem. The current episode started 1 to 4 weeks ago. The problem occurs daily. The problem has been unchanged. Associated symptoms include fatigue, myalgias and weakness. Pertinent negatives include no abdominal pain, arthralgias, change in bowel habit, chest pain, chills, congestion, coughing, diaphoresis, fever, headaches, joint swelling, nausea, neck pain, numbness, rash, sore throat or vomiting. Nothing aggravates the symptoms. He has tried nothing for the symptoms. The treatment provided no relief. Cerebrovascular Accident  This is a new problem. The current episode started more than 1 month ago. The problem has been gradually improving. Associated symptoms include fatigue, myalgias and weakness. Pertinent negatives include no abdominal pain, arthralgias, change in bowel habit, chest pain, chills, congestion, coughing, diaphoresis, fever, headaches, joint swelling, nausea, neck pain, numbness, rash, sore throat or vomiting. Nothing aggravates the symptoms. He has tried nothing for the symptoms. The treatment provided no relief. Hyperlipidemia  This is a chronic problem. The current episode started more than 1 year ago. The problem is controlled. He has no history of chronic renal disease, diabetes, hypothyroidism or obesity. Associated symptoms include leg pain and myalgias. Pertinent negatives include no chest pain or shortness of breath.  Current antihyperlipidemic treatment includes statins. The current treatment provides significant improvement of lipids. There are no compliance problems. Risk factors for coronary artery disease include dyslipidemia and male sex. Patient Active Problem List   Diagnosis    S/P MVR (mitral valve replacement)    S/P AVR (aortic valve replacement)    Hypertension    Hyperlipidemia    Infective endocarditis    Atrial flutter (HCC)       Past Medical History:   Diagnosis Date    Chest pain 2/21/2013    Hypertension     LONG TERM ANTICOAGULENT USE     Psoriasis     S/P AVR (aortic valve replacement) 2/21/2013    S/P MVR (mitral valve replacement) 2/21/2013       Past Surgical History:   Procedure Laterality Date    AORTIC VALVE REPLACEMENT  1/2006    Dr Angela Guillen  01--2006     [facility]       Current Outpatient Medications   Medication Sig Dispense Refill    Elastic Bandages & Supports (MEDICAL COMPRESSION STOCKINGS) MISC 1 each by Does not apply route daily 20-30 mmHg, knee high. Dx: Venous insufficiency 2 each 0    lisinopril (PRINIVIL;ZESTRIL) 20 MG tablet TAKE ONE TABLET BY MOUTH DAILY 90 tablet 0    warfarin (JANTOVEN) 10 MG tablet Take 1 tablet by mouth daily or as directed by the doctor 30 tablet 5    aspirin 81 MG chewable tablet Take 81 mg by mouth daily      metoprolol tartrate (LOPRESSOR) 25 MG tablet Take 37.25 mg by mouth every 8 hours      oxacillin 2 GM/50ML Infuse 2 g intravenously every 4 hours      potassium chloride (KLOR-CON M) 20 MEQ extended release tablet Take 20 mEq by mouth 2 times daily      spironolactone (ALDACTONE) 25 MG tablet Take 25 mg by mouth daily      Multiple Vitamins-Minerals (THERAPEUTIC MULTIVITAMIN-MINERALS) tablet Take 1 tablet by mouth daily  2-22-19       No current facility-administered medications for this visit.        No Known Allergies    Social History     Socioeconomic History    Marital status:      Spouse name: None    Number of children: None    Years of education: None    Highest education level: None   Occupational History     Comment: [employer]   Tobacco Use    Smoking status: Never Smoker    Smokeless tobacco: Never Used   Substance and Sexual Activity    Alcohol use: Yes     Comment: 1-2 beers per month    Drug use: No    Sexual activity: None   Other Topics Concern    None   Social History Narrative    None     Social Determinants of Health     Financial Resource Strain:     Difficulty of Paying Living Expenses:    Food Insecurity:     Worried About Running Out of Food in the Last Year:     Ran Out of Food in the Last Year:    Transportation Needs:     Lack of Transportation (Medical):  Lack of Transportation (Non-Medical):    Physical Activity:     Days of Exercise per Week:     Minutes of Exercise per Session:    Stress:     Feeling of Stress :    Social Connections:     Frequency of Communication with Friends and Family:     Frequency of Social Gatherings with Friends and Family:     Attends Episcopal Services:     Active Member of Clubs or Organizations:     Attends Club or Organization Meetings:     Marital Status:    Intimate Partner Violence:     Fear of Current or Ex-Partner:     Emotionally Abused:     Physically Abused:     Sexually Abused:        History reviewed. No pertinent family history. Review of Systems   Constitutional: Positive for fatigue. Negative for activity change, appetite change, chills, diaphoresis and fever. HENT: Negative for congestion, ear pain, hearing loss, nosebleeds, rhinorrhea, sinus pressure and sore throat. Eyes: Negative for pain, redness, itching and visual disturbance. Respiratory: Negative for apnea, cough, chest tightness, shortness of breath and wheezing. Cardiovascular: Negative for chest pain, palpitations and leg swelling.    Gastrointestinal: Negative for abdominal pain, blood in stool, change in bowel habit, constipation, diarrhea, nausea and vomiting. Endocrine: Negative. Genitourinary: Negative for decreased urine volume, difficulty urinating, dysuria, frequency, hematuria and urgency. Musculoskeletal: Positive for myalgias. Negative for arthralgias, back pain, gait problem, joint swelling and neck pain. Skin: Negative for color change and rash. Allergic/Immunologic: Negative for environmental allergies and food allergies. Neurological: Positive for weakness. Negative for dizziness, light-headedness, numbness and headaches. Hematological: Negative for adenopathy. Does not bruise/bleed easily. Psychiatric/Behavioral: Negative for behavioral problems, dysphoric mood and sleep disturbance. The patient is not nervous/anxious and is not hyperactive. All other systems reviewed and are negative. /78   Pulse 89   Temp 97.4 °F (36.3 °C)   Ht 6' (1.829 m)   Wt 241 lb (109.3 kg)   SpO2 97%   BMI 32.69 kg/m²     Physical Exam  Vitals and nursing note reviewed. Constitutional:       General: He is not in acute distress. Appearance: Normal appearance. He is well-developed. HENT:      Head: Normocephalic and atraumatic. Right Ear: Hearing, tympanic membrane and external ear normal. No tenderness. No middle ear effusion. Left Ear: Hearing, tympanic membrane and external ear normal. No tenderness. No middle ear effusion. Nose: Nose normal. No congestion or rhinorrhea. Right Turbinates: Not enlarged. Left Turbinates: Not enlarged. Mouth/Throat:      Mouth: Mucous membranes are moist.      Tongue: No lesions. Pharynx: Oropharynx is clear. No oropharyngeal exudate or posterior oropharyngeal erythema. Eyes:      General: No scleral icterus. Conjunctiva/sclera: Conjunctivae normal.      Pupils: Pupils are equal, round, and reactive to light. Neck:      Thyroid: No thyromegaly. Cardiovascular:      Rate and Rhythm: Normal rate and regular rhythm.       Heart sounds: Normal heart sounds. No murmur heard. Pulmonary:      Effort: Pulmonary effort is normal. No respiratory distress. Breath sounds: Normal breath sounds. No wheezing or rales. Abdominal:      General: Bowel sounds are normal. There is no distension. Palpations: Abdomen is soft. Tenderness: There is no abdominal tenderness. Musculoskeletal:         General: Swelling present. No tenderness. Normal range of motion. Cervical back: Normal range of motion and neck supple. No rigidity. No muscular tenderness. Right lower leg: Edema present. Left lower leg: Edema present. Lymphadenopathy:      Cervical: No cervical adenopathy. Skin:     General: Skin is warm and dry. Findings: Erythema and rash present. Neurological:      General: No focal deficit present. Mental Status: He is alert and oriented to person, place, and time. Cranial Nerves: No cranial nerve deficit. Deep Tendon Reflexes: Reflexes are normal and symmetric. Reflexes normal.   Psychiatric:         Mood and Affect: Mood normal.                                 ASSESSMENT/PLAN:    Patient Active Problem List   Diagnosis    S/P MVR (mitral valve replacement)    S/P AVR (aortic valve replacement)    Hypertension    Hyperlipidemia    Infective endocarditis    Atrial flutter (HCC)       Tressa Cao was seen today for foot swelling and leg pain. Diagnoses and all orders for this visit:    Primary hypertension  -     CBC; Future  -     Comprehensive Metabolic Panel; Future  -     TSH without Reflex; Future  -     Lipid Panel; Future    Hyperlipidemia, unspecified hyperlipidemia type  -     CBC; Future  -     Comprehensive Metabolic Panel; Future  -     TSH without Reflex; Future  -     Lipid Panel; Future    S/P MVR (mitral valve replacement)  -     Protime-INR; Future    S/P AVR (aortic valve replacement)  -     Protime-INR;  Future    Bilateral leg edema    Screening for malignant neoplasm of colon  -     POCT Fecal Immunochemical Test (FIT); Future    Other orders  -     Elastic Bandages & Supports (MEDICAL COMPRESSION STOCKINGS) MISC; 1 each by Does not apply route daily 20-30 mmHg, knee high. Dx: Venous insufficiency  -     INFLUENZA, MDCK QUADV, 2 YRS AND OLDER, IM, PF, PREFILL SYR OR SDV, 0.5ML (FLUCELVAX QUADV, PF)          Return in about 6 months (around 4/12/2022) for Recheck labs, Recheck Meds, Follow up HTN, Follow up Lipids. I spent 30 minutes with this patient. I spent greater than 50% of the time counseling this patient.         Arn Files, DO  10/12/2021  9:22 AM

## 2021-10-16 LAB — INR BLD: 2.5

## 2021-10-18 ENCOUNTER — ANTI-COAG VISIT (OUTPATIENT)
Dept: PRIMARY CARE CLINIC | Age: 51
End: 2021-10-18

## 2021-10-18 DIAGNOSIS — Z95.2 S/P AVR (AORTIC VALVE REPLACEMENT): Primary | ICD-10-CM

## 2021-10-18 DIAGNOSIS — Z95.2 S/P MVR (MITRAL VALVE REPLACEMENT): ICD-10-CM

## 2021-10-31 LAB — INR BLD: 2.4

## 2021-11-01 ENCOUNTER — ANTI-COAG VISIT (OUTPATIENT)
Dept: PRIMARY CARE CLINIC | Age: 51
End: 2021-11-01

## 2021-11-01 DIAGNOSIS — Z95.2 S/P AVR (AORTIC VALVE REPLACEMENT): Primary | ICD-10-CM

## 2021-11-01 DIAGNOSIS — Z95.2 S/P MVR (MITRAL VALVE REPLACEMENT): ICD-10-CM

## 2021-11-09 ENCOUNTER — ANTI-COAG VISIT (OUTPATIENT)
Dept: PRIMARY CARE CLINIC | Age: 51
End: 2021-11-09

## 2021-11-09 DIAGNOSIS — Z95.2 S/P AVR (AORTIC VALVE REPLACEMENT): Primary | ICD-10-CM

## 2021-11-09 DIAGNOSIS — Z95.2 S/P MVR (MITRAL VALVE REPLACEMENT): ICD-10-CM

## 2021-11-09 LAB — INR BLD: 1.9

## 2021-11-17 ENCOUNTER — ANTI-COAG VISIT (OUTPATIENT)
Dept: PRIMARY CARE CLINIC | Age: 51
End: 2021-11-17

## 2021-11-17 DIAGNOSIS — Z95.2 S/P AVR (AORTIC VALVE REPLACEMENT): Primary | ICD-10-CM

## 2021-11-17 DIAGNOSIS — Z95.2 S/P MVR (MITRAL VALVE REPLACEMENT): ICD-10-CM

## 2021-11-17 LAB — INR BLD: 2.5

## 2021-11-27 LAB — INR BLD: 2.8

## 2021-11-29 ENCOUNTER — ANTI-COAG VISIT (OUTPATIENT)
Dept: PRIMARY CARE CLINIC | Age: 51
End: 2021-11-29

## 2021-11-29 DIAGNOSIS — Z95.2 S/P AVR (AORTIC VALVE REPLACEMENT): Primary | ICD-10-CM

## 2021-11-29 DIAGNOSIS — Z95.2 S/P MVR (MITRAL VALVE REPLACEMENT): ICD-10-CM

## 2021-12-07 ENCOUNTER — APPOINTMENT (OUTPATIENT)
Dept: CT IMAGING | Age: 51
DRG: 055 | End: 2021-12-07
Payer: MEDICAID

## 2021-12-07 ENCOUNTER — APPOINTMENT (OUTPATIENT)
Dept: GENERAL RADIOLOGY | Age: 51
DRG: 055 | End: 2021-12-07
Payer: MEDICAID

## 2021-12-07 ENCOUNTER — HOSPITAL ENCOUNTER (INPATIENT)
Age: 51
LOS: 4 days | Discharge: HOME | DRG: 055 | End: 2021-12-11
Attending: EMERGENCY MEDICINE | Admitting: SURGERY
Payer: MEDICAID

## 2021-12-07 DIAGNOSIS — I10 HYPERTENSION, UNSPECIFIED TYPE: ICD-10-CM

## 2021-12-07 DIAGNOSIS — S06.5XAA SUBDURAL HEMATOMA: Primary | ICD-10-CM

## 2021-12-07 DIAGNOSIS — R55 SYNCOPE AND COLLAPSE: ICD-10-CM

## 2021-12-07 PROBLEM — T14.90XA TRAUMA: Status: ACTIVE | Noted: 2021-12-07

## 2021-12-07 LAB
ABO/RH: NORMAL
ACETAMINOPHEN LEVEL: <5 MCG/ML (ref 10–30)
ALBUMIN SERPL-MCNC: 4.6 G/DL (ref 3.5–5.2)
ALP BLD-CCNC: 76 U/L (ref 40–129)
ALT SERPL-CCNC: 29 U/L (ref 0–40)
AMPHETAMINE SCREEN, URINE: NOT DETECTED
ANGLE (CLOT STRENGTH): 68.1 DEGREE (ref 59–74)
ANION GAP SERPL CALCULATED.3IONS-SCNC: 12 MMOL/L (ref 7–16)
ANION GAP SERPL CALCULATED.3IONS-SCNC: 16 MMOL/L (ref 7–16)
ANTIBODY SCREEN: NORMAL
AST SERPL-CCNC: 27 U/L (ref 0–39)
BARBITURATE SCREEN URINE: NOT DETECTED
BASOPHILS ABSOLUTE: 0.01 E9/L (ref 0–0.2)
BASOPHILS RELATIVE PERCENT: 0.1 % (ref 0–2)
BENZODIAZEPINE SCREEN, URINE: NOT DETECTED
BILIRUB SERPL-MCNC: 0.9 MG/DL (ref 0–1.2)
BUN BLDV-MCNC: 19 MG/DL (ref 6–20)
BUN BLDV-MCNC: 22 MG/DL (ref 6–20)
CALCIUM SERPL-MCNC: 8.8 MG/DL (ref 8.6–10.2)
CALCIUM SERPL-MCNC: 9.5 MG/DL (ref 8.6–10.2)
CANNABINOID SCREEN URINE: NOT DETECTED
CHLORIDE BLD-SCNC: 105 MMOL/L (ref 98–107)
CHLORIDE BLD-SCNC: 105 MMOL/L (ref 98–107)
CHP ED QC CHECK: YES
CO2: 21 MMOL/L (ref 22–29)
CO2: 23 MMOL/L (ref 22–29)
COCAINE METABOLITE SCREEN URINE: NOT DETECTED
CREAT SERPL-MCNC: 0.9 MG/DL (ref 0.7–1.2)
CREAT SERPL-MCNC: 1.1 MG/DL (ref 0.7–1.2)
EOSINOPHILS ABSOLUTE: 0.1 E9/L (ref 0.05–0.5)
EOSINOPHILS RELATIVE PERCENT: 1.4 % (ref 0–6)
EPL-TEG: 0 % (ref 0–15)
ETHANOL: <10 MG/DL (ref 0–0.08)
FENTANYL SCREEN, URINE: NOT DETECTED
G-TEG: 10.8 K D/SC (ref 4.5–11)
GFR AFRICAN AMERICAN: >60
GFR AFRICAN AMERICAN: >60
GFR NON-AFRICAN AMERICAN: >60 ML/MIN/1.73
GFR NON-AFRICAN AMERICAN: >60 ML/MIN/1.73
GLUCOSE BLD-MCNC: 119 MG/DL (ref 74–99)
GLUCOSE BLD-MCNC: 143 MG/DL (ref 74–99)
GLUCOSE BLD-MCNC: 147 MG/DL
HCT VFR BLD CALC: 46.7 % (ref 37–54)
HCT VFR BLD CALC: 50.8 % (ref 37–54)
HEMOGLOBIN: 15.2 G/DL (ref 12.5–16.5)
HEMOGLOBIN: 16.6 G/DL (ref 12.5–16.5)
IMMATURE GRANULOCYTES #: 0.05 E9/L
IMMATURE GRANULOCYTES %: 0.7 % (ref 0–5)
INR BLD: 1
INR BLD: 2.1
INR BLD: 3.6
K (CLOTTING TIME): 1.4 MIN (ref 1–3)
LACTIC ACID: 5.4 MMOL/L (ref 0.5–2.2)
LY30 (FIBRINOLYSIS): 0 % (ref 0–8)
LYMPHOCYTES ABSOLUTE: 2.18 E9/L (ref 1.5–4)
LYMPHOCYTES RELATIVE PERCENT: 31.5 % (ref 20–42)
Lab: NORMAL
MA (MAX AMPLITUDE): 68.4 MM (ref 50–70)
MAGNESIUM: 2.2 MG/DL (ref 1.6–2.6)
MCH RBC QN AUTO: 28 PG (ref 26–35)
MCH RBC QN AUTO: 28.8 PG (ref 26–35)
MCHC RBC AUTO-ENTMCNC: 32.5 % (ref 32–34.5)
MCHC RBC AUTO-ENTMCNC: 32.7 % (ref 32–34.5)
MCV RBC AUTO: 86 FL (ref 80–99.9)
MCV RBC AUTO: 88.2 FL (ref 80–99.9)
METER GLUCOSE: 147 MG/DL (ref 74–99)
METHADONE SCREEN, URINE: NOT DETECTED
MONOCYTES ABSOLUTE: 0.46 E9/L (ref 0.1–0.95)
MONOCYTES RELATIVE PERCENT: 6.6 % (ref 2–12)
NEUTROPHILS ABSOLUTE: 4.13 E9/L (ref 1.8–7.3)
NEUTROPHILS RELATIVE PERCENT: 59.7 % (ref 43–80)
OPIATE SCREEN URINE: NOT DETECTED
OXYCODONE URINE: NOT DETECTED
PDW BLD-RTO: 13.9 FL (ref 11.5–15)
PDW BLD-RTO: 13.9 FL (ref 11.5–15)
PHENCYCLIDINE SCREEN URINE: NOT DETECTED
PLATELET # BLD: 206 E9/L (ref 130–450)
PLATELET # BLD: 229 E9/L (ref 130–450)
PMV BLD AUTO: 10.3 FL (ref 7–12)
PMV BLD AUTO: 10.5 FL (ref 7–12)
POTASSIUM SERPL-SCNC: 3.5 MMOL/L (ref 3.5–5)
POTASSIUM SERPL-SCNC: 3.7 MMOL/L (ref 3.5–5)
PRO-BNP: 514 PG/ML (ref 0–125)
PROTHROMBIN TIME: 11.2 SEC (ref 9.3–12.4)
PROTHROMBIN TIME: 22.6 SEC (ref 9.3–12.4)
PROTHROMBIN TIME: 39.1 SEC (ref 9.3–12.4)
R (REACTION TIME): 7.7 MIN (ref 5–10)
RBC # BLD: 5.43 E12/L (ref 3.8–5.8)
RBC # BLD: 5.76 E12/L (ref 3.8–5.8)
SALICYLATE, SERUM: <0.3 MG/DL (ref 0–30)
SARS-COV-2, NAAT: NOT DETECTED
SODIUM BLD-SCNC: 140 MMOL/L (ref 132–146)
SODIUM BLD-SCNC: 142 MMOL/L (ref 132–146)
TOTAL PROTEIN: 8.1 G/DL (ref 6.4–8.3)
TRICYCLIC ANTIDEPRESSANTS SCREEN SERUM: NEGATIVE NG/ML
TROPONIN, HIGH SENSITIVITY: 13 NG/L (ref 0–11)
TROPONIN, HIGH SENSITIVITY: 16 NG/L (ref 0–11)
TSH SERPL DL<=0.05 MIU/L-ACNC: 0.69 UIU/ML (ref 0.27–4.2)
WBC # BLD: 12.6 E9/L (ref 4.5–11.5)
WBC # BLD: 6.9 E9/L (ref 4.5–11.5)

## 2021-12-07 PROCEDURE — 85576 BLOOD PLATELET AGGREGATION: CPT

## 2021-12-07 PROCEDURE — 6370000000 HC RX 637 (ALT 250 FOR IP): Performed by: STUDENT IN AN ORGANIZED HEALTH CARE EDUCATION/TRAINING PROGRAM

## 2021-12-07 PROCEDURE — 2500000003 HC RX 250 WO HCPCS: Performed by: SURGERY

## 2021-12-07 PROCEDURE — 80048 BASIC METABOLIC PNL TOTAL CA: CPT

## 2021-12-07 PROCEDURE — 99285 EMERGENCY DEPT VISIT HI MDM: CPT

## 2021-12-07 PROCEDURE — 84443 ASSAY THYROID STIM HORMONE: CPT

## 2021-12-07 PROCEDURE — 93005 ELECTROCARDIOGRAM TRACING: CPT | Performed by: STUDENT IN AN ORGANIZED HEALTH CARE EDUCATION/TRAINING PROGRAM

## 2021-12-07 PROCEDURE — 71045 X-RAY EXAM CHEST 1 VIEW: CPT

## 2021-12-07 PROCEDURE — 6360000004 HC RX CONTRAST MEDICATION: Performed by: RADIOLOGY

## 2021-12-07 PROCEDURE — 2580000003 HC RX 258: Performed by: STUDENT IN AN ORGANIZED HEALTH CARE EDUCATION/TRAINING PROGRAM

## 2021-12-07 PROCEDURE — 87081 CULTURE SCREEN ONLY: CPT

## 2021-12-07 PROCEDURE — 6360000002 HC RX W HCPCS: Performed by: STUDENT IN AN ORGANIZED HEALTH CARE EDUCATION/TRAINING PROGRAM

## 2021-12-07 PROCEDURE — 83605 ASSAY OF LACTIC ACID: CPT

## 2021-12-07 PROCEDURE — 70496 CT ANGIOGRAPHY HEAD: CPT

## 2021-12-07 PROCEDURE — 82077 ASSAY SPEC XCP UR&BREATH IA: CPT

## 2021-12-07 PROCEDURE — 80053 COMPREHEN METABOLIC PANEL: CPT

## 2021-12-07 PROCEDURE — 96366 THER/PROPH/DIAG IV INF ADDON: CPT

## 2021-12-07 PROCEDURE — 72125 CT NECK SPINE W/O DYE: CPT

## 2021-12-07 PROCEDURE — 83880 ASSAY OF NATRIURETIC PEPTIDE: CPT

## 2021-12-07 PROCEDURE — 85025 COMPLETE CBC W/AUTO DIFF WBC: CPT

## 2021-12-07 PROCEDURE — 80179 DRUG ASSAY SALICYLATE: CPT

## 2021-12-07 PROCEDURE — 6370000000 HC RX 637 (ALT 250 FOR IP)

## 2021-12-07 PROCEDURE — 84484 ASSAY OF TROPONIN QUANT: CPT

## 2021-12-07 PROCEDURE — 86850 RBC ANTIBODY SCREEN: CPT

## 2021-12-07 PROCEDURE — 85027 COMPLETE CBC AUTOMATED: CPT

## 2021-12-07 PROCEDURE — 80307 DRUG TEST PRSMV CHEM ANLYZR: CPT

## 2021-12-07 PROCEDURE — 96375 TX/PRO/DX INJ NEW DRUG ADDON: CPT

## 2021-12-07 PROCEDURE — 80143 DRUG ASSAY ACETAMINOPHEN: CPT

## 2021-12-07 PROCEDURE — 2000000000 HC ICU R&B

## 2021-12-07 PROCEDURE — 87635 SARS-COV-2 COVID-19 AMP PRB: CPT

## 2021-12-07 PROCEDURE — 6360000002 HC RX W HCPCS: Performed by: SURGERY

## 2021-12-07 PROCEDURE — 2500000003 HC RX 250 WO HCPCS: Performed by: STUDENT IN AN ORGANIZED HEALTH CARE EDUCATION/TRAINING PROGRAM

## 2021-12-07 PROCEDURE — 85610 PROTHROMBIN TIME: CPT

## 2021-12-07 PROCEDURE — 96361 HYDRATE IV INFUSION ADD-ON: CPT

## 2021-12-07 PROCEDURE — 85347 COAGULATION TIME ACTIVATED: CPT

## 2021-12-07 PROCEDURE — 83735 ASSAY OF MAGNESIUM: CPT

## 2021-12-07 PROCEDURE — 36415 COLL VENOUS BLD VENIPUNCTURE: CPT

## 2021-12-07 PROCEDURE — 6370000000 HC RX 637 (ALT 250 FOR IP): Performed by: SURGERY

## 2021-12-07 PROCEDURE — 82962 GLUCOSE BLOOD TEST: CPT

## 2021-12-07 PROCEDURE — 70450 CT HEAD/BRAIN W/O DYE: CPT

## 2021-12-07 PROCEDURE — 85384 FIBRINOGEN ACTIVITY: CPT

## 2021-12-07 PROCEDURE — 86900 BLOOD TYPING SEROLOGIC ABO: CPT

## 2021-12-07 PROCEDURE — 96367 TX/PROPH/DG ADDL SEQ IV INF: CPT

## 2021-12-07 PROCEDURE — 96365 THER/PROPH/DIAG IV INF INIT: CPT

## 2021-12-07 PROCEDURE — 86901 BLOOD TYPING SEROLOGIC RH(D): CPT

## 2021-12-07 PROCEDURE — 93005 ELECTROCARDIOGRAM TRACING: CPT

## 2021-12-07 PROCEDURE — 99223 1ST HOSP IP/OBS HIGH 75: CPT | Performed by: SURGERY

## 2021-12-07 PROCEDURE — 2580000003 HC RX 258

## 2021-12-07 PROCEDURE — 6360000002 HC RX W HCPCS

## 2021-12-07 RX ORDER — SODIUM CHLORIDE 9 MG/ML
50 INJECTION, SOLUTION INTRAVENOUS ONCE
Status: COMPLETED | OUTPATIENT
Start: 2021-12-07 | End: 2021-12-07

## 2021-12-07 RX ORDER — SODIUM CHLORIDE 0.9 % (FLUSH) 0.9 %
5-40 SYRINGE (ML) INJECTION EVERY 12 HOURS SCHEDULED
Status: DISCONTINUED | OUTPATIENT
Start: 2021-12-07 | End: 2021-12-12 | Stop reason: HOSPADM

## 2021-12-07 RX ORDER — ONDANSETRON 4 MG/1
4 TABLET, ORALLY DISINTEGRATING ORAL EVERY 8 HOURS PRN
Status: DISCONTINUED | OUTPATIENT
Start: 2021-12-07 | End: 2021-12-12 | Stop reason: HOSPADM

## 2021-12-07 RX ORDER — ACETAMINOPHEN 650 MG/1
650 SUPPOSITORY RECTAL EVERY 6 HOURS PRN
Status: DISCONTINUED | OUTPATIENT
Start: 2021-12-07 | End: 2021-12-12 | Stop reason: HOSPADM

## 2021-12-07 RX ORDER — HYDRALAZINE HYDROCHLORIDE 20 MG/ML
10 INJECTION INTRAMUSCULAR; INTRAVENOUS
Status: DISCONTINUED | OUTPATIENT
Start: 2021-12-07 | End: 2021-12-08

## 2021-12-07 RX ORDER — LEVETIRACETAM 10 MG/ML
1000 INJECTION INTRAVASCULAR ONCE
Status: COMPLETED | OUTPATIENT
Start: 2021-12-07 | End: 2021-12-07

## 2021-12-07 RX ORDER — ONDANSETRON 2 MG/ML
4 INJECTION INTRAMUSCULAR; INTRAVENOUS EVERY 6 HOURS PRN
Status: DISCONTINUED | OUTPATIENT
Start: 2021-12-07 | End: 2021-12-12 | Stop reason: HOSPADM

## 2021-12-07 RX ORDER — LABETALOL HYDROCHLORIDE 5 MG/ML
10 INJECTION, SOLUTION INTRAVENOUS ONCE
Status: DISCONTINUED | OUTPATIENT
Start: 2021-12-07 | End: 2021-12-07

## 2021-12-07 RX ORDER — LEVETIRACETAM 15 MG/ML
1500 INJECTION INTRAVASCULAR ONCE
Status: DISCONTINUED | OUTPATIENT
Start: 2021-12-07 | End: 2021-12-07

## 2021-12-07 RX ORDER — ACETAMINOPHEN 500 MG
1000 TABLET ORAL ONCE
Status: COMPLETED | OUTPATIENT
Start: 2021-12-07 | End: 2021-12-07

## 2021-12-07 RX ORDER — ACETAMINOPHEN 325 MG/1
650 TABLET ORAL EVERY 6 HOURS PRN
Status: DISCONTINUED | OUTPATIENT
Start: 2021-12-07 | End: 2021-12-12 | Stop reason: HOSPADM

## 2021-12-07 RX ORDER — POLYETHYLENE GLYCOL 3350 17 G/17G
17 POWDER, FOR SOLUTION ORAL DAILY PRN
Status: DISCONTINUED | OUTPATIENT
Start: 2021-12-07 | End: 2021-12-12 | Stop reason: HOSPADM

## 2021-12-07 RX ORDER — LEVETIRACETAM 500 MG/1
500 TABLET ORAL 2 TIMES DAILY
Status: DISCONTINUED | OUTPATIENT
Start: 2021-12-07 | End: 2021-12-07

## 2021-12-07 RX ORDER — SODIUM CHLORIDE 9 MG/ML
25 INJECTION, SOLUTION INTRAVENOUS PRN
Status: DISCONTINUED | OUTPATIENT
Start: 2021-12-07 | End: 2021-12-12 | Stop reason: HOSPADM

## 2021-12-07 RX ORDER — SODIUM CHLORIDE 9 MG/ML
INJECTION, SOLUTION INTRAVENOUS PRN
Status: DISCONTINUED | OUTPATIENT
Start: 2021-12-07 | End: 2021-12-08

## 2021-12-07 RX ORDER — 0.9 % SODIUM CHLORIDE 0.9 %
1000 INTRAVENOUS SOLUTION INTRAVENOUS ONCE
Status: COMPLETED | OUTPATIENT
Start: 2021-12-07 | End: 2021-12-07

## 2021-12-07 RX ORDER — LABETALOL HYDROCHLORIDE 5 MG/ML
10 INJECTION, SOLUTION INTRAVENOUS
Status: DISCONTINUED | OUTPATIENT
Start: 2021-12-07 | End: 2021-12-08

## 2021-12-07 RX ORDER — SODIUM CHLORIDE 9 MG/ML
INJECTION, SOLUTION INTRAVENOUS CONTINUOUS
Status: DISCONTINUED | OUTPATIENT
Start: 2021-12-07 | End: 2021-12-08

## 2021-12-07 RX ORDER — SODIUM CHLORIDE 0.9 % (FLUSH) 0.9 %
5-40 SYRINGE (ML) INJECTION PRN
Status: DISCONTINUED | OUTPATIENT
Start: 2021-12-07 | End: 2021-12-12 | Stop reason: HOSPADM

## 2021-12-07 RX ORDER — LABETALOL HYDROCHLORIDE 5 MG/ML
10 INJECTION, SOLUTION INTRAVENOUS ONCE
Status: COMPLETED | OUTPATIENT
Start: 2021-12-07 | End: 2021-12-07

## 2021-12-07 RX ORDER — LEVETIRACETAM 500 MG/1
750 TABLET ORAL 2 TIMES DAILY
Status: DISCONTINUED | OUTPATIENT
Start: 2021-12-07 | End: 2021-12-12 | Stop reason: HOSPADM

## 2021-12-07 RX ADMIN — ACETAMINOPHEN 1000 MG: 500 TABLET ORAL at 12:47

## 2021-12-07 RX ADMIN — Medication 10 ML: at 20:58

## 2021-12-07 RX ADMIN — IOPAMIDOL 75 ML: 755 INJECTION, SOLUTION INTRAVENOUS at 17:34

## 2021-12-07 RX ADMIN — LEVETIRACETAM 500 MG: 500 TABLET, FILM COATED ORAL at 12:46

## 2021-12-07 RX ADMIN — SODIUM CHLORIDE 50 ML: 9 INJECTION, SOLUTION INTRAVENOUS at 10:26

## 2021-12-07 RX ADMIN — LABETALOL HYDROCHLORIDE 10 MG: 5 INJECTION INTRAVENOUS at 20:08

## 2021-12-07 RX ADMIN — PHYTONADIONE 10 MG: 10 INJECTION, EMULSION INTRAMUSCULAR; INTRAVENOUS; SUBCUTANEOUS at 09:08

## 2021-12-07 RX ADMIN — LEVETIRACETAM 750 MG: 500 TABLET, FILM COATED ORAL at 20:54

## 2021-12-07 RX ADMIN — LABETALOL HYDROCHLORIDE 10 MG: 5 INJECTION INTRAVENOUS at 12:47

## 2021-12-07 RX ADMIN — SODIUM CHLORIDE 1000 ML: 9 INJECTION, SOLUTION INTRAVENOUS at 08:34

## 2021-12-07 RX ADMIN — SODIUM CHLORIDE: 9 INJECTION, SOLUTION INTRAVENOUS at 17:51

## 2021-12-07 RX ADMIN — LABETALOL HYDROCHLORIDE 10 MG: 5 INJECTION INTRAVENOUS at 16:52

## 2021-12-07 RX ADMIN — HYDRALAZINE HYDROCHLORIDE 10 MG: 20 INJECTION INTRAMUSCULAR; INTRAVENOUS at 22:05

## 2021-12-07 RX ADMIN — PROTHROMBIN, COAGULATION FACTOR VII HUMAN, COAGULATION FACTOR IX HUMAN, COAGULATION FACTOR X HUMAN, PROTEIN C, PROTEIN S HUMAN, AND WATER 2500 UNITS: KIT at 10:22

## 2021-12-07 RX ADMIN — LEVETIRACETAM 1000 MG: 10 INJECTION INTRAVASCULAR at 09:54

## 2021-12-07 ASSESSMENT — PAIN SCALES - GENERAL
PAINLEVEL_OUTOF10: 0
PAINLEVEL_OUTOF10: 5

## 2021-12-07 ASSESSMENT — ENCOUNTER SYMPTOMS
SORE THROAT: 0
ABDOMINAL PAIN: 0
SHORTNESS OF BREATH: 0
BACK PAIN: 0
NAUSEA: 0
COUGH: 0
VOMITING: 0

## 2021-12-07 ASSESSMENT — PAIN DESCRIPTION - FREQUENCY: FREQUENCY: INTERMITTENT

## 2021-12-07 NOTE — PLAN OF CARE
Problem: Pain:  Goal: Pain level will decrease  Outcome: Met This Shift     Problem: Skin Integrity:  Goal: Will show no infection signs and symptoms  Outcome: Met This Shift     Problem: Falls - Risk of:  Goal: Will remain free from falls  Outcome: Met This Shift     Problem:  Bowel/Gastric:  Goal: Control of bowel function will improve  Outcome: Met This Shift     Problem: Nutritional:  Goal: Ability to follow a diet with enough fiber (20 to 30 grams) for normal bowel function will improve  Outcome: Met This Shift

## 2021-12-07 NOTE — H&P
Please see Consult Note authored by Dr. Kierra Mcdermott on 12/7/21 for full H&P.      Maranda Briones MD  General Surgery PGY-2
microspheres (DEFINITY) injection 1.65 mg  1.5 mL IntraVENous ONCE PRN Valentín Woo MD        sodium chloride flush 0.9 % injection 5-40 mL  5-40 mL IntraVENous 2 times per day Fadia Ivory MD        sodium chloride flush 0.9 % injection 5-40 mL  5-40 mL IntraVENous PRN Fadia Ivory MD        0.9 % sodium chloride infusion  25 mL IntraVENous PRN Fadia Ivory MD        ondansetron (ZOFRAN-ODT) disintegrating tablet 4 mg  4 mg Oral Q8H PRN Fadia Ivory MD        Or    ondansetron [facility]) injection 4 mg  4 mg IntraVENous Q6H PRN Fadia Ivory MD        polyethylene glycol [facility]) packet 17 g  17 g Oral Daily PRN Fadia Ivory MD        acetaminophen (TYLENOL) tablet 650 mg  650 mg Oral Q6H PRN Fadia Ivory MD        Or    acetaminophen (TYLENOL) suppository 650 mg  650 mg Rectal Q6H PRN Fadia Ivory MD        levETIRAcetam (KEPPRA) tablet 750 mg  750 mg Oral BID Radha Benjamin MD        hydrALAZINE (APRESOLINE) injection 10 mg  10 mg IntraVENous Q1H PRN Radha Benjamin MD        labetalol (NORMODYNE;TRANDATE) injection 10 mg  10 mg IntraVENous Q1H PRN Radha Benjamin MD         Current Outpatient Medications   Medication Sig Dispense Refill    Elastic Bandages & Supports (MEDICAL COMPRESSION STOCKINGS) MISC 1 each by Does not apply route daily 20-30 mmHg, knee high.  Dx: Venous insufficiency 2 each 0    lisinopril (PRINIVIL;ZESTRIL) 20 MG tablet TAKE ONE TABLET BY MOUTH DAILY 90 tablet 0    warfarin (JANTOVEN) 10 MG tablet Take 1 tablet by mouth daily or as directed by the doctor 30 tablet 5    aspirin 81 MG chewable tablet Take 81 mg by mouth daily      metoprolol tartrate (LOPRESSOR) 25 MG tablet Take 37.25 mg by mouth every 8 hours      oxacillin 2 GM/50ML Infuse 2 g intravenously every 4 hours      potassium chloride (KLOR-CON M) 20 MEQ extended release tablet Take 20 mEq by mouth 2 times daily      spironolactone (ALDACTONE) 25 MG tablet Take 25 mg by mouth

## 2021-12-07 NOTE — Clinical Note
Patient Class: Inpatient [101]   REQUIRED: Diagnosis: Trauma [[id number]]   Estimated Length of Stay: Estimated stay of more than 2 midnights

## 2021-12-07 NOTE — ED NOTES
at bedside updating pt and family     Sheryle Simon, [address] [facility]  12/07/21 3825

## 2021-12-07 NOTE — ED PROVIDER NOTES
This is a 49-year-old male with history of mitral valve/aortic valve replacement, reported prior CVA, infective endocarditis, atrial flutter, hypertension, hyperlipidemia, anticoagulated on warfarin. He is presenting to emerge department after a reported episode of syncope with mild foaming that quickly resolved, he woke up and then had reported right arm drift per EMS. Patient does not remember what happened, and by the time he arrived to the emergency department had no focal neuro deficits. Patient with no complaints at this time, he is initially mildly hypertensive, mildly tachycardic, with no headache or vision changes or numbness or weakness or tingling. He denies any chest pain or shortness of breath. He does state that he has had a prior CVA, does not remember his deficits at that time. He states he is taking warfarin. The history is provided by the patient and medical records. Review of Systems   Constitutional: Negative for chills and fever. HENT: Negative for congestion and sore throat. Eyes: Negative for visual disturbance. Respiratory: Negative for cough and shortness of breath. Cardiovascular: Negative for chest pain, palpitations and leg swelling. Gastrointestinal: Negative for abdominal pain, nausea and vomiting. Genitourinary: Negative for dysuria. Musculoskeletal: Negative for back pain and neck pain. Skin: Negative for rash and wound. Neurological: Positive for syncope. Negative for weakness, light-headedness and numbness. Physical Exam  Vitals and nursing note reviewed. Constitutional:       Appearance: He is obese. He is not ill-appearing, toxic-appearing or diaphoretic. HENT:      Head: Normocephalic. Comments: Small amount of erythema around the left thigh. Small amount of erythema posterior occipital region.       Right Ear: External ear normal.      Left Ear: External ear normal.      Nose: Nose normal.      Mouth/Throat:      Mouth: Mucous membranes are moist.      Pharynx: Oropharynx is clear. No oropharyngeal exudate. Eyes:      Extraocular Movements: Extraocular movements intact. Conjunctiva/sclera: Conjunctivae normal.      Pupils: Pupils are equal, round, and reactive to light. Cardiovascular:      Rate and Rhythm: Regular rhythm. Tachycardia present. Pulses: Normal pulses. Heart sounds: Normal heart sounds. Pulmonary:      Effort: Pulmonary effort is normal.      Breath sounds: Normal breath sounds. Abdominal:      Comments: Obese, soft, nontender   Musculoskeletal:         General: No swelling or deformity. Normal range of motion. Cervical back: Normal range of motion and neck supple. Right lower leg: No edema. Left lower leg: No edema. Comments: Chronic venous stasis color changes bilateral lower extremities. Skin:     General: Skin is warm and dry. Capillary Refill: Capillary refill takes less than 2 seconds. Neurological:      General: No focal deficit present. Mental Status: He is alert and oriented to person, place, and time. Comments: Initial NIH 0. Patient alert/keenly responsive, oriented to month and age. Following commands appropriately. No aphasia or dysarthria, able to identify objects and describe their use. No extinction or inattention. CN II-XII intact (e.g. extraocular movements intact, facial sensation intact to light touch, no facial asymmetry, tongue midline with intact lateral movements, normal speech, hearing grossly intact bilaterally, no gross visual field deficits bilaterally, no weakness with shrug/lateral head movements)  No drift of bilateral upper and lower extremities bilaterally (no drift for 10 sec w/ arms, no drift 5 sec w/ legs). Strength 5/5 in major muscle groups of bilateral upper and lower extremities. Sensation intact to light touch and sharp touch throughout dermatomes of bilateral upper and lower extremities.   No limb ataxia with In-depth discussion was had with patient and spouse, they are agreeable with plan for admission and all questions were answered. ED Course as of 12/07/21 1123   Tue Dec 07, 2021   0715 Patient follows with Dr. Ely Coats for cardiology [RH]   4591 EKG: This EKG is signed and interpreted by me. Rate: 117  Rhythm: Sinus  Interpretation: non-specific EKG, sinus tachycardia, and diffuse ST depressions  Comparison: changes compared to previous EKG  [RH]   0857 Discussed case with neurosurgery DR. Duran. He is not convinced that this is a subdural hematoma, stated may be meningioma. He would like a stat MRI brain, would also like a gram of Keppra given. He would like patient to maintain normal blood pressure. He would also like a rapid Covid test.  Like to be called back after results of MRI.  [RH]      ED Course User Index  [RH] [address], DO     ED Course as of 12/07/21 1123   Tue Dec 07, 2021   0715 Patient follows with Dr. Ely Coats for cardiology [RH]   4278 EKG: This EKG is signed and interpreted by me. Rate: 117  Rhythm: Sinus  Interpretation: non-specific EKG, sinus tachycardia, and diffuse ST depressions  Comparison: changes compared to previous EKG  [RH]   0857 Discussed case with neurosurgery DR. Duran. He is not convinced that this is a subdural hematoma, stated may be meningioma. He would like a stat MRI brain, would also like a gram of Keppra given. He would like patient to maintain normal blood pressure. He would also like a rapid Covid test.  Like to be called back after results of MRI.  [RH]      ED Course User Index  [RH] [address], DO       --------------------------------------------- PAST HISTORY ---------------------------------------------  Past Medical History:  has a past medical history of Chest pain, Hypertension, LONG TERM ANTICOAGULENT USE, Psoriasis, S/P AVR (aortic valve replacement), and S/P MVR (mitral valve replacement).     Past Surgical History:  has a past surgical history that includes Mitral valve replacement (01--2006) and Aortic valve replacement (1/2006). Social History:  reports that he has never smoked. He has never used smokeless tobacco. He reports current alcohol use. He reports that he does not use drugs. Family History: family history is not on file. The patients home medications have been reviewed.     Allergies: Pcn [penicillins]    -------------------------------------------------- RESULTS -------------------------------------------------    Lab  Results for orders placed or performed during the hospital encounter of 12/07/21   COVID-19, Rapid    Specimen: Nasopharyngeal Swab   Result Value Ref Range    SARS-CoV-2, NAAT Not Detected Not Detected   CBC Auto Differential   Result Value Ref Range    WBC 6.9 4.5 - 11.5 E9/L    RBC 5.76 3.80 - 5.80 E12/L    Hemoglobin 16.6 (H) 12.5 - 16.5 g/dL    Hematocrit 50.8 37.0 - 54.0 %    MCV 88.2 80.0 - 99.9 fL    MCH 28.8 26.0 - 35.0 pg    MCHC 32.7 32.0 - 34.5 %    RDW 13.9 11.5 - 15.0 fL    Platelets 214 072 - 695 E9/L    MPV 10.3 7.0 - 12.0 fL    Neutrophils % 59.7 43.0 - 80.0 %    Immature Granulocytes % 0.7 0.0 - 5.0 %    Lymphocytes % 31.5 20.0 - 42.0 %    Monocytes % 6.6 2.0 - 12.0 %    Eosinophils % 1.4 0.0 - 6.0 %    Basophils % 0.1 0.0 - 2.0 %    Neutrophils Absolute 4.13 1.80 - 7.30 E9/L    Immature Granulocytes # 0.05 E9/L    Lymphocytes Absolute 2.18 1.50 - 4.00 E9/L    Monocytes Absolute 0.46 0.10 - 0.95 E9/L    Eosinophils Absolute 0.10 0.05 - 0.50 E9/L    Basophils Absolute 0.01 0.00 - 0.20 E9/L   Comprehensive Metabolic Panel   Result Value Ref Range    Sodium 142 132 - 146 mmol/L    Potassium 3.5 3.5 - 5.0 mmol/L    Chloride 105 98 - 107 mmol/L    CO2 21 (L) 22 - 29 mmol/L    Anion Gap 16 7 - 16 mmol/L    Glucose 143 (H) 74 - 99 mg/dL    BUN 22 (H) 6 - 20 mg/dL    CREATININE 1.1 0.7 - 1.2 mg/dL    GFR Non-African American >60 >=60 mL/min/1.73    GFR African American >60     Calcium 9.5 8.6 - 10.2 mg/dL    Total Protein 8.1 6.4 - 8.3 g/dL    Albumin 4.6 3.5 - 5.2 g/dL    Total Bilirubin 0.9 0.0 - 1.2 mg/dL    Alkaline Phosphatase 76 40 - 129 U/L    ALT 29 0 - 40 U/L    AST 27 0 - 39 U/L   Magnesium   Result Value Ref Range    Magnesium 2.2 1.6 - 2.6 mg/dL   Troponin   Result Value Ref Range    Troponin, High Sensitivity 13 (H) 0 - 11 ng/L   Brain Natriuretic Peptide   Result Value Ref Range    Pro- (H) 0 - 125 pg/mL   Protime-INR   Result Value Ref Range    Protime 39.1 (H) 9.3 - 12.4 sec    INR 3.6    Lactic Acid, Plasma   Result Value Ref Range    Lactic Acid 5.4 (HH) 0.5 - 2.2 mmol/L   Serum Drug Screen   Result Value Ref Range    Ethanol Lvl <10 mg/dL    Acetaminophen Level <5.0 (L) 10.0 - 59.5 mcg/mL    Salicylate, Serum <7.4 0.0 - 30.0 mg/dL    TCA Scrn NEGATIVE Cutoff:300 ng/mL   URINE DRUG SCREEN   Result Value Ref Range    Amphetamine Screen, Urine NOT DETECTED Negative <1000 ng/mL    Barbiturate Screen, Ur NOT DETECTED Negative < 200 ng/mL    Benzodiazepine Screen, Urine NOT DETECTED Negative < 200 ng/mL    Cannabinoid Scrn, Ur NOT DETECTED Negative < 50ng/mL    Cocaine Metabolite Screen, Urine NOT DETECTED Negative < 300 ng/mL    Opiate Scrn, Ur NOT DETECTED Negative < 300ng/mL    PCP Screen, Urine NOT DETECTED Negative < 25 ng/mL    Methadone Screen, Urine NOT DETECTED Negative <300 ng/mL    Oxycodone Urine NOT DETECTED Negative <100 ng/mL    FENTANYL SCREEN, URINE NOT DETECTED Negative <1 ng/mL    Drug Screen Comment: see below    Troponin   Result Value Ref Range    Troponin, High Sensitivity 16 (H) 0 - 11 ng/L   CBC   Result Value Ref Range    WBC 12.6 (H) 4.5 - 11.5 E9/L    RBC 5.43 3.80 - 5.80 E12/L    Hemoglobin 15.2 12.5 - 16.5 g/dL    Hematocrit 46.7 37.0 - 54.0 %    MCV 86.0 80.0 - 99.9 fL    MCH 28.0 26.0 - 35.0 pg    MCHC 32.5 32.0 - 34.5 %    RDW 13.9 11.5 - 15.0 fL    Platelets 875 835 - 361 E9/L    MPV 10.5 7.0 - 12.0 fL   Basic Metabolic Panel Result Value Ref Range    Sodium 140 132 - 146 mmol/L    Potassium 3.7 3.5 - 5.0 mmol/L    Chloride 105 98 - 107 mmol/L    CO2 23 22 - 29 mmol/L    Anion Gap 12 7 - 16 mmol/L    Glucose 119 (H) 74 - 99 mg/dL    BUN 19 6 - 20 mg/dL    CREATININE 0.9 0.7 - 1.2 mg/dL    GFR Non-African American >60 >=60 mL/min/1.73    GFR African American >60     Calcium 8.8 8.6 - 10.2 mg/dL   Protime-INR   Result Value Ref Range    Protime 22.6 (H) 9.3 - 12.4 sec    INR 2.1    TSH WITHOUT REFLEX   Result Value Ref Range    TSH 0.685 0.270 - 4.200 uIU/mL   POCT Glucose   Result Value Ref Range    Glucose 147 mg/dL    QC OK? yes    POCT Glucose   Result Value Ref Range    Meter Glucose 147 (H) 74 - 99 mg/dL   EKG 12 Lead   Result Value Ref Range    Ventricular Rate 117 BPM    Atrial Rate 117 BPM    P-R Interval 160 ms    QRS Duration 92 ms    Q-T Interval 350 ms    QTc Calculation (Bazett) 488 ms    R Axis 93 degrees    T Axis -165 degrees   EKG 12 Lead   Result Value Ref Range    Ventricular Rate 117 BPM    Atrial Rate 117 BPM    P-R Interval 216 ms    QRS Duration 88 ms    Q-T Interval 322 ms    QTc Calculation (Bazett) 449 ms    R Axis 116 degrees    T Axis 112 degrees   TYPE AND SCREEN   Result Value Ref Range    ABO/Rh A POS     Antibody Screen NEG        Radiology  CT HEAD WO CONTRAST   Final Result   A parafalcine subdural hematoma anteriorly on the right as described above. Critical results were called by Dr. Ac Rosado MD to Dr. Aristeo Toth MD on   12/7/2021 at 08:23. XR CHEST PORTABLE   Final Result   New postoperative changes. Cardiomegaly. Suspected atelectasis and or   infiltrate at the left lower lobe. See above.          CT CERVICAL SPINE WO CONTRAST    (Results Pending)   CT HEAD WO CONTRAST    (Results Pending)       ------------------------- NURSING NOTES AND VITALS REVIEWED ---------------------------  Date / Time Roomed:  12/7/2021  6:45 AM  ED Bed Assignment:  08/08    The nursing notes within the ED encounter and vital signs as below have been reviewed. Patient Vitals for the past 24 hrs:   BP Temp Temp src Pulse Resp SpO2 Height Weight   12/07/21 0955 (!) 180/131          12/07/21 0654 (!) 148/115 98.3 °F (36.8 °C) Oral 117 17 94 % 6' (1.829 m) 230 lb (104.3 kg)       Oxygen Saturation Interpretation: Normal      ------------------------------------------ PROGRESS NOTES ------------------------------------------  Re-evaluation(s):  See ED COURSE    I have spoken with the patient and spouse and discussed todays results, in addition to providing specific details for the plan of care and counseling regarding the diagnosis and prognosis. Their questions are answered at this time and they are agreeable with the plan.      --------------------------------- ADDITIONAL PROVIDER NOTES ---------------------------------  Consultations:  See ED COURSE  This patient's ED course included: a personal history and physicial examination, re-evaluation prior to disposition, multiple bedside re-evaluations, IV medications, cardiac monitoring, continuous pulse oximetry and complex medical decision making and emergency management    This patient has remained hemodynamically stable during their ED course. Please note that the withdrawal or failure to initiate urgent interventions for this patient would likely result in a life threatening deterioration or permanent disability. Accordingly this patient received 35 min of critical care time excluding separately billable procedures. Clinical Impression  1. Subdural hematoma ([address]    2. Hypertension, unspecified type    3. Syncope and collapse          Disposition  Patient's disposition: Admit to intermediate  Patient's condition is serious but serious.        PETER Alvarez 49, DO  Resident  12/07/21 6995

## 2021-12-07 NOTE — CONSULTS
TRAUMA HISTORY & PHYSICAL  RESIDENT   12/7/2021  11:21 AM    PRIMARY SURVEY    CHIEF COMPLAINT:  Trauma consult. Injury occurred just prior to arrival. Patient was found down in his kitchen by his wife. He states that he does not recall falling nor the events surrounding the fall, he remembers waking up being in the ambulance, does not recall if he was confused or tired at this time. He denies recent headaches, visual changes, lightheadedness, numbness, weakness, paresthesias. He has a history of infective endocarditis and received mechanical heart valve in 2006, which was then replaced by a bovine valve in 08/2020 following a CVA. He is on warfarin for heart valves INR on admission 3.6. AIRWAY:   Airway Normal  EMS ETT Absent  Noisy respirations Absent  Retractions: Absent  Vomiting/bleeding: Absent      BREATHING:    Midaxillary breath sound left:  Normal  Midaxillary breath sound right:  Normal    Cough sound intensity:  good   FiO2: Room air  SMI 1750 mL.       CIRCULATION:   Femerol pulse intensity: Strong  Palpebral conjunctiva: Pink       Vitals:    12/07/21 0955   BP: (!) 180/131   Pulse:    Resp:    Temp:    SpO2:        Vitals:    12/07/21 0654 12/07/21 0955   BP: (!) 148/115 (!) 180/131   Pulse: 117    Resp: 17    Temp: 98.3 °F (36.8 °C)    TempSrc: Oral    SpO2: 94%    Weight: 230 lb (104.3 kg)    Height: 6' (1.829 m)         FAST EXAM: not indicated     Central Nervous System    GCS Initial 15 minutes   Eye  Motor  Verbal 4 - Opens eyes on own  6 - Follows simple motor commands  5 - Alert and oriented 4 - Opens eyes on own  6 - Follows simple motor commands  5 - Alert and oriented     Neuromuscular blockade: No  Pupil size:  Left 3 mm    Right 3 mm  Pupil reaction: Yes    Wiggles fingers: Left Yes Right Yes  Wiggles toes: Left Yes   Right Yes    Hand grasp:   Left  Present      Right  Present  Plantar flexion: Left  present     Right   Present    Loss of consciousness:  No  History Obtained From: Patient  Private Medical Doctor: Stacey Larsen DO      Pre-exisiting Medical History:     Conditions:   Past Medical History:   Diagnosis Date    Chest pain 2/21/2013    Hypertension     LONG TERM ANTICOAGULENT USE     Psoriasis     S/P AVR (aortic valve replacement) 2/21/2013    S/P MVR (mitral valve replacement) 2/21/2013         Medications:   Current Facility-Administered Medications:     0.9 % sodium chloride infusion, , IntraVENous, PRN, Ryley Thomas T Hausken, DO    perflutren lipid microspheres (DEFINITY) injection 1.65 mg, 1.5 mL, IntraVENous, ONCE PRN, Deepthi Brady MD    levETIRAcetam (KEPPRA) tablet 500 mg, 500 mg, Oral, BID, Deepthi Brady MD    labetalol (NORMODYNE;TRANDATE) injection 10 mg, 10 mg, IntraVENous, Once, Ryley Brigido Cai, DO    Current Outpatient Medications:     Elastic Bandages & Supports ([address]) MISC, 1 each by Does not apply route daily 20-30 mmHg, knee high. Dx: Venous insufficiency, Disp: 2 each, Rfl: 0    lisinopril (PRINIVIL;ZESTRIL) 20 MG tablet, TAKE ONE TABLET BY MOUTH DAILY, Disp: 90 tablet, Rfl: 0    warfarin (JANTOVEN) 10 MG tablet, Take 1 tablet by mouth daily or as directed by the doctor, Disp: 30 tablet, Rfl: 5    aspirin 81 MG chewable tablet, Take 81 mg by mouth daily, Disp: , Rfl:     metoprolol tartrate (LOPRESSOR) 25 MG tablet, Take 37.25 mg by mouth every 8 hours, Disp: , Rfl:     oxacillin 2 GM/50ML, Infuse 2 g intravenously every 4 hours, Disp: , Rfl:     potassium chloride (KLOR-CON M) 20 MEQ extended release tablet, Take 20 mEq by mouth 2 times daily, Disp: , Rfl:     spironolactone (ALDACTONE) 25 MG tablet, Take 25 mg by mouth daily, Disp: , Rfl:     Multiple Vitamins-Minerals (THERAPEUTIC MULTIVITAMIN-MINERALS) tablet, Take 1 tablet by mouth daily LD 2-22-19, Disp: , Rfl:       Allergies:    Allergies   Allergen Reactions    Pcn [Penicillins] Other (See Comments)     Patient states that he breaks out Social History:   Tobacco use:  none  Alcohol use:  social drinker  Illicit drug use:  no history of illicit drug use    Past Surgical History:    Past Surgical History:   Procedure Laterality Date    AORTIC VALVE REPLACEMENT  1/2006    Dr Aaliyah Tim  01--2006    Dr Call[facility]         Anticoagulant use: Yes warfarin  Antiplatelet use:    yes    NSAID use in last 72 hours: no  Taken PCN in past:  yes  Last food/drink: yesterday  Last tetanus: unknown     Family History:   Not pertinent to presenting problem. Complaints:     Head: small L frontal headache   Neck: none  Chest: none  Back: none  Abdomen: none  Extremities: chronic R hand numbness   Comments:       SECONDARY SURVEY    Head/scalp: Atraumatic. Redness over left eye, he states this is a rash that has been present for some time. Not itchy or painful     Face: Atraumatic, redness over right eye    Eyes/ears/nose: PEERL, Atraumatic    Pharynx/mouth:  Atraumatic, no malocclusion    Neck: Atraumatic   Cervical spine tenderness: none  ROM:  Cervical collar not in place    Chest wall:  CTAB, No crepitus, Atraumatic    Heart:  RRR    Abdomen: Soft, non-distended, Atraumatic  Tenderness:  none    Pelvis: Stable, Atraumatic  Tenderness: none    Thoracolumbar spine: Atraumatic, no step-offs  Tenderness:  none    Genitourinary:  Atraumatic     Rectum: Atraumatic     Perineum: Atraumatic     Extremities:   Sensory normal numbness in left digits   Motor normal    Distal Pulses:   Left arm Normal  Right arm Normal  Left leg Normal  Right leg Normal    Capillary Refill:   Left arm normal  Right arm normal  Left leg normal   Right leg normal    Procedures in ED:  None     In the event of Emergency Blood Transfusion:  Due to the critical condition of this patient, I request the immediate release of blood products for emergency transfusion secondary to shock.  I understand the increased risks incurred by the lack of complete transfusion testing.      Radiology:  CT Head  CT Cervical spine      Consultations: Neurosurgery     Admission/Diagnosis:   46 y.o. male s/p unwitnessed fall from [address] [place] [address] with SDH    Plan of Treatment:  Await final CT reads  Pain management  Admit to monitored floor  Reverse warfarin with k centra   Neurosurgery recs   Keppra BID x7 days   TEF  Syncopal workup : ekg, echo, tsh  Repeat CT Head     Plan discussed with Dr. Mahnaz Lanier at 12/7/2021 on 11:21 AM    Levi Saavedra MD  Surgery Resident PGY-1  12/7/2021  11:21 AM

## 2021-12-07 NOTE — DISCHARGE SUMMARY
Physician Discharge Summary     Patient ID:  Wyatt Christopher  [id number]  20 y.o.  1970    Admit date: 12/7/2021    Discharge date and time: 12/11/2021 10:00 PM     Admitting Physician: Natan Dhaliwal MD     Admission Diagnoses: Syncope and collapse [R55]  Trauma [T14.90XA]  Subdural hematoma ([address] [S06.5X9A]  Hypertension, unspecified type [I10]    Discharge Diagnoses: Active Problems:    H/O mitral valve replacement    H/O aortic valve replacement    Trauma    TBI (traumatic brain injury) ([address]    Seizure-like activity ([address]    Subdural hematoma (HCC)    History of CVA (cerebrovascular accident)  Resolved Problems:    * No resolved hospital problems. *      Admission Condition: fair    Discharged Condition: stable    Indication for Admission: Mechanical fall, SDH    Hospital Course/Procedures/Operation/treatments:   12/7: 47 y/o male who who states he was found down but he told the ER that he foamed at the mouth and then fell to the ground. The patient has no complaints of pain. He does take Coumadin for mechanical heart valves. He was given vitamin K Kcentra here. The patient was transported by EMS to the [address] [facility] [place] from home. Evaluation prior to arrival included: CT head. Treatment prior to arrival included: Vitamin K. He was found to have enlarging SDH on CT. Neurosurgery was consulted with recommendations for repeat CT head and MRI to rule out meningioma. He was started on keppra and admitted to the SICU for further monitoring. 12/8: Patient transferred out of the SICU  12/9: Cardiology consulted awaiting recommendations. No events overnight tolerated diet denies bowel movements since admission. 12/10: 1 episode of incontinence overnight, tachycardic and hypertensive. Questionable seizure EEG ordered. ANO x4 this morning tolerating diet having bowel movements. 12/11: EEG yesterday showed no seizure activity.  1 more episode of: Incontinence but states it is related to not being able to make it to the bathroom in time and is fully oriented during the episode. Consults:   IP CONSULT TO NEUROSURGERY  IP CONSULT TO TRAUMA SURGERY  IP CONSULT TO NEUROSURGERY  IP CONSULT TO SOCIAL WORK  IP CONSULT TO CARDIOLOGY    Significant Diagnostic Studies:   CT HEAD WO CONTRAST    Addendum Date: 12/7/2021    ADDENDUM: Findings discussed on 12/07/2021 at 1:54 p.m. with Dr. Kierra Mcdermott     Result Date: 12/7/2021  EXAMINATION: CT OF THE HEAD WITHOUT CONTRAST  12/7/2021 8:05 am TECHNIQUE: CT of the head was performed without the administration of intravenous contrast. Dose modulation, iterative reconstruction, and/or weight based adjustment of the mA/kV was utilized to reduce the radiation dose to as low as reasonably achievable. COMPARISON: None. HISTORY: ORDERING SYSTEM PROVIDED HISTORY: Transient right-sided weakness, syncope TECHNOLOGIST PROVIDED HISTORY: Reason for exam:->Transient right-sided weakness, syncope Has a \"code stroke\" or \"stroke alert\" been called? ->No Decision Support Exception - unselect if not a suspected or confirmed emergency medical condition->Emergency Medical Condition (MA) What reading provider will be dictating this exam?->CRC FINDINGS: BRAIN/VENTRICLES: Parafalcine lobular high attenuation of the right side is present anteriorly consistent with subdural hematoma, thickness up to 8 mm, cranial caudal dimension about 2.5 cm. Does extend into mid to posterior falx area as well. Adjacent hypodensity suggests encephalomalacia such as old infarct at the paramidline left frontal lobe. Additional subtle white matter hypodensities are nearby in both frontal lobes suggestive of prior insult. There is moderate appearing global brain volume loss, increased for given age. No hydrocephalus. Subtle remote lacunar infarcts in periventricular deep brain locations suggested. There is mild local mass effect related to the subdural collection described above. ORBITS: The visualized portion of the orbits demonstrate no acute abnormality. SINUSES: The visualized paranasal sinuses and mastoid air cells demonstrate no acute abnormality. SOFT TISSUES/SKULL:  No acute abnormality of the visualized skull or soft tissues. A parafalcine subdural hematoma anteriorly on the right as described above. Critical results were called by Dr. Vlad Rebolledo MD to Dr. Margo Zapata MD on 12/7/2021 at 08:23. CT HEAD WO CONTRAST    Result Date: 12/7/2021  EXAMINATION: CT OF THE HEAD WITHOUT CONTRAST  12/7/2021 1:35 pm TECHNIQUE: CT of the head was performed without the administration of intravenous contrast. Dose modulation, iterative reconstruction, and/or weight based adjustment of the mA/kV was utilized to reduce the radiation dose to as low as reasonably achievable. COMPARISON: 8:06 a.m. HISTORY: ORDERING SYSTEM PROVIDED HISTORY: SDH from trauma TECHNOLOGIST PROVIDED HISTORY: Reason for exam:->SDH from trauma Has a \"code stroke\" or \"stroke alert\" been called? ->No Decision Support Exception - unselect if not a suspected or confirmed emergency medical condition->Emergency Medical Condition (MA) What reading provider will be dictating this exam?->CRC FINDINGS: BRAIN/VENTRICLES: Stable right parafalcine subdural hematoma compared to prior study. New 3.6 cm x 1.9 cm right frontal intraparenchymal hemorrhage with extension across the midline/sub fall seen herniation. With halo of edema. .  The gray-white differentiation is maintained without evidence of an acute infarct. There is no evidence of hydrocephalus. Hemorrhage into a mass cannot be excluded. Stable changes of encephalomalacia left frontal lobe. ORBITS: The visualized portion of the orbits demonstrate no acute abnormality. SINUSES: The visualized paranasal sinuses and mastoid air cells demonstrate no acute abnormality. SOFT TISSUES/SKULL:  No acute abnormality of the visualized skull or soft tissues.      Stable right parafalcine subdural hematoma compared to 08/06 a.m.. 3.6 cm x 1.9 cm right frontal intraparenchymal hemorrhage with a halo of edema. Hemorrhage is present on the left frontal lobe which may represent subfalcine extension across the midline. CT CERVICAL SPINE WO CONTRAST    Result Date: 12/7/2021  EXAMINATION: CT OF THE CERVICAL SPINE WITHOUT CONTRAST 12/7/2021 1:35 pm TECHNIQUE: CT of the cervical spine was performed without the administration of intravenous contrast. Multiplanar reformatted images are provided for review. Dose modulation, iterative reconstruction, and/or weight based adjustment of the mA/kV was utilized to reduce the radiation dose to as low as reasonably achievable. COMPARISON: None. HISTORY: ORDERING SYSTEM PROVIDED HISTORY: sudural hematoma, possible fall, concern for fx TECHNOLOGIST PROVIDED HISTORY: Reason for exam:->sudural hematoma, possible fall, concern for fx Decision Support Exception - unselect if not a suspected or confirmed emergency medical condition->Emergency Medical Condition (MA) What reading provider will be dictating this exam?->CRC FINDINGS: BONES/ALIGNMENT: There is no acute fracture or traumatic malalignment. DEGENERATIVE CHANGES: No significant degenerative changes. SOFT TISSUES: There is no prevertebral soft tissue swelling. No acute abnormality of the cervical spine. XR CHEST PORTABLE    Result Date: 12/7/2021  EXAMINATION: ONE XRAY VIEW OF THE CHEST 12/7/2021 6:45 am COMPARISON: 21 August 2020 HISTORY: ORDERING SYSTEM PROVIDED HISTORY: Reported syncope TECHNOLOGIST PROVIDED HISTORY: Reason for exam:->Reported syncope What reading provider will be dictating this exam?->CRC FINDINGS: New postsurgical changes are evident. Heart is enlarged. Pulmonary vascularity is normal.  There is opacity at the lateral left lung base which causes partial obscuration of the left hemidiaphragm suggesting atelectasis and or infiltrate at the left lower lobe.   These findings are subtle. Neither costophrenic angle is clearly blunted. New postoperative changes. Cardiomegaly. Suspected atelectasis and or infiltrate at the left lower lobe. See above. Discharge Exam:  Physical Exam  Constitutional:       Appearance: Normal appearance. He is obese. HENT:      Head: Normocephalic and atraumatic. Nose: Nose normal.      Mouth/Throat:      Mouth: Mucous membranes are moist.      Pharynx: Oropharynx is clear. Eyes:      Extraocular Movements: Extraocular movements intact. Pupils: Pupils are equal, round, and reactive to light. Comments: Rash around eye has improved     Disposition: home    In process/preliminary results:  Outstanding Order Results       No orders found from 11/8/2021 to 12/8/2021. Patient Instructions:   Discharge Medication List as of 12/14/2021  4:00 PM             Details   levETIRAcetam (KEPPRA) 750 MG tablet Take 1 tablet by mouth 2 times daily for 5 doses, Disp-5 tablet, R-0Normal                Details   lisinopril (PRINIVIL;ZESTRIL) 20 MG tablet TAKE ONE TABLET BY MOUTH DAILY, Disp-90 tablet, R-0Normal      metoprolol tartrate (LOPRESSOR) 25 MG tablet Take 37.25 mg by mouth every 8 hoursHistorical Med      potassium chloride (KLOR-CON M) 20 MEQ extended release tablet Take 20 mEq by mouth 2 times dailyHistorical Med      spironolactone (ALDACTONE) 25 MG tablet Take 25 mg by mouth dailyHistorical Med      Multiple Vitamins-Minerals (THERAPEUTIC MULTIVITAMIN-MINERALS) tablet Take 1 tablet by mouth daily LD [id number] Med             You can obtain Outpatient therapy prescriptions for PT and OT from your primary care doctor at your follow-up appointment. You can go to therapy anywhere that takes your insurance that is close to home and you would like. You can set up transportation to and from your therapy sessions through your insurance. The phone number should be on the back of your insurance card:  [phone]. drainage at the wound site (s)   Unrelieved pain or increase in pain     Increase in shortness of breath       Follow up:   [address]  [address]  [phone]  Schedule an appointment as soon as possible for a visit in 2 weeks      Shaneka Fernandez, [facility]     Schedule an appointment as soon as possible for a visit in 1 week  Call today to schedule hospital follow up appoitnment within 5-7 days    Puma Jennings, [address] [place] [address]  [phone]    Schedule an appointment as soon as possible for a visit in 1 month      Isreal Verma MD  [address]  [phone]    Schedule an appointment as soon as possible for a visit       Signed:  Gina Downey MD  12/15/2021  7:16 AM

## 2021-12-08 ENCOUNTER — APPOINTMENT (OUTPATIENT)
Dept: MRI IMAGING | Age: 51
DRG: 055 | End: 2021-12-08
Payer: MEDICAID

## 2021-12-08 PROBLEM — S06.9XAA TBI (TRAUMATIC BRAIN INJURY): Status: ACTIVE | Noted: 2021-12-08

## 2021-12-08 LAB
ALBUMIN SERPL-MCNC: 4 G/DL (ref 3.5–5.2)
ALP BLD-CCNC: 68 U/L (ref 40–129)
ALT SERPL-CCNC: 26 U/L (ref 0–40)
ANION GAP SERPL CALCULATED.3IONS-SCNC: 11 MMOL/L (ref 7–16)
AST SERPL-CCNC: 26 U/L (ref 0–39)
BASOPHILS ABSOLUTE: 0.01 E9/L (ref 0–0.2)
BASOPHILS RELATIVE PERCENT: 0.1 % (ref 0–2)
BILIRUB SERPL-MCNC: 1.2 MG/DL (ref 0–1.2)
BUN BLDV-MCNC: 12 MG/DL (ref 6–20)
CALCIUM IONIZED: 1.25 MMOL/L (ref 1.15–1.33)
CALCIUM SERPL-MCNC: 9 MG/DL (ref 8.6–10.2)
CHLORIDE BLD-SCNC: 104 MMOL/L (ref 98–107)
CO2: 21 MMOL/L (ref 22–29)
CREAT SERPL-MCNC: 0.8 MG/DL (ref 0.7–1.2)
EKG ATRIAL RATE: 117 BPM
EKG P-R INTERVAL: 160 MS
EKG Q-T INTERVAL: 350 MS
EKG QRS DURATION: 92 MS
EKG QTC CALCULATION (BAZETT): 488 MS
EKG R AXIS: 93 DEGREES
EKG T AXIS: -165 DEGREES
EKG VENTRICULAR RATE: 117 BPM
EOSINOPHILS ABSOLUTE: 0.04 E9/L (ref 0.05–0.5)
EOSINOPHILS RELATIVE PERCENT: 0.4 % (ref 0–6)
GFR AFRICAN AMERICAN: >60
GFR NON-AFRICAN AMERICAN: >60 ML/MIN/1.73
GLUCOSE BLD-MCNC: 110 MG/DL (ref 74–99)
HCT VFR BLD CALC: 45.2 % (ref 37–54)
HEMOGLOBIN: 15.2 G/DL (ref 12.5–16.5)
IMMATURE GRANULOCYTES #: 0.02 E9/L
IMMATURE GRANULOCYTES %: 0.2 % (ref 0–5)
INR BLD: 1.3
LYMPHOCYTES ABSOLUTE: 1.11 E9/L (ref 1.5–4)
LYMPHOCYTES RELATIVE PERCENT: 10.6 % (ref 20–42)
MAGNESIUM: 2.2 MG/DL (ref 1.6–2.6)
MCH RBC QN AUTO: 28.5 PG (ref 26–35)
MCHC RBC AUTO-ENTMCNC: 33.6 % (ref 32–34.5)
MCV RBC AUTO: 84.8 FL (ref 80–99.9)
MONOCYTES ABSOLUTE: 0.77 E9/L (ref 0.1–0.95)
MONOCYTES RELATIVE PERCENT: 7.4 % (ref 2–12)
NEUTROPHILS ABSOLUTE: 8.52 E9/L (ref 1.8–7.3)
NEUTROPHILS RELATIVE PERCENT: 81.3 % (ref 43–80)
PDW BLD-RTO: 13.9 FL (ref 11.5–15)
PHOSPHORUS: 2.6 MG/DL (ref 2.5–4.5)
PLATELET # BLD: 218 E9/L (ref 130–450)
PMV BLD AUTO: 10.6 FL (ref 7–12)
POTASSIUM REFLEX MAGNESIUM: 3.3 MMOL/L (ref 3.5–5)
PROTHROMBIN TIME: 14.2 SEC (ref 9.3–12.4)
RBC # BLD: 5.33 E12/L (ref 3.8–5.8)
SODIUM BLD-SCNC: 136 MMOL/L (ref 132–146)
TOTAL PROTEIN: 7.1 G/DL (ref 6.4–8.3)
WBC # BLD: 10.5 E9/L (ref 4.5–11.5)

## 2021-12-08 PROCEDURE — 85025 COMPLETE CBC W/AUTO DIFF WBC: CPT

## 2021-12-08 PROCEDURE — 2500000003 HC RX 250 WO HCPCS: Performed by: NURSE PRACTITIONER

## 2021-12-08 PROCEDURE — 97166 OT EVAL MOD COMPLEX 45 MIN: CPT

## 2021-12-08 PROCEDURE — 2060000000 HC ICU INTERMEDIATE R&B

## 2021-12-08 PROCEDURE — 2500000003 HC RX 250 WO HCPCS: Performed by: SURGERY

## 2021-12-08 PROCEDURE — 6360000004 HC RX CONTRAST MEDICATION: Performed by: RADIOLOGY

## 2021-12-08 PROCEDURE — 6370000000 HC RX 637 (ALT 250 FOR IP): Performed by: SURGERY

## 2021-12-08 PROCEDURE — 6370000000 HC RX 637 (ALT 250 FOR IP): Performed by: NURSE PRACTITIONER

## 2021-12-08 PROCEDURE — APPSS30 APP SPLIT SHARED TIME 16-30 MINUTES: Performed by: NURSE PRACTITIONER

## 2021-12-08 PROCEDURE — 70553 MRI BRAIN STEM W/O & W/DYE: CPT

## 2021-12-08 PROCEDURE — 97161 PT EVAL LOW COMPLEX 20 MIN: CPT

## 2021-12-08 PROCEDURE — 2580000003 HC RX 258: Performed by: NURSE PRACTITIONER

## 2021-12-08 PROCEDURE — 85610 PROTHROMBIN TIME: CPT

## 2021-12-08 PROCEDURE — 97530 THERAPEUTIC ACTIVITIES: CPT

## 2021-12-08 PROCEDURE — 2580000003 HC RX 258: Performed by: STUDENT IN AN ORGANIZED HEALTH CARE EDUCATION/TRAINING PROGRAM

## 2021-12-08 PROCEDURE — 82330 ASSAY OF CALCIUM: CPT

## 2021-12-08 PROCEDURE — 36415 COLL VENOUS BLD VENIPUNCTURE: CPT

## 2021-12-08 PROCEDURE — 84100 ASSAY OF PHOSPHORUS: CPT

## 2021-12-08 PROCEDURE — 80053 COMPREHEN METABOLIC PANEL: CPT

## 2021-12-08 PROCEDURE — 83735 ASSAY OF MAGNESIUM: CPT

## 2021-12-08 PROCEDURE — 93010 ELECTROCARDIOGRAM REPORT: CPT | Performed by: INTERNAL MEDICINE

## 2021-12-08 PROCEDURE — A9579 GAD-BASE MR CONTRAST NOS,1ML: HCPCS | Performed by: RADIOLOGY

## 2021-12-08 RX ORDER — M-VIT,TX,IRON,MINS/CALC/FOLIC 27MG-0.4MG
1 TABLET ORAL DAILY
Status: DISCONTINUED | OUTPATIENT
Start: 2021-12-08 | End: 2021-12-12 | Stop reason: HOSPADM

## 2021-12-08 RX ORDER — POTASSIUM CHLORIDE 20 MEQ/1
20 TABLET, EXTENDED RELEASE ORAL 2 TIMES DAILY
Status: DISCONTINUED | OUTPATIENT
Start: 2021-12-08 | End: 2021-12-09

## 2021-12-08 RX ORDER — LABETALOL HYDROCHLORIDE 5 MG/ML
10 INJECTION, SOLUTION INTRAVENOUS EVERY 4 HOURS PRN
Status: DISCONTINUED | OUTPATIENT
Start: 2021-12-08 | End: 2021-12-12 | Stop reason: HOSPADM

## 2021-12-08 RX ORDER — LISINOPRIL 20 MG/1
20 TABLET ORAL DAILY
Status: DISCONTINUED | OUTPATIENT
Start: 2021-12-08 | End: 2021-12-08

## 2021-12-08 RX ORDER — HYDRALAZINE HYDROCHLORIDE 20 MG/ML
10 INJECTION INTRAMUSCULAR; INTRAVENOUS EVERY 4 HOURS PRN
Status: DISCONTINUED | OUTPATIENT
Start: 2021-12-08 | End: 2021-12-12 | Stop reason: HOSPADM

## 2021-12-08 RX ADMIN — GADOTERIDOL 20 ML: 279.3 INJECTION, SOLUTION INTRAVENOUS at 07:07

## 2021-12-08 RX ADMIN — LABETALOL HYDROCHLORIDE 10 MG: 5 INJECTION INTRAVENOUS at 11:35

## 2021-12-08 RX ADMIN — SODIUM CHLORIDE: 9 INJECTION, SOLUTION INTRAVENOUS at 09:26

## 2021-12-08 RX ADMIN — Medication 10 ML: at 09:12

## 2021-12-08 RX ADMIN — Medication 1 TABLET: at 14:04

## 2021-12-08 RX ADMIN — LEVETIRACETAM 750 MG: 500 TABLET, FILM COATED ORAL at 21:50

## 2021-12-08 RX ADMIN — METOPROLOL TARTRATE 37.5 MG: 25 TABLET, FILM COATED ORAL at 10:23

## 2021-12-08 RX ADMIN — POTASSIUM CHLORIDE 20 MEQ: 1500 TABLET, EXTENDED RELEASE ORAL at 21:50

## 2021-12-08 RX ADMIN — POTASSIUM CHLORIDE 20 MEQ: 1500 TABLET, EXTENDED RELEASE ORAL at 14:04

## 2021-12-08 RX ADMIN — METOPROLOL TARTRATE 37.5 MG: 25 TABLET, FILM COATED ORAL at 17:37

## 2021-12-08 RX ADMIN — LABETALOL HYDROCHLORIDE 10 MG: 5 INJECTION INTRAVENOUS at 19:10

## 2021-12-08 RX ADMIN — LABETALOL HYDROCHLORIDE 10 MG: 5 INJECTION INTRAVENOUS at 09:15

## 2021-12-08 RX ADMIN — Medication 10 ML: at 21:51

## 2021-12-08 RX ADMIN — LEVETIRACETAM 750 MG: 500 TABLET, FILM COATED ORAL at 09:12

## 2021-12-08 ASSESSMENT — PAIN SCALES - GENERAL
PAINLEVEL_OUTOF10: 0

## 2021-12-08 NOTE — PROGRESS NOTES
Occupational Therapy    OCCUPATIONAL THERAPY INITIAL EVALUATION     [address] [address] [address]  OXEF:                                             Patient Name: Golden Agustin  MRN: [id number]  : 1970  Room: [address] [place] [address]A    Evaluating OT: Jun Mckeon, OTD, OTR/L; #[id number]    Referring Provider: CELSO Gtz CNP  Specific Provider Orders/Date: OT Evaluation and Treatment, 21    Diagnosis: Syncope and collapse [R55]  Trauma [T14.90XA]  Subdural hematoma ([facility] [S06.5X9A]  Hypertension, unspecified type [I10]   - Presented to hospital after fall  Surgery: none   Pertinent Medical History: chest pain, HTN, s/p AVR and MVR, hx of CVA (2020)          Precautions:  Fall Risk, impaired cognition      Assessment of current deficits   [x] Functional mobility   [x]ADLs  [x] Strength               [x]Cognition   [x] Functional transfers   [x] IADLs         [x] Safety Awareness   [x]Endurance   [x] Fine Coordination              [x] Balance      [] Vision/perception   [x]Sensation    []Gross Motor Coordination  [] ROM  [] Delirium                   [] Motor Control     OT PLAN OF CARE   OT POC based on physician orders, patient diagnosis and results of clinical assessment    Frequency/Duration: 2-4 days/wk for 2 weeks PRN   Specific OT Treatment Interventions to include:   * Instruction/training on adapted ADL techniques and AE recommendations to increase functional independence within precautions       * Training on energy conservation strategies, correct breathing pattern and techniques to improve independence/tolerance for self-care routine  * Functional transfer/mobility training/DME recommendations for increased independence, safety, and fall prevention  * Patient/Family education to increase follow through with safety techniques and functional independence  * Recommendation of environmental modifications for increased safety with functional transfers/mobility and ADLs  * Cognitive retraining/development of therapeutic activities to improve problem solving, judgement, memory, and attention for increased safety/participation in ADL/IADL tasks  * Splinting/positioning for increased function, prevention of contractures, and improve skin integrity  * Therapeutic exercise to improve motor endurance, ROM, and functional strength for ADLs/functional transfers  * Therapeutic activities to facilitate/challenge dynamic balance, stand tolerance for increased safety and independence with ADLs  * Therapeutic activities to facilitate gross/fine motor skills for increased independence with ADLs  * Positioning to improve skin integrity, interaction with environment and functional independence  * Delirium prevention/treatment      Recommended Adaptive Equipment: TBD pending discharge plan     Home Living: Patient lives alone in an apartment on the 2nd floor with no steps to enter building and a flight of stairs with railing on 1 side to apartment unit. Laundry is located on 1st floor of building. Bathroom setup: Tub/shower with standard commode   Equipment owned: none    Prior Level of Function: I with ADLs. I  with IADLs. Patient completed functional mobility using no device.   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 [employer]      Pain Level: 5/10 in B hip  Cognition: A&O: 3/4; Follows 1-2 step directions   Memory:  Fair, recalled 1/3 words with delayed recall   Sequencing:  Fair   Problem solving:  Fair-  Judgement/safety:  Fair-    Communication skills: WFL, slight increased time required           Vision: [facility]               Glasses:no                                                   Hearing: WFL     RASS: 0  CAM-ICU: (NT) Delirium     Functional Assessment:  AM-PAC Daily Activity Raw Score: 16/24   Initial Eval Status  Date: 12/8/21 Treatment Status  Date: STGs = LTGs  Time frame: 10-14 days Feeding Modified [place]   Observed completing in chair position in bed      Grooming Stand by Assist   Washed hands standing at sink  Modified [place]   while standing at  sink level    UB Dressing Minimal Assist   Modified [place]    LB Dressing Moderate Assist   Modified [place]   using AE as needed for safe reach/ energy conservation     Bathing Moderate Assist  Modified [place]    Toileting Moderate Assist   Modified [place]   using AE as needed for safe reach/ energy conservation     Bed Mobility  Supine to sit: NT   Sit to supine: Stand by Assist   Supine to sit: Modified [place]   Sit to supine: Modified [place]    Functional Transfers Stand by Assist   Modified [place]   sit<>stand/functional bathroom transfers using AD/DME as needed for balance and safety   Functional Mobility Stand by Assist   Completed in room/hallway without device to simulate household distance  Modified [place]   Functional/bathroom mobility using AD as needed & demonstrating good safety   Balance Sitting:     Static: SBA    Dynamic: SBA  Standing: SBA  Good dynamic sitting balance; Good dynamic standing balance  during ADL tasks & transfers   Activity Tolerance Fair  Good           Hand Dominance: R handed   AROM (PROM) Strength Additional Info:    RUE  WFL 4/5  : 4/5 Fair+ FMC/dexterity noted during ADL tasks       LUE WFL 4/5  : 4/5 Fair FMC/dexterity noted during ADL tasks       Sensation:  No acute c/o numbness or tingling   Tone: WFL   Edema: None observed       Vitals:   HR at rest: 114 bpm HR at end of session: 115 bpm   SpO2 at rest: 98 % SpO2 at end of session: 98 %   BP at rest: 154/117 mmHg BP at end of session: 157/107mmHg        Comments: Nursing approved OT session. Upon arrival, patient agreeable to session. Patient demonstrated fair tolerance with fair understanding of education/techniques.   At end of session, patient semi-supine in bed per nursing request. Call light within reach, all lines and tubes intact. Patient instructed on use of call light for assistance and fall prevention. Line management and environmental modifications made prior to and end of session to ensure patient safety and to increase efficiency of session. Skilled monitoring of HR, O2 saturation, blood pressure and patient's response to activity performed throughout session. Overall, patient presents with decreased activity tolerance, impaired dynamic balance, weakness, and impaired insight/safety awareness limiting completion of ADLs and safe return home. Patient would benefit from continued skilled OT to increase safety and functional performance with ADLs/ functional mobility to maximize functional outcomes in order for patient to return to [facility]       Assistance of two required for safety secondary to patient's medical complexity, line management and monitoring of vitals. Rehab Potential: Good  for established goals     Patient / Family Goal: To return home      Patient and/or family were instructed on functional diagnosis, prognosis/goals and OT plan of care. Demonstrated fair+ understanding. ·  Eval Complexity: Evaluation Complexity: Mod Complexity  ? History: Expanded review of medical records and additional review of physical, cognitive, or psychosocial history related to current functional performance  ? Exam: 5+ performance deficits  ? Assistance/Modification: mod/max assistance or modifications required to perform tasks. May have comorbidities that affect occupational performance.       Time In: 13:35  Time Out: 13:46  Total Treatment Time: n/a    Min Units   OT Eval Low 68001       OT Eval Medium 97166  X 1   OT Eval High 29530       OT Re-Eval [id number]       Therapeutic Ex [facility]       Therapeutic Activities 38771       ADL/Self Care [id number]       Orthotic Management 20999       Neuro Re-Ed [id number]       Non-Billable Time              Evaluation Time includes thorough review of current medical information, gathering information on past medical history/social history and prior level of function, completion of standardized testing/informal observation of tasks, assessment of data and education on plan of care and goals.             RAZ Davidson, OTR/L; #[id number]

## 2021-12-08 NOTE — PROGRESS NOTES
Physical Therapy    Physical Therapy Initial Assessment     Name: Leeann Chavez  : 1970  MRN: [id number]      Date of Service: 2021    Evaluating PT:  Michelet VY Boles DPT  [id number]     Room #:  5357/2460-F  Diagnosis:  Syncope and collapse [R55]  Trauma [T14.90XA]  Subdural hematoma ([address] [S06.5X9A]  Hypertension, unspecified type [I10]  PMHx/PSHx:  HTN, chest pain, psoriasis, hx AVR and MVR, hx of CVA    Precautions:  Falls, Safety   Equipment Needs:  NA    SUBJECTIVE:    Pt is questionable historian. Reports he lives alone in a 2nd floor apartment home with full flight to 2nd floor. No steps to enter building. Pt ambulated with no AD PTA. OBJECTIVE:   Initial Evaluation  Date: 21 Treatment Short Term/ Long Term   Goals   AM-PAC 6 Clicks      Was pt agreeable to Eval/treatment? Yes      Does pt have pain? Reports 5/10 pain in BLE     Bed Mobility  Rolling: SBA  Supine to sit: SBA  Sit to supine: SBA  Scooting: SBA  Rolling: Independent   Supine to sit: Independent   Sit to supine: Independent   Scooting: Independent    Transfers Sit to stand: SBA  Stand to sit: SBA  Stand pivot: SBA  Sit to stand: Independent   Stand to sit:  Independent   Stand pivot: Independent    Ambulation    150 feet with no AD SBA  >300 feet with no AD independent    Stair negotiation: ascended and descended  NT  >8 steps with 1 rail Modified Independent     ROM BUE:  Per OT eval  BLE:  WFL     Strength BUE:  Per OT eval   BLE:  5/5     Balance Sitting EOB:  SBA  Dynamic Standing:  SBA  Sitting EOB:  Independent   Dynamic Standing:  Independent      Pt is A & O x 3  RASS:  0  CAM-ICU:  Nt  Sensation:  Pt denies numbness and tingling to extremities  Edema:  Unremarkable    Vitals:  Blood Pressure at rest 154/117 mmHg  Blood Pressure post session 157/107 mmHg   Heart Rate at rest 115 bpm  Heart Rate post session 115 bpm    SPO2 at rest 99% on RA SPO2 post session 98% on RA       Therapeutic Exercises:    BLE ROM    Patient education  Pt educated on PT role, safety during functional mobility    Patient response to education:   Pt verbalized understanding Pt demonstrated skill Pt requires further education in this area   Yes  Yes  Yes      ASSESSMENT:    Conditions Requiring Skilled Therapeutic Intervention:    []Decreased strength     []Decreased ROM  [x]Decreased functional mobility  [x]Decreased balance   []Decreased endurance   []Decreased posture  []Decreased sensation  [x]Decreased coordination   []Decreased vision  [x]Decreased safety awareness   []Increased pain       Comments:  Pt received standing at EOB and agreeable to PT evaluation with OT collaboration. Pt cleared for participation by RN prior to session. Vitals monitored during session. Pt demonstrates poor safety and impulsivity. Poor awareness into deficits. He is mildly unsteady during gait but able to stand and ambulate in hallway. Requires verbal cues for safety and fall prevention. Pt assisted back to bed and placed in chair position on exit. Set up with lunch tray. Pt left with call button in reach, lines attached, and needs met. Discussed pt case at interdisciplinary rounds in AM.     Treatment:  Patient practiced and was instructed in the following treatment:     STS and pivot transfer training - pt educated on proper hand and foot placement, safety and sequencing, and use of no AD to safely complete sit<>stand and pivot transfers with hands on assistance to complete task safely    Gait training- pt was given verbal and tactile cues to facilitate safety/balance during ambulation as well as provided with physical assistance. Pt's/ family goals   1. home    Prognosis is good for reaching above PT goals. Patient and or family understand(s) diagnosis, prognosis, and plan of care.   Yes     PHYSICAL THERAPY PLAN OF CARE:    PT POC is established based on physician order and patient diagnosis     Referring provider/PT Order: 12/08/21 1030  PT eval and treat  Start:  12/08/21 1030,   End:  12/08/21 1030,   ONE TIME,   Standing Count:  1 Occurrences,   R         Bette Ham, APRN - CNP   Diagnosis:  Syncope and collapse [R55]  Trauma [T14.90XA]  Subdural hematoma ([address] [S06.5X9A]  Hypertension, unspecified type [I10]  Specific instructions for next treatment:  Progress gait, safety and fall prevention     Current Treatment Recommendations:     [] Strengthening to improve independence with functional mobility   [] ROM to improve independence with functional mobility   [x] Balance Training to improve static/dynamic balance and to reduce fall risk  [x] Endurance Training to improve activity tolerance during functional mobility   [x] Transfer Training to improve safety and independence with all functional transfers   [x] Gait Training to improve gait mechanics, endurance and asses need for appropriate assistive device  [x] Stair Training in preparation for safe discharge home and/or into the community   [x] Positioning to prevent skin breakdown and contractures  [x] Safety and Education Training   [x] Patient/Caregiver Education   [] HEP  [] Other     PT long term treatment goals are located in above grid    Frequency of treatments: 2-5x/week x 1-2 weeks. Time in  1325  Time out  1345    Total Treatment Time  10 minutes     Evaluation Time includes thorough review of current medical information, gathering information on past medical history/social history and prior level of function, completion of standardized testing/informal observation of tasks, assessment of data and education on plan of care and goals.     CPT codes:  [x] Low Complexity PT evaluation 52312  [] Moderate Complexity PT evaluation 44174  [] High Complexity PT evaluation 28561  [] PT Re-evaluation 42387  [] Gait training 35317 -- minutes  [] Manual therapy 87198 -- minutes  [x] Therapeutic activities 41375 10 minutes  [] Therapeutic exercises 09172 -- minutes  [] Neuromuscular reeducation 50853 -- minutes     Merry Cook, PT, DPT  [id number]

## 2021-12-08 NOTE — PROGRESS NOTES
Trauma Tertiary Survey    Admit Date: 12/7/20213    Hospital day 1     CC:  Syncopal Fall with ICH        Past Medical History:   Diagnosis Date    Chest pain 2/21/2013    Hypertension     LONG TERM ANTICOAGULENT USE     Psoriasis     S/P AVR (aortic valve replacement) 2/21/2013    S/P MVR (mitral valve replacement) 2/21/2013       Alcohol pre-screening:  Men: How many times in the past year have you had 5 or more drinks in a day?  none    How much do you drink on a daily basis? 2-3 beers a week     Scheduled Meds:   metoprolol tartrate  37.5 mg Oral Q8H    therapeutic multivitamin-minerals  1 tablet Oral Daily    potassium chloride  20 mEq Oral BID    sodium chloride flush  5-40 mL IntraVENous 2 times per day    levETIRAcetam  750 mg Oral BID     Continuous Infusions:   sodium chloride      sodium chloride      sodium chloride 75 mL/hr at 12/08/21 0926     PRN Meds:sodium chloride, perflutren lipid microspheres, sodium chloride flush, sodium chloride, ondansetron **OR** ondansetron, polyethylene glycol, acetaminophen **OR** acetaminophen, hydrALAZINE, labetalol    Subjective:   Patient with complaint of pain to L elbow.      Objective:     Patient Vitals for the past 8 hrs:   BP Temp Temp src Pulse Resp SpO2 Weight   12/08/21 1145 (!) 140/99   114 22     12/08/21 1135 (!) 156/117   117 24     12/08/21 1100 (!) 142/102   116 16     12/08/21 1023 (!) 171/129   116      12/08/21 1000 (!) 171/129   115 24     12/08/21 0900    118 15     12/08/21 0800 (!) 133/98 98.1 °F (36.7 °C) Oral 116 20 94 %    12/08/21 0700 (!) 136/101   166 19 96 %    12/08/21 0600 (!) 132/99 98 °F (36.7 °C) Oral 117 19 96 %    12/08/21 0549       228 lb (103.4 kg)   12/08/21 0500 (!) 147/103   117 27 95 %        Past Medical History:   Diagnosis Date    Chest pain 2/21/2013    Hypertension     LONG TERM ANTICOAGULENT USE     Psoriasis     S/P AVR (aortic valve replacement) 2/21/2013    S/P MVR (mitral valve replacement) 2/21/2013       Radiology:  MRI BRAIN W WO CONTRAST   Final Result   1. Redemonstration of an intraparenchymal hemorrhage within the medial aspect   of the right frontal lobe with adjacent subarachnoid hemorrhage noted. Right   parafalcine subdural hemorrhage, not significantly changed from the prior CT   scan. 2. Encephalomalacia within the left frontal lobe. 3. No abnormal enhancement or dural-based mass evident. CT HEAD WO CONTRAST   Final Result   Normal appearing [id number]. Stable multicompartmental intracranial hemorrhage compared to the prior exam   from 1:30 p.m. Cloteal Benson CTA HEAD W CONTRAST   Final Result   Normal appearing [id number]. Stable multicompartmental intracranial hemorrhage compared to the prior exam   from 1:30 p.m. Cloteal Benson CT CERVICAL SPINE WO CONTRAST   Final Result   No acute abnormality of the cervical spine. CT HEAD WO CONTRAST   Final Result   Stable right parafalcine subdural hematoma compared to 08/06 a.m.. 3.6 cm x 1.9 cm right frontal intraparenchymal hemorrhage with a halo of   edema. Hemorrhage is present on the left frontal lobe which may represent   subfalcine extension across the midline. CT HEAD WO CONTRAST   Final Result   Addendum 1 of 1   ADDENDUM:   Findings discussed on 12/07/2021 at 1:54 p.m. with Dr. Marychuy Villela         Final   A parafalcine subdural hematoma anteriorly on the right as described above. Critical results were called by Dr. Jameel Oliveira MD to Dr. Abel Freitas MD on   12/7/2021 at 08:23. XR CHEST PORTABLE   Final Result   New postoperative changes. Cardiomegaly. Suspected atelectasis and or   infiltrate at the left lower lobe. See above.              PHYSICAL EXAM:   GCS:  15  4 - Opens eyes on own   6 - Follows simple motor commands  5 - Alert and oriented      Pupil size:  Left 3 mm Right 3 mm  Pupil reaction: Yes  Wiggles fingers: Left yes Right yes  Hand grasp:   Left yes  Right yes  Wiggles toes: Left yes   Right yes  Plantar flexion: Left yes Right yes    PHYSICAL EXAM   General: No apparent distress, comfortable  HEENT: Trachea midline, no masses, Pupils equal round, Strong equal hand grasps, denies any numbness/tingling   Chest: Respiratory effort was normal with no retractions or use of accessory muscles. RA  Cardiovascular: Extremities warm, well perfused  Abdomen:  Soft and non distended. No tenderness, guarding, rebound, or rigidity   Extremities: Moves all 4 extremities, No pedal edema     Spine:     Spine Tenderness ROM   Cervical 0 /10 Normal   Thoracic 0 /10 Normal   Lumbar 0 /10 Normal     Musculoskeletal    Joint Tenderness Swelling ROM   Right shoulder absent absent normal   Left shoulder absent absent normal   Right elbow absent absent normal   Left elbow absent absent normal   Right wrist absent absent normal   Left wrist absent absent normal   Right hand grasp absent absent normal   Left hand grasp absent absent normal   Right hip absent absent normal   Left hip absent absent normal   Right knee absent absent normal   Left knee absent absent normal   Right ankle absent absent normal   Left ankle absent absent normal   Right foot absent absent normal   Left foot absent absent normal       CONSULTS: Neurosurgery. PROCEDURES: none    INJURIES:    Active Problems:    Trauma    TBI (traumatic brain injury) ([facility]  Resolved Problems:    * No resolved hospital problems. *        Assessment/Plan:       · Neuro: TBI. SDH. Possible Seizure. Hx: previous stroke- Continue to monitor neuro status. Keppra for seizure prophylaxis. Neurosurgery following. · CV:  No acute issues. Hx: HTN, Aflutter, AVR & MVR. Monitor hemodynamics. SBP< 150 mm Hg. PRN Hydralazine & Labetalol. Resume home Metoprolol. Holding aldactone at this time. ECHO pending. · Pulm: No acute issues. Monitor RR and SpO2. O2 as needed. · GI: No acute issues.   Diet- low sodium ,

## 2021-12-08 NOTE — PROGRESS NOTES
Sister Danita Matson called in and patient see's [facility] cardiologist at [facility]. Also seen neurologist Dr. Cb José at [facility]. States, \"I want them to do a echo or VANESA because he has had bacterial endocarditis before without fever. I want everything checked out before he goes home. He has a bovine and a cadaver valve. \"  My number [phone]. Patient has a 15year old son and ex wife. Son's name is Jaden Alesia.

## 2021-12-08 NOTE — CARE COORDINATION
12/8 Care Coordination: Pt presenting to emerge department after a reported episode of syncope. Pt has a history of mitral valve/aortic valve replacement, reported prior CVA, infective endocarditis, atrial flutter, hypertension, hyperlipidemia, anticoagulated on warfarin. CT head was obtained with evidence of possible subdural hematoma, patient is anticoagulated on warfarin. In ED NIH score was 0. Neurosurgery was consulted. Started on Kepra. Admit to SICU. PT/OT ordered. CM/SW will continue to follow for discharge planning. await evals. PTA pt lives with his wife.     Kaitlynn BELLAN,RN-CV-BC  [phone]

## 2021-12-08 NOTE — PLAN OF CARE
Problem: Pain:  Goal: Pain level will decrease  Description: Pain level will decrease  12/8/2021 0952 by Minna Perez RN  Outcome: Met This Shift  12/8/2021 0033 by Jose Ramey RN  Outcome: Met This Shift  12/7/2021 2036 by Jose Ramey RN  Outcome: Met This Shift  Goal: Control of acute pain  Description: Control of acute pain  12/8/2021 0952 by Minna Perez RN  Outcome: Met This Shift  12/8/2021 0033 by Jose Ramey RN  Outcome: Met This Shift  Goal: Control of chronic pain  Description: Control of chronic pain  12/8/2021 0952 by Minna Perez RN  Outcome: Met This Shift  12/8/2021 0033 by Jose Ramey RN  Outcome: Met This Shift     Problem: Skin Integrity:  Goal: Will show no infection signs and symptoms  Description: Will show no infection signs and symptoms  12/8/2021 0952 by Minna Perez RN  Outcome: Met This Shift  12/8/2021 0033 by Jose Ramey RN  Outcome: Met This Shift  12/7/2021 2036 by Jose Ramey RN  Outcome: Met This Shift  Goal: Absence of new skin breakdown  Description: Absence of new skin breakdown  12/8/2021 0952 by Minna Perez RN  Outcome: Met This Shift  12/8/2021 0033 by Jose Ramey RN  Outcome: Met This Shift  Goal: Risk for impaired skin integrity will decrease  Description: Risk for impaired skin integrity will decrease  12/8/2021 0952 by Minna Perez RN  Outcome: Met This Shift  12/8/2021 0033 by Jose Raemy RN  Outcome: Met This Shift     Problem: Falls - Risk of:  Goal: Will remain free from falls  Description: Will remain free from falls  12/8/2021 0952 by Minna Perez RN  Outcome: Met This Shift  12/8/2021 0033 by Jose Ramey RN  Outcome: Met This Shift  12/7/2021 2036 by Jose Ramey RN  Outcome: Met This Shift  Goal: Absence of physical injury  Description: Absence of physical injury  12/8/2021 0952 by Minna Perez RN  Outcome: Met This Shift  12/8/2021 0033 by Jose Ramey, RN  Outcome: Met This Shift     Problem:  Bowel/Gastric:  Goal: Control of bowel function will improve  Description: Control of bowel function will improve  12/8/2021 0952 by Del Francis RN  Outcome: Met This Shift  12/8/2021 0033 by Michele Almanzar RN  Outcome: Met This Shift  12/7/2021 2036 by Michele Almanzar RN  Outcome: Met This Shift  Goal: Ability to achieve a regular elimination pattern will improve  Description: Ability to achieve a regular elimination pattern will improve  12/8/2021 0952 by Del Francis RN  Outcome: Met This Shift  12/8/2021 0033 by Michele Almanzar RN  Outcome: Met This Shift     Problem: Nutritional:  Goal: Ability to follow a diet with enough fiber (20 to 30 grams) for normal bowel function will improve  Description: Ability to follow a diet with enough fiber (20 to 30 grams) for normal bowel function will improve  12/8/2021 0952 by Del Francis RN  Outcome: Ongoing  12/8/2021 0033 by Michele Almanzar RN  Outcome: Met This Shift     Problem: HEMODYNAMIC STATUS  Goal: Patient has stable vital signs and fluid balance  Outcome: Ongoing     Problem: ACTIVITY INTOLERANCE/IMPAIRED MOBILITY  Goal: Mobility/activity is maintained at optimum level for patient  Outcome: Met This Shift     Problem: COMMUNICATION IMPAIRMENT  Goal: Ability to express needs and understand communication  Outcome: Met This Shift

## 2021-12-08 NOTE — PROGRESS NOTES
Patient a transfer to [facility]. Cardiology consulted.    Electronically signed by Jing SHARMA, AGACNP-BC 12/8/2021 2:43 PM

## 2021-12-08 NOTE — PLAN OF CARE
Problem: Pain:  Goal: Pain level will decrease  Description: Pain level will decrease  12/7/2021 2036 by Santosh Drake RN  Outcome: Met This Shift     Problem: Skin Integrity:  Goal: Will show no infection signs and symptoms  Description: Will show no infection signs and symptoms  12/7/2021 2036 by Santosh Drake RN  Outcome: Met This Shift     Problem: Falls - Risk of:  Goal: Will remain free from falls  Description: Will remain free from falls  12/7/2021 2036 by Santosh Drake RN  Outcome: Met This Shift     Problem:  Bowel/Gastric:  Goal: Control of bowel function will improve  Description: Control of bowel function will improve  12/7/2021 2036 by Santosh Drake RN  Outcome: Met This Shift

## 2021-12-08 NOTE — PROGRESS NOTES
Patient seen and examined, films reviewed, counseled the patient at the bedside greater than 50% of the encounter time.     Neuro exam is nonfocal pupils are equally round reactive to light extraocular movements are full no motor weakness or drift    Diagnosis seizure disorder and acute subdural hematoma    Full anticoagulation contraindicated till complete imaging resolution of the acute intracranial hemorrhage    Agree with Keppra blood pressure control and reversing of his anticoagulation we will follow with you

## 2021-12-09 LAB
ALBUMIN SERPL-MCNC: 4.1 G/DL (ref 3.5–5.2)
ALP BLD-CCNC: 70 U/L (ref 40–129)
ALT SERPL-CCNC: 25 U/L (ref 0–40)
ANION GAP SERPL CALCULATED.3IONS-SCNC: 11 MMOL/L (ref 7–16)
ANION GAP SERPL CALCULATED.3IONS-SCNC: 20 MMOL/L (ref 7–16)
AST SERPL-CCNC: 24 U/L (ref 0–39)
BASOPHILS ABSOLUTE: 0.01 E9/L (ref 0–0.2)
BASOPHILS RELATIVE PERCENT: 0.1 % (ref 0–2)
BILIRUB SERPL-MCNC: 1 MG/DL (ref 0–1.2)
BUN BLDV-MCNC: 13 MG/DL (ref 6–20)
BUN BLDV-MCNC: 15 MG/DL (ref 6–20)
CALCIUM IONIZED: 1.28 MMOL/L (ref 1.15–1.33)
CALCIUM SERPL-MCNC: 9.2 MG/DL (ref 8.6–10.2)
CALCIUM SERPL-MCNC: 9.3 MG/DL (ref 8.6–10.2)
CHLORIDE BLD-SCNC: 106 MMOL/L (ref 98–107)
CHLORIDE BLD-SCNC: 112 MMOL/L (ref 98–107)
CO2: 15 MMOL/L (ref 22–29)
CO2: 21 MMOL/L (ref 22–29)
CREAT SERPL-MCNC: 0.9 MG/DL (ref 0.7–1.2)
CREAT SERPL-MCNC: 1 MG/DL (ref 0.7–1.2)
EKG ATRIAL RATE: 117 BPM
EKG P-R INTERVAL: 216 MS
EKG Q-T INTERVAL: 322 MS
EKG QRS DURATION: 88 MS
EKG QTC CALCULATION (BAZETT): 449 MS
EKG R AXIS: 116 DEGREES
EKG T AXIS: 112 DEGREES
EKG VENTRICULAR RATE: 117 BPM
EOSINOPHILS ABSOLUTE: 0.1 E9/L (ref 0.05–0.5)
EOSINOPHILS RELATIVE PERCENT: 1 % (ref 0–6)
GFR AFRICAN AMERICAN: >60
GFR AFRICAN AMERICAN: >60
GFR NON-AFRICAN AMERICAN: >60 ML/MIN/1.73
GFR NON-AFRICAN AMERICAN: >60 ML/MIN/1.73
GLUCOSE BLD-MCNC: 109 MG/DL (ref 74–99)
GLUCOSE BLD-MCNC: 113 MG/DL (ref 74–99)
HCT VFR BLD CALC: 48.6 % (ref 37–54)
HEMOGLOBIN: 15.7 G/DL (ref 12.5–16.5)
IMMATURE GRANULOCYTES #: 0.03 E9/L
IMMATURE GRANULOCYTES %: 0.3 % (ref 0–5)
INR BLD: 1.1
LACTIC ACID: 1.1 MMOL/L (ref 0.5–2.2)
LYMPHOCYTES ABSOLUTE: 1.46 E9/L (ref 1.5–4)
LYMPHOCYTES RELATIVE PERCENT: 15.3 % (ref 20–42)
MAGNESIUM: 2.3 MG/DL (ref 1.6–2.6)
MCH RBC QN AUTO: 28.8 PG (ref 26–35)
MCHC RBC AUTO-ENTMCNC: 32.3 % (ref 32–34.5)
MCV RBC AUTO: 89 FL (ref 80–99.9)
MONOCYTES ABSOLUTE: 0.92 E9/L (ref 0.1–0.95)
MONOCYTES RELATIVE PERCENT: 9.6 % (ref 2–12)
MRSA CULTURE ONLY: NORMAL
NEUTROPHILS ABSOLUTE: 7.05 E9/L (ref 1.8–7.3)
NEUTROPHILS RELATIVE PERCENT: 73.7 % (ref 43–80)
PDW BLD-RTO: 14.3 FL (ref 11.5–15)
PHOSPHORUS: 3 MG/DL (ref 2.5–4.5)
PLATELET # BLD: 213 E9/L (ref 130–450)
PMV BLD AUTO: 11.1 FL (ref 7–12)
POTASSIUM REFLEX MAGNESIUM: 4 MMOL/L (ref 3.5–5)
POTASSIUM REFLEX MAGNESIUM: 4.4 MMOL/L (ref 3.5–5)
PROTHROMBIN TIME: 12.4 SEC (ref 9.3–12.4)
RBC # BLD: 5.46 E12/L (ref 3.8–5.8)
SODIUM BLD-SCNC: 138 MMOL/L (ref 132–146)
SODIUM BLD-SCNC: 147 MMOL/L (ref 132–146)
TOTAL PROTEIN: 7.3 G/DL (ref 6.4–8.3)
WBC # BLD: 9.6 E9/L (ref 4.5–11.5)

## 2021-12-09 PROCEDURE — 85025 COMPLETE CBC W/AUTO DIFF WBC: CPT

## 2021-12-09 PROCEDURE — 99233 SBSQ HOSP IP/OBS HIGH 50: CPT | Performed by: SURGERY

## 2021-12-09 PROCEDURE — 99254 IP/OBS CNSLTJ NEW/EST MOD 60: CPT | Performed by: INTERNAL MEDICINE

## 2021-12-09 PROCEDURE — 36415 COLL VENOUS BLD VENIPUNCTURE: CPT

## 2021-12-09 PROCEDURE — 82330 ASSAY OF CALCIUM: CPT

## 2021-12-09 PROCEDURE — 6370000000 HC RX 637 (ALT 250 FOR IP): Performed by: NURSE PRACTITIONER

## 2021-12-09 PROCEDURE — 2500000003 HC RX 250 WO HCPCS: Performed by: NURSE PRACTITIONER

## 2021-12-09 PROCEDURE — 2580000003 HC RX 258: Performed by: NURSE PRACTITIONER

## 2021-12-09 PROCEDURE — 2060000000 HC ICU INTERMEDIATE R&B

## 2021-12-09 PROCEDURE — 80053 COMPREHEN METABOLIC PANEL: CPT

## 2021-12-09 PROCEDURE — APPSS60 APP SPLIT SHARED TIME 46-60 MINUTES: Performed by: PHYSICIAN ASSISTANT

## 2021-12-09 PROCEDURE — 83605 ASSAY OF LACTIC ACID: CPT

## 2021-12-09 PROCEDURE — 6370000000 HC RX 637 (ALT 250 FOR IP): Performed by: SURGERY

## 2021-12-09 PROCEDURE — 85610 PROTHROMBIN TIME: CPT

## 2021-12-09 PROCEDURE — 93010 ELECTROCARDIOGRAM REPORT: CPT | Performed by: INTERNAL MEDICINE

## 2021-12-09 PROCEDURE — 6360000002 HC RX W HCPCS: Performed by: NEUROLOGICAL SURGERY

## 2021-12-09 PROCEDURE — 83735 ASSAY OF MAGNESIUM: CPT

## 2021-12-09 PROCEDURE — 84100 ASSAY OF PHOSPHORUS: CPT

## 2021-12-09 PROCEDURE — 92523 SPEECH SOUND LANG COMPREHEN: CPT

## 2021-12-09 PROCEDURE — 80048 BASIC METABOLIC PNL TOTAL CA: CPT

## 2021-12-09 RX ORDER — LEVETIRACETAM 15 MG/ML
750 INJECTION INTRAVASCULAR ONCE
Status: COMPLETED | OUTPATIENT
Start: 2021-12-09 | End: 2021-12-09

## 2021-12-09 RX ORDER — DOCUSATE SODIUM 100 MG/1
100 CAPSULE, LIQUID FILLED ORAL 2 TIMES DAILY
Status: DISCONTINUED | OUTPATIENT
Start: 2021-12-09 | End: 2021-12-12 | Stop reason: HOSPADM

## 2021-12-09 RX ORDER — SENNA PLUS 8.6 MG/1
1 TABLET ORAL NIGHTLY
Status: DISCONTINUED | OUTPATIENT
Start: 2021-12-09 | End: 2021-12-12 | Stop reason: HOSPADM

## 2021-12-09 RX ORDER — BISACODYL 10 MG
10 SUPPOSITORY, RECTAL RECTAL DAILY PRN
Status: DISCONTINUED | OUTPATIENT
Start: 2021-12-09 | End: 2021-12-12 | Stop reason: HOSPADM

## 2021-12-09 RX ORDER — POTASSIUM CHLORIDE 7.45 MG/ML
10 INJECTION INTRAVENOUS
Status: DISCONTINUED | OUTPATIENT
Start: 2021-12-09 | End: 2021-12-09

## 2021-12-09 RX ADMIN — LABETALOL HYDROCHLORIDE 10 MG: 5 INJECTION INTRAVENOUS at 23:38

## 2021-12-09 RX ADMIN — METOPROLOL TARTRATE 37.5 MG: 25 TABLET, FILM COATED ORAL at 02:38

## 2021-12-09 RX ADMIN — LEVETIRACETAM 750 MG: 500 TABLET, FILM COATED ORAL at 20:53

## 2021-12-09 RX ADMIN — POTASSIUM CHLORIDE 20 MEQ: 1500 TABLET, EXTENDED RELEASE ORAL at 09:24

## 2021-12-09 RX ADMIN — Medication 1 TABLET: at 09:24

## 2021-12-09 RX ADMIN — DOCUSATE SODIUM 100 MG: 100 CAPSULE, LIQUID FILLED ORAL at 09:24

## 2021-12-09 RX ADMIN — DOCUSATE SODIUM 100 MG: 100 CAPSULE, LIQUID FILLED ORAL at 20:53

## 2021-12-09 RX ADMIN — Medication 10 ML: at 20:53

## 2021-12-09 RX ADMIN — Medication 10 ML: at 09:30

## 2021-12-09 RX ADMIN — LEVETIRACETAM 750 MG: 15 INJECTION INTRAVASCULAR at 11:01

## 2021-12-09 RX ADMIN — ACETAMINOPHEN 650 MG: 325 TABLET ORAL at 20:55

## 2021-12-09 RX ADMIN — SENNOSIDES 8.6 MG: 8.6 TABLET, COATED ORAL at 20:52

## 2021-12-09 RX ADMIN — SODIUM CHLORIDE, PRESERVATIVE FREE 10 ML: 5 INJECTION INTRAVENOUS at 23:38

## 2021-12-09 RX ADMIN — LEVETIRACETAM 750 MG: 500 TABLET, FILM COATED ORAL at 09:24

## 2021-12-09 ASSESSMENT — PAIN DESCRIPTION - PAIN TYPE: TYPE: ACUTE PAIN

## 2021-12-09 ASSESSMENT — ENCOUNTER SYMPTOMS
ANAL BLEEDING: 0
DIARRHEA: 0
BLOOD IN STOOL: 0
SHORTNESS OF BREATH: 0
ABDOMINAL PAIN: 0
NAUSEA: 1
CONSTIPATION: 0
EYES NEGATIVE: 1
VOMITING: 0
BACK PAIN: 0
ABDOMINAL DISTENTION: 0
RESPIRATORY NEGATIVE: 1
ALLERGIC/IMMUNOLOGIC NEGATIVE: 1
COUGH: 0

## 2021-12-09 ASSESSMENT — PAIN DESCRIPTION - FREQUENCY: FREQUENCY: INTERMITTENT

## 2021-12-09 ASSESSMENT — PAIN SCALES - GENERAL
PAINLEVEL_OUTOF10: 0
PAINLEVEL_OUTOF10: 3
PAINLEVEL_OUTOF10: 0

## 2021-12-09 ASSESSMENT — PAIN DESCRIPTION - DESCRIPTORS: DESCRIPTORS: HEAVINESS

## 2021-12-09 ASSESSMENT — PAIN DESCRIPTION - LOCATION: LOCATION: HEAD

## 2021-12-09 NOTE — PROGRESS NOTES
[facility]  PROGRESS NOTE  ATTENDING NOTE        TRAUMA  MECHANISM:  Found down, possible seizure    Chief Complaint   Patient presents with    Extremity Weakness     per ems pt family called when pt on the floor with a \" seizure\" pt woke up from seizure confused and a right sided arm drift and facial droop with confusion. pt alert and oriented upon arrival      HPI:  The patient is a 45 y/o male who who states he was found down but he told the ER that he foamed at the mouth and then fell to the ground. The patient has no complaints of pain. He does take Coumadin for mechanical heart valves. He was given vitamin K Kcentra here. The patient was transported by EMS to the [address] [facility] from home. Evaluation prior to arrival included: CT head. Treatment prior to arrival included: Vitamin K. A trauma consult was requested to assist, guide,  and expedite further evaluation and treatment for the patient. Patient Active Problem List   Diagnosis    S/P MVR (mitral valve replacement)    S/P AVR (aortic valve replacement)    Hypertension    Hyperlipidemia    Infective endocarditis    Atrial flutter ([address]    Trauma    TBI (traumatic brain injury) ([facility] [address]       SUBJECTIVE/OVERNIGHT EVENTS:  Transferred from SICU  Emesis this morning with meds    Review of Systems   Constitutional: Negative. Negative for activity change, appetite change and unexpected weight change. HENT: Negative. Eyes: Negative. Respiratory: Negative. Negative for cough and shortness of breath. Cardiovascular: Negative. Negative for chest pain and leg swelling. Gastrointestinal: Positive for nausea. Negative for abdominal distention, abdominal pain, anal bleeding, blood in stool, constipation, diarrhea and vomiting. Endocrine: Negative. Genitourinary: Negative. Musculoskeletal: Negative.   Negative for arthralgias, back pain, gait problem, joint swelling and myalgias. Skin: Negative. Allergic/Immunologic: Negative. Neurological: Negative. Negative for dizziness, weakness and headaches. Hematological: Negative. Psychiatric/Behavioral: Negative. Negative for confusion, decreased concentration and sleep disturbance. BP (!) 148/90   Pulse 76   Temp 97.9 °F (36.6 °C) (Temporal)   Resp 16   Ht 6' (1.829 m)   Wt 238 lb 4.8 oz (108.1 kg)   SpO2 96%   BMI 32.32 kg/m²   Physical Exam  Constitutional:       Appearance: Normal appearance. He is obese. HENT:      Head: Normocephalic and atraumatic. Nose: Nose normal.      Mouth/Throat:      Mouth: Mucous membranes are moist.      Pharynx: Oropharynx is clear. Eyes:      Extraocular Movements: Extraocular movements intact. Pupils: Pupils are equal, round, and reactive to light. Comments: Rash around eye has improved   Cardiovascular:      Rate and Rhythm: Normal rate and regular rhythm. Pulses: Normal pulses. Heart sounds: Normal heart sounds. Pulmonary:      Effort: Pulmonary effort is normal.      Breath sounds: Normal breath sounds. Abdominal:      General: There is no distension. Palpations: Abdomen is soft. Tenderness: There is no abdominal tenderness. Musculoskeletal:         General: No tenderness or signs of injury. Cervical back: Normal range of motion and neck supple. Skin:     General: Skin is warm and dry. Neurological:      General: No focal deficit present. Mental Status: He is alert and oriented to person, place, and time. Psychiatric:         Mood and Affect: Mood normal.         Behavior: Behavior normal.         Thought Content: Thought content normal.         Judgment: Judgment normal.         ASSESSMENT/PLAN:  1. Traumatic brain injury--NSGY following, Keppra  2. Possible seizure--Keppra  3. Lactic acidosis--resolved  4. Chronic anticoagulation--cardiology to evaluate for ASA since cannot be on coumadin  5.   Mechanical heart valves--cardiology evaluation  6. Hypertension--metoprolol  7.   Metabolic acidosis--recheck BMP this afternoon    INCIDENTAL FINDINGS:  none    AMPAC:  18/24    DVT/GI ppx:  Bilateral SCDs/diet    uShail Bassett MD, MSc, FACS  12/9/2021  11:06 AM

## 2021-12-09 NOTE — PROGRESS NOTES
SPEECH/LANGUAGE PATHOLOGY  SPEECH/LANGUAGE/COGNITIVE EVALUATION   and PLAN OF CARE      PATIENT NAME:  Leeann Chavez  (male)     MRN:  [id number]    :  1970  (46 y.o.)  STATUS:  Inpatient: Room 8516/8516-A    TODAY'S DATE:  2021  REFERRING PROVIDER:     CELSO Siegel - CNP    SPECIFIC PROVIDER ORDER: SLP eval and treat  Date of order:  21  REASON FOR REFERRAL: fall  EVALUATING THERAPIST: IVAN Barron    ADMITTING DIAGNOSIS: Syncope and collapse [R55]  Trauma [T14.90XA]  Subdural hematoma ([address] [S06.5X9A]  Hypertension, unspecified type [I10]    VISIT DIAGNOSIS:   Visit Diagnoses       Codes    Subdural hematoma ([address]    -  Primary S06.5X9A    Syncope and collapse     R55           SPEECH THERAPY  PLAN OF CARE   The speech therapy  POC is established based on physician order, speech pathology diagnosis and results of clinical assessment     SPEECH PATHOLOGY DIAGNOSIS:    Within function limits    Speech Pathology intervention is not warranted at this time. Conditions Requiring Skilled Therapeutic Intervention for speech, language and/or cognition  Not applicable     Specific Speech Therapy Interventions to Include:   Not applicable    Specific instructions for next treatment:    Not applicable    SHORT/LONG TERM GOALS  Not applicable      Patient goals: Patient/family involved in developing goals and treatment plan:   Treatment goals discussed with Patient      Rehabilitation Potential/Prognosis: not applicable                CLINICAL ASSESSMENT:  MOTOR SPEECH       Oral Peripheral Examination   Adequate lingual/labial strength     Parameters of Speech Production  Respiration:  Adequate for speech production  Articulation:  Within functional limits  Resonance:  Within functional limits  Quality:   Within functional limits  Pitch:     Within functional limits  Intensity: Within functional limits  Fluency:  Intact  Prosody Intact    RECEPTIVE LANGUAGE    Comprehension of Yes/No Questions: Within functional limits    Process  Simple Verbal Commands:   Within functional limits  Process Intermediate Verbal Commands:   Within functional limits  Process Complex Verbal Commands:     Within functional limits    Comprehension of Conversation:      Within functional limits      EXPRESSIVE LANGUAGE     Serials: Functional    Imitation:  Words   Functional   Sentences Functional    Naming:  (Modality used:  Verbal)  Confrontation Naming  Functional  Functional Description  Functional  Response Naming: Functional    Conversation:      Conversation was within functional limits    COGNITION     Attention/Orientation  Attention: Sustained attention   Orientation:  Oriented to Person, Place, Date, Reason for hospitalization    Memory   Immediate Recall: Repeated 3/3    Delayed Recall:   Recalled  2/3  Long Term Recall:   Recalled Address, Birthdate, Age, and Family    Organization/Problem Solving/Reasoning   Verbal Sequencing:   Functional        Verbal Problem solving:   Functional          CLINICAL OBSERVATIONS NOTED DURING THE EVALUATION  Latent responses                  EDUCATION:   The Speech Language Pathologist (SLP) completed education regarding results of evaluation and that intervention is not warranted at this time. Learner: Patient  Education: Reviewed results and recommendations of this evaluation  Evaluation of Education:  Verbalizes understanding    Evaluation Time includes thorough review of current medical information, gathering information on past medical history/social history and prior level of function, completion of standardized testing/informal observation of tasks, assessment of data and education on plan of care and goals. CPT code:    42521  eval speech sound lang comprehension      The admitting diagnosis and active problem list, as listed below have been reviewed prior to initiation of this evaluation.         ACTIVE PROBLEM LIST:   Patient Active Problem List Diagnosis    S/P MVR (mitral valve replacement)    S/P AVR (aortic valve replacement)    Hypertension    Hyperlipidemia    Infective endocarditis    Atrial flutter (HCC)    Trauma    TBI (traumatic brain injury) ([facility] [address]

## 2021-12-09 NOTE — PLAN OF CARE
Problem: Falls - Risk of:  Goal: Will remain free from falls  12/9/2021 0450 by Myke Boo RN  Outcome: Met This Shift  12/8/2021 2338 by Yazmin Morrow RN  Outcome: Met This Shift  Goal: Absence of physical injury  12/9/2021 0450 by Myke Boo RN  Outcome: Met This Shift  12/8/2021 2338 by Yazmin Morrow RN  Outcome: Met This Shift

## 2021-12-09 NOTE — PROGRESS NOTES
Neurosurg progress note  VITALS:  BP (!) 148/90   Pulse 76   Temp 97.9 °F (36.6 °C) (Temporal)   Resp 16   Ht 6' (1.829 m)   Wt 238 lb 4.8 oz (108.1 kg)   SpO2 96%   BMI 32.32 kg/m²   24HR INTAKE/OUTPUT:    Intake/Output Summary (Last 24 hours) at 12/9/2021 0372  Last data filed at 12/9/2021 0000  Gross per 24 hour   Intake 510 ml   Output 360 ml   Net 150 ml     CT HEAD WO CONTRAST    Result Date: 12/7/2021  EXAMINATION: CTA OF THE HEAD WITH CONTRAST; CT OF THE HEAD WITHOUT CONTRAST 12/7/2021 5:13 pm: TECHNIQUE: CTA of the head/brain was performed with the administration of intravenous contrast. Multiplanar reformatted images are provided for review. MIP images are provided for review. Dose modulation, iterative reconstruction, and/or weight based adjustment of the mA/kV was utilized to reduce the radiation dose to as low as reasonably achievable.; CT of the head was performed without the administration of intravenous contrast. Dose modulation, iterative reconstruction, and/or weight based adjustment of the mA/kV was utilized to reduce the radiation dose to as low as reasonably achievable. COMPARISON: Noncontrast head CT from 1:30 p.m. HISTORY: ORDERING SYSTEM PROVIDED HISTORY: trauma TECHNOLOGIST PROVIDED HISTORY: Reason for exam:->trauma Has a \"code stroke\" or \"stroke alert\" been called? ->No What reading provider will be dictating this exam?->CRC FINDINGS: There is redemonstration of a paramedian right frontal lobe intraparenchymal hematoma, with adjacent subarachnoid hemorrhage as well as a parafalcine subdural hematoma partially extending over the right frontal convexity. A small intraparenchymal hematoma is also seen within the contralateral frontal lobe. These findings are unchanged. 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 No intraventricular hemorrhage is identified. The vertebrobasilar system is within normal limits. There is a left-sided posterior communicating artery.   The posterior cerebral arteries are normal in course and caliber. The anterior and middle cerebral arteries demonstrate normal arborization patterns. No aneurysm or AVM is identified. Normal appearing [id number]. Stable multicompartmental intracranial hemorrhage compared to the prior exam from 1:30 p.m. [facility] CT HEAD WO CONTRAST    Addendum Date: 12/7/2021    ADDENDUM: Findings discussed on 12/07/2021 at 1:54 p.m. with Dr. Gopi Zapata     Result Date: 12/7/2021  EXAMINATION: CT OF THE HEAD WITHOUT CONTRAST  12/7/2021 8:05 am TECHNIQUE: CT of the head was performed without the administration of intravenous contrast. Dose modulation, iterative reconstruction, and/or weight based adjustment of the mA/kV was utilized to reduce the radiation dose to as low as reasonably achievable. COMPARISON: None. HISTORY: ORDERING SYSTEM PROVIDED HISTORY: Transient right-sided weakness, syncope TECHNOLOGIST PROVIDED HISTORY: Reason for exam:->Transient right-sided weakness, syncope Has a \"code stroke\" or \"stroke alert\" been called? ->No Decision Support Exception - unselect if not a suspected or confirmed emergency medical condition->Emergency Medical Condition (MA) What reading provider will be dictating this exam?->CRC FINDINGS: BRAIN/VENTRICLES: Parafalcine lobular high attenuation of the right side is present anteriorly consistent with subdural hematoma, thickness up to 8 mm, cranial caudal dimension about 2.5 cm. Does extend into mid to posterior falx area as well. Adjacent hypodensity suggests encephalomalacia such as old infarct at the paramidline left frontal lobe. Additional subtle white matter hypodensities are nearby in both frontal lobes suggestive of prior insult. There is moderate appearing global brain volume loss, increased for given age. No hydrocephalus. Subtle remote lacunar infarcts in periventricular deep brain locations suggested.   There is mild local mass effect related to the subdural collection described above. ORBITS: The visualized portion of the orbits demonstrate no acute abnormality. SINUSES: The visualized paranasal sinuses and mastoid air cells demonstrate no acute abnormality. SOFT TISSUES/SKULL:  No acute abnormality of the visualized skull or soft tissues. A parafalcine subdural hematoma anteriorly on the right as described above. Critical results were called by Dr. Raquel Nicholson MD to Dr. Gladis Bullard MD on 12/7/2021 at 08:23. CT HEAD WO CONTRAST    Result Date: 12/7/2021  EXAMINATION: CT OF THE HEAD WITHOUT CONTRAST  12/7/2021 1:35 pm TECHNIQUE: CT of the head was performed without the administration of intravenous contrast. Dose modulation, iterative reconstruction, and/or weight based adjustment of the mA/kV was utilized to reduce the radiation dose to as low as reasonably achievable. COMPARISON: 8:06 a.m. HISTORY: ORDERING SYSTEM PROVIDED HISTORY: SDH from trauma TECHNOLOGIST PROVIDED HISTORY: Reason for exam:->SDH from trauma Has a \"code stroke\" or \"stroke alert\" been called? ->No Decision Support Exception - unselect if not a suspected or confirmed emergency medical condition->Emergency Medical Condition (MA) What reading provider will be dictating this exam?->CRC FINDINGS: BRAIN/VENTRICLES: Stable right parafalcine subdural hematoma compared to prior study. New 3.6 cm x 1.9 cm right frontal intraparenchymal hemorrhage with extension across the midline/sub fall seen herniation. With halo of edema. .  The gray-white differentiation is maintained without evidence of an acute infarct. There is no evidence of hydrocephalus. Hemorrhage into a mass cannot be excluded. Stable changes of encephalomalacia left frontal lobe. ORBITS: The visualized portion of the orbits demonstrate no acute abnormality. SINUSES: The visualized paranasal sinuses and mastoid air cells demonstrate no acute abnormality. SOFT TISSUES/SKULL:  No acute abnormality of the visualized skull or soft tissues.      Stable right parafalcine subdural hematoma compared to 08/06 a.m.. 3.6 cm x 1.9 cm right frontal intraparenchymal hemorrhage with a halo of edema. Hemorrhage is present on the left frontal lobe which may represent subfalcine extension across the midline. CT CERVICAL SPINE WO CONTRAST    Result Date: 12/7/2021  EXAMINATION: CT OF THE CERVICAL SPINE WITHOUT CONTRAST 12/7/2021 1:35 pm TECHNIQUE: CT of the cervical spine was performed without the administration of intravenous contrast. Multiplanar reformatted images are provided for review. Dose modulation, iterative reconstruction, and/or weight based adjustment of the mA/kV was utilized to reduce the radiation dose to as low as reasonably achievable. COMPARISON: None. HISTORY: ORDERING SYSTEM PROVIDED HISTORY: sudural hematoma, possible fall, concern for fx TECHNOLOGIST PROVIDED HISTORY: Reason for exam:->sudural hematoma, possible fall, concern for fx Decision Support Exception - unselect if not a suspected or confirmed emergency medical condition->Emergency Medical Condition (MA) What reading provider will be dictating this exam?->CRC FINDINGS: BONES/ALIGNMENT: There is no acute fracture or traumatic malalignment. DEGENERATIVE CHANGES: No significant degenerative changes. SOFT TISSUES: There is no prevertebral soft tissue swelling. No acute abnormality of the cervical spine. XR CHEST PORTABLE    Result Date: 12/7/2021  EXAMINATION: ONE XRAY VIEW OF THE CHEST 12/7/2021 6:45 am COMPARISON: 21 August 2020 HISTORY: ORDERING SYSTEM PROVIDED HISTORY: Reported syncope TECHNOLOGIST PROVIDED HISTORY: Reason for exam:->Reported syncope What reading provider will be dictating this exam?->CRC FINDINGS: New postsurgical changes are evident. Heart is enlarged. Pulmonary vascularity is normal.  There is opacity at the lateral left lung base which causes partial obscuration of the left hemidiaphragm suggesting atelectasis and or infiltrate at the left lower lobe.   These findings are subtle. Neither costophrenic angle is clearly blunted. New postoperative changes. Cardiomegaly. Suspected atelectasis and or infiltrate at the left lower lobe. See above. CTA HEAD W CONTRAST    Result Date: 12/7/2021  EXAMINATION: CTA OF THE HEAD WITH CONTRAST; CT OF THE HEAD WITHOUT CONTRAST 12/7/2021 5:13 pm: TECHNIQUE: CTA of the head/brain was performed with the administration of intravenous contrast. Multiplanar reformatted images are provided for review. MIP images are provided for review. Dose modulation, iterative reconstruction, and/or weight based adjustment of the mA/kV was utilized to reduce the radiation dose to as low as reasonably achievable.; CT of the head was performed without the administration of intravenous contrast. Dose modulation, iterative reconstruction, and/or weight based adjustment of the mA/kV was utilized to reduce the radiation dose to as low as reasonably achievable. COMPARISON: Noncontrast head CT from 1:30 p.m. HISTORY: ORDERING SYSTEM PROVIDED HISTORY: trauma TECHNOLOGIST PROVIDED HISTORY: Reason for exam:->trauma Has a \"code stroke\" or \"stroke alert\" been called? ->No What reading provider will be dictating this exam?->CRC FINDINGS: There is redemonstration of a paramedian right frontal lobe intraparenchymal hematoma, with adjacent subarachnoid hemorrhage as well as a parafalcine subdural hematoma partially extending over the right frontal convexity. A small intraparenchymal hematoma is also seen within the contralateral frontal lobe. These findings are unchanged. 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 No intraventricular hemorrhage is identified. The vertebrobasilar system is within normal limits. There is a left-sided posterior communicating artery. The posterior cerebral arteries are normal in course and caliber. The anterior and middle cerebral arteries demonstrate normal arborization patterns. No aneurysm or AVM is identified.      Normal appearing [id number]. Stable multicompartmental intracranial hemorrhage compared to the prior exam from 1:30 p.m.. MRI BRAIN W WO CONTRAST    Result Date: 12/8/2021  EXAMINATION: MRI OF THE BRAIN WITHOUT AND WITH CONTRAST  12/8/2021 6:32 am TECHNIQUE: Multiplanar multisequence MRI of the head/brain was performed without and with the administration of intravenous contrast. COMPARISON: CT brain/CT angiogram brain 12/07/2021 HISTORY: ORDERING SYSTEM PROVIDED HISTORY: Concern for subdural hematoma versus meningioma TECHNOLOGIST PROVIDED HISTORY: Reason for exam:->Concern for subdural hematoma versus meningioma What reading provider will be dictating this exam?->CRC FINDINGS: INTRACRANIAL STRUCTURES/VENTRICLES:  There is an acute intraparenchymal hemorrhage within the medial aspect of the right frontal lobe, corresponding to the findings on the previous CT scan. There is resulting effacement of the anterior horn of the right lateral ventricle which is displaced inferiorly. There is an adjacent right parafalcine subdural hemorrhage measuring 6 mm in thickness. Small volume of subdural hemorrhage extends over the right frontal and temporal lobes. There is scattered subarachnoid hemorrhage within the right frontal lobe. Encephalomalacia is present within the left frontal lobe. Several scattered remote microhemorrhages are noted. There are multiple foci of increased T2/FLAIR signal intensity within the periventricular, subcortical and deep white matter of both hemispheres. There is no abnormal enhancement. ORBITS: Limited evaluation of the orbits is unremarkable. SINUSES: The paranasal sinuses and mastoid air cells are clear. BONES/SOFT TISSUES: Bone marrow signal intensity is normal.     1. Redemonstration of an intraparenchymal hemorrhage within the medial aspect of the right frontal lobe with adjacent subarachnoid hemorrhage noted.   Right parafalcine subdural hemorrhage, not significantly changed from the prior CT scan. 2. Encephalomalacia within the left frontal lobe. 3. No abnormal enhancement or dural-based mass evident.      CBC:   Lab Results   Component Value Date    WBC 10.5 12/08/2021    RBC 5.33 12/08/2021    HGB 15.2 12/08/2021    HCT 45.2 12/08/2021    MCV 84.8 12/08/2021    MCH 28.5 12/08/2021    MCHC 33.6 12/08/2021    RDW 13.9 12/08/2021     12/08/2021    MPV 10.6 12/08/2021     BMP:    Lab Results   Component Value Date     12/08/2021    K 3.3 12/08/2021     12/08/2021    CO2 21 12/08/2021    BUN 12 12/08/2021    LABALBU 4.0 12/08/2021    CREATININE 0.8 12/08/2021    CALCIUM 9.0 12/08/2021    GFRAA >60 12/08/2021    LABGLOM >60 12/08/2021    GLUCOSE 110 12/08/2021    GLUCOSE 105 02/26/2011      senna  1 tablet Oral Nightly    docusate sodium  100 mg Oral BID    metoprolol tartrate  37.5 mg Oral Q8H    therapeutic multivitamin-minerals  1 tablet Oral Daily    potassium chloride  20 mEq Oral BID    sodium chloride flush  5-40 mL IntraVENous 2 times per day    levETIRAcetam  750 mg Oral BID     Patient is awake and alert, follows commands oriented x3 pupils equal round reactive extraocular is full motor is full throughout  Assessment:  Patient Active Problem List   Diagnosis    S/P MVR (mitral valve replacement)    S/P AVR (aortic valve replacement)    Hypertension    Hyperlipidemia    Infective endocarditis    Atrial flutter (HCC)    Trauma    TBI (traumatic brain injury) ([facility] [address]     Plan: Full anticoagulation contraindicated until CT of the head shows complete resolution of the subdural hematoma, if discharged she needs to be with a reliable adult 24/7 no driving continue Keppra for at least 6 to 8 weeks likely should have cardiology clearance prior to discharge because of having artificial heart valves and Coumadin being contraindicated aspirin or platelet inhibition could be a consideration  continue current care  Hemant Sigala MD M.D.

## 2021-12-09 NOTE — CONSULTS
Inpatient Cardiology Consultation      Reason for Consult:  Hx: MVR & AVR valve surgery, HTN    Consulting Physician: Lennox Devlin MD    Requesting Physician:  Floresita Gandara MD    Date of Consultation: 12/9/2021    HISTORY OF PRESENT ILLNESS OF Gianni Dalton located in  room 8516/8516-A:     Gianni Dalton is a 46 y.o. male  known to Dr. Eleni Ca. Rosa Fuentes MD    Patient has h/o mild coronary artery disease (heart cath 8/28/2020 [facility]), preserved left ventricular systolic function (VANESA 46/62/7767 ), s/p AVR and MVR in 2006 by Dr. Marc Calabrese because of endocarditis, s/p Commando procedure on 9/2/2020 (Aortic root replacement with Homograft, Reconstrcution of the Inter-valvular fibrosa with bovine pericardial patch, MVR Biocor#31)  with last VANESA evaluation on 8/24/2020 without significant findings, HTN, encephalomalacia, subacute infarct in the left anterior cerebral artery territory (CT 8/27/2020)  Patient was brought to ED of [facility] on 12/7/21 after was found on the ground. His INR on admission was 3.6. He was found to have intracranial hemorrhage and was admitted for evaluation for a possible seizure as a course of the fall. He denied having chest pain. CT head from 12/7/2021 showed parafalcine subdural hematoma  and MRI from 12/8/2021 showed intraparenchymal hemorrhage within the medial aspect of the right frontal lobe with adjacent subarachnoid hemorrhage    According to neurosurgery anticoagulation contraindicated  till completed imaging resolution of acute intracranial hemorrhage. Please note: past medical records were reviewed per electronic medical record (EMR) - see detailed reports under Past Medical/ Surgical History.    Past Medical History:    Past Medical History:   Diagnosis Date    Chest pain 2/21/2013    Hypertension     LONG TERM ANTICOAGULENT USE     Memory loss     Psoriasis     S/P AVR (aortic valve replacement) 2/21/2013    S/P MVR (mitral valve replacement) 2/21/2013 Past Surgical History:    Past Surgical History:   Procedure Laterality Date    AORTIC VALVE REPLACEMENT  1/2006    Dr Onelia Goff   [facility]  01--2006    Dr Onelia Goff  [facility]       Medications Prior to admit:  Prior to Admission medications    Medication Sig Start Date End Date Taking? Authorizing Provider   Elastic Bandages & Supports (MEDICAL COMPRESSION STOCKINGS) MISC 1 each by Does not apply route daily 20-30 mmHg, knee high.  Dx: Venous insufficiency 10/12/21   Emeterio Brooks,    lisinopril (PRINIVIL;ZESTRIL) 20 MG tablet TAKE ONE TABLET BY MOUTH DAILY 9/7/21   Shaneka Fernandez DO   warfarin (JANTOVEN) 10 MG tablet Take 1 tablet by mouth daily or as directed by the doctor 6/28/21   Shaneka Fernandez DO   aspirin 81 MG chewable tablet Take 81 mg by mouth daily 9/13/20   Historical Provider, MD   metoprolol tartrate (LOPRESSOR) 25 MG tablet Take 37.25 mg by mouth every 8 hours 9/23/20   Historical Provider, MD   oxacillin 2 GM/50ML Infuse 2 g intravenously every 4 hours 9/12/20   Historical Provider, MD   potassium chloride (KLOR-CON M) 20 MEQ extended release tablet Take 20 mEq by mouth 2 times daily 9/12/20   Historical Provider, MD   spironolactone (ALDACTONE) 25 MG tablet Take 25 mg by mouth daily 9/23/20   Historical Provider, MD   Multiple Vitamins-Minerals (THERAPEUTIC MULTIVITAMIN-MINERALS) tablet Take 1 tablet by mouth daily LD 2-22-19    Historical Provider, MD       Current Medications:    Current Facility-Administered Medications: senna (SENOKOT) tablet 8.6 mg, 1 tablet, Oral, Nightly  docusate sodium (COLACE) capsule 100 mg, 100 mg, Oral, BID  bisacodyl (DULCOLAX) suppository 10 mg, 10 mg, Rectal, Daily PRN  metoprolol tartrate (LOPRESSOR) tablet 37.5 mg, 37.5 mg, Oral, Q8H  therapeutic multivitamin-minerals 1 tablet, 1 tablet, Oral, Daily  potassium chloride (KLOR-CON M) extended release tablet 20 mEq, 20 mEq, Oral, BID  hydrALAZINE (APRESOLINE) injection 10 mg, 10 mg, IntraVENous, Q4H PRN  labetalol (NORMODYNE;TRANDATE) injection 10 mg, 10 mg, IntraVENous, Q4H PRN  perflutren lipid microspheres (DEFINITY) injection 1.65 mg, 1.5 mL, IntraVENous, ONCE PRN  sodium chloride flush 0.9 % injection 5-40 mL, 5-40 mL, IntraVENous, 2 times per day  sodium chloride flush 0.9 % injection 5-40 mL, 5-40 mL, IntraVENous, PRN  0.9 % sodium chloride infusion, 25 mL, IntraVENous, PRN  ondansetron (ZOFRAN-ODT) disintegrating tablet 4 mg, 4 mg, Oral, Q8H PRN **OR** ondansetron (ZOFRAN) injection 4 mg, 4 mg, IntraVENous, Q6H PRN  polyethylene glycol (GLYCOLAX) packet 17 g, 17 g, Oral, Daily PRN  acetaminophen (TYLENOL) tablet 650 mg, 650 mg, Oral, Q6H PRN **OR** acetaminophen (TYLENOL) suppository 650 mg, 650 mg, Rectal, Q6H PRN  levETIRAcetam (KEPPRA) tablet 750 mg, 750 mg, Oral, BID    Allergies:  Pcn [penicillins]    Social History:   Social History     Socioeconomic History    Marital status:      Spouse name: Not on file    Number of children: Not on file    Years of education: Not on file    Highest education level: Not on file   Occupational History     Comment: [employer]   Tobacco Use    Smoking status: Never Smoker    Smokeless tobacco: Never Used   Substance and Sexual Activity    Alcohol use: Yes     Comment: 1-2 beers per month    Drug use: No    Sexual activity: Not on file   Other Topics Concern    Not on file   Social History Narrative    Not on file     Social Determinants of Health     Financial Resource Strain:     Difficulty of Paying Living Expenses: Not on file   Food Insecurity:     Worried About Running Out of Food in the Last Year: Not on file    Nica of Food in the Last Year: Not on file   Transportation Needs:     Lack of Transportation (Medical): Not on file    Lack of Transportation (Non-Medical):  Not on file   Physical Activity:     Days of Exercise per Week: Not on file    Minutes of Exercise per Session: Not on file   Stress:     Feeling of Stress : Not on file   Social Connections:     Frequency of Communication with Friends and Family: Not on file    Frequency of Social Gatherings with Friends and Family: Not on file    Attends [employer] Services: Not on file    Active Member of Clubs or Organizations: Not on file    Attends Club or Organization Meetings: Not on file    Marital Status: Not on file   Intimate Partner Violence:     Fear of Current or Ex-Partner: Not on file    Emotionally Abused: Not on file    Physically Abused: Not on file    Sexually Abused: Not on file   Housing Stability:     Unable to Pay for Housing in the Last Year: Not on file    Number of [place] in the Last Year: Not on file    Unstable Housing in the Last Year: Not on file       Family History:   History reviewed. No pertinent family history. REVIEW OF SYSTEMS:     Constitutional: Has fatigue. Denies  fevers, chills, night sweats, pb loss, pb gain  HEENT: Denies headaches, nose bleeds, and blurred vision, oral pain, oral lesion. Neurological: Denies  weakness, dizziness and lightheadedness, numbness and tingling  Cardiovascular: Has shortness of breath with effort. Denies chest pain,  palpitations, and feelings of heart racing. Respiratory: Denies cough, wheezing,  use of supplementary oxygen, CPAP/BiPAP  Gastrointestinal: Denies heartburn, nausea, vomiting, diarrhea and constipation, bloody or black/tarry stools. Genitourinary: Denies pain on  urination,  blood in urine, trouble starting urination, need to strain on urination, urinary urgency, urinary incontinence. Hematologic:Denies history of easy bruising, prolonged bleeding,  of blood clots in legs  Lymphatic: Denies lumps and bumps to neck, axilla, breast, and groin  Endocrine: Denies excessive thirst. Denies intolerance to heat or cold  Musculoskeletal: Denies falls, pain to BLE with ambulation and edema to BLE. Psychiatric: Denies anxiety and depression. [place]      PHYSICAL EXAM: BP (!) 148/90   Pulse 76   Temp 97.9 °F (36.6 °C) (Temporal)   Resp 16   Ht 6' (1.829 m)   Wt 238 lb 4.8 oz (108.1 kg)   SpO2 96%   BMI 46.12 kg/m²   Systolic (91HBB), LZB:216 , Min:117 , CIL:194    Diastolic (71WUW), MAL:732, Min:82, Max:153    CONST:  Well developed, well nourished who appears stated age. Awake, alert, cooperative, no apparent distress  HEENT:   Head- Normocephalic, atraumatic   Eyes- Conjunctivae pink, anicteric  Throat- Oral mucosa pink and moist  Neck-  No stridor, trachea midline, no jugular venous distention. No adenopathy   CHEST: Chest symmetrical and non-tender to palpation. No accessory muscle use or intercostal retractions  RESPIRATORY: Lung sounds - clear throughout fields;   CARDIOVASCULAR:     No carotid bruits  Heart Inspection- shows no noted pulsations  Heart Ausculation- Regular  rate and rhythm, no murmur. No s3, s4 or rub   PV: No lower extremity edema. No varicosities. Pedal pulses palpable, no clubbing or cyanosis   ABDOMEN: Soft, non-tender to light palpation. Bowel sounds present. No palpable masses no organomegaly; no abdominal bruit. MS: Moves all extremities. No atrophy or abnormal movements. : Deferred  SKIN: Warm and dry,  no stasis dermatitis or ulcers on legs  NEURO / PSYCH: Oriented to person, place and time. Speech clear and appropriate. Follows all commands. Pleasant affect     DATA:    ECG reviewed with Dr. Yefri Almeida strips: Speech and behavior appropriate    Diagnostic:     VANESA 11/25/2020 at [facility] prior to cardioversion  CONCLUSIONS:   - Exam indication: Pre Cardioversion, Pre AF Ablation   - The left ventricle is normal in size. Left ventricular systolic function is   normal. EF = 55 ± 5% (visual est.) Assessment of left ventricular function is   limited from transesophageal imaging, due to acoustic shadowing from the mitral   prosthesis.  Assessment of ventricular function is performed from transgastric   imaging.   - The right ventricle is normal in size. Right ventricular systolic function is   normal.   - The left atrial cavity is dilated. Smoke is evident in the left atrial appendage    with significantly reduced maximal emptying velocity (12 cm/sec). No discrete   left atrial or left atrial appendage thrombus. - The right atrial cavity is dilated. - Biocor prosthetic mitral valve (size #31). There is trace mitral valve   regurgitation. The peak gradient is 11 mmHg and the mean gradient is 4 mmHg. Transmitral gradients obtained at 103 bpm. Prior transmitral gradients: 12/7 mmHg. - Assessment of tricuspid valve leaflets is somewhat limited by acoustic shadowing    from the mitral prosthesis. - Homograft prosthetic aortic valve (size #23). There is no aortic valve   regurgitation. The peak gradient is 7 mmHg, the mean gradient is 3 mmHg and the   dimensionless valve index is 0.67. Thickening noted around the homograft aortic   root (Images 53-55). This may represent postoperative changes. - There is no patent foramen ovale as detected by Doppler and agitated saline   contrast.   - Exam was compared with the [address] [place] [address] echocardiographic exam performed on   10/21/2020. No discrete left atrial or left atrial appendage thrombus on VANESA   imaging. Echocardiography Report: Transthoracic Echo   [address] [address]   Date of service: 10/21/2020 11:40:49 AM   Accession #: [id number]     Ordering physician: Davonte Antonio   Indication: s/p redo AVR MVR     Technologist: [facility]   Interpreting physician: Jay German MD, PhD     PATIENT:   Name: Floresita Castillo   MRN: [id number]   : 1970   Age: 44 years   Gender: M     History of hypertension, dyslipidemia and valvular heart disease. Previous cardiovascular interventions: Aortic valve replacement ()   Mitral valve replacement ()   Aortic valve replacement (9/3/2020)   Mitral valve replacement (9/3/2020)   Aortic root replacement (9/3/2020)     Primary rhythm: atrial fib. Height: 185.40 cm BSA: 2.31 m²   Weight: 104.06 kg BMI: 30.3 kg/m²       Heart rate     120 bpm   Blood pressure 137/98 mmHg     Color Doppler was utilized to interrogate the cardiac valves assessed and spectral    Doppler was utilized to determine the flow velocities and pressure gradients   reported in this exam.   CONCLUSIONS:   - Exam indication: s/p redo AVR MVR   - The left ventricle is normal in size. Left ventricular systolic function is   normal. EF = 60 ± 5% (2D biplane) Left ventricular diastolic function was not   evaluated due to AF and mitral valve surgery. - The right ventricle is normal in size. Right ventricular systolic function is   normal.   - Post mitral valve replacement. Biocor prosthetic mitral valve (size #31). There   is no mitral valve regurgitation. The peak gradient is 12 mmHg and the mean    gradient is 7 mmHg. - Prior Pk/Mn gradient was 9/4 mmHg. - Homograft prosthetic aortic valve (size #23). There is no aortic valve   regurgitation. The peak gradient is 6 mmHg, the mean gradient is 3 mmHg and the   dimensionless valve index is 0.81. - Prior Pk/Mn gradient was 12/7 mmHg. - Patient in Afib RVR throughout entire exam (-135 bpm). - Exam was compared with the prior  echocardiographic exam performed on   9/8/2020. s/p Aortic root replacement with Homograft, Reconstruction of the   Inter-valvular fibrosa with bovine pericardial patch (Commando Procedure) with   MVR. Patient is in Afib RVR today.      MEASUREMENTS:                            Value               Indexed    Normal   Max aortic dimension     3.7 cm                         Ao < 3.8   Left atrial volume       70 ml (biplane A-L) 30 ml/m²   Paco <= 34   LV ID (diastole)         4.1 cm (2D)         1.76 cm/m²   LV ID (systole)          3.0 cm (2D)         1.31 cm/m²   IVS, leaflet tips        1.0 cm (2D)   Posterior wall thickness 1.3 cm (2D)   Left ventricular mass    163 g (2D)          71 g/m²   LV stroke volume         60 ml (2D biplane)   LV end diastolic volume  21 ml (2D biplane)  43.0 ml/m² 96<=KAHA<16   LV end systolic volume   40 ml (2D biplane)  17.1 ml/m²   Ejection Fraction        60 % (2D biplane)              EF > 52       FINDINGS:     LEFT VENTRICLE   The left ventricle is normal in size. Left ventricular systolic function is normal.   Left ventricular diastolic function was not evaluated due to AF and mitral valve   surgery. Wall Motion:   All scored segments are normal.         RIGHT VENTRICLE   The right ventricle is normal in size. Right ventricular systolic function is normal. RV systolic tissue Doppler velocity    is 8.1 cm/s. Tricuspid annular displacement is 2.4 cm. LEFT ATRIUM   The left atrial cavity is normal in size. RIGHT ATRIUM   The right atrial cavity is normal in size. MITRAL VALVE   Post mitral valve replacement. Biocor prosthetic valve size #31. There is no   mitral valve regurgitation. The peak mitral valve gradient is 12 mmHg. The mean   mitral valve gradient is 7 mmHg. TRICUSPID VALVE   The tricuspid valve leaflets are structurally normal. There is trace tricuspid   valve regurgitation. AORTIC VALVE   Homograft prosthetic valve size #23. There is no aortic valve regurgitation. The   peak gradient is 6 mmHg (peak velocity = 125.4 cm/s). The mean gradient is 3 mmHg.    The LVOT mean velocity is 65.7 cm/s. The aortic VTI is 19.6 cm. The mean velocity    in the aortic valve is 75.4 cm/s. The dimensionless valve index is 0.81. PULMONIC VALVE   The pulmonic valve cusps are structurally normal. There is trace pulmonic valve   regurgitation. AORTA   The visualized aorta is normal in size. Measurements - Mid ascending aorta 3.7 cm. PULMONARY ARTERIES   The pulmonary arteries are normal.     INTERVENTRICULAR SEPTUM   There is abnormal motion of the interventricular septum secondary to prior cardiac    surgery.      CT HEAD WO CONTRAST    Result Date: 12/7/2021  EXAMINATION: CTA OF THE HEAD WITH CONTRAST; CT OF THE HEAD WITHOUT CONTRAST 12/7/2021 5:13 pm: TECHNIQUE: CTA of the head/brain was performed with the administration of intravenous contrast. Multiplanar reformatted images are provided for review. MIP images are provided for review. Dose modulation, iterative reconstruction, and/or weight based adjustment of the mA/kV was utilized to reduce the radiation dose to as low as reasonably achievable.; CT of the head was performed without the administration of intravenous contrast. Dose modulation, iterative reconstruction, and/or weight based adjustment of the mA/kV was utilized to reduce the radiation dose to as low as reasonably achievable. COMPARISON: Noncontrast head CT from 1:30 p.m. HISTORY: ORDERING SYSTEM PROVIDED HISTORY: trauma TECHNOLOGIST PROVIDED HISTORY: Reason for exam:->trauma Has a \"code stroke\" or \"stroke alert\" been called? ->No What reading provider will be dictating this exam?->CRC FINDINGS: There is redemonstration of a paramedian right frontal lobe intraparenchymal hematoma, with adjacent subarachnoid hemorrhage as well as a parafalcine subdural hematoma partially extending over the right frontal convexity. A small intraparenchymal hematoma is also seen within the contralateral frontal lobe. These findings are unchanged. 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 No intraventricular hemorrhage is identified. The vertebrobasilar system is within normal limits. There is a left-sided posterior communicating artery. The posterior cerebral arteries are normal in course and caliber. The anterior and middle cerebral arteries demonstrate normal arborization patterns. No aneurysm or AVM is identified. Normal appearing [id number]. Stable multicompartmental intracranial hemorrhage compared to the prior exam from 1:30 p.m. [facility]      CT HEAD WO CONTRAST    Addendum Date: 12/7/2021    ADDENDUM: Findings discussed on 12/07/2021 at 1: 54 p.m. with Dr. Mauricio Mar     Result Date: 12/7/2021  EXAMINATION: CT OF THE HEAD WITHOUT CONTRAST  12/7/2021 8:05 am TECHNIQUE: CT of the head was performed without the administration of intravenous contrast. Dose modulation, iterative reconstruction, and/or weight based adjustment of the mA/kV was utilized to reduce the radiation dose to as low as reasonably achievable. COMPARISON: None. HISTORY: ORDERING SYSTEM PROVIDED HISTORY: Transient right-sided weakness, syncope TECHNOLOGIST PROVIDED HISTORY: Reason for exam:->Transient right-sided weakness, syncope Has a \"code stroke\" or \"stroke alert\" been called? ->No Decision Support Exception - unselect if not a suspected or confirmed emergency medical condition->Emergency Medical Condition (MA) What reading provider will be dictating this exam?->CRC FINDINGS: BRAIN/VENTRICLES: Parafalcine lobular high attenuation of the right side is present anteriorly consistent with subdural hematoma, thickness up to 8 mm, cranial caudal dimension about 2.5 cm. Does extend into mid to posterior falx area as well. Adjacent hypodensity suggests encephalomalacia such as old infarct at the paramidline left frontal lobe. Additional subtle white matter hypodensities are nearby in both frontal lobes suggestive of prior insult. There is moderate appearing global brain volume loss, increased for given age. No hydrocephalus. Subtle remote lacunar infarcts in periventricular deep brain locations suggested. There is mild local mass effect related to the subdural collection described above. ORBITS: The visualized portion of the orbits demonstrate no acute abnormality. SINUSES: The visualized paranasal sinuses and mastoid air cells demonstrate no acute abnormality. SOFT TISSUES/SKULL:  No acute abnormality of the visualized skull or soft tissues. A parafalcine subdural hematoma anteriorly on the right as described above.  Critical results were called by Dr. Morris Arrington MD to Dr. Acacia Mast Barbi Winslow MD on 12/7/2021 at 08:23. CT HEAD WO CONTRAST    Result Date: 12/7/2021  EXAMINATION: CT OF THE HEAD WITHOUT CONTRAST  12/7/2021 1:35 pm TECHNIQUE: CT of the head was performed without the administration of intravenous contrast. Dose modulation, iterative reconstruction, and/or weight based adjustment of the mA/kV was utilized to reduce the radiation dose to as low as reasonably achievable. COMPARISON: 8:06 a.m. HISTORY: ORDERING SYSTEM PROVIDED HISTORY: SDH from trauma TECHNOLOGIST PROVIDED HISTORY: Reason for exam:->SDH from trauma Has a \"code stroke\" or \"stroke alert\" been called? ->No Decision Support Exception - unselect if not a suspected or confirmed emergency medical condition->Emergency Medical Condition (MA) What reading provider will be dictating this exam?->CRC FINDINGS: BRAIN/VENTRICLES: Stable right parafalcine subdural hematoma compared to prior study. New 3.6 cm x 1.9 cm right frontal intraparenchymal hemorrhage with extension across the midline/sub fall seen herniation. With halo of edema. .  The gray-white differentiation is maintained without evidence of an acute infarct. There is no evidence of hydrocephalus. Hemorrhage into a mass cannot be excluded. Stable changes of encephalomalacia left frontal lobe. ORBITS: The visualized portion of the orbits demonstrate no acute abnormality. SINUSES: The visualized paranasal sinuses and mastoid air cells demonstrate no acute abnormality. SOFT TISSUES/SKULL:  No acute abnormality of the visualized skull or soft tissues. Stable right parafalcine subdural hematoma compared to 08/06 a.m.. 3.6 cm x 1.9 cm right frontal intraparenchymal hemorrhage with a halo of edema. Hemorrhage is present on the left frontal lobe which may represent subfalcine extension across the midline.      CT CERVICAL SPINE WO CONTRAST    Result Date: 12/7/2021  EXAMINATION: CT OF THE CERVICAL SPINE WITHOUT CONTRAST 12/7/2021 1:35 pm TECHNIQUE: CT of the cervical spine was performed without the administration of intravenous contrast. Multiplanar reformatted images are provided for review. Dose modulation, iterative reconstruction, and/or weight based adjustment of the mA/kV was utilized to reduce the radiation dose to as low as reasonably achievable. COMPARISON: None. HISTORY: ORDERING SYSTEM PROVIDED HISTORY: sudural hematoma, possible fall, concern for fx TECHNOLOGIST PROVIDED HISTORY: Reason for exam:->sudural hematoma, possible fall, concern for fx Decision Support Exception - unselect if not a suspected or confirmed emergency medical condition->Emergency Medical Condition (MA) What reading provider will be dictating this exam?->CRC FINDINGS: BONES/ALIGNMENT: There is no acute fracture or traumatic malalignment. DEGENERATIVE CHANGES: No significant degenerative changes. SOFT TISSUES: There is no prevertebral soft tissue swelling. No acute abnormality of the cervical spine. XR CHEST PORTABLE    Result Date: 12/7/2021  EXAMINATION: ONE XRAY VIEW OF THE CHEST 12/7/2021 6:45 am COMPARISON: 21 August 2020 HISTORY: ORDERING SYSTEM PROVIDED HISTORY: Reported syncope TECHNOLOGIST PROVIDED HISTORY: Reason for exam:->Reported syncope What reading provider will be dictating this exam?->CRC FINDINGS: New postsurgical changes are evident. Heart is enlarged. Pulmonary vascularity is normal.  There is opacity at the lateral left lung base which causes partial obscuration of the left hemidiaphragm suggesting atelectasis and or infiltrate at the left lower lobe. These findings are subtle. Neither costophrenic angle is clearly blunted. New postoperative changes. Cardiomegaly. Suspected atelectasis and or infiltrate at the left lower lobe. See above.      CTA HEAD W CONTRAST    Result Date: 12/7/2021  EXAMINATION: CTA OF THE HEAD WITH CONTRAST; CT OF THE HEAD WITHOUT CONTRAST 12/7/2021 5:13 pm: TECHNIQUE: CTA of the head/brain was performed with the administration of intravenous contrast. Multiplanar reformatted images are provided for review. MIP images are provided for review. Dose modulation, iterative reconstruction, and/or weight based adjustment of the mA/kV was utilized to reduce the radiation dose to as low as reasonably achievable.; CT of the head was performed without the administration of intravenous contrast. Dose modulation, iterative reconstruction, and/or weight based adjustment of the mA/kV was utilized to reduce the radiation dose to as low as reasonably achievable. COMPARISON: Noncontrast head CT from 1:30 p.m. HISTORY: ORDERING SYSTEM PROVIDED HISTORY: trauma TECHNOLOGIST PROVIDED HISTORY: Reason for exam:->trauma Has a \"code stroke\" or \"stroke alert\" been called? ->No What reading provider will be dictating this exam?->CRC FINDINGS: There is redemonstration of a paramedian right frontal lobe intraparenchymal hematoma, with adjacent subarachnoid hemorrhage as well as a parafalcine subdural hematoma partially extending over the right frontal convexity. A small intraparenchymal hematoma is also seen within the contralateral frontal lobe. These findings are unchanged. 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 No intraventricular hemorrhage is identified. The vertebrobasilar system is within normal limits. There is a left-sided posterior communicating artery. The posterior cerebral arteries are normal in course and caliber. The anterior and middle cerebral arteries demonstrate normal arborization patterns. No aneurysm or AVM is identified. Normal appearing [id number]. Stable multicompartmental intracranial hemorrhage compared to the prior exam from 1:30 p.m..      MRI BRAIN W WO CONTRAST    Result Date: 12/8/2021  EXAMINATION: MRI OF THE BRAIN WITHOUT AND WITH CONTRAST  12/8/2021 6:32 am TECHNIQUE: Multiplanar multisequence MRI of the head/brain was performed without and with the administration of intravenous contrast. COMPARISON: CT brain/CT angiogram brain 12/07/2021 HISTORY: ORDERING SYSTEM PROVIDED HISTORY: Concern for subdural hematoma versus meningioma TECHNOLOGIST PROVIDED HISTORY: Reason for exam:->Concern for subdural hematoma versus meningioma What reading provider will be dictating this exam?->CRC FINDINGS: INTRACRANIAL STRUCTURES/VENTRICLES:  There is an acute intraparenchymal hemorrhage within the medial aspect of the right frontal lobe, corresponding to the findings on the previous CT scan. There is resulting effacement of the anterior horn of the right lateral ventricle which is displaced inferiorly. There is an adjacent right parafalcine subdural hemorrhage measuring 6 mm in thickness. Small volume of subdural hemorrhage extends over the right frontal and temporal lobes. There is scattered subarachnoid hemorrhage within the right frontal lobe. Encephalomalacia is present within the left frontal lobe. Several scattered remote microhemorrhages are noted. There are multiple foci of increased T2/FLAIR signal intensity within the periventricular, subcortical and deep white matter of both hemispheres. There is no abnormal enhancement. ORBITS: Limited evaluation of the orbits is unremarkable. SINUSES: The paranasal sinuses and mastoid air cells are clear. BONES/SOFT TISSUES: Bone marrow signal intensity is normal.     1. Redemonstration of an intraparenchymal hemorrhage within the medial aspect of the right frontal lobe with adjacent subarachnoid hemorrhage noted. Right parafalcine subdural hemorrhage, not significantly changed from the prior CT scan. 2. Encephalomalacia within the left frontal lobe. 3. No abnormal enhancement or dural-based mass evident.        Labs:   CARDIAC ENZYMES:  Recent Labs     12/07/21  0703 12/07/21  0946   TROPHS 13* 16*     H/O TROPONIN levels    Lab Results   Component Value Date    TROPHS 16 12/07/2021    TROPHS 13 12/07/2021    TROPONINI 0.04 08/22/2020    TROPONINI 0.01 08/22/2020    TROPONINI <0.01 08/21/2020    TROPONINI <0.01 12/17/2017     Recent Labs     12/07/21  0703   PROBNP 514*     Lab Results   Component Value Date    PROBNP 514 12/07/2021     BMP:   Recent Labs     12/07/21  0946 12/08/21  0450    136   K 3.7 3.3*   CO2 23 21*   BUN 19 12   CREATININE 0.9 0.8   LABGLOM >60 >60   CALCIUM 8.8 9.0     Lab Results   Component Value Date    CREATININE 0.8 12/08/2021    CREATININE 0.9 12/07/2021    CREATININE 1.1 12/07/2021    CREATININE 1.0 10/12/2021    CREATININE 1.2 10/12/2020    CREATININE 1.3 10/05/2020    CREATININE 1.54 09/28/2020    CREATININE 1.8 08/22/2020    CREATININE 1.7 08/21/2020    CREATININE 1.6 08/21/2020    CREATININE 1.1 06/02/2020    CREATININE 1.1 11/14/2018    CREATININE 1.0 12/17/2017    CREATININE 1.1 06/01/2017    CREATININE 1.1 02/26/2011     CBC:   Recent Labs     12/07/21  1026 12/08/21  0450   WBC 12.6* 10.5   HGB 15.2 15.2   HCT 46.7 45.2    218     Mag:   Recent Labs     12/07/21  0703 12/08/21  0450   MG 2.2 2.2     Phos:   Recent Labs     12/08/21  0450   PHOS 2.6     TSH:   Recent Labs     12/07/21  0946   TSH 0.685     HgA1c:   Lab Results   Component Value Date    LABA1C 5.9 (H) 08/23/2020     No results found for: EAG  BNP: No results for input(s): BNP in the last 72 hours. PT/INR:   Recent Labs     12/08/21  0450 12/09/21  0631   PROTIME 14.2* 12.4   INR 1.3 1.1     APTT:No results for input(s): APTT in the last 72 hours.     FASTING LIPID PANEL:  Lab Results   Component Value Date    CHOL 196 10/12/2021    HDL 56 10/12/2021    LDLCALC 123 10/12/2021    TRIG 86 10/12/2021     LIVER PROFILE:  Recent Labs     12/07/21  0703 12/08/21  0450   AST 27 26   ALT 29 26   LABALBU 4.6 4.0       COVID-19 Labs:  Lab Results   Component Value Date    COVID19 Not Detected 12/07/2021     Recent Labs     12/07/21  0914   COVID19 Not Detected             ASSESSMENT:  CAD, mild  heart cath 8/28/2020 at [facility] [place]  Preserved left ventricular systolic function (VANESA 58/52/7207 ), S/p Commando procedure on 9/3/2020 (Aortic root replacement and aortic valve  replacement -  Homograft size# 23,  reconstrcution of the Inter-valvular fibrosa with  bovine pericardial patch), S/p MVR (Biocor#31)  9/3/21  HTN,  H/o paroxysmal A. Fib, h/o cardioversion 11/25/2020 at [facility] [place]  Episode of first-degree AV block  Intracranial hemorrhage per CT 12/7/2021 and  MRI 12/8/2021  Chronic anticoagulation with Coumadin, now, according to neurosurgery,   anticoagulation  contraindicated till completed imaging resolution of acute  intracranial hemorrhage. Seizure disorder, newly diagnosed  H/o Subacute infarct in the left anterior cerebral artery territory (CT 8/27/2020)  H/o Encephalomalacia          PLAN:  Continue Telemetry  Continue current therapy  Anticoagulation on hold due to intracranial hemorrhage  No additional cardiological testing in the moment    Further cardiac recommendations will be forthcoming pending patient clinical course. Assessment and plan discussed with Dr. Delvin Malhotra MD    Assessment and Plan to follow as per Dr. Delvin Malhotra MD    Electronically signed by YSABEL Leal on 12/9/2021 at 8:38 [phone] [facility] Cardiology Consult       Jana Benavidez    I have personally participated in a face-to-face and personally obtained history and performed physical exam on the date of service. I reviewed chart, vitals, labs and radiologic studies. I also participated in medical decision making with YSABEL Leal on the date of service All of the assessments and recommendations are from me and I agree with all of the pertinent clinical information, assessment and treatment plan. I have reviewed and edited the note above based on my findings during my history, exam, and decision making. Please see my additional contributions to the history, physical exam, assessment, and recommendations below.       HISTORY OF PRESENT ILLNESS:     Reviewed, as above      Past medical history:  Reviewed, as above. Past surgical history:  Reviewed, as above. Current medications:  Reviewed, as above    Allergies:  Reviewed, as above    Social history:  Reviewed, as above    Family medical history:  Reviewed, as above. REVIEW OF SYSTEMS:   Reviewed, as above. PHYSICAL EXAM:   CONSTITUTIONAL:  awake, alert, cooperative, no apparent distress, and appears stated age  EYES:  lids and lashes normal, anicteric sclerae  HEAD:  normocepalic, without obvious abnormality, atraumatic, pink, moist mucous membranes. NECK:  Supple, symmetrical, trachea midline, no adenopathy, thyroid symmetric, not enlarged and no tenderness, skin normal  HEMATOLOGIC/LYMPHATICS:  no cervical lymphadenopathy and no supraclavicular lymphadenopathy  LUNGS:  No increased work of breathing, good air exchange, clear to auscultation bilaterally, no crackles or wheezing  CARDIOVASCULAR:  Normal apical impulse, irregularly irregular, tachycardic, normal S1 and S2, no S3, no murmur noted and no JVD, no carotid bruit, no pedal edema, good carotid upstroke bilaterally. ABDOMEN:  Soft, nontender, no masses, no hepatomegaly or splenomegaly, BS+  CHEST: nontender to palpation, expands symmetrically  MUSCULOSKELETAL:  No clubbing no cyanosis. there is no redness, warmth, or swelling of the joints  full range of motion noted  NEUROLOGIC:  Alert, awake  SKIN:  no bruising or bleeding, normal skin color, texture, turgor and no redness, warmth, or swelling    BP (!) 156/92   Pulse 121   Temp 97.1 °F (36.2 °C) (Temporal)   Resp 18   Ht 6' (1.829 m)   Wt 238 lb 4.8 oz (108.1 kg)   SpO2 97%   BMI 32.32 kg/m²       No intake/output data recorded. No intake/output data recorded. DATA:   I personally reviewed the admission EKG with the following interpretation: Sinus tachycardia  ECHO: 11/25/2020CONCLUSIONS:   - Exam indication: Pre Cardioversion, Pre AF Ablation   - The left ventricle is normal in size.  Left ventricular systolic function is   normal. EF = 55 ± 5% (visual est.) Assessment of left ventricular function is   limited from transesophageal imaging, due to acoustic shadowing from the mitral   prosthesis. Assessment of ventricular function is performed from transgastric   imaging.   - The right ventricle is normal in size. Right ventricular systolic function is   normal.   - The left atrial cavity is dilated. Smoke is evident in the left atrial appendage    with significantly reduced maximal emptying velocity (12 cm/sec). No discrete   left atrial or left atrial appendage thrombus. - The right atrial cavity is dilated. - Biocor prosthetic mitral valve (size #31). There is trace mitral valve   regurgitation. The peak gradient is 11 mmHg and the mean gradient is 4 mmHg. Transmitral gradients obtained at 103 bpm. Prior transmitral gradients: 12/7 mmHg. - Assessment of tricuspid valve leaflets is somewhat limited by acoustic shadowing    from the mitral prosthesis. - Homograft prosthetic aortic valve (size #23). There is no aortic valve   regurgitation. The peak gradient is 7 mmHg, the mean gradient is 3 mmHg and the   dimensionless valve index is 0.67. Thickening noted around the homograft aortic   root (Images 53-55). This may represent postoperative changes. - There is no patent foramen ovale as detected by Doppler and agitated saline   contrast.   - Exam was compared with the [address] [place] [address] echocardiographic exam performed on   10/21/2020. No discrete left atrial or left atrial appendage thrombus on VANESA   imaging.      Stress Test: Reviewed  Angiography:    Cardiology Labs:   BMP:    Lab Results   Component Value Date     12/10/2021    K 4.5 12/10/2021     12/10/2021    CO2 21 12/10/2021    BUN 12 12/10/2021     CMP:    Lab Results   Component Value Date     12/10/2021    K 4.5 12/10/2021     12/10/2021    CO2 21 12/10/2021    BUN 12 12/10/2021    PROT 7.7 12/10/2021     CBC:    Lab Results   Component Value Date    WBC 8.8 12/10/2021    RBC 5.42 12/10/2021    HGB 15.6 12/10/2021    HCT 46.6 12/10/2021    MCV 86.0 12/10/2021    RDW 14.2 12/10/2021     12/10/2021     PT/INR:  No results found for: PTINR  PT/INR Warfarin:  No components found for: PTPATWAR, PTINRWAR  PTT:    Lab Results   Component Value Date    APTT 51.0 08/25/2020     PTT Heparin:  No components found for: APTTHEP  Magnesium:    Lab Results   Component Value Date    MG 2.3 12/09/2021     TSH:    Lab Results   Component Value Date    TSH 0.685 12/07/2021     TROPONIN:  No components found for: TROP  BNP:  No results found for: BNP  FASTING LIPID PANEL:    Lab Results   Component Value Date    CHOL 196 10/12/2021    HDL 56 10/12/2021    TRIG 86 10/12/2021     MRI BRAIN W WO CONTRAST   Final Result   1. Redemonstration of an intraparenchymal hemorrhage within the medial aspect   of the right frontal lobe with adjacent subarachnoid hemorrhage noted. Right   parafalcine subdural hemorrhage, not significantly changed from the prior CT   scan. 2. Encephalomalacia within the left frontal lobe. 3. No abnormal enhancement or dural-based mass evident. CT HEAD WO CONTRAST   Final Result   Normal appearing [id number]. Stable multicompartmental intracranial hemorrhage compared to the prior exam   from 1:30 p.m. Pinky Angles CTA HEAD W CONTRAST   Final Result   Normal appearing [id number]. Stable multicompartmental intracranial hemorrhage compared to the prior exam   from 1:30 p.m. Pinky Angles CT CERVICAL SPINE WO CONTRAST   Final Result   No acute abnormality of the cervical spine. CT HEAD WO CONTRAST   Final Result   Stable right parafalcine subdural hematoma compared to 08/06 a.m.. 3.6 cm x 1.9 cm right frontal intraparenchymal hemorrhage with a halo of   edema. Hemorrhage is present on the left frontal lobe which may represent   subfalcine extension across the midline.          CT HEAD WO CONTRAST   Final Result   Addendum 1 of 1 ADDENDUM:   Findings discussed on 12/07/2021 at 1:54 p.m. with Dr. Bella Galdamez         Final   A parafalcine subdural hematoma anteriorly on the right as described above. Critical results were called by Dr. Johnny Guzmán MD to Dr. Hodan Encarnacion MD on   12/7/2021 at 08:23. XR CHEST PORTABLE   Final Result   New postoperative changes. Cardiomegaly. Suspected atelectasis and or   infiltrate at the left lower lobe. See above. I have personally reviewed the laboratory, cardiac diagnostic and radiographic testing as outlined above:    IMPRESSION:  1. Intracranial bleed: Patient is on chronic anticoagulation for atrial flutter, anticoagulation is on hold due to intracerebral bleed  2. Atrial flutter/atrial fibrillation: Rapid ventricular response, will adjust medications  3. S/p aortic root replacement with aortic and mitral valve replacement  4. S/p aortic valve replacement #23 mm  5. S/p mitral valve replacement: Biocor #31  6. Hypertension: Controlled  7. Seizures  8. History of CVA    RECOMMENDATIONS:   1. Continue current treatment  2. Continue to hold anticoagulation for now  3. Basic metabolic panel and CBC in a.m.  4. Increase Toprol to 100 mg by mouth twice daily    Discussed with patient    I have reviewed my findings and recommendations with patient    Thank you for the consult  Electronically signed by Danna Merlos MD on 12/10/2021 at 4:39 PM  NOTE: This report was transcribed using voice recognition software.  Every effort was made to ensure accuracy; however, inadvertent computerized transcription errors may be present

## 2021-12-09 NOTE — PLAN OF CARE
Problem: Pain:  Goal: Pain level will decrease  Description: Pain level will decrease  12/8/2021 2338 by Grace Liang RN  Outcome: Met This Shift  12/8/2021 0952 by Marychuy Adrian RN  Outcome: Met This Shift  Goal: Control of acute pain  Description: Control of acute pain  12/8/2021 2338 by Grace Liang RN  Outcome: Met This Shift  12/8/2021 0952 by Marychuy Adrian RN  Outcome: Met This Shift  Goal: Control of chronic pain  Description: Control of chronic pain  12/8/2021 2338 by Grace Liang RN  Outcome: Met This Shift  12/8/2021 0952 by Marychuy Adrian RN  Outcome: Met This Shift     Problem: Skin Integrity:  Goal: Will show no infection signs and symptoms  Description: Will show no infection signs and symptoms  12/8/2021 2338 by Grace Liang RN  Outcome: Met This Shift  12/8/2021 0952 by Marychuy Adrian RN  Outcome: Met This Shift  Goal: Absence of new skin breakdown  Description: Absence of new skin breakdown  12/8/2021 2338 by Grace Liang RN  Outcome: Met This Shift  12/8/2021 0952 by Marychuy Adrian RN  Outcome: Met This Shift  Goal: Risk for impaired skin integrity will decrease  Description: Risk for impaired skin integrity will decrease  12/8/2021 2338 by Grace Liang RN  Outcome: Met This Shift  12/8/2021 0952 by Marychuy Adrian RN  Outcome: Met This Shift     Problem: Falls - Risk of:  Goal: Will remain free from falls  Description: Will remain free from falls  12/8/2021 2338 by Grace Liang RN  Outcome: Met This Shift  12/8/2021 0952 by Marychuy Adrian RN  Outcome: Met This Shift  Goal: Absence of physical injury  Description: Absence of physical injury  12/8/2021 2338 by Grace Liang RN  Outcome: Met This Shift  12/8/2021 0952 by Marychuy Adrian RN  Outcome: Met This Shift     Problem:  Bowel/Gastric:  Goal: Control of bowel function will improve  Description: Control of bowel function will improve  12/8/2021 2338 by Grace Liang RN  Outcome: Met This Shift  12/8/2021 0952 by Del Francis RN  Outcome: Met This Shift  Goal: Ability to achieve a regular elimination pattern will improve  Description: Ability to achieve a regular elimination pattern will improve  12/8/2021 2338 by Rober Lua RN  Outcome: Met This Shift  12/8/2021 0952 by Del Francis RN  Outcome: Met This Shift     Problem: Nutritional:  Goal: Ability to follow a diet with enough fiber (20 to 30 grams) for normal bowel function will improve  Description: Ability to follow a diet with enough fiber (20 to 30 grams) for normal bowel function will improve  12/8/2021 2338 by Rober Lua RN  Outcome: Met This Shift  12/8/2021 0952 by Del Francis RN  Outcome: Ongoing

## 2021-12-09 NOTE — PROGRESS NOTES
Pt was taking his morning medications in applesauce. After the last pill he threw up all over the floor. Pt stated he can not take anymore pills at this time. Spoke with Dr. Horace Wilks and pt was ordered a 1 time dose of KEppra 750 IV.

## 2021-12-10 ENCOUNTER — APPOINTMENT (OUTPATIENT)
Dept: NEUROLOGY | Age: 51
DRG: 055 | End: 2021-12-10
Payer: MEDICAID

## 2021-12-10 LAB
ALBUMIN SERPL-MCNC: 3.9 G/DL (ref 3.5–5.2)
ALP BLD-CCNC: 64 U/L (ref 40–129)
ALT SERPL-CCNC: 32 U/L (ref 0–40)
ANION GAP SERPL CALCULATED.3IONS-SCNC: 13 MMOL/L (ref 7–16)
AST SERPL-CCNC: 45 U/L (ref 0–39)
BASOPHILS ABSOLUTE: 0.02 E9/L (ref 0–0.2)
BASOPHILS RELATIVE PERCENT: 0.2 % (ref 0–2)
BILIRUB SERPL-MCNC: 1 MG/DL (ref 0–1.2)
BUN BLDV-MCNC: 12 MG/DL (ref 6–20)
CALCIUM SERPL-MCNC: 9.2 MG/DL (ref 8.6–10.2)
CHLORIDE BLD-SCNC: 104 MMOL/L (ref 98–107)
CO2: 21 MMOL/L (ref 22–29)
CREAT SERPL-MCNC: 0.8 MG/DL (ref 0.7–1.2)
EOSINOPHILS ABSOLUTE: 0.17 E9/L (ref 0.05–0.5)
EOSINOPHILS RELATIVE PERCENT: 1.9 % (ref 0–6)
GFR AFRICAN AMERICAN: >60
GFR NON-AFRICAN AMERICAN: >60 ML/MIN/1.73
GLUCOSE BLD-MCNC: 115 MG/DL (ref 74–99)
HCT VFR BLD CALC: 46.6 % (ref 37–54)
HEMOGLOBIN: 15.6 G/DL (ref 12.5–16.5)
IMMATURE GRANULOCYTES #: 0.03 E9/L
IMMATURE GRANULOCYTES %: 0.3 % (ref 0–5)
INR BLD: 1.2
LYMPHOCYTES ABSOLUTE: 1.92 E9/L (ref 1.5–4)
LYMPHOCYTES RELATIVE PERCENT: 21.8 % (ref 20–42)
MCH RBC QN AUTO: 28.8 PG (ref 26–35)
MCHC RBC AUTO-ENTMCNC: 33.5 % (ref 32–34.5)
MCV RBC AUTO: 86 FL (ref 80–99.9)
MONOCYTES ABSOLUTE: 0.78 E9/L (ref 0.1–0.95)
MONOCYTES RELATIVE PERCENT: 8.9 % (ref 2–12)
NEUTROPHILS ABSOLUTE: 5.89 E9/L (ref 1.8–7.3)
NEUTROPHILS RELATIVE PERCENT: 66.9 % (ref 43–80)
PDW BLD-RTO: 14.2 FL (ref 11.5–15)
PLATELET # BLD: 236 E9/L (ref 130–450)
PMV BLD AUTO: 10.5 FL (ref 7–12)
POTASSIUM REFLEX MAGNESIUM: 4.5 MMOL/L (ref 3.5–5)
PROTHROMBIN TIME: 12.8 SEC (ref 9.3–12.4)
RBC # BLD: 5.42 E12/L (ref 3.8–5.8)
SODIUM BLD-SCNC: 138 MMOL/L (ref 132–146)
TOTAL PROTEIN: 7.7 G/DL (ref 6.4–8.3)
WBC # BLD: 8.8 E9/L (ref 4.5–11.5)

## 2021-12-10 PROCEDURE — 85610 PROTHROMBIN TIME: CPT

## 2021-12-10 PROCEDURE — 97530 THERAPEUTIC ACTIVITIES: CPT

## 2021-12-10 PROCEDURE — 2060000000 HC ICU INTERMEDIATE R&B

## 2021-12-10 PROCEDURE — 6370000000 HC RX 637 (ALT 250 FOR IP): Performed by: INTERNAL MEDICINE

## 2021-12-10 PROCEDURE — 95816 EEG AWAKE AND DROWSY: CPT | Performed by: PSYCHIATRY & NEUROLOGY

## 2021-12-10 PROCEDURE — 6370000000 HC RX 637 (ALT 250 FOR IP): Performed by: NURSE PRACTITIONER

## 2021-12-10 PROCEDURE — 80053 COMPREHEN METABOLIC PANEL: CPT

## 2021-12-10 PROCEDURE — 97535 SELF CARE MNGMENT TRAINING: CPT

## 2021-12-10 PROCEDURE — 2580000003 HC RX 258: Performed by: NURSE PRACTITIONER

## 2021-12-10 PROCEDURE — 36415 COLL VENOUS BLD VENIPUNCTURE: CPT

## 2021-12-10 PROCEDURE — 6360000002 HC RX W HCPCS: Performed by: INTERNAL MEDICINE

## 2021-12-10 PROCEDURE — 95819 EEG AWAKE AND ASLEEP: CPT

## 2021-12-10 PROCEDURE — 99232 SBSQ HOSP IP/OBS MODERATE 35: CPT | Performed by: INTERNAL MEDICINE

## 2021-12-10 PROCEDURE — 99233 SBSQ HOSP IP/OBS HIGH 50: CPT | Performed by: SURGERY

## 2021-12-10 PROCEDURE — 6360000002 HC RX W HCPCS: Performed by: SURGERY

## 2021-12-10 PROCEDURE — 6370000000 HC RX 637 (ALT 250 FOR IP): Performed by: SURGERY

## 2021-12-10 PROCEDURE — 85025 COMPLETE CBC W/AUTO DIFF WBC: CPT

## 2021-12-10 RX ORDER — DIGOXIN 125 MCG
250 TABLET ORAL DAILY
Status: DISCONTINUED | OUTPATIENT
Start: 2021-12-12 | End: 2021-12-12 | Stop reason: HOSPADM

## 2021-12-10 RX ORDER — METOPROLOL TARTRATE 50 MG/1
100 TABLET, FILM COATED ORAL 2 TIMES DAILY
Status: DISCONTINUED | OUTPATIENT
Start: 2021-12-10 | End: 2021-12-12 | Stop reason: HOSPADM

## 2021-12-10 RX ORDER — DIGOXIN 0.25 MG/ML
250 INJECTION INTRAMUSCULAR; INTRAVENOUS EVERY 4 HOURS
Status: COMPLETED | OUTPATIENT
Start: 2021-12-10 | End: 2021-12-11

## 2021-12-10 RX ADMIN — SENNOSIDES 8.6 MG: 8.6 TABLET, COATED ORAL at 20:23

## 2021-12-10 RX ADMIN — METOPROLOL TARTRATE 37.5 MG: 25 TABLET, FILM COATED ORAL at 09:50

## 2021-12-10 RX ADMIN — METOPROLOL TARTRATE 37.5 MG: 25 TABLET, FILM COATED ORAL at 02:40

## 2021-12-10 RX ADMIN — LEVETIRACETAM 750 MG: 500 TABLET, FILM COATED ORAL at 08:58

## 2021-12-10 RX ADMIN — ACETAMINOPHEN 650 MG: 325 TABLET ORAL at 20:21

## 2021-12-10 RX ADMIN — METOPROLOL TARTRATE 100 MG: 50 TABLET, FILM COATED ORAL at 20:23

## 2021-12-10 RX ADMIN — ENOXAPARIN SODIUM 30 MG: 100 INJECTION SUBCUTANEOUS at 20:21

## 2021-12-10 RX ADMIN — DOCUSATE SODIUM 100 MG: 100 CAPSULE, LIQUID FILLED ORAL at 20:23

## 2021-12-10 RX ADMIN — LEVETIRACETAM 750 MG: 500 TABLET, FILM COATED ORAL at 20:22

## 2021-12-10 RX ADMIN — DIGOXIN 250 MCG: 250 INJECTION, SOLUTION INTRAMUSCULAR; INTRAVENOUS; PARENTERAL at 20:22

## 2021-12-10 RX ADMIN — ENOXAPARIN SODIUM 30 MG: 100 INJECTION SUBCUTANEOUS at 10:48

## 2021-12-10 RX ADMIN — Medication 10 ML: at 20:23

## 2021-12-10 RX ADMIN — Medication 1 TABLET: at 08:58

## 2021-12-10 ASSESSMENT — PAIN SCALES - GENERAL
PAINLEVEL_OUTOF10: 0
PAINLEVEL_OUTOF10: 1
PAINLEVEL_OUTOF10: 3
PAINLEVEL_OUTOF10: 0
PAINLEVEL_OUTOF10: 0
PAINLEVEL_OUTOF10: 5

## 2021-12-10 ASSESSMENT — ENCOUNTER SYMPTOMS
BACK PAIN: 0
NAUSEA: 1
BLOOD IN STOOL: 0
COUGH: 0
ABDOMINAL DISTENTION: 0
ABDOMINAL PAIN: 0
EYES NEGATIVE: 1
SHORTNESS OF BREATH: 0
ANAL BLEEDING: 0
ALLERGIC/IMMUNOLOGIC NEGATIVE: 1
DIARRHEA: 0
RESPIRATORY NEGATIVE: 1
CONSTIPATION: 0
VOMITING: 0

## 2021-12-10 NOTE — CARE COORDINATION
Patient with SDH, found down at home, patient os warfarin at home. Patient with history of mitral valve/aortic chito replacement. Will need final determination from cardiology and neurosurgery re: anticoagulation for discharge planning. Neurosurgery leaning towards Asprin only if necessary. EEG completed today, pending reading. Per trauma, patient will need a repeat head CT prior to discharge. Spoke with patient's wife Kristine Grove re: transition of care plan. She does not currently live with the patient as she has to keep her home in [place] until her daughter graduates high school. She does see the patient everyday and works at the [employer] next door the the [employer] complex the patient live in. Discussed transition of care plan and possible outpatient therapy. Reviewed insurance and setting up transport to and from therapy through the patient's insurance once scripts are received from the patient's PCP. Kristine Grove expressed understanding. She will provide transport when the patient is ready to discharge.       Juan Osman RN.  [facility]  [phone]

## 2021-12-10 NOTE — PROGRESS NOTES
Occupational Therapy  OT BEDSIDE TREATMENT NOTE   [address] [facility] [address]  [address] [place] [address]       FJET:  Patient Name: Joe Tripp  MRN: [id number]  : 1970  Room: [address] [place] [address]A     Per OT Eval:    Evaluating OT: Shani Sousa, HANNAHD, OTR/L; #[id number]     Referring Provider: CELSO Phillips CNP  Specific Provider Orders/Date: OT Evaluation and Treatment, 21     Diagnosis: Syncope and collapse [R55]  Trauma [T14.90XA]  Subdural hematoma ([facility] [address] [S06.5X9A]  Hypertension, unspecified type [I10]   - Presented to hospital after fall  Surgery: none   Pertinent Medical History: chest pain, HTN, s/p AVR and MVR, hx of CVA (2020)           Precautions:  Fall Risk, impaired cognition        Assessment of current deficits   [x]? Functional mobility             [x]?ADLs           [x]? Strength                  [x]? Cognition   [x]? Functional transfers           [x]? IADLs         [x]? Safety Awareness   [x]? Endurance   [x]? Fine Coordination              [x]? Balance      []? Vision/perception   [x]? Sensation     []? Gross Motor Coordination  []? ROM           []?  Delirium                   []? Motor Control      OT PLAN OF CARE   OT POC based on physician orders, patient diagnosis and results of clinical assessment     Frequency/Duration: 2-4 days/wk for 2 weeks PRN   Specific OT Treatment Interventions to include:   * Instruction/training on adapted ADL techniques and AE recommendations to increase functional independence within precautions       * Training on energy conservation strategies, correct breathing pattern and techniques to improve independence/tolerance for self-care routine  * Functional transfer/mobility training/DME recommendations for increased independence, safety, and fall prevention  * Patient/Family education to increase follow through with safety techniques and functional independence  * Recommendation of environmental modifications for increased safety with functional transfers/mobility and ADLs  * Cognitive retraining/development of therapeutic activities to improve problem solving, judgement, memory, and attention for increased safety/participation in ADL/IADL tasks  * Splinting/positioning for increased function, prevention of contractures, and improve skin integrity  * Therapeutic exercise to improve motor endurance, ROM, and functional strength for ADLs/functional transfers  * Therapeutic activities to facilitate/challenge dynamic balance, stand tolerance for increased safety and independence with ADLs  * Therapeutic activities to facilitate gross/fine motor skills for increased independence with ADLs  * Positioning to improve skin integrity, interaction with environment and functional independence  * Delirium prevention/treatment        Recommended Adaptive Equipment: TBD pending discharge plan      Home Living: Patient lives alone in an apartment on the 2nd floor with no steps to enter building and a flight of stairs with railing on 1 side to apartment unit. Laundry is located on 1st floor of building. Bathroom setup: Tub/shower with standard commode   Equipment owned: none     Prior Level of Function: I with ADLs. I  with IADLs. Patient completed functional mobility using no device.   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 [employer]     Pain Level: no pain   Cognition: A&O: 3/4; Follows 1-2 step directions, pleasant & cooperative              Memory:  Fair              Sequencing:  Fair              Problem solving:  Fair-  Judgement/safety:  Fair-     Communication skills: WFL              Vision: WFL                     Glasses:no                                                        Hearing: WFL                Functional Assessment:  AM-PAC Daily Activity Raw Score: 19/24    Initial Eval Status  Date: 12/8/21 Treatment Status  Date:  12/10/21 STGs = LTGs  Time frame: 10-14 days   Feeding Modified [place]   Observed completing in chair position in bed       Grooming Stand by Assist   Washed hands standing at sink Supervision  Standing at sink Modified [place]   while standing at  sink level    UB Dressing Minimal Assist   Set up Modified [place]    LB Dressing Moderate Assist  Min A  Pt able to doff socks easily, then able to mikaela R sock but required assist to hold L LE into cross over position with pt then able to mikaela left sock Modified [place]   using AE as needed for safe reach/ energy conservation     Bathing Moderate Assist Min A  Simulated, may benefit from LH sponge  (pt has walk in shower) Modified [place]    Toileting Moderate Assist  Supervision  simulated Modified [place]   using AE as needed for safe reach/ energy conservation     Bed Mobility  Supine to sit: NT   Sit to supine: Stand by Assist   Mod Indep Supine to sit: Modified [place]   Sit to supine: Modified [place]    Functional Transfers Stand by Assist   Supervision Modified [place]   sit<>stand/functional bathroom transfers using AD/DME as needed for balance and safety   Functional Mobility Stand by Assist   Completed in room/hallway without device to simulate household distance SBA  No AD able to complete household distance   Modified [place]   Functional/bathroom mobility using AD as needed & demonstrating good safety   Balance Sitting:     Static: SBA    Dynamic: SBA  Standing: SBA Sitting: Indep  Standing: S/SBA  Good dynamic sitting balance; Good dynamic standing balance  during ADL tasks & transfers   Activity Tolerance Fair  Good- Good                Education:  Pt was educated through out treatment regarding proper technique & safety with functional transfers & mobility & ADL compensatory strategies to ease tasks, improve safety & prevent falls to return home safely. Comments: Upon arrival pt was in bed & agreeable for therapy.  At end of session pt was seated at EOB all lines and tubes intact, call light within reach. · Pt has made Good progress towards set goals. · Continue with current plan of care    Treatment Time In: 2:25           Treatment Time Out: 2:50           Treatment Charges: Mins Units   Ther Ex  [id number]     Manual Therapy [id number]     Thera Activities [id number] 10 1   ADL/Home Mgt [id number] 15 1   Neuro Re-ed [id number]     Group Therapy      Orthotic manage/training  [id number]     Non-Billable Time     Total Timed Treatment 25 2       Tyesha CONNORS  [address] [place] [address], [address] [place] [address]

## 2021-12-10 NOTE — PROGRESS NOTES
Neurosurg progress note  VITALS:  /86   Pulse 62   Temp 97.2 °F (36.2 °C) (Temporal)   Resp 18   Ht 6' (1.829 m)   Wt 238 lb 4.8 oz (108.1 kg)   SpO2 97%   BMI 32.32 kg/m²   24HR INTAKE/OUTPUT:  No intake or output data in the 24 hours ending 12/10/21 0824  CT HEAD WO CONTRAST    Result Date: 12/7/2021  EXAMINATION: CTA OF THE HEAD WITH CONTRAST; CT OF THE HEAD WITHOUT CONTRAST 12/7/2021 5:13 pm: TECHNIQUE: CTA of the head/brain was performed with the administration of intravenous contrast. Multiplanar reformatted images are provided for review. MIP images are provided for review. Dose modulation, iterative reconstruction, and/or weight based adjustment of the mA/kV was utilized to reduce the radiation dose to as low as reasonably achievable.; CT of the head was performed without the administration of intravenous contrast. Dose modulation, iterative reconstruction, and/or weight based adjustment of the mA/kV was utilized to reduce the radiation dose to as low as reasonably achievable. COMPARISON: Noncontrast head CT from 1:30 p.m. HISTORY: ORDERING SYSTEM PROVIDED HISTORY: trauma TECHNOLOGIST PROVIDED HISTORY: Reason for exam:->trauma Has a \"code stroke\" or \"stroke alert\" been called? ->No What reading provider will be dictating this exam?->CRC FINDINGS: There is redemonstration of a paramedian right frontal lobe intraparenchymal hematoma, with adjacent subarachnoid hemorrhage as well as a parafalcine subdural hematoma partially extending over the right frontal convexity. A small intraparenchymal hematoma is also seen within the contralateral frontal lobe. These findings are unchanged. 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 No intraventricular hemorrhage is identified. The vertebrobasilar system is within normal limits. There is a left-sided posterior communicating artery. The posterior cerebral arteries are normal in course and caliber.   The anterior and middle cerebral arteries demonstrate normal arborization patterns. No aneurysm or AVM is identified. Normal appearing [id number]. Stable multicompartmental intracranial hemorrhage compared to the prior exam from 1:30 p.m. Cloteal Benson CT HEAD WO CONTRAST    Addendum Date: 12/7/2021    ADDENDUM: Findings discussed on 12/07/2021 at 1:54 p.m. with Dr. Marychuy Villela     Result Date: 12/7/2021  EXAMINATION: CT OF THE HEAD WITHOUT CONTRAST  12/7/2021 8:05 am TECHNIQUE: CT of the head was performed without the administration of intravenous contrast. Dose modulation, iterative reconstruction, and/or weight based adjustment of the mA/kV was utilized to reduce the radiation dose to as low as reasonably achievable. COMPARISON: None. HISTORY: ORDERING SYSTEM PROVIDED HISTORY: Transient right-sided weakness, syncope TECHNOLOGIST PROVIDED HISTORY: Reason for exam:->Transient right-sided weakness, syncope Has a \"code stroke\" or \"stroke alert\" been called? ->No Decision Support Exception - unselect if not a suspected or confirmed emergency medical condition->Emergency Medical Condition (MA) What reading provider will be dictating this exam?->CRC FINDINGS: BRAIN/VENTRICLES: Parafalcine lobular high attenuation of the right side is present anteriorly consistent with subdural hematoma, thickness up to 8 mm, cranial caudal dimension about 2.5 cm. Does extend into mid to posterior falx area as well. Adjacent hypodensity suggests encephalomalacia such as old infarct at the paramidline left frontal lobe. Additional subtle white matter hypodensities are nearby in both frontal lobes suggestive of prior insult. There is moderate appearing global brain volume loss, increased for given age. No hydrocephalus. Subtle remote lacunar infarcts in periventricular deep brain locations suggested. There is mild local mass effect related to the subdural collection described above. ORBITS: The visualized portion of the orbits demonstrate no acute abnormality.  SINUSES: The visualized paranasal sinuses and mastoid air cells demonstrate no acute abnormality. SOFT TISSUES/SKULL:  No acute abnormality of the visualized skull or soft tissues. A parafalcine subdural hematoma anteriorly on the right as described above. Critical results were called by Dr. Madhuri Dawkins MD to Dr. Shahrzad Sanchez MD on 12/7/2021 at 08:23. CT HEAD WO CONTRAST    Result Date: 12/7/2021  EXAMINATION: CT OF THE HEAD WITHOUT CONTRAST  12/7/2021 1:35 pm TECHNIQUE: CT of the head was performed without the administration of intravenous contrast. Dose modulation, iterative reconstruction, and/or weight based adjustment of the mA/kV was utilized to reduce the radiation dose to as low as reasonably achievable. COMPARISON: 8:06 a.m. HISTORY: ORDERING SYSTEM PROVIDED HISTORY: SDH from trauma TECHNOLOGIST PROVIDED HISTORY: Reason for exam:->SDH from trauma Has a \"code stroke\" or \"stroke alert\" been called? ->No Decision Support Exception - unselect if not a suspected or confirmed emergency medical condition->Emergency Medical Condition (MA) What reading provider will be dictating this exam?->CRC FINDINGS: BRAIN/VENTRICLES: Stable right parafalcine subdural hematoma compared to prior study. New 3.6 cm x 1.9 cm right frontal intraparenchymal hemorrhage with extension across the midline/sub fall seen herniation. With halo of edema. .  The gray-white differentiation is maintained without evidence of an acute infarct. There is no evidence of hydrocephalus. Hemorrhage into a mass cannot be excluded. Stable changes of encephalomalacia left frontal lobe. ORBITS: The visualized portion of the orbits demonstrate no acute abnormality. SINUSES: The visualized paranasal sinuses and mastoid air cells demonstrate no acute abnormality. SOFT TISSUES/SKULL:  No acute abnormality of the visualized skull or soft tissues. Stable right parafalcine subdural hematoma compared to 08/06 a.m..  3.6 cm x 1.9 cm right frontal intraparenchymal hemorrhage with a halo of edema. Hemorrhage is present on the left frontal lobe which may represent subfalcine extension across the midline. CT CERVICAL SPINE WO CONTRAST    Result Date: 12/7/2021  EXAMINATION: CT OF THE CERVICAL SPINE WITHOUT CONTRAST 12/7/2021 1:35 pm TECHNIQUE: CT of the cervical spine was performed without the administration of intravenous contrast. Multiplanar reformatted images are provided for review. Dose modulation, iterative reconstruction, and/or weight based adjustment of the mA/kV was utilized to reduce the radiation dose to as low as reasonably achievable. COMPARISON: None. HISTORY: ORDERING SYSTEM PROVIDED HISTORY: sudural hematoma, possible fall, concern for fx TECHNOLOGIST PROVIDED HISTORY: Reason for exam:->sudural hematoma, possible fall, concern for fx Decision Support Exception - unselect if not a suspected or confirmed emergency medical condition->Emergency Medical Condition (MA) What reading provider will be dictating this exam?->CRC FINDINGS: BONES/ALIGNMENT: There is no acute fracture or traumatic malalignment. DEGENERATIVE CHANGES: No significant degenerative changes. SOFT TISSUES: There is no prevertebral soft tissue swelling. No acute abnormality of the cervical spine. XR CHEST PORTABLE    Result Date: 12/7/2021  EXAMINATION: ONE XRAY VIEW OF THE CHEST 12/7/2021 6:45 am COMPARISON: 21 August 2020 HISTORY: ORDERING SYSTEM PROVIDED HISTORY: Reported syncope TECHNOLOGIST PROVIDED HISTORY: Reason for exam:->Reported syncope What reading provider will be dictating this exam?->CRC FINDINGS: New postsurgical changes are evident. Heart is enlarged. Pulmonary vascularity is normal.  There is opacity at the lateral left lung base which causes partial obscuration of the left hemidiaphragm suggesting atelectasis and or infiltrate at the left lower lobe. These findings are subtle. Neither costophrenic angle is clearly blunted. New postoperative changes. Cardiomegaly. Suspected atelectasis and or infiltrate at the left lower lobe. See above. CTA HEAD W CONTRAST    Result Date: 12/7/2021  EXAMINATION: CTA OF THE HEAD WITH CONTRAST; CT OF THE HEAD WITHOUT CONTRAST 12/7/2021 5:13 pm: TECHNIQUE: CTA of the head/brain was performed with the administration of intravenous contrast. Multiplanar reformatted images are provided for review. MIP images are provided for review. Dose modulation, iterative reconstruction, and/or weight based adjustment of the mA/kV was utilized to reduce the radiation dose to as low as reasonably achievable.; CT of the head was performed without the administration of intravenous contrast. Dose modulation, iterative reconstruction, and/or weight based adjustment of the mA/kV was utilized to reduce the radiation dose to as low as reasonably achievable. COMPARISON: Noncontrast head CT from 1:30 p.m. HISTORY: ORDERING SYSTEM PROVIDED HISTORY: trauma TECHNOLOGIST PROVIDED HISTORY: Reason for exam:->trauma Has a \"code stroke\" or \"stroke alert\" been called? ->No What reading provider will be dictating this exam?->CRC FINDINGS: There is redemonstration of a paramedian right frontal lobe intraparenchymal hematoma, with adjacent subarachnoid hemorrhage as well as a parafalcine subdural hematoma partially extending over the right frontal convexity. A small intraparenchymal hematoma is also seen within the contralateral frontal lobe. These findings are unchanged. 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 No intraventricular hemorrhage is identified. The vertebrobasilar system is within normal limits. There is a left-sided posterior communicating artery. The posterior cerebral arteries are normal in course and caliber. The anterior and middle cerebral arteries demonstrate normal arborization patterns. No aneurysm or AVM is identified. Normal appearing [id number].  Stable multicompartmental intracranial hemorrhage compared to the prior exam from 1:30 p.m.. MRI BRAIN W WO CONTRAST    Result Date: 12/8/2021  EXAMINATION: MRI OF THE BRAIN WITHOUT AND WITH CONTRAST  12/8/2021 6:32 am TECHNIQUE: Multiplanar multisequence MRI of the head/brain was performed without and with the administration of intravenous contrast. COMPARISON: CT brain/CT angiogram brain 12/07/2021 HISTORY: ORDERING SYSTEM PROVIDED HISTORY: Concern for subdural hematoma versus meningioma TECHNOLOGIST PROVIDED HISTORY: Reason for exam:->Concern for subdural hematoma versus meningioma What reading provider will be dictating this exam?->CRC FINDINGS: INTRACRANIAL STRUCTURES/VENTRICLES:  There is an acute intraparenchymal hemorrhage within the medial aspect of the right frontal lobe, corresponding to the findings on the previous CT scan. There is resulting effacement of the anterior horn of the right lateral ventricle which is displaced inferiorly. There is an adjacent right parafalcine subdural hemorrhage measuring 6 mm in thickness. Small volume of subdural hemorrhage extends over the right frontal and temporal lobes. There is scattered subarachnoid hemorrhage within the right frontal lobe. Encephalomalacia is present within the left frontal lobe. Several scattered remote microhemorrhages are noted. There are multiple foci of increased T2/FLAIR signal intensity within the periventricular, subcortical and deep white matter of both hemispheres. There is no abnormal enhancement. ORBITS: Limited evaluation of the orbits is unremarkable. SINUSES: The paranasal sinuses and mastoid air cells are clear. BONES/SOFT TISSUES: Bone marrow signal intensity is normal.     1. Redemonstration of an intraparenchymal hemorrhage within the medial aspect of the right frontal lobe with adjacent subarachnoid hemorrhage noted. Right parafalcine subdural hemorrhage, not significantly changed from the prior CT scan. 2. Encephalomalacia within the left frontal lobe.  3. No abnormal enhancement or dural-based mass evident.      CBC:   Lab Results   Component Value Date    WBC 8.8 12/10/2021    RBC 5.42 12/10/2021    HGB 15.6 12/10/2021    HCT 46.6 12/10/2021    MCV 86.0 12/10/2021    MCH 28.8 12/10/2021    MCHC 33.5 12/10/2021    RDW 14.2 12/10/2021     12/10/2021    MPV 10.5 12/10/2021     BMP:    Lab Results   Component Value Date     12/10/2021    K 4.5 12/10/2021     12/10/2021    CO2 21 12/10/2021    BUN 12 12/10/2021    LABALBU 3.9 12/10/2021    CREATININE 0.8 12/10/2021    CALCIUM 9.2 12/10/2021    GFRAA >60 12/10/2021    LABGLOM >60 12/10/2021    GLUCOSE 115 12/10/2021    GLUCOSE 105 02/26/2011      senna  1 tablet Oral Nightly    docusate sodium  100 mg Oral BID    metoprolol tartrate  37.5 mg Oral Q8H    therapeutic multivitamin-minerals  1 tablet Oral Daily    sodium chloride flush  5-40 mL IntraVENous 2 times per day    levETIRAcetam  750 mg Oral BID     Remains awake and alert, follows commands perrl eomi speech fluent  Assessment:  Patient Active Problem List   Diagnosis    S/P MVR (mitral valve replacement)    S/P AVR (aortic valve replacement)    Hypertension    Hyperlipidemia    Infective endocarditis    Atrial flutter (HCC)    Trauma    TBI (traumatic brain injury) ([facility] [address]     Plan:Full anticoagulation on hold until follow up head ct documents resolution of Continue current care  Rebecca Lima MD M.D.

## 2021-12-10 NOTE — PROCEDURES
[address] Report    MRN: [id number]  Patient's name: Wyatt Christopher  : 1970  Age: 46 y.o. Gender: male    Date of report: 12/10/2021   Date of service: 12/10/2021   Interpreting physician: Lizabeth Loyola MD  Referring physician: Emmy Omer, DO    Procedure  Routine EEG with video    Indication for Study  Altered mental status; possible seizure    Duration of Study  0:29:03    Technical Summary  Digital video and scalp EEG monitoring was performed using the standard protocol for this laboratory. Scalp electrodes were applied in the international 10/20 system. Multiple digital montage arrangements were utilized for evaluation. EKG and video were recorded. EEG Description  The awake background included a 9 Hz posterior dominant rhythm that attenuated with eye opening and activity. During drowsiness, identified by ocular signs and alpha attenuation, there was intermittent, diffuse, asynchronous theta activity admixed with 2-4 Hz polymorphic frontotemporal delta activity, more prominent over the right right frontal region. Hyperventilation was not performed. Photic strobe stimulation elicited no abnormalities. There were no epileptiform abnormalities. Clinical Interpretation: This is an abnormal EEG due to right frontal slowing. Focal slowing indicates focal cerebral dysfunction, potentially consistent with patient's history of ICH.

## 2021-12-10 NOTE — PROGRESS NOTES
[facility]  PROGRESS NOTE  ATTENDING NOTE        TRAUMA  MECHANISM:  Found down, possible seizure    Chief Complaint   Patient presents with    Extremity Weakness     per ems pt family called when pt on the floor with a \" seizure\" pt woke up from seizure confused and a right sided arm drift and facial droop with confusion. pt alert and oriented upon arrival      HPI:  The patient is a 45 y/o male who who states he was found down but he told the ER that he foamed at the mouth and then fell to the ground. The patient has no complaints of pain. He does take Coumadin for mechanical heart valves. He was given vitamin K Kcentra here. The patient was transported by EMS to the [address] [facility] from home. Evaluation prior to arrival included: CT head. Treatment prior to arrival included: Vitamin K. A trauma consult was requested to assist, guide,  and expedite further evaluation and treatment for the patient. Patient Active Problem List   Diagnosis    S/P MVR (mitral valve replacement)    S/P AVR (aortic valve replacement)    Hypertension    Hyperlipidemia    Infective endocarditis    Atrial flutter (HCC)    Trauma    TBI (traumatic brain injury) ([facility] [address]       SUBJECTIVE/OVERNIGHT EVENTS:  Incontinent of stools overnight patient states that he has had this for a while    Review of Systems   Constitutional: Negative. Negative for activity change, appetite change and unexpected weight change. HENT: Negative. Eyes: Negative. Respiratory: Negative. Negative for cough and shortness of breath. Cardiovascular: Negative. Negative for chest pain and leg swelling. Gastrointestinal: Positive for nausea. Negative for abdominal distention, abdominal pain, anal bleeding, blood in stool, constipation, diarrhea and vomiting. Endocrine: Negative. Genitourinary: Negative. Musculoskeletal: Negative.   Negative for arthralgias, back pain, gait problem, joint swelling and myalgias. Skin: Negative. Allergic/Immunologic: Negative. Neurological: Negative. Negative for dizziness, weakness and headaches. Hematological: Negative. Psychiatric/Behavioral: Negative. Negative for confusion, decreased concentration and sleep disturbance. BP (!) 157/95   Pulse 89   Temp 97.1 °F (36.2 °C) (Temporal)   Resp 20   Ht 6' (1.829 m)   Wt 238 lb 4.8 oz (108.1 kg)   SpO2 97%   BMI 32.32 kg/m²   Physical Exam  Constitutional:       Appearance: Normal appearance. He is obese. HENT:      Head: Normocephalic and atraumatic. Nose: Nose normal.      Mouth/Throat:      Mouth: Mucous membranes are moist.      Pharynx: Oropharynx is clear. Eyes:      Extraocular Movements: Extraocular movements intact. Pupils: Pupils are equal, round, and reactive to light. Comments: Rash around eye has improved   Cardiovascular:      Rate and Rhythm: Normal rate and regular rhythm. Pulses: Normal pulses. Heart sounds: Normal heart sounds. Pulmonary:      Effort: Pulmonary effort is normal.      Breath sounds: Normal breath sounds. Abdominal:      General: There is no distension. Palpations: Abdomen is soft. Tenderness: There is no abdominal tenderness. Musculoskeletal:         General: No tenderness or signs of injury. Cervical back: Normal range of motion and neck supple. Skin:     General: Skin is warm and dry. Neurological:      General: No focal deficit present. Mental Status: He is alert and oriented to person, place, and time. Psychiatric:         Mood and Affect: Mood normal.         Behavior: Behavior normal.         Thought Content: Thought content normal.         Judgment: Judgment normal.         ASSESSMENT/PLAN:  1. Traumatic brain injury--NSGY following, Keppra  2. Possible seizure--Keppra, still a concern since patient is incontinent of stools. Will get EEG today  3.   Lactic acidosis--resolved  4. Chronic anticoagulation--cardiology to evaluate for ASA since cannot be on coumadin  5. Mechanical heart valves--cardiology evaluation  6. Hypertension--metoprolol  7.   Metabolic acidosis--improved    INCIDENTAL FINDINGS:  none    [facility]:  18/24    DVT/GI ppx:  Bilateral SCDs, lovenox/diet    Monserrat Zuniga MD, MSc, FACS  12/10/2021  10:30 AM

## 2021-12-10 NOTE — PLAN OF CARE
Problem: Pain:  Goal: Pain level will decrease  Description: Pain level will decrease  Outcome: Met This Shift  Goal: Control of acute pain  Description: Control of acute pain  Outcome: Met This Shift  Goal: Control of chronic pain  Description: Control of chronic pain  Outcome: Met This Shift     Problem: Skin Integrity:  Goal: Will show no infection signs and symptoms  Description: Will show no infection signs and symptoms  Outcome: Met This Shift  Goal: Absence of new skin breakdown  Description: Absence of new skin breakdown  Outcome: Met This Shift  Goal: Risk for impaired skin integrity will decrease  Description: Risk for impaired skin integrity will decrease  Outcome: Met This Shift     Problem: Falls - Risk of:  Goal: Will remain free from falls  Description: Will remain free from falls  Outcome: Met This Shift  Goal: Absence of physical injury  Description: Absence of physical injury  Outcome: Met This Shift     Problem:  Bowel/Gastric:  Goal: Control of bowel function will improve  Description: Control of bowel function will improve  Outcome: Met This Shift  Goal: Ability to achieve a regular elimination pattern will improve  Description: Ability to achieve a regular elimination pattern will improve  Outcome: Met This Shift     Problem: Nutritional:  Goal: Ability to follow a diet with enough fiber (20 to 30 grams) for normal bowel function will improve  Description: Ability to follow a diet with enough fiber (20 to 30 grams) for normal bowel function will improve  Outcome: Met This Shift     Problem: HEMODYNAMIC STATUS  Goal: Patient has stable vital signs and fluid balance  Outcome: Met This Shift     Problem: ACTIVITY INTOLERANCE/IMPAIRED MOBILITY  Goal: Mobility/activity is maintained at optimum level for patient  Outcome: Met This Shift     Problem: COMMUNICATION IMPAIRMENT  Goal: Ability to express needs and understand communication  Outcome: Met This Shift

## 2021-12-10 NOTE — PROGRESS NOTES
Physical Therapy    Physical Therapy    Name: Toshia Hanson  : 1970  MRN: [id number]      Date of Service: 12/10/2021    Evaluating PT:  Kelby Mohs, PT, DPT  [id number]     Room #:  7624/1210-O  Diagnosis:  Syncope and collapse [R55]  Trauma [T14.90XA]  Subdural hematoma ([address] [S06.5X9A]  Hypertension, unspecified type [I10]  PMHx/PSHx:  HTN, chest pain, psoriasis, hx AVR and MVR, hx of CVA    Precautions:  Falls, Safety   Equipment Needs:  NA    SUBJECTIVE:    Pt is questionable historian. Reports he lives alone in a 2nd floor apartment home with full flight to 2nd floor. No steps to enter building. Pt ambulated with no AD PTA. OBJECTIVE:   Initial Evaluation  Date: 21 Treatment  12/10/21 Short Term/ Long Term   Goals   AM-PAC 6 Clicks  82/45    Was pt agreeable to Eval/treatment? Yes  yes    Does pt have pain? Reports 5/10 pain in BLE none    Bed Mobility  Rolling: SBA  Supine to sit: SBA  Sit to supine: SBA  Scooting: SBA Rolling: Supervision  Supine to sit: Supervision  Sit to supine: NT  Scooting: Supervision Rolling: Independent   Supine to sit: Independent   Sit to supine: Independent   Scooting: Independent    Transfers Sit to stand: SBA  Stand to sit: SBA  Stand pivot: SBA Sit to stand: Supervision  Stand to sit: Supervision  Stand pivot: Supervision Sit to stand: Independent   Stand to sit:  Independent   Stand pivot: Independent    Ambulation    150 feet with no AD  feet with no AD Supervision >300 feet with no AD independent    Stair negotiation: ascended and descended  NT 8 steps 1 rail on L Supervision >8 steps with 1 rail Modified Independent     ROM BUE:  Per OT eval  BLE:  WFL     Strength BUE:  Per OT eval   BLE:  5/5     Balance Sitting EOB:  SBA  Dynamic Standing:  SBA Sitting Ind  Standing Sup Sitting EOB:  Independent   Dynamic Standing:  Independent      Pt is A & O x 3  Sensation:  Pt denies numbness and tingling to extremities  Edema: Unremarkable      Therapeutic Exercises:  NT    Patient education  Pt educated on safety during stair training    Patient response to education:   Pt verbalized understanding Pt demonstrated skill Pt requires further education in this area   Yes  Yes  Yes      ASSESSMENT:    Conditions Requiring Skilled Therapeutic Intervention:    []Decreased strength     []Decreased ROM  [x]Decreased functional mobility  [x]Decreased balance   []Decreased endurance   []Decreased posture  []Decreased sensation  [x]Decreased coordination   []Decreased vision  [x]Decreased safety awareness   []Increased pain       Comments:  RN cleared for treatment. Pt received supine in bed and agreed to PT session. Pt completed bed mobility under supervision. Pt requested to use restroom and completed short ambulation under supervision to bathroom. When finished, pt ambulated 300 feet with supervision and no AD. Pt completed 8 steps with 1 rail on the L Sup. Pt required cues to slow down with reciprocal pattern and overall safety during stair navigation. Pt returned to room and transferred to bedside chair. Pt left comfortable in chair with call light in reach and all needs met    Treatment:  Patient practiced and was instructed in the following treatment:     Bed Mobility -All bed mobility completed under supervision   Transfers - All transfers completed under supervision.  Ambulation - Ambulated 300 feet no AD under supervision   Stair Naviagtion - 8 steps 1 rail on L Sup. Cues for safety and slowing down     Pt's/ family goals   1. home    Prognosis is good for reaching above PT goals. Patient and or family understand(s) diagnosis, prognosis, and plan of care.   Yes     Specific instructions for next treatment:  Progress gait, safety and fall prevention     Current Treatment Recommendations:     [] Strengthening to improve independence with functional mobility   [] ROM to improve independence with functional mobility   [x] Balance Training to improve static/dynamic balance and to reduce fall risk  [x] Endurance Training to improve activity tolerance during functional mobility   [x] Transfer Training to improve safety and independence with all functional transfers   [x] Gait Training to improve gait mechanics, endurance and asses need for appropriate assistive device  [x] Stair Training in preparation for safe discharge home and/or into the community   [x] Positioning to prevent skin breakdown and contractures  [x] Safety and Education Training   [x] Patient/Caregiver Education   [] HEP  [] Other     PT long term treatment goals are located in above grid    Frequency of treatments: 2-5x/week x 1-2 weeks. Time in:  0925  Time out: 0940    Total Treatment Time  15 minutes     Evaluation Time includes thorough review of current medical information, gathering information on past medical history/social history and prior level of function, completion of standardized testing/informal observation of tasks, assessment of data and education on plan of care and goals.     CPT codes:  [] Low Complexity PT evaluation 76294  [] Moderate Complexity PT evaluation 40302  [] High Complexity PT evaluation 91375  [] PT Re-evaluation 80281  [] Gait training 69403 -- minutes  [] Manual therapy [id number] -- minutes  [x] Therapeutic activities 45350 15 minutes  [] Therapeutic exercises 29958 -- minutes  [] Neuromuscular reeducation 92954 -- minutes     TARI Hutton [id number]

## 2021-12-11 VITALS
BODY MASS INDEX: 32.28 KG/M2 | HEIGHT: 72 IN | RESPIRATION RATE: 18 BRPM | TEMPERATURE: 98.2 F | WEIGHT: 238.3 LBS | SYSTOLIC BLOOD PRESSURE: 140 MMHG | DIASTOLIC BLOOD PRESSURE: 99 MMHG | OXYGEN SATURATION: 97 % | HEART RATE: 64 BPM

## 2021-12-11 PROCEDURE — 99233 SBSQ HOSP IP/OBS HIGH 50: CPT | Performed by: INTERNAL MEDICINE

## 2021-12-11 PROCEDURE — 6360000002 HC RX W HCPCS: Performed by: SURGERY

## 2021-12-11 PROCEDURE — 6370000000 HC RX 637 (ALT 250 FOR IP): Performed by: NURSE PRACTITIONER

## 2021-12-11 PROCEDURE — 6360000002 HC RX W HCPCS: Performed by: INTERNAL MEDICINE

## 2021-12-11 PROCEDURE — 99232 SBSQ HOSP IP/OBS MODERATE 35: CPT | Performed by: SURGERY

## 2021-12-11 PROCEDURE — 2580000003 HC RX 258: Performed by: NURSE PRACTITIONER

## 2021-12-11 PROCEDURE — 6370000000 HC RX 637 (ALT 250 FOR IP): Performed by: INTERNAL MEDICINE

## 2021-12-11 PROCEDURE — 6370000000 HC RX 637 (ALT 250 FOR IP): Performed by: SURGERY

## 2021-12-11 RX ORDER — LEVETIRACETAM 750 MG/1
750 TABLET ORAL 2 TIMES DAILY
Qty: 5 TABLET | Refills: 0 | Status: SHIPPED | OUTPATIENT
Start: 2021-12-11 | End: 2022-01-10 | Stop reason: SDUPTHER

## 2021-12-11 RX ADMIN — ENOXAPARIN SODIUM 30 MG: 100 INJECTION SUBCUTANEOUS at 09:26

## 2021-12-11 RX ADMIN — DIGOXIN 250 MCG: 250 INJECTION, SOLUTION INTRAMUSCULAR; INTRAVENOUS; PARENTERAL at 04:45

## 2021-12-11 RX ADMIN — LEVETIRACETAM 750 MG: 500 TABLET, FILM COATED ORAL at 09:25

## 2021-12-11 RX ADMIN — DIGOXIN 250 MCG: 250 INJECTION, SOLUTION INTRAMUSCULAR; INTRAVENOUS; PARENTERAL at 00:16

## 2021-12-11 RX ADMIN — METOPROLOL TARTRATE 100 MG: 50 TABLET, FILM COATED ORAL at 09:25

## 2021-12-11 RX ADMIN — Medication 1 TABLET: at 09:25

## 2021-12-11 RX ADMIN — DOCUSATE SODIUM 100 MG: 100 CAPSULE, LIQUID FILLED ORAL at 09:25

## 2021-12-11 RX ADMIN — Medication 10 ML: at 09:26

## 2021-12-11 RX ADMIN — DIGOXIN 250 MCG: 250 INJECTION, SOLUTION INTRAMUSCULAR; INTRAVENOUS; PARENTERAL at 09:25

## 2021-12-11 ASSESSMENT — ENCOUNTER SYMPTOMS
ABDOMINAL DISTENTION: 0
ALLERGIC/IMMUNOLOGIC NEGATIVE: 1
BLOOD IN STOOL: 0
BACK PAIN: 0
RESPIRATORY NEGATIVE: 1
ANAL BLEEDING: 0
DIARRHEA: 0
NAUSEA: 1
COUGH: 0
ABDOMINAL PAIN: 0
SHORTNESS OF BREATH: 0
EYES NEGATIVE: 1
VOMITING: 0
CONSTIPATION: 0

## 2021-12-11 ASSESSMENT — PAIN SCALES - GENERAL: PAINLEVEL_OUTOF10: 0

## 2021-12-11 NOTE — PROGRESS NOTES
Patient up to nurses station regarding discharge. This nurse sent message to trauma resident asking if patient could be discharged.  Will continue to follow

## 2021-12-11 NOTE — PROGRESS NOTES
[facility]  PROGRESS NOTE  ATTENDING NOTE        TRAUMA  MECHANISM:  Found down, possible seizure    Chief Complaint   Patient presents with    Extremity Weakness     per ems pt family called when pt on the floor with a \" seizure\" pt woke up from seizure confused and a right sided arm drift and facial droop with confusion. pt alert and oriented upon arrival      HPI:  The patient is a 47 y/o male who who states he was found down but he told the ER that he foamed at the mouth and then fell to the ground. The patient has no complaints of pain. He does take Coumadin for mechanical heart valves. He was given vitamin K Kcentra here. The patient was transported by EMS to the [address] [facility] [place] from home. Evaluation prior to arrival included: CT head. Treatment prior to arrival included: Vitamin K. A trauma consult was requested to assist, guide,  and expedite further evaluation and treatment for the patient. Patient Active Problem List   Diagnosis    H/O mitral valve replacement    H/O aortic valve replacement    Hypertension    Hyperlipidemia    Infective endocarditis    Atrial flutter ([address]    Trauma    TBI (traumatic brain injury) ([address]    Seizure-like activity ([address]    Subdural hematoma (HCC)    History of CVA (cerebrovascular accident)       SUBJECTIVE/OVERNIGHT EVENTS:  No new issues, started on digoxin yesterday; EEG negative for seizure    Review of Systems   Constitutional: Negative. Negative for activity change, appetite change and unexpected weight change. HENT: Negative. Eyes: Negative. Respiratory: Negative. Negative for cough and shortness of breath. Cardiovascular: Negative. Negative for chest pain and leg swelling. Gastrointestinal: Positive for nausea. Negative for abdominal distention, abdominal pain, anal bleeding, blood in stool, constipation, diarrhea and vomiting. Endocrine: Negative. Genitourinary: Negative. Musculoskeletal: Negative. Negative for arthralgias, back pain, gait problem, joint swelling and myalgias. Skin: Negative. Allergic/Immunologic: Negative. Neurological: Negative. Negative for dizziness, weakness and headaches. Hematological: Negative. Psychiatric/Behavioral: Negative. Negative for confusion, decreased concentration and sleep disturbance. /83   Pulse 110   Temp 98.2 °F (36.8 °C) (Temporal)   Resp 18   Ht 6' (1.829 m)   Wt 238 lb 4.8 oz (108.1 kg)   SpO2 97%   BMI 32.32 kg/m²   Physical Exam  Constitutional:       Appearance: Normal appearance. He is obese. HENT:      Head: Normocephalic and atraumatic. Nose: Nose normal.      Mouth/Throat:      Mouth: Mucous membranes are moist.      Pharynx: Oropharynx is clear. Eyes:      Extraocular Movements: Extraocular movements intact. Pupils: Pupils are equal, round, and reactive to light. Comments: Rash around eye has improved   Cardiovascular:      Rate and Rhythm: Normal rate and regular rhythm. Pulses: Normal pulses. Heart sounds: Normal heart sounds. Pulmonary:      Effort: Pulmonary effort is normal.      Breath sounds: Normal breath sounds. Abdominal:      General: There is no distension. Palpations: Abdomen is soft. Tenderness: There is no abdominal tenderness. Musculoskeletal:         General: No tenderness or signs of injury. Cervical back: Normal range of motion and neck supple. Skin:     General: Skin is warm and dry. Neurological:      General: No focal deficit present. Mental Status: He is alert and oriented to person, place, and time. Psychiatric:         Mood and Affect: Mood normal.         Behavior: Behavior normal.         Thought Content: Thought content normal.         Judgment: Judgment normal.         ASSESSMENT/PLAN:  1. Traumatic brain injury--NSGY following, Keppra  2.   Possible seizure--Keppra, still a concern since patient is incontinent of stools. Will get EEG today  3. Lactic acidosis--resolved  4. Chronic anticoagulation--cardiology to evaluate for ASA since cannot be on coumadin  5. Mechanical heart valves--cardiology evaluation  6. Hypertension--metoprolol  7.   Metabolic acidosis--improved    INCIDENTAL FINDINGS:  none    [facility]:  18/24    DVT/GI ppx:  Bilateral SCDs, lovenox/diet    Ok to discharge home after seen by cardiology today    Isa Sandy MD, MSc, FACS  12/11/2021  11:49 AM

## 2021-12-11 NOTE — PLAN OF CARE
Problem: Pain:  Goal: Pain level will decrease  Description: Pain level will decrease  Outcome: Met This Shift     Problem: Pain:  Goal: Control of acute pain  Description: Control of acute pain  Outcome: Met This Shift     Problem: Pain:  Goal: Control of chronic pain  Description: Control of chronic pain  Outcome: Met This Shift     Problem: Skin Integrity:  Goal: Will show no infection signs and symptoms  Description: Will show no infection signs and symptoms  Outcome: Met This Shift     Problem: Skin Integrity:  Goal: Risk for impaired skin integrity will decrease  Description: Risk for impaired skin integrity will decrease  Outcome: Met This Shift     Problem: Falls - Risk of:  Goal: Will remain free from falls  Description: Will remain free from falls  Outcome: Met This Shift     Problem: Falls - Risk of:  Goal: Absence of physical injury  Description: Absence of physical injury  Outcome: Met This Shift

## 2021-12-11 NOTE — PROGRESS NOTES
Neurosurg progress note  VITALS:  /83   Pulse 110   Temp 98.2 °F (36.8 °C) (Temporal)   Resp 18   Ht 6' (1.829 m)   Wt 238 lb 4.8 oz (108.1 kg)   SpO2 97%   BMI 32.32 kg/m²   24HR INTAKE/OUTPUT:  No intake or output data in the 24 hours ending 12/11/21 1245  CT HEAD WO CONTRAST    Result Date: 12/7/2021  EXAMINATION: CTA OF THE HEAD WITH CONTRAST; CT OF THE HEAD WITHOUT CONTRAST 12/7/2021 5:13 pm: TECHNIQUE: CTA of the head/brain was performed with the administration of intravenous contrast. Multiplanar reformatted images are provided for review. MIP images are provided for review. Dose modulation, iterative reconstruction, and/or weight based adjustment of the mA/kV was utilized to reduce the radiation dose to as low as reasonably achievable.; CT of the head was performed without the administration of intravenous contrast. Dose modulation, iterative reconstruction, and/or weight based adjustment of the mA/kV was utilized to reduce the radiation dose to as low as reasonably achievable. COMPARISON: Noncontrast head CT from 1:30 p.m. HISTORY: ORDERING SYSTEM PROVIDED HISTORY: trauma TECHNOLOGIST PROVIDED HISTORY: Reason for exam:->trauma Has a \"code stroke\" or \"stroke alert\" been called? ->No What reading provider will be dictating this exam?->CRC FINDINGS: There is redemonstration of a paramedian right frontal lobe intraparenchymal hematoma, with adjacent subarachnoid hemorrhage as well as a parafalcine subdural hematoma partially extending over the right frontal convexity. A small intraparenchymal hematoma is also seen within the contralateral frontal lobe. These findings are unchanged. 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 No intraventricular hemorrhage is identified. The vertebrobasilar system is within normal limits. There is a left-sided posterior communicating artery. The posterior cerebral arteries are normal in course and caliber.   The anterior and middle cerebral arteries demonstrate normal arborization patterns. No aneurysm or AVM is identified. Normal appearing [id number]. Stable multicompartmental intracranial hemorrhage compared to the prior exam from 1:30 p.m. Colleen Hooker CT HEAD WO CONTRAST    Addendum Date: 12/7/2021    ADDENDUM: Findings discussed on 12/07/2021 at 1:54 p.m. with Dr. Ele Bonilla     Result Date: 12/7/2021  EXAMINATION: CT OF THE HEAD WITHOUT CONTRAST  12/7/2021 8:05 am TECHNIQUE: CT of the head was performed without the administration of intravenous contrast. Dose modulation, iterative reconstruction, and/or weight based adjustment of the mA/kV was utilized to reduce the radiation dose to as low as reasonably achievable. COMPARISON: None. HISTORY: ORDERING SYSTEM PROVIDED HISTORY: Transient right-sided weakness, syncope TECHNOLOGIST PROVIDED HISTORY: Reason for exam:->Transient right-sided weakness, syncope Has a \"code stroke\" or \"stroke alert\" been called? ->No Decision Support Exception - unselect if not a suspected or confirmed emergency medical condition->Emergency Medical Condition (MA) What reading provider will be dictating this exam?->CRC FINDINGS: BRAIN/VENTRICLES: Parafalcine lobular high attenuation of the right side is present anteriorly consistent with subdural hematoma, thickness up to 8 mm, cranial caudal dimension about 2.5 cm. Does extend into mid to posterior falx area as well. Adjacent hypodensity suggests encephalomalacia such as old infarct at the paramidline left frontal lobe. Additional subtle white matter hypodensities are nearby in both frontal lobes suggestive of prior insult. There is moderate appearing global brain volume loss, increased for given age. No hydrocephalus. Subtle remote lacunar infarcts in periventricular deep brain locations suggested. There is mild local mass effect related to the subdural collection described above. ORBITS: The visualized portion of the orbits demonstrate no acute abnormality.  SINUSES: The visualized paranasal sinuses and mastoid air cells demonstrate no acute abnormality. SOFT TISSUES/SKULL:  No acute abnormality of the visualized skull or soft tissues. A parafalcine subdural hematoma anteriorly on the right as described above. Critical results were called by Dr. Radha Werner MD to Dr. Antonietta Kowalski MD on 12/7/2021 at 08:23. CT HEAD WO CONTRAST    Result Date: 12/7/2021  EXAMINATION: CT OF THE HEAD WITHOUT CONTRAST  12/7/2021 1:35 pm TECHNIQUE: CT of the head was performed without the administration of intravenous contrast. Dose modulation, iterative reconstruction, and/or weight based adjustment of the mA/kV was utilized to reduce the radiation dose to as low as reasonably achievable. COMPARISON: 8:06 a.m. HISTORY: ORDERING SYSTEM PROVIDED HISTORY: SDH from trauma TECHNOLOGIST PROVIDED HISTORY: Reason for exam:->SDH from trauma Has a \"code stroke\" or \"stroke alert\" been called? ->No Decision Support Exception - unselect if not a suspected or confirmed emergency medical condition->Emergency Medical Condition (MA) What reading provider will be dictating this exam?->CRC FINDINGS: BRAIN/VENTRICLES: Stable right parafalcine subdural hematoma compared to prior study. New 3.6 cm x 1.9 cm right frontal intraparenchymal hemorrhage with extension across the midline/sub fall seen herniation. With halo of edema. .  The gray-white differentiation is maintained without evidence of an acute infarct. There is no evidence of hydrocephalus. Hemorrhage into a mass cannot be excluded. Stable changes of encephalomalacia left frontal lobe. ORBITS: The visualized portion of the orbits demonstrate no acute abnormality. SINUSES: The visualized paranasal sinuses and mastoid air cells demonstrate no acute abnormality. SOFT TISSUES/SKULL:  No acute abnormality of the visualized skull or soft tissues. Stable right parafalcine subdural hematoma compared to 08/06 a.m..  3.6 cm x 1.9 cm right frontal intraparenchymal Cardiomegaly. Suspected atelectasis and or infiltrate at the left lower lobe. See above. CTA HEAD W CONTRAST    Result Date: 12/7/2021  EXAMINATION: CTA OF THE HEAD WITH CONTRAST; CT OF THE HEAD WITHOUT CONTRAST 12/7/2021 5:13 pm: TECHNIQUE: CTA of the head/brain was performed with the administration of intravenous contrast. Multiplanar reformatted images are provided for review. MIP images are provided for review. Dose modulation, iterative reconstruction, and/or weight based adjustment of the mA/kV was utilized to reduce the radiation dose to as low as reasonably achievable.; CT of the head was performed without the administration of intravenous contrast. Dose modulation, iterative reconstruction, and/or weight based adjustment of the mA/kV was utilized to reduce the radiation dose to as low as reasonably achievable. COMPARISON: Noncontrast head CT from 1:30 p.m. HISTORY: ORDERING SYSTEM PROVIDED HISTORY: trauma TECHNOLOGIST PROVIDED HISTORY: Reason for exam:->trauma Has a \"code stroke\" or \"stroke alert\" been called? ->No What reading provider will be dictating this exam?->CRC FINDINGS: There is redemonstration of a paramedian right frontal lobe intraparenchymal hematoma, with adjacent subarachnoid hemorrhage as well as a parafalcine subdural hematoma partially extending over the right frontal convexity. A small intraparenchymal hematoma is also seen within the contralateral frontal lobe. These findings are unchanged. 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 No intraventricular hemorrhage is identified. The vertebrobasilar system is within normal limits. There is a left-sided posterior communicating artery. The posterior cerebral arteries are normal in course and caliber. The anterior and middle cerebral arteries demonstrate normal arborization patterns. No aneurysm or AVM is identified. Normal appearing [id number].  Stable multicompartmental intracranial hemorrhage compared to the prior exam from 1:30 p.m.. MRI BRAIN W WO CONTRAST    Result Date: 12/8/2021  EXAMINATION: MRI OF THE BRAIN WITHOUT AND WITH CONTRAST  12/8/2021 6:32 am TECHNIQUE: Multiplanar multisequence MRI of the head/brain was performed without and with the administration of intravenous contrast. COMPARISON: CT brain/CT angiogram brain 12/07/2021 HISTORY: ORDERING SYSTEM PROVIDED HISTORY: Concern for subdural hematoma versus meningioma TECHNOLOGIST PROVIDED HISTORY: Reason for exam:->Concern for subdural hematoma versus meningioma What reading provider will be dictating this exam?->CRC FINDINGS: INTRACRANIAL STRUCTURES/VENTRICLES:  There is an acute intraparenchymal hemorrhage within the medial aspect of the right frontal lobe, corresponding to the findings on the previous CT scan. There is resulting effacement of the anterior horn of the right lateral ventricle which is displaced inferiorly. There is an adjacent right parafalcine subdural hemorrhage measuring 6 mm in thickness. Small volume of subdural hemorrhage extends over the right frontal and temporal lobes. There is scattered subarachnoid hemorrhage within the right frontal lobe. Encephalomalacia is present within the left frontal lobe. Several scattered remote microhemorrhages are noted. There are multiple foci of increased T2/FLAIR signal intensity within the periventricular, subcortical and deep white matter of both hemispheres. There is no abnormal enhancement. ORBITS: Limited evaluation of the orbits is unremarkable. SINUSES: The paranasal sinuses and mastoid air cells are clear. BONES/SOFT TISSUES: Bone marrow signal intensity is normal.     1. Redemonstration of an intraparenchymal hemorrhage within the medial aspect of the right frontal lobe with adjacent subarachnoid hemorrhage noted. Right parafalcine subdural hemorrhage, not significantly changed from the prior CT scan. 2. Encephalomalacia within the left frontal lobe.  3. No abnormal enhancement or dural-based mass evident.      CBC:   Lab Results   Component Value Date    WBC 8.8 12/10/2021    RBC 5.42 12/10/2021    HGB 15.6 12/10/2021    HCT 46.6 12/10/2021    MCV 86.0 12/10/2021    MCH 28.8 12/10/2021    MCHC 33.5 12/10/2021    RDW 14.2 12/10/2021     12/10/2021    MPV 10.5 12/10/2021     BMP:    Lab Results   Component Value Date     12/10/2021    K 4.5 12/10/2021     12/10/2021    CO2 21 12/10/2021    BUN 12 12/10/2021    LABALBU 3.9 12/10/2021    CREATININE 0.8 12/10/2021    CALCIUM 9.2 12/10/2021    GFRAA >60 12/10/2021    LABGLOM >60 12/10/2021    GLUCOSE 115 12/10/2021    GLUCOSE 105 02/26/2011      enoxaparin  30 mg SubCUTAneous BID    metoprolol tartrate  100 mg Oral BID    [START ON 12/12/2021] digoxin  250 mcg Oral Daily    senna  1 tablet Oral Nightly    docusate sodium  100 mg Oral BID    therapeutic multivitamin-minerals  1 tablet Oral Daily    sodium chloride flush  5-40 mL IntraVENous 2 times per day    levETIRAcetam  750 mg Oral BID     Awake and alert, follows commands perrl eomi motor full  Assessment:  Patient Active Problem List   Diagnosis    H/O mitral valve replacement    H/O aortic valve replacement    Hypertension    Hyperlipidemia    Infective endocarditis    Atrial flutter (HCC)    Trauma    TBI (traumatic brain injury) ([address]    Seizure-like activity ([address]    Subdural hematoma ([address]    History of CVA (cerebrovascular accident)     Plan:Continue current care  Randa Harry MD M.D.

## 2021-12-11 NOTE — PROGRESS NOTES
Patient is seen in follow-up for history of mitral valve and aortic valve replacement, hypertension. Subjective:     Mr. Corrinne Climes better today, denies any chest pain or shortness of breath,  wants to go home  Sitting up in bed no apparent distress    ROS:  CONSTITUTIONAL:  negative for  fevers, chills  HEENT:  negative for earaches, nasal congestion and epistaxis  RESPIRATORY:  negative for  dry cough, cough with sputum,wheezing and hemoptysis  GASTROINTESTINAL:  negative for nausea, vomiting  MUSCULOSKELETAL:  negative for  myalgias, arthralgias  NEUROLOGICAL:  negative for visual disturbance, dysphagia    Medication side effects: none    Scheduled Meds:   enoxaparin  30 mg SubCUTAneous BID    metoprolol tartrate  100 mg Oral BID    senna  1 tablet Oral Nightly    docusate sodium  100 mg Oral BID    therapeutic multivitamin-minerals  1 tablet Oral Daily    sodium chloride flush  5-40 mL IntraVENous 2 times per day    levETIRAcetam  750 mg Oral BID     Continuous Infusions:   sodium chloride       PRN Meds:bisacodyl, hydrALAZINE, labetalol, perflutren lipid microspheres, sodium chloride flush, sodium chloride, ondansetron **OR** ondansetron, polyethylene glycol, acetaminophen **OR** acetaminophen    No intake/output data recorded. No intake/output data recorded.       Objective:      Physical Exam:   BP (!) 156/92   Pulse 121   Temp 97.1 °F (36.2 °C) (Temporal)   Resp 18   Ht 6' (1.829 m)   Wt 238 lb 4.8 oz (108.1 kg)   SpO2 97%   BMI 32.32 kg/m²   CONSTITUTIONAL:  awake, alert, cooperative, no apparent distress, and appears stated age  HEAD:  normocepalic, without obvious abnormality, atraumatic  NECK:  Supple, symmetrical, trachea midline, no adenopathy, thyroid symmetric, not enlarged and no tenderness, skin normal  LUNGS:  No increased work of breathing, No accessory muscle use or intercostal retractions, good air exchange, clear to auscultation bilaterally, no crackles or wheezing  CARDIOVASCULAR:  Normal apical impulse, regular rate and rhythm, normal S1 and S2, no S3 or S4, and no murmur noted, no edema, no JVD, no carotid bruit. ABDOMEN:  Soft, nontender, no masses, no hepatomegaly, no splenomegaly, BS+  MUSCULOSKELETAL:  No clubbing no cyanosis. there is no redness, warmth, or swelling of the joints  full range of motion noted  NEUROLOGIC:  Alert, awake,oriented x3  SKIN:  no bruising or bleeding, normal skin color, texture, turgor and no redness, warmth, or swelling      Cardiographics  I personally reviewed the telemetry monitor strips with the following interpretation: Atrial fibrillation with RVR    Echocardiogram:  11/25/2020CONCLUSIONS:   - Exam indication: Pre Cardioversion, Pre AF Ablation   - The left ventricle is normal in size. Left ventricular systolic function is   normal. EF = 55 ± 5% (visual est.) Assessment of left ventricular function is   limited from transesophageal imaging, due to acoustic shadowing from the mitral   prosthesis. Assessment of ventricular function is performed from transgastric   imaging.   - The right ventricle is normal in size. Right ventricular systolic function is   normal.   - The left atrial cavity is dilated. Smoke is evident in the left atrial appendage    with significantly reduced maximal emptying velocity (12 cm/sec). No discrete   left atrial or left atrial appendage thrombus. - The right atrial cavity is dilated. - Biocor prosthetic mitral valve (size #31). There is trace mitral valve   regurgitation. The peak gradient is 11 mmHg and the mean gradient is 4 mmHg. Transmitral gradients obtained at 103 bpm. Prior transmitral gradients: 12/7 mmHg. - Assessment of tricuspid valve leaflets is somewhat limited by acoustic shadowing    from the mitral prosthesis. - Homograft prosthetic aortic valve (size #23). There is no aortic valve   regurgitation.  The peak gradient is 7 mmHg, the mean gradient is 3 mmHg and the dimensionless valve index is 0.67. Thickening noted around the homograft aortic   root (Images 53-55). This may represent postoperative changes. - There is no patent foramen ovale as detected by Doppler and agitated saline   contrast.   - Exam was compared with the [facility] echocardiographic exam performed on   10/21/2020. No discrete left atrial or left atrial appendage thrombus on [facility]   imaging. Imaging  MRI BRAIN W WO CONTRAST   Final Result   1. Redemonstration of an intraparenchymal hemorrhage within the medial aspect   of the right frontal lobe with adjacent subarachnoid hemorrhage noted. Right   parafalcine subdural hemorrhage, not significantly changed from the prior CT   scan. 2. Encephalomalacia within the left frontal lobe. 3. No abnormal enhancement or dural-based mass evident. CT HEAD WO CONTRAST   Final Result   Normal appearing [id number]. Stable multicompartmental intracranial hemorrhage compared to the prior exam   from 1:30 p.m. Stacie Crape CTA HEAD W CONTRAST   Final Result   Normal appearing [id number]. Stable multicompartmental intracranial hemorrhage compared to the prior exam   from 1:30 p.m. Stacie Crape CT CERVICAL SPINE WO CONTRAST   Final Result   No acute abnormality of the cervical spine. CT HEAD WO CONTRAST   Final Result   Stable right parafalcine subdural hematoma compared to 08/06 a.m.. 3.6 cm x 1.9 cm right frontal intraparenchymal hemorrhage with a halo of   edema. Hemorrhage is present on the left frontal lobe which may represent   subfalcine extension across the midline. CT HEAD WO CONTRAST   Final Result   Addendum 1 of 1   ADDENDUM:   Findings discussed on 12/07/2021 at 1:54 p.m. with Dr. Violet Larsen         Final   A parafalcine subdural hematoma anteriorly on the right as described above. Critical results were called by Dr. Jas Rubalcava MD to Dr. Haily Martinez MD on   12/7/2021 at 08:23.             XR CHEST PORTABLE Final Result   New postoperative changes. Cardiomegaly. Suspected atelectasis and or   infiltrate at the left lower lobe. See above. Lab Review   Lab Results   Component Value Date     12/10/2021    K 4.5 12/10/2021     12/10/2021    CO2 21 12/10/2021    BUN 12 12/10/2021    CREATININE 0.8 12/10/2021    GLUCOSE 115 12/10/2021    GLUCOSE 105 02/26/2011    CALCIUM 9.2 12/10/2021     Lab Results   Component Value Date    WBC 8.8 12/10/2021    HGB 15.6 12/10/2021    HCT 46.6 12/10/2021    MCV 86.0 12/10/2021     12/10/2021     I have personally reviewed the laboratory, cardiac diagnostic and radiographic testing as outlined above:    Assessment:     1. Intracranial bleed: Patient is on chronic anticoagulation for atrial flutter, anticoagulation is on hold due to intracerebral bleed  2. Atrial flutter/atrial fibrillation: Rapid ventricular response, will adjust medications  3. S/p aortic root replacement with aortic and mitral valve replacement  4. S/p aortic valve replacement #23   5. S/p mitral valve replacement: Biocor #31  6. Hypertension: Controlled  7. Seizures  8. History of CVA    Recommendations:     1. IV digoxin loading dose  2. Will start oral digoxin tomorrow  3. Continue the rest of medications  4. Continue to hold anticoagulation  5. No further cardiac work-up is planned at this point of time. Discussed with patient  Electronically signed by Sukumar Sierra MD on 12/10/2021 at 7:03 PM  NOTE: This report was transcribed using voice recognition software.  Every effort was made to ensure accuracy; however, inadvertent computerized transcription errors may be present

## 2021-12-11 NOTE — PROGRESS NOTES
Inpatient Cardiology Progress note     PATIENT IS BEING FOLLOWED FOR: History MVR & AVR valve surgery. AF. ICH/SAH/SDH. Orlando Isabel is a 46 y. o.  male previously known to Dr Scott Gramajo seen this admission by Dr Richar Whitman. SUBJECTIVE: Denies CP, SOB, palpitations, or HA  OBJECTIVE: Laying flat in bed no apparent distress     ROS:  Consist: Denies fevers, chills or night sweats  Heart: Denies chest pain, palpitations, lightheadedness, dizziness or syncope  Lungs: Denies SOB, cough, wheezing, orthopnea or PND  GI: Denies abdominal pain, vomiting or diarrhea    PHYSICAL EXAM:   /83   Pulse 110   Temp 98.2 °F (36.8 °C) (Temporal)   Resp 18   Ht 6' (1.829 m)   Wt 238 lb 4.8 oz (108.1 kg)   SpO2 97%   BMI 32.32 kg/m²    B/P Range last 24 hours: Systolic (86RDQ), ABL:320 , Min:129 , HZL:944    Diastolic (62ONX), MWO:33, Min:83, Max:121    CONST: Well developed,  male who appears stated age. Awake, alert and cooperative. No apparent distress  HEENT:   Head- Normocephalic, atraumatic   Eyes- Conjunctivae pink, anicteric  Throat- Oral mucosa pink and moist  Neck-  No stridor, trachea midline, no jugular venous distention. No carotid bruit  CHEST: Chest symmetrical and non-tender to palpation. No accessory muscle use or intercostal retractions  RESPIRATORY:  Lung sounds - clear throughout fields   CARDIOVASCULAR:     Heart Inspection- shows no noted pulsations  Heart Palpation- no heaves or thrills; PMI is non-displaced   Heart Ausculation- IRRR, no murmur heard. PV: No lower extremity edema. No varicosities. Pedal pulses palpable, no clubbing or cyanosis   ABDOMEN: Soft, non-tender to light palpation. Bowel sounds present. No palpable masses no organomegaly; no abdominal bruit  MS: Good muscle strength and tone. No atrophy or abnormal movements. : Deferred  SKIN: Warm and dry no statis dermatitis or ulcers   NEURO / PSYCH: Oriented to person, place and time.  Speech clear and appropriate. Follows all commands. Pleasant affect     Weight:   Wt Readings from Last 3 Encounters:   12/09/21 238 lb 4.8 oz (108.1 kg)   10/12/21 241 lb (109.3 kg)   11/16/20 222 lb (100.7 kg)     Current Inpatient Medications:   enoxaparin  30 mg SubCUTAneous BID    metoprolol tartrate  100 mg Oral BID    [START ON 12/12/2021] digoxin  250 mcg Oral Daily    senna  1 tablet Oral Nightly    docusate sodium  100 mg Oral BID    therapeutic multivitamin-minerals  1 tablet Oral Daily    sodium chloride flush  5-40 mL IntraVENous 2 times per day    levETIRAcetam  750 mg Oral BID       IV Infusions (if any):   sodium chloride         DIAGNOSTIC/ LABORATORY DATA:  Labs:   CBC:   Recent Labs     12/09/21  0631 12/10/21  0443   WBC 9.6 8.8   HGB 15.7 15.6   HCT 48.6 46.6    236     BMP:   Recent Labs     12/09/21  1648 12/10/21  0443    138   K 4.0 4.5   CO2 21* 21*   BUN 13 12   CREATININE 0.9 0.8   LABGLOM >60 >60   CALCIUM 9.2 9.2     Mag:   Recent Labs     12/09/21  0631   MG 2.3     Phos:   Recent Labs     12/09/21  0631   PHOS 3.0     TFT:   Lab Results   Component Value Date    TSH 0.685 12/07/2021      HgA1c:   Lab Results   Component Value Date    LABA1C 5.9 (H) 08/23/2020     PT/INR:   Recent Labs     12/09/21  0631 12/10/21  0443   PROTIME 12.4 12.8*   INR 1.1 1.2     FASTING LIPID PANEL:  Lab Results   Component Value Date    CHOL 196 10/12/2021    HDL 56 10/12/2021    LDLCALC 123 10/12/2021    TRIG 86 10/12/2021     LIVER PROFILE:  Recent Labs     12/09/21  0631 12/10/21  0443   AST 24 45*   ALT 25 32   LABALBU 4.1 3.9     CXR 12/7/2021: New postoperative changes.  Cardiomegaly.  Suspected atelectasis and or infiltrate at the left lower lobe    MRI Brain 12/8/2021: Redemonstration of an intraparenchymal hemorrhage within the medial aspect of the right frontal lobe with adjacent subarachnoid hemorrhage noted.  Right parafalcine subdural hemorrhage, not significantly changed from the prior CT  scan. Encephalomalacia within the left frontal lobe. No abnormal enhancement or dural-based mass evident    Telemetry: AF/AFL with rate 60-70's       ASSESSMENT:   1. ICH/SAH/SDH: [address] on hold   2. Hx mechanical AVR/MVR in 2006 s/p re-do AVR/MVR (bioprosthetic) (for valvular endocarditis) and aortic root replacement in 9/2020 ([facility]). 3. AFL/AF, initially with RVR, now rate controlled. Known hx of Post-op AF with hx of DCCV  4. Hx CVA 8/2020  5. HTN  6. Questionable seizure this admission placed on Keppra  7. BMI 32      PLAN:  1. Continue Digoxin and BB therapy  2. [address] resumption ONLY when with neurosurgery. 3. No indication for ASA therapy  4.  May be discharged from cardiology standpoint when okay with others    Electronically signed by Raissa Corea MD on 12/11/2021 at 11:53 AM

## 2021-12-13 ENCOUNTER — TELEPHONE (OUTPATIENT)
Dept: PRIMARY CARE CLINIC | Age: 51
End: 2021-12-13

## 2021-12-13 LAB — INR BLD: 2.8

## 2021-12-13 NOTE — TELEPHONE ENCOUNTER
Isa [facility] Transitions Initial Follow Up Call    Outreach made within 2 business days of discharge: Yes    Patient: Darron Pino Patient : 1970   MRN: [id number]  Reason for Admission: Subdural hematoma   Discharge Date: 21       Spoke with: Alyssa Fang     Discharge department/facility: [facility]     TCM Interactive Patient Contact:  Was patient able to fill all prescriptions: Yes  Was patient instructed to bring all medications to the follow-up visit: Yes  Is patient taking all medications as directed in the discharge summary?  Yes  Does patient understand their discharge instructions: Yes  Does patient have questions or concerns that need addressed prior to 7-14 day follow up office visit: no    Scheduled appointment with PCP within 7-14 days    Follow Up  Future Appointments   Date Time Provider Bradley Packer   2021  4:00 PM Ctra[address] Alethea Mckee, DO [facility]       Grace Mckinney

## 2021-12-14 ENCOUNTER — ANTI-COAG VISIT (OUTPATIENT)
Dept: PRIMARY CARE CLINIC | Age: 51
End: 2021-12-14

## 2021-12-14 DIAGNOSIS — Z95.2 H/O AORTIC VALVE REPLACEMENT: Primary | ICD-10-CM

## 2021-12-14 DIAGNOSIS — Z95.2 H/O MITRAL VALVE REPLACEMENT: ICD-10-CM

## 2021-12-16 ENCOUNTER — OFFICE VISIT (OUTPATIENT)
Dept: PRIMARY CARE CLINIC | Age: 51
End: 2021-12-16
Payer: MEDICAID

## 2021-12-16 VITALS
HEART RATE: 85 BPM | BODY MASS INDEX: 32.23 KG/M2 | HEIGHT: 72 IN | OXYGEN SATURATION: 96 % | RESPIRATION RATE: 16 BRPM | DIASTOLIC BLOOD PRESSURE: 76 MMHG | WEIGHT: 238 LBS | SYSTOLIC BLOOD PRESSURE: 134 MMHG

## 2021-12-16 DIAGNOSIS — Z95.2 H/O MITRAL VALVE REPLACEMENT: ICD-10-CM

## 2021-12-16 DIAGNOSIS — R56.9 SEIZURE-LIKE ACTIVITY (HCC): ICD-10-CM

## 2021-12-16 DIAGNOSIS — Z95.2 H/O AORTIC VALVE REPLACEMENT: ICD-10-CM

## 2021-12-16 DIAGNOSIS — E78.5 HYPERLIPIDEMIA, UNSPECIFIED HYPERLIPIDEMIA TYPE: ICD-10-CM

## 2021-12-16 DIAGNOSIS — S06.9X1D TRAUMATIC BRAIN INJURY, WITH LOSS OF CONSCIOUSNESS OF 30 MINUTES OR LESS, SUBSEQUENT ENCOUNTER: ICD-10-CM

## 2021-12-16 DIAGNOSIS — S06.5XAA SUBDURAL HEMATOMA: Primary | ICD-10-CM

## 2021-12-16 DIAGNOSIS — K59.09 OTHER CONSTIPATION: ICD-10-CM

## 2021-12-16 PROBLEM — T14.90XA TRAUMA: Status: RESOLVED | Noted: 2021-12-07 | Resolved: 2021-12-16

## 2021-12-16 PROCEDURE — 1111F DSCHRG MED/CURRENT MED MERGE: CPT | Performed by: FAMILY MEDICINE

## 2021-12-16 PROCEDURE — 99215 OFFICE O/P EST HI 40 MIN: CPT | Performed by: FAMILY MEDICINE

## 2021-12-16 RX ORDER — POTASSIUM CHLORIDE 20 MEQ/1
20 TABLET, EXTENDED RELEASE ORAL 2 TIMES DAILY
Qty: 180 TABLET | Refills: 1 | Status: SHIPPED
Start: 2021-12-16 | End: 2022-02-15 | Stop reason: SDUPTHER

## 2021-12-16 RX ORDER — LISINOPRIL 20 MG/1
TABLET ORAL
Qty: 90 TABLET | Refills: 1 | Status: SHIPPED
Start: 2021-12-16 | End: 2021-12-29 | Stop reason: SDUPTHER

## 2021-12-16 RX ORDER — MULTIVITAMIN,THERAPEUTIC
1 TABLET ORAL DAILY
Qty: 30 TABLET | Refills: 3 | Status: SHIPPED
Start: 2021-12-16 | End: 2021-12-29 | Stop reason: SDUPTHER

## 2021-12-16 RX ORDER — DOCUSATE SODIUM 100 MG/1
100 CAPSULE, LIQUID FILLED ORAL DAILY PRN
Qty: 30 CAPSULE | Refills: 0 | Status: SHIPPED
Start: 2021-12-16 | End: 2022-03-17

## 2021-12-16 RX ORDER — SPIRONOLACTONE 25 MG/1
25 TABLET ORAL DAILY
Qty: 90 TABLET | Refills: 1 | Status: SHIPPED
Start: 2021-12-16 | End: 2022-02-15 | Stop reason: SDUPTHER

## 2021-12-16 NOTE — PROGRESS NOTES
Post-Discharge Transitional Care Management Services or Hospital Follow Up      Nyla Duran   YOB: 1970    Date of Office Visit:  12/16/2021  Date of Hospital Admission: 12/7/21  Date of Hospital Discharge: 12/11/21  Risk of hospital readmission (high >=14%. Medium >=10%) :Readmission Risk Score: 6.7 ( )      Care management risk score Rising risk (score 2-5) and Complex Care (Scores >=6): 0     Non face to face  following discharge, date last encounter closed (first attempt may have been earlier): 12/13/2021  5:06 PM    Call initiated 2 business days of discharge: Yes    Patient Active Problem List   Diagnosis    H/O mitral valve replacement    H/O aortic valve replacement    Hypertension    Hyperlipidemia    Infective endocarditis    Atrial flutter ([address]    TBI (traumatic brain injury) ([address]    Seizure-like activity ([address]    Subdural hematoma ([address]    History of CVA (cerebrovascular accident)    Other constipation       Allergies   Allergen Reactions    Pcn [Penicillins] Other (See Comments)     Patient states that he breaks out        Medications listed as ordered at the time of discharge from hospital     Medication List          Accurate as of December 16, 2021  4:24 PM. If you have any questions, ask your nurse or doctor.             START taking these medications    docusate sodium 100 MG capsule  Commonly known as: COLACE  Take 1 capsule by mouth daily as needed for Constipation  Started by: Jefry Jackson DO     thera/beta-carotene Tabs  Take 1 tablet by mouth daily  Started by: Jefry Jackson DO        CONTINUE taking these medications    levETIRAcetam 750 MG tablet  Commonly known as: KEPPRA  Take 1 tablet by mouth 2 times daily for 5 doses     lisinopril 20 MG tablet  Commonly known as: PRINIVIL;ZESTRIL  TAKE ONE TABLET BY MOUTH DAILY     metoprolol tartrate 25 MG tablet  Commonly known as: LOPRESSOR  Take 1.5 tablets by mouth every 8 hours     potassium chloride 20 MEQ extended release tablet  Commonly known as: KLOR-CON M  Take 1 tablet by mouth 2 times daily     spironolactone 25 MG tablet  Commonly known as: ALDACTONE  Take 1 tablet by mouth daily     therapeutic multivitamin-minerals tablet           Where to Get Your Medications      These medications were sent to [address] [place] [address] [address], [address]  [address]    Phone: [phone]   · docusate sodium 100 MG capsule  · lisinopril 20 MG tablet  · metoprolol tartrate 25 MG tablet  · potassium chloride 20 MEQ extended release tablet  · spironolactone 25 MG tablet  · thera/beta-carotene Tabs           Medications marked \"taking\" at this time  Outpatient Medications Marked as Taking for the 12/16/21 encounter (Office Visit) with Lauren Moeller, DO   Medication Sig Dispense Refill    potassium chloride (KLOR-CON M) 20 MEQ extended release tablet Take 1 tablet by mouth 2 times daily 180 tablet 1    lisinopril (PRINIVIL;ZESTRIL) 20 MG tablet TAKE ONE TABLET BY MOUTH DAILY 90 tablet 1    metoprolol tartrate (LOPRESSOR) 25 MG tablet Take 1.5 tablets by mouth every 8 hours 405 tablet 1    spironolactone (ALDACTONE) 25 MG tablet Take 1 tablet by mouth daily 90 tablet 1    docusate sodium (COLACE) 100 MG capsule Take 1 capsule by mouth daily as needed for Constipation 30 capsule 0    Multiple Vitamin (THERA/BETA-CAROTENE) TABS Take 1 tablet by mouth daily 30 tablet 3    Multiple Vitamins-Minerals (THERAPEUTIC MULTIVITAMIN-MINERALS) tablet Take 1 tablet by mouth daily LD 2-22-19          Medications patient taking as of now reconciled against medications ordered at time of hospital discharge: Yes    Chief Complaint   Patient presents with   Niranjan Clancy Care Management     transitional care management       History of Present illness - Follow up of Hospital diagnosis(es): Discharge Diagnoses:  Active Problems:    H/O mitral valve replacement    H/O aortic valve replacement    Trauma    TBI (traumatic [facility] injury) ([facility]    Seizure-like activity ([facility]    Subdural hematoma (H[facility])    History of CVA (cerebrovascular accident)    Inpatient course: Discharge summary reviewed- see chart. Interval history/Current status: stable    A comprehensive review of systems was negative except for what was noted in the HPI. Vitals:    12/16/21 1605   BP: 134/76   Pulse: 85   Resp: 16   SpO2: 96%   Weight: 238 lb (108 kg)   Height: 6' (1.829 m)     Body mass index is 32.28 kg/m².    Wt Readings from Last 3 Encounters:   12/16/21 238 lb (108 kg)   12/09/21 238 lb 4.8 oz (108.1 kg)   10/12/21 241 lb (109.3 kg)     BP Readings from Last 3 Encounters:   12/16/21 134/76   12/11/21 (!) 140/99   10/12/21 136/78        Physical Exam:  General Appearance: alert and oriented to person, place and time, well developed and well- nourished, in no acute distress  Skin: warm and dry, no rash or erythema  Head: normocephalic and atraumatic  Eyes: pupils equal, round, and reactive to light, extraocular eye movements intact, conjunctivae normal  ENT: tympanic membrane, external ear and ear canal normal bilaterally, nose without deformity, nasal mucosa and turbinates normal without polyps  Neck: supple and non-tender without mass, no thyromegaly or thyroid nodules, no cervical lymphadenopathy  Pulmonary/Chest: clear to auscultation bilaterally- no wheezes, rales or rhonchi, normal air movement, no respiratory distress  Cardiovascular: normal rate, regular rhythm, normal S1 and S2, no murmurs, rubs, clicks, or gallops, distal pulses intact, no carotid bruits  Abdomen: soft, non-tender, non-distended, normal bowel sounds, no masses or organomegaly  Extremities: no cyanosis, clubbing or edema  Musculoskeletal: normal range of motion, no joint swelling, deformity or tenderness  Neurologic: reflexes normal and symmetric, no cranial nerve deficit, gait, coordination and speech normal    Assessment/Plan:  1. Subdural hematoma ([facility]  Follow up with Neuro  - [place] DISCHARGE MEDS RECONCILED W/ CURRENT OUTPATIENT MED LIST    2. Traumatic brain injury, with loss of consciousness of 30 minutes or less, subsequent encounter  Follow up with Neuro  - [place] DISCHARGE MEDS RECONCILED W/ CURRENT OUTPATIENT MED LIST    3. Seizure-like activity ([facility]  On Keppra, follow up with Neuro  - [place] DISCHARGE MEDS RECONCILED W/ CURRENT OUTPATIENT MED LIST    4. Hyperlipidemia, unspecified hyperlipidemia type  stable  - [place] DISCHARGE MEDS RECONCILED W/ CURRENT OUTPATIENT MED LIST    5. H/O mitral valve replacement  Follow up Cardiology  - [place] DISCHARGE MEDS RECONCILED W/ CURRENT OUTPATIENT MED LIST    6. H/O aortic valve replacement  Follow up Cardiology  - [place] DISCHARGE MEDS RECONCILED W/ CURRENT OUTPATIENT MED LIST    7.  Other constipation  stable  - [place] DISCHARGE MEDS RECONCILED W/ CURRENT OUTPATIENT MED LIST        Medical Decision Making: high complexity

## 2021-12-19 NOTE — CONSULTS
Neurosurgery Consult Note      CHIEF COMPLAINT : seizure    HPI:  Gianni Dalton is a 46 y.o. male patient being seen for complaints of The patient is a 47 y/o male who who states he was found down but he told the ER that he foamed at the mouth and then fell to the ground. The patient has no complaints of pain. He does take Coumadin for mechanical heart valves. He was given vitamin K Kcentra here. The patient was transported by EMS to the [facility] Level [facility] from home. Evaluation prior to arrival included: CT head. Treatment prior to arrival included: Vitamin K. A trauma consult was requested to assist, guide,  and expedite further evaluation     Past Medical History:   Diagnosis Date    Chest pain 02/21/2013    Hypertension     LONG TERM ANTICOAGULENT USE     Memory loss     Psoriasis      Past Surgical History:   Procedure Laterality Date    AORTIC VALVE REPLACEMENT  01/01/2006    Dr Marc Diaz. 70 VALVE REPLACEMENT  09/03/2020    Commando procedure(Aortic root replacement and AVR with Homograft #23, reconstrcution of the Inter-valvular fibrosa with bovine pericardial patch) at [address] [place] [address]  01/01/2006    Dr Marc Calabrese  [address] [place] [address]  09/03/2020    Biocor#31 at [facility] [address] [id number] [penicillins]  Prior to Admission medications    Medication Sig Start Date End Date Taking?  Authorizing Provider   levETIRAcetam (KEPPRA) 750 MG tablet Take 1 tablet by mouth 2 times daily for 5 doses 12/11/21 12/14/21 Yes Veronica Ceron MD   potassium chloride (KLOR-CON M) 20 MEQ extended release tablet Take 1 tablet by mouth 2 times daily 12/16/21   Emeterio Hutchinsi, DO   lisinopril (PRINIVIL;ZESTRIL) 20 MG tablet TAKE ONE TABLET BY MOUTH DAILY 12/16/21   Fredna Goods, DO   metoprolol tartrate (LOPRESSOR) 25 MG tablet Take 1.5 tablets by mouth every 8 hours 12/16/21 6/14/22  Emeterio Hutchinsi, DO   spironolactone (ALDACTONE) 25 MG tablet Take 1 tablet by mouth daily 12/16/21   Jefry Fines, DO   docusate sodium (COLACE) 100 MG capsule Take 1 capsule by mouth daily as needed for Constipation 12/16/21   Jefry Fines, DO   Multiple Vitamin (THERA/BETA-CAROTENE) TABS Take 1 tablet by mouth daily 12/16/21   Jefry Fines, DO   Multiple Vitamins-Minerals (THERAPEUTIC MULTIVITAMIN-MINERALS) tablet Take 1 tablet by mouth daily  2-22-19    Historical Provider, MD     Outpatient Medications Marked as Taking for the 12/7/21 encounter [facility] Encounter)   Medication Sig Dispense Refill    levETIRAcetam (KEPPRA) 750 MG tablet Take 1 tablet by mouth 2 times daily for 5 doses 5 tablet 0     Social History     Socioeconomic History    Marital status:      Spouse name: Not on file    Number of children: Not on file    Years of education: Not on file    Highest education level: Not on file   Occupational History     Comment: [employer]   Tobacco Use    Smoking status: Never Smoker    Smokeless tobacco: Never Used   Substance and Sexual Activity    Alcohol use: Yes     Comment: 1-2 beers per month    Drug use: No    Sexual activity: Not on file   Other Topics Concern    Not on file   Social History Narrative    Not on file     Social Determinants of Health     Financial Resource Strain:     Difficulty of Paying Living Expenses: Not on file   Food Insecurity:     Worried About Running Out of Food in the Last Year: Not on file    Nica of Food in the Last Year: Not on file   Transportation Needs:     Lack of Transportation (Medical): Not on file    Lack of Transportation (Non-Medical):  Not on file   Physical Activity:     Days of Exercise per Week: Not on file    Minutes of Exercise per Session: Not on file   Stress:     Feeling of Stress : Not on file   Social Connections:     Frequency of Communication with Friends and Family: Not on file    Frequency of Social Gatherings with Friends and Family: Not on file    Attends [employer] Services: Not on file   [address] [facility] [employer] or Organizations: Not on file    Attends Club or Organization Meetings: Not on file    Marital Status: Not on file   Intimate Partner Violence:     Fear of Current or Ex-Partner: Not on file    Emotionally Abused: Not on file    Physically Abused: Not on file    Sexually Abused: Not on file   Housing Stability:     Unable to Pay for Housing in the Last Year: Not on file    Number of [place] in the Last Year: Not on file    Unstable Housing in the Last Year: Not on file     History reviewed. No pertinent family history. ROS: Complete 10 point ROS was obtained with positives in HPI, otherwise:  Pt denies fevers, denies chills. Pt denies chest pain, denies SOB, denies nausea, denies vomiting, denies headache. EXAMINATION:  BP (!) 140/99   Pulse 64   Temp 98.2 °F (36.8 °C) (Temporal)   Resp 18   Ht 6' (1.829 m)   Wt 238 lb 4.8 oz (108.1 kg)   SpO2 97%   BMI 32.32 kg/m²     Cranial Nerves: Pupils equal round react to light extraocular is full facial expressions are symmetric tongue midline gag present  Motor: 5 out of 5 throughout no motor drift  Sensory: Intact to position sense light touch  Cerebellar: Finger-to-nose testing normal  Gait: Gait not checked  DTRs: +1 equal bilaterally    IMAGING STUDIES:  A parafalcine subdural hematoma anteriorly on the right as described above. IMPRESSION: A parafalcine subdural hematoma anteriorly on the right as described above.seizure    RECOMMENDATIONS:Full anticoagulation contraindicated till complete imaging resolution of the acute intracranial hemorrhage     Agree with Keppra blood pressure control and reversing of his anticoagulation we will follow with you    Thank you again for this consultation.       Puma Jennings MD  12/19/2021

## 2021-12-23 ENCOUNTER — ANTI-COAG VISIT (OUTPATIENT)
Dept: PRIMARY CARE CLINIC | Age: 51
End: 2021-12-23

## 2021-12-23 DIAGNOSIS — Z95.2 H/O MITRAL VALVE REPLACEMENT: ICD-10-CM

## 2021-12-23 DIAGNOSIS — Z95.2 H/O AORTIC VALVE REPLACEMENT: Primary | ICD-10-CM

## 2021-12-23 LAB — INR BLD: 1.6

## 2021-12-29 RX ORDER — LISINOPRIL 20 MG/1
TABLET ORAL
Qty: 90 TABLET | Refills: 1 | Status: SHIPPED
Start: 2021-12-29 | End: 2022-02-15 | Stop reason: SDUPTHER

## 2021-12-29 RX ORDER — MULTIVITAMIN,THERAPEUTIC
1 TABLET ORAL DAILY
Qty: 30 TABLET | Refills: 3 | Status: SHIPPED
Start: 2021-12-29 | End: 2022-02-15 | Stop reason: SDUPTHER

## 2022-01-02 NOTE — PROGRESS NOTES
[facility] Cardiology Progress Note  Dr. Marimar Taylor      Referring Physician: Benito Gurrola DO  CHIEF COMPLAINT:   Chief Complaint   Patient presents with    Atrial Flutter     hf/u- pt has complaints of dizziness and swelling in feet and legs       HISTORY OF PRESENT ILLNESS:   46year old male with history of atrial fibrillation, aortic and mitral valve replacement, hypertension and CVA is here for a follow up visit. Occasional pedal edema, patient denies any chest pain, no shortness of breath, no lightheadedness, no dizziness, no palpitations, no PND, no orthopnea, no syncope, no presyncopal episodes.     Past Medical History:   Diagnosis Date    Chest pain 02/21/2013    Hypertension     LONG TERM ANTICOAGULENT USE     Memory loss     Psoriasis          Past Surgical History:   Procedure Laterality Date    AORTIC VALVE REPLACEMENT  01/01/2006    Dr Ej Leal   [facility]    AORTIC VALVE REPLACEMENT  09/03/2020    Commando procedure(Aortic root replacement and AVR with Homograft #23, reconstrcution of the Inter-valvular fibrosa with bovine pericardial patch) at [address] [place] [address]  01/01/2006    Dr Ej Leal  [address] [place] [address]  09/03/2020    Biocor#31 at [facility]         Current Outpatient Medications   Medication Sig Dispense Refill    Docusate Sodium (STOOL SOFTENER) 100 MG TABS Take by mouth as needed Chewable tablet      Multiple Vitamin (THERA/BETA-CAROTENE) TABS Take 1 tablet by mouth daily 30 tablet 3    lisinopril (PRINIVIL;ZESTRIL) 20 MG tablet TAKE ONE TABLET BY MOUTH DAILY 90 tablet 1    potassium chloride (KLOR-CON M) 20 MEQ extended release tablet Take 1 tablet by mouth 2 times daily 180 tablet 1    metoprolol tartrate (LOPRESSOR) 25 MG tablet Take 1.5 tablets by mouth every 8 hours 405 tablet 1    spironolactone (ALDACTONE) 25 MG tablet Take 1 tablet by mouth daily 90 tablet 1    Multiple Vitamins-Minerals (THERAPEUTIC MULTIVITAMIN-MINERALS) tablet Take 1 tablet by mouth daily LD 2-22-19      warfarin (COUMADIN) 7.5 MG tablet Take 10 mg by mouth (Patient not taking: Reported on 1/4/2022)      docusate sodium (COLACE) 100 MG capsule Take 1 capsule by mouth daily as needed for Constipation (Patient not taking: Reported on 1/4/2022) 30 capsule 0    levETIRAcetam (KEPPRA) 750 MG tablet Take 1 tablet by mouth 2 times daily for 5 doses (Patient not taking: Reported on 1/4/2022) 5 tablet 0     No current facility-administered medications for this visit. Allergies as of 01/04/2022 - Fully Reviewed 01/04/2022   Allergen Reaction Noted    Pcn [penicillins] Other (See Comments) 12/07/2021       Social History     Socioeconomic History    Marital status:      Spouse name: Not on file    Number of children: Not on file    Years of education: Not on file    Highest education level: Not on file   Occupational History     Comment: [employer]   Tobacco Use    Smoking status: Never Smoker    Smokeless tobacco: Never Used   Vaping Use    Vaping Use: Never used   Substance and Sexual Activity    Alcohol use: Yes     Comment: 1-2 beers per month    Drug use: No    Sexual activity: Not on file   Other Topics Concern    Not on file   Social History Narrative    Not on file     Social Determinants of Health     Financial Resource Strain:     Difficulty of Paying Living Expenses: Not on file   Food Insecurity:     Worried About Running Out of Food in the Last Year: Not on file    Nica of Food in the Last Year: Not on file   Transportation Needs:     Lack of Transportation (Medical): Not on file    Lack of Transportation (Non-Medical):  Not on file   Physical Activity:     Days of Exercise per Week: Not on file    Minutes of Exercise per Session: Not on file   Stress:     Feeling of Stress : Not on file   Social Connections:     Frequency of Communication with Friends and Family: Not on file    Frequency of Social Gatherings with Friends and Family: Not on file    Attends Episcopal Services: Not on file    Active Member of Clubs or Organizations: Not on file    Attends Club or Organization Meetings: Not on file    Marital Status: Not on file   Intimate Partner Violence:     Fear of Current or Ex-Partner: Not on file    Emotionally Abused: Not on file    Physically Abused: Not on file    Sexually Abused: Not on file   Housing Stability:     Unable to Pay for Housing in the Last Year: Not on file    Number of [place] in the Last Year: Not on file    Unstable Housing in the Last Year: Not on file       No family history for early CAD    REVIEW OF SYSTEMS:     CONSTITUTIONAL:  negative for  fevers, chills, sweats and fatigue  HEENT:  negative for  tinnitus, earaches, nasal congestion and epistaxis  RESPIRATORY:  negative for  dry cough, cough with sputum, dyspnea, wheezing and hemoptysis  GASTROINTESTINAL:  negative for nausea, vomiting, diarrhea, constipation, pruritus and jaundice  HEMATOLOGIC/LYMPHATIC:  negative for easy bruising, bleeding, lymphadenopathy and petechiae  ENDOCRINE:  negative for heat intolerance, cold intolerance, tremor, hair loss and diabetic symptoms including neither polyuria nor polydipsia nor blurred vision  MUSCULOSKELETAL:  negative for  myalgias, arthralgias, joint swelling, stiff joints and decreased range of motion  NEUROLOGICAL:  negative for memory problems, speech problems, visual disturbance, dysphagia, weakness and numbness      PHYSICAL EXAM:   CONSTITUTIONAL:  awake, alert, cooperative, no apparent distress, and appears stated age  HEAD:  normocepalic, without obvious abnormality, atraumatic  NECK:  Supple, symmetrical, trachea midline, no adenopathy, thyroid symmetric, not enlarged and no tenderness, skin normal  LUNGS:  No increased work of breathing, No accessory muscle use or intercostal retractions, good air exchange, clear to auscultation bilaterally, no crackles or wheezing  CARDIOVASCULAR:  Normal apical impulse, irregular, normal S1 and S2, no S3 or S4, and no murmur noted, no edema, no JVD, no carotid bruit. ABDOMEN:  Soft, nontender, no masses, no hepatomegaly, no splenomegaly, BS+  MUSCULOSKELETAL:  No clubbing no cyanosis. there is no redness, warmth, or swelling of the joints  full range of motion noted  NEUROLOGIC:  Alert, awake,oriented x3  SKIN:  no bruising or bleeding, normal skin color, texture, turgor and no redness, warmth, or swelling    /84   Pulse 71   Resp 18   Ht 6' (1.829 m)   Wt 238 lb (108 kg)   BMI 32.28 kg/m²     DATA:   I personally reviewed the visit EKG with the following interpretation: Atrial fibrillation, controlled ventricular response, LVH, right axis deviation    EKG 12/7/21 Sinus tachycardia with 1st degree AV block  Right ventricular hypertrophy  Marked ST abnormality, possible lateral subendocardial injury  Abnormal ECG  When compared with ECG of 07-DEC-2021 07:02,  Significant changes have occurred    ECHO: 11/25/2020, CONCLUSIONS:   - Exam indication: Pre Cardioversion, Pre AF Ablation   - The left ventricle is normal in size. Left ventricular systolic function is   normal. EF = 55 ± 5% (visual est.) Assessment of left ventricular function is   limited from transesophageal imaging, due to acoustic shadowing from the mitral   prosthesis. Assessment of ventricular function is performed from transgastric   imaging.   - The right ventricle is normal in size. Right ventricular systolic function is   normal.   - The left atrial cavity is dilated. Smoke is evident in the left atrial appendage    with significantly reduced maximal emptying velocity (12 cm/sec). No discrete   left atrial or left atrial appendage thrombus. - The right atrial cavity is dilated. - Biocor prosthetic mitral valve (size #31). There is trace mitral valve   regurgitation. The peak gradient is 11 mmHg and the mean gradient is 4 mmHg. Transmitral gradients obtained at 103 bpm. Prior transmitral gradients: 12/7 mmHg. - Assessment of tricuspid valve leaflets is somewhat limited by acoustic shadowing    from the mitral prosthesis. - Homograft prosthetic aortic valve (size #23). There is no aortic valve   regurgitation. The peak gradient is 7 mmHg, the mean gradient is 3 mmHg and the   dimensionless valve index is 0.67. Thickening noted around the homograft aortic   root (Images 53-55). This may represent postoperative changes. - There is no patent foramen ovale as detected by Doppler and agitated saline   contrast.   - Exam was compared with the [address] [place] [address] echocardiographic exam performed on   10/21/2020. No discrete left atrial or left atrial appendage thrombus on VANESA   imaging. Electronically signed by Robbin Hathaway MD on 11/25/2020 at 2:56:24 PM      8/24/20 Summary   Mechanical valve at the mitral position with no significant stenosis or   regurgitation. Two mobile masses detected on the prosthetic mitral valve   consistent with vegetations. They are measured at 0.9 cm and 0.7 cm. Mechanical valve in the aortic position with no evidence of regurgitation. The maximum transvalvular velocity is 2.7 m/sec. The leaflets are not well   visualized due to shadowing from the mechanical mitral valve. Grossly no   evidence of vegetation on the aortic valve. Normal left ventricular chamber size. Normal left ventricular systolic function. Visually estimated LVEF is 60-65 %. No wall motion abnormalities. Normal right ventricle structure and function.     Stress Test:   Angiography:   Cardiology Labs: BMP:    Lab Results   Component Value Date     12/10/2021    K 4.5 12/10/2021     12/10/2021    CO2 21 12/10/2021    BUN 12 12/10/2021    CREATININE 0.8 12/10/2021     CMP:    Lab Results   Component Value Date     12/10/2021    K 4.5 12/10/2021     12/10/2021    CO2 21 12/10/2021    BUN 12 12/10/2021    CREATININE 0.8 12/10/2021    PROT 7.7 12/10/2021     CBC:    Lab Results   Component Value Date    WBC 8.8 12/10/2021    RBC 5.42 12/10/2021    HGB 15.6 12/10/2021    HCT 46.6 12/10/2021    MCV 86.0 12/10/2021    RDW 14.2 12/10/2021     12/10/2021     PT/INR:  No results found for: PTINR  PT/INR Warfarin:  No components found for: PTPATWAR, PTINRWAR  PTT:    Lab Results   Component Value Date    APTT 51.0 08/25/2020     PTT Heparin:  No components found for: APTTHEP  Magnesium:    Lab Results   Component Value Date    MG 2.3 12/09/2021     TSH:    Lab Results   Component Value Date    TSH 0.685 12/07/2021     TROPONIN:  No components found for: TROP  BNP:  No results found for: BNP  FASTING LIPID PANEL:    Lab Results   Component Value Date    CHOL 196 10/12/2021    HDL 56 10/12/2021    TRIG 86 10/12/2021     No orders to display     I have personally reviewed the laboratory, cardiac diagnostic and radiographic testing as outlined above:      IMPRESSION:  1. Atrial flutter/atrial fibrillation: Controlled ventricular response, will continue current treatment  2. S/p aortic root replacement with aortic and mitral valve replacement  3. S/p aortic valve replacement #23   4. S/p mitral valve replacement: Biocor #31  5. Hypertension: Controlled  6. Intracranial bleed: Patient is on chronic anticoagulation for atrial flutter, anticoagulation is on hold due to intracerebral bleed  7. Seizures  8.  History of CVA    RECOMMENDATIONS:   1. Continue current treatment  2. Follow-up with neurosurgery to evaluate for resolution of intracranial hemorrhage  3. Referral to structural heart clinic for evaluation for WATCHMAN procedure  4. Follow-up with Dr. Adele Huitron as scheduled  5. Follow-up with Dr. Colin Thornton in 3 months, sooner if symptomatic for any reason    I have reviewed my findings and recommendations with patient    Electronically signed by Frankie Perez MD on 1/4/2022 at 4:23 PM    NOTE: This report was transcribed using voice recognition software.  Every effort was made to ensure accuracy; however, inadvertent computerized transcription errors may be present

## 2022-01-04 ENCOUNTER — OFFICE VISIT (OUTPATIENT)
Dept: CARDIOLOGY CLINIC | Age: 52
End: 2022-01-04
Payer: MEDICAID

## 2022-01-04 VITALS
HEART RATE: 71 BPM | BODY MASS INDEX: 32.23 KG/M2 | RESPIRATION RATE: 18 BRPM | WEIGHT: 238 LBS | HEIGHT: 72 IN | SYSTOLIC BLOOD PRESSURE: 128 MMHG | DIASTOLIC BLOOD PRESSURE: 84 MMHG

## 2022-01-04 DIAGNOSIS — I48.92 ATRIAL FLUTTER, UNSPECIFIED TYPE (HCC): Primary | ICD-10-CM

## 2022-01-04 PROCEDURE — 3017F COLORECTAL CA SCREEN DOC REV: CPT | Performed by: INTERNAL MEDICINE

## 2022-01-04 PROCEDURE — G8417 CALC BMI ABV UP PARAM F/U: HCPCS | Performed by: INTERNAL MEDICINE

## 2022-01-04 PROCEDURE — G8482 FLU IMMUNIZE ORDER/ADMIN: HCPCS | Performed by: INTERNAL MEDICINE

## 2022-01-04 PROCEDURE — 93000 ELECTROCARDIOGRAM COMPLETE: CPT | Performed by: INTERNAL MEDICINE

## 2022-01-04 PROCEDURE — 99214 OFFICE O/P EST MOD 30 MIN: CPT | Performed by: INTERNAL MEDICINE

## 2022-01-04 PROCEDURE — G8427 DOCREV CUR MEDS BY ELIG CLIN: HCPCS | Performed by: INTERNAL MEDICINE

## 2022-01-04 PROCEDURE — 1111F DSCHRG MED/CURRENT MED MERGE: CPT | Performed by: INTERNAL MEDICINE

## 2022-01-04 PROCEDURE — 1036F TOBACCO NON-USER: CPT | Performed by: INTERNAL MEDICINE

## 2022-01-04 RX ORDER — ASPIRIN 81 MG
TABLET, DELAYED RELEASE (ENTERIC COATED) ORAL PRN
COMMUNITY
End: 2022-05-10

## 2022-01-05 DIAGNOSIS — I48.92 ATRIAL FLUTTER, UNSPECIFIED TYPE (HCC): Primary | ICD-10-CM

## 2022-01-10 RX ORDER — LEVETIRACETAM 750 MG/1
750 TABLET ORAL 2 TIMES DAILY
Qty: 60 TABLET | Refills: 0 | Status: SHIPPED
Start: 2022-01-10 | End: 2022-02-09 | Stop reason: SDUPTHER

## 2022-01-13 NOTE — CONSULTS
Neurosurgery Consult Note      CHIEF COMPLAINT :found down with acute SDH    HPI:The patient is a 45 y/o male who who states he was found down but he told the ER that he foamed at the mouth and then fell to the ground. The patient has no complaints of pain. He does take Coumadin for mechanical heart valves. He was given vitamin K Kcentra here. The patient was transported by EMS to the [address] [facility] from home. Evaluation prior to arrival included: CT head. Treatment prior to arrival included: Vitamin K. A trauma consult was requested to assist, guide,  and expedite further evaluation and treatment for the patient. Neurosurgery consult done on 12/8/21,head ct reveals acute SDH with adjacent parenchymal bleed. Past Medical History:   Diagnosis Date    AF (atrial fibrillation) (HCC)     Chest pain 02/21/2013    Hypertension     LONG TERM ANTICOAGULENT USE     Memory loss     Psoriasis      Past Surgical History:   Procedure Laterality Date    AORTIC VALVE [facility]AC[facility]  [facility]01[facility]2006    [facility]  09/03/2020    Commando procedure(Aortic root replacement and AVR with Homograft #23, reconstrcution of the Inter-valvular fibrosa with bovine pericardial patch) at [address] [place] [address]  01/01/2006    Dr Zuleyka De La Rosa  [address] [place] [address]  09/03/2020    Biocor#31 at [address] [facility]"" [address] [address] [penicillins]  Prior to Admission medications    Medication Sig Start Date End Date Taking?  Authorizing Provider   levETIRAcetam (KEPPRA) 750 MG tablet Take 1 tablet by mouth 2 times daily for 5 doses 1/10/22 1/13/22  Emeterio Souza, DO   Docusate Sodium (STOOL SOFTENER) 100 MG TABS Take by mouth as needed Chewable tablet    Historical Provider, MD   Multiple Vitamin (THERA/BETA-CAROTENE) TABS Take 1 tablet by mouth daily 12/29/21   Emeterio Hutchinsi, DO   lisinopril (PRINIVIL;ZESTRIL) 20 MG tablet TAKE ONE TABLET BY MOUTH DAILY 12/29/21   Emeterio SOLIZ Transportation Needs:     Lack of Transportation (Medical): Not on file    Lack of Transportation (Non-Medical): Not on file   Physical Activity:     Days of Exercise per Week: Not on file    Minutes of Exercise per Session: Not on file   Stress:     Feeling of Stress : Not on file   Social Connections:     Frequency of Communication with Friends and Family: Not on file    Frequency of Social Gatherings with Friends and Family: Not on file    Attends Yarsanism Services: Not on file    Active Member of [address] [place] [address] [employer] or Organizations: Not on file    Attends Club or Organization Meetings: Not on file    Marital Status: Not on file   Intimate Partner Violence:     Fear of Current or Ex-Partner: Not on file    Emotionally Abused: Not on file    Physically Abused: Not on file    Sexually Abused: Not on file   Housing Stability:     Unable to Pay for Housing in the Last Year: Not on file    Number of [place] in the Last Year: Not on file    Unstable Housing in the Last Year: Not on file     History reviewed. No pertinent family history. ROS: Complete 10 point ROS was obtained with positives in HPI, otherwise:  Pt denies fevers, denies chills. Pt denies chest pain, denies SOB, denies nausea, denies vomiting, complains of headache.       EXAMINATION:  BP (!) 140/99   Pulse 64   Temp 98.2 °F (36.8 °C) (Temporal)   Resp 18   Ht 6' (1.829 m)   Wt 238 lb 4.8 oz (108.1 kg)   SpO2 97%   BMI 32.32 kg/m²     Cranial Nerves: Cranial nerves II through XII are intact pupils are equally round and reactive to light extraocular movements are full facial expressions are symmetric tongue is midline gag is present shoulder shrug normal  Motor: 5 out of 5 throughout normal bulk and tone no drift sensory:  Cerebellar finger-to-nose testing is normal  Gait: Not checked  DTRs +1 equal bilaterally:    IMAGING STUDIES: serial head CTs were reviewed a total of 3 of them documenting stability of intracranial hemorrhage to include an acute subdural hematoma with immediately adjacent intraparenchymal bleed    IMPRESSION: Seizure disorder with likely posttraumatic acute subdural hematoma and adjacent parenchymal hemorrhage    RECOMMENDATIONS: Full anticoagulation is contraindicated until complete resolution of the intra cerebral hemorrhages documented by follow-up head CT scans close neurochecks as well as blood pressure control is a mainstay in the treatment regimen we will follow with you the patient was counseled greater than 50% of encounter time answered all of his questions    Thank you again for this consultation.       Michelle Reveles MD  1/13/2022

## 2022-01-21 ENCOUNTER — HOSPITAL ENCOUNTER (OUTPATIENT)
Dept: CT IMAGING | Age: 52
Discharge: HOME | End: 2022-01-21
Payer: MEDICAID

## 2022-01-21 DIAGNOSIS — I62.9 INTRACRANIAL HEMORRHAGE (HCC): ICD-10-CM

## 2022-01-21 PROCEDURE — 70450 CT HEAD/BRAIN W/O DYE: CPT

## 2022-02-04 ENCOUNTER — TELEPHONE (OUTPATIENT)
Dept: PRIMARY CARE CLINIC | Age: 52
End: 2022-02-04

## 2022-02-04 NOTE — TELEPHONE ENCOUNTER
----- Message from Devon Mekhi sent at 2/4/2022 12:22 PM EST -----  Subject: Message to Provider    QUESTIONS  Information for Provider? Needs a note to return to work for unemployment. Please email to [email or web address].  ---------------------------------------------------------------------------  --------------  Torie NOLEN  What is the best way for the office to contact you? OK to leave message on   [employer]  Preferred Call Back Phone Number? [phone]  ---------------------------------------------------------------------------  --------------  SCRIPT ANSWERS  Relationship to Patient?  Self

## 2022-02-04 NOTE — LETTER
[facility]  [address]  Rusty WATKINS [address]  Phone: [phone]  Fax: [address], [place]        February 4, 2022     Patient: Loren Hutson   YOB: 1970           To Whom It May Concern: It is my medical opinion that Jackie Kaur may return to work on 02/07/2022. If you have any questions or concerns, please don't hesitate to call.     Sincerely,        Vishnu Barone, DO

## 2022-02-08 ENCOUNTER — OFFICE VISIT (OUTPATIENT)
Dept: CARDIOLOGY CLINIC | Age: 52
End: 2022-02-08
Payer: MEDICAID

## 2022-02-08 VITALS
RESPIRATION RATE: 16 BRPM | HEART RATE: 62 BPM | BODY MASS INDEX: 33.13 KG/M2 | WEIGHT: 244.6 LBS | DIASTOLIC BLOOD PRESSURE: 80 MMHG | HEIGHT: 72 IN | SYSTOLIC BLOOD PRESSURE: 126 MMHG

## 2022-02-08 DIAGNOSIS — I48.20 CHRONIC ATRIAL FIBRILLATION (HCC): ICD-10-CM

## 2022-02-08 DIAGNOSIS — Z86.79 HISTORY OF INTRACRANIAL HEMORRHAGE: ICD-10-CM

## 2022-02-08 DIAGNOSIS — Z95.2 S/P MVR (MITRAL VALVE REPLACEMENT): ICD-10-CM

## 2022-02-08 DIAGNOSIS — Z95.2 S/P AVR (AORTIC VALVE REPLACEMENT) AND AORTOPLASTY: ICD-10-CM

## 2022-02-08 DIAGNOSIS — Z91.89 AT HIGH RISK FOR BLEEDING: Primary | ICD-10-CM

## 2022-02-08 PROCEDURE — G8427 DOCREV CUR MEDS BY ELIG CLIN: HCPCS | Performed by: INTERNAL MEDICINE

## 2022-02-08 PROCEDURE — 1036F TOBACCO NON-USER: CPT | Performed by: INTERNAL MEDICINE

## 2022-02-08 PROCEDURE — 3017F COLORECTAL CA SCREEN DOC REV: CPT | Performed by: INTERNAL MEDICINE

## 2022-02-08 PROCEDURE — 93000 ELECTROCARDIOGRAM COMPLETE: CPT | Performed by: INTERNAL MEDICINE

## 2022-02-08 PROCEDURE — G8417 CALC BMI ABV UP PARAM F/U: HCPCS | Performed by: INTERNAL MEDICINE

## 2022-02-08 PROCEDURE — 99215 OFFICE O/P EST HI 40 MIN: CPT | Performed by: INTERNAL MEDICINE

## 2022-02-08 PROCEDURE — G8482 FLU IMMUNIZE ORDER/ADMIN: HCPCS | Performed by: INTERNAL MEDICINE

## 2022-02-08 NOTE — PROGRESS NOTES
[address] [facility]   Heart and Vascular [place]   Structural heart clinic Note     Date:2/8/22   Patient Name:Moise Magaña  YOB: 1970  Age: 46 y.o. Primary Care Provider: Merline Boor, DO Subjective Pleasant 69-year-old  male referred by Dr. Blanca Millard for consideration of CAROL occlusion. He is back on warfarin now and tolerating well. He has not had any hematochezia or melena or hematuria. He is not on any antiplatelets. He does use NSAIDs occasionally if needed. He denies chest discomfort, dyspnea, lower extremity edema, orthopnea, PND. He does have significant balance issues. A focused history review includes:  1. Atrial fibrillation  2. Surgical AVR and MVR in 2006 (mechanical) and redo sternotomy in September 2020 for endocarditis with aortic root and redo aortic valve replacement (#23 homograft) and redo MVR (#31 Biocor) with reconstruction of the aortomitral fibrosa at [facility] -CAROL was not tackled  a. Most recent VANESA at [facility] in November 2020 with normal AVR and MVR function and spontaneous echo contrast in the left atrium  3. Hypertension  4. intracranial hemorrhage 12/2021 in the setting of seizure  5. Psoriasis  6.  History of stroke 2020 while on warfarin    Past History    Past Medical History:         Diagnosis Date    AF (atrial fibrillation) ([address]     Chest pain 02/21/2013    Hypertension     Intracranial hemorrhage ([address] 12/07/2021    LONG TERM ANTICOAGULENT USE     Memory loss     Psoriasis     Se[facility] ([facility] 12/07/2021    only one pt has had    Stroke [facility]) 08/2020       Past Surgical History:  Past Surgical History:   Procedure Laterality Date    AORTIC VALVE REPLACEMENT  01/01/2006    Dr Otilio Diaz. 70 VALVE REPLACEMENT  09/03/2020    Commando procedure(Aortic root replacement and AVR with Homograft #23, reconstrcution of the Inter-valvular fibrosa with bovine pericardial patch) at [address] [place] [address] 01/01/2006    Dr Pelon Sutton  [address] [facility] [employer] MITRAL VALVE REPLACEMENT  09/03/2020    Biocor#31 at [facility]       Social History:    Social History     Tobacco History     Smoking Status  Never Smoker    Smokeless Tobacco Use  Never Used          Alcohol History     Alcohol Use Status  Yes Comment  1-2 beers per month          Drug Use     Drug Use Status  Never          Sexual Activity     Sexually Active  Not Asked                    Family History:-  No family history on file. Review of Systems   Negative except as in HPI        Medications     Current Outpatient Medications   Medication Sig Dispense Refill    levETIRAcetam (KEPPRA) 750 MG tablet Take 1 tablet by mouth 2 times daily for 5 doses 60 tablet 0    Multiple Vitamin (THERA/BETA-CAROTENE) TABS Take 1 tablet by mouth daily 30 tablet 3    lisinopril (PRINIVIL;ZESTRIL) 20 MG tablet TAKE ONE TABLET BY MOUTH DAILY 90 tablet 1    warfarin (COUMADIN) 7.5 MG tablet Take 7.5 mg by mouth daily       potassium chloride (KLOR-CON M) 20 MEQ extended release tablet Take 1 tablet by mouth 2 times daily 180 tablet 1    metoprolol tartrate (LOPRESSOR) 25 MG tablet Take 1.5 tablets by mouth every 8 hours 405 tablet 1    spironolactone (ALDACTONE) 25 MG tablet Take 1 tablet by mouth daily 90 tablet 1    Multiple Vitamins-Minerals (THERAPEUTIC MULTIVITAMIN-MINERALS) tablet Take 1 tablet by mouth daily LD 2-22-19      Docusate Sodium (STOOL SOFTENER) 100 MG TABS Take by mouth as needed Chewable tablet (Patient not taking: Reported on 2/8/2022)      docusate sodium (COLACE) 100 MG capsule Take 1 capsule by mouth daily as needed for Constipation (Patient not taking: Reported on 2/8/2022) 30 capsule 0     No current facility-administered medications for this visit. Physical Examination      /80   Pulse 62   Resp 16   Ht 6' (1.829 m)   Wt 244 lb 9.6 oz (110.9 kg)   BMI 33.17 kg/m²   Body surface area is 2.37 meters squared.      General: No acute distress, appears as stated age, nonicteric  Head: Atraumatic, no gross abnormalities or bruises  Neck: Supple and nontender, no carotid bruits, no JVP  Lungs: Clear to auscultation bilaterally, no wheezes, rales, or rhonchi  Heart: Irregularly irregular, no murmurs, rubs, or gallops  Abdomen: Soft, nontender, nondistended, normal bowel sounds  Extremities: No obvious deformities, no cyanosis, no edema  Neurological: Alert and oriented x3, EOMI, moving all extremities x4  Psychological: Normal mood and affect, cooperative  Skin: Color, texture, and turgor normal for age          Labs/Imaging/Diagnostics   Personally reviewed:    Lab Results   Component Value Date     12/10/2021    K 4.5 12/10/2021     12/10/2021    CO2 21 12/10/2021    BUN 12 12/10/2021    CREATININE 0.8 12/10/2021    GLUCOSE 115 12/10/2021    GLUCOSE 105 02/26/2011    CALCIUM 9.2 12/10/2021        Estimated Creatinine Clearance: 137 mL/min (based on SCr of 0.8 mg/dL).     Lab Results   Component Value Date    WBC 8.8 12/10/2021    HGB 15.6 12/10/2021    HCT 46.6 12/10/2021    MCV 86.0 12/10/2021     12/10/2021       Lab Results   Component Value Date    ALT 32 12/10/2021    AST 45 (H) 12/10/2021    ALKPHOS 64 12/10/2021    BILITOT 1.0 12/10/2021       Lab Results   Component Value Date    LABALBU 3.9 12/10/2021       Lab Results   Component Value Date    CHOL 196 10/12/2021    CHOL 138 08/23/2020    CHOL 225 (H) 06/02/2020     Lab Results   Component Value Date    TRIG 86 10/12/2021    TRIG 336 (H) 08/23/2020    TRIG 149 06/02/2020     Lab Results   Component Value Date    HDL 56 10/12/2021    HDL 20 08/23/2020    HDL 55 06/02/2020     Lab Results   Component Value Date    LDLCALC 123 (H) 10/12/2021    LDLCALC 51 08/23/2020    LDLCALC 140 (H) 06/02/2020     Lab Results   Component Value Date    LABVLDL 17 10/12/2021    LABVLDL 67 08/23/2020    LABVLDL 30 06/02/2020         Lab Results   Component Value Date    LABA1C 5.9 (H) 08/23/2020 Personally interpreted the following studies.:  EKG: Atrial fibrillation with normal ventricular rate and right posterior fascicular block     Assessment and Plan:        78-year-old  male with a history noted above. He is certainly at high risk for stroke and high risk for bleeding given his history for intracranial hemorrhage as well as balance issues. That said, I am not certain that left atrial appendage occlusion would be adequate to protect him from embolic stroke related to AF/AFL in the absence of oral anticoagulation due to the history of MVRx2 and reported spontaneous echo contrast on the most recent VANESA. We did go over the risks and benefits of TAO versus continued oral anticoagulation though using guideline recommended shared decision-making tools. He is willing to proceed with the procedure if indicated     Diagnosis Orders   1. At high risk for bleeding  Echo transesophageal (VANESA)   2. Chronic atrial fibrillation (HCC)     3. History of intracranial hemorrhage     4. S/P MVR (mitral valve replacement)     5. S/P AVR (aortic valve replacement) and aortoplasty           VANESA to be performed by one of my imaging colleagues to evaluate the mitral valve, the CAROL anatomy (including sizing) and presence or absence of SVC or thrombus in the left atrium. Will discuss with the patient afterwards regarding appropriateness of LAAO     Assessment and plan reviewed with the patient/family and their questions and concerns answered in full.  Follow up with me in 2 months tentatively    We appreciate the opportunity to participate in His care!       Uvaldo Montero MD, [facility]  Interventional Cardiology/Structural Heart Disease  Office: [phone]  Fax: [phone]  Office Coordinators: Moira Bright

## 2022-02-08 NOTE — LETTER
[facility]  [address] [place]  Phone: [phone]  Fax: [phone]           Katlin Mari MD      February 8, 2022     Patient: Loren Hutson   MR Number: [id number]   YOB: 1970   Date of Visit: 2/8/2022       Dear Dr. Vanessa Garcia: Thank you for referring Jackie Kaur to me for evaluation/treatment. Below are the relevant portions of my assessment and plan of care. If you have questions, please do not hesitate to call me. I look forward to following Jody Savage along with you.     Sincerely,        Katlin Mari MD    CC providers:  Wai Hernandez MD  [facility] [phone]  [place] [place]

## 2022-02-08 NOTE — PATIENT INSTRUCTIONS
1. Continue warfarin as is  2. We will schedule a VANESA (ultrasound down the throat) to evaluate the valves and anatomy and whether Watchman would be suitable and protective for you  3.  Return in 2 months0

## 2022-02-15 RX ORDER — SPIRONOLACTONE 25 MG/1
25 TABLET ORAL DAILY
Qty: 90 TABLET | Refills: 1 | Status: SHIPPED
Start: 2022-02-15 | End: 2022-06-11 | Stop reason: SDUPTHER

## 2022-02-15 RX ORDER — LISINOPRIL 20 MG/1
TABLET ORAL
Qty: 90 TABLET | Refills: 1 | Status: SHIPPED
Start: 2022-02-15 | End: 2022-04-28 | Stop reason: CLARIF

## 2022-02-15 RX ORDER — POTASSIUM CHLORIDE 20 MEQ/1
20 TABLET, EXTENDED RELEASE ORAL 2 TIMES DAILY
Qty: 180 TABLET | Refills: 1 | Status: SHIPPED | OUTPATIENT
Start: 2022-02-15

## 2022-02-15 RX ORDER — MULTIVITAMIN,THERAPEUTIC
1 TABLET ORAL DAILY
Qty: 30 TABLET | Refills: 3 | Status: SHIPPED | OUTPATIENT
Start: 2022-02-15

## 2022-02-15 RX ORDER — SPIRONOLACTONE 25 MG/1
25 TABLET ORAL DAILY
Qty: 90 TABLET | Refills: 1 | Status: SHIPPED
Start: 2022-02-15 | End: 2022-04-28 | Stop reason: CLARIF

## 2022-02-15 RX ORDER — LISINOPRIL 20 MG/1
TABLET ORAL
Qty: 90 TABLET | Refills: 1 | Status: SHIPPED
Start: 2022-02-15 | End: 2022-06-11 | Stop reason: SDUPTHER

## 2022-02-23 ENCOUNTER — ANESTHESIA EVENT (OUTPATIENT)
Dept: ENDOSCOPY | Age: 52
End: 2022-02-23
Payer: MEDICAID

## 2022-02-23 NOTE — ANESTHESIA PRE PROCEDURE
Department of Anesthesiology  Preprocedure Note       Name:  Brittnee Noyola   Age:  46 y.o.  :  1970                                          MRN:  [id number]         Date:  2022      Surgeon: Jarett Stanley  Procedure: VANESA    Medications prior to admission:   Prior to Admission medications    Medication Sig Start Date End Date Taking?  Authorizing Provider   lisinopril (PRINIVIL;ZESTRIL) 20 MG tablet TAKE ONE TABLET BY MOUTH DAILY 2/15/22   Pete [place], DO   metoprolol tartrate (LOPRESSOR) 25 MG tablet Take 1.5 tablets by mouth every 8 hours 2/15/22 8/14/22  Emeterio Maria, DO   spironolactone (ALDACTONE) 25 MG tablet Take 1 tablet by mouth daily 2/15/22   Pete [place], DO   potassium chloride (KLOR-CON M) 20 MEQ extended release tablet Take 1 tablet by mouth 2 times daily 2/15/22   Pete [place], DO   metoprolol tartrate (LOPRESSOR) 25 MG tablet Take 1.5 tablets by mouth every 8 hours 2/15/22 8/14/22  Emeterio Maria, DO   spironolactone (ALDACTONE) 25 MG tablet Take 1 tablet by mouth daily 2/15/22   Pete [place], DO   Multiple Vitamin (THERA/BETA-CAROTENE) TABS Take 1 tablet by mouth daily 2/15/22   Pete [place], DO   lisinopril (PRINIVIL;ZESTRIL) 20 MG tablet TAKE ONE TABLET BY MOUTH DAILY 2/15/22   Pete [place], DO   levETIRAcetam (KEPPRA) 750 MG tablet Take 1 tablet by mouth 2 times daily 2/9/22 3/11/22  Elijah Smyth MD   Docusate Sodium (STOOL SOFTENER) 100 MG TABS Take by mouth as needed Chewable tablet  Patient not taking: Reported on 2022    Historical Provider, MD   warfarin (COUMADIN) 7.5 MG tablet Take 7.5 mg by mouth daily     Historical Provider, MD   docusate sodium (COLACE) 100 MG capsule Take 1 capsule by mouth daily as needed for Constipation  Patient not taking: Reported on 21   Pete Lord, DO   Multiple Vitamins-Minerals (THERAPEUTIC MULTIVITAMIN-MINERALS) tablet Take 1 tablet by mouth daily LD 19    Historical Provider, MD       Current medications:    Current Outpatient Medications   Medication Sig Dispense Refill    lisinopril (PRINIVIL;ZESTRIL) 20 MG tablet TAKE ONE TABLET BY MOUTH DAILY 90 tablet 1    metoprolol tartrate (LOPRESSOR) 25 MG tablet Take 1.5 tablets by mouth every 8 hours 405 tablet 1    spironolactone (ALDACTONE) 25 MG tablet Take 1 tablet by mouth daily 90 tablet 1    potassium chloride (KLOR-CON M) 20 MEQ extended release tablet Take 1 tablet by mouth 2 times daily 180 tablet 1    metoprolol tartrate (LOPRESSOR) 25 MG tablet Take 1.5 tablets by mouth every 8 hours 405 tablet 1    spironolactone (ALDACTONE) 25 MG tablet Take 1 tablet by mouth daily 90 tablet 1    Multiple Vitamin (THERA/BETA-CAROTENE) TABS Take 1 tablet by mouth daily 30 tablet 3    lisinopril (PRINIVIL;ZESTRIL) 20 MG tablet TAKE ONE TABLET BY MOUTH DAILY 90 tablet 1    levETIRAcetam (KEPPRA) 750 MG tablet Take 1 tablet by mouth 2 times daily 60 tablet 0    Docusate Sodium (STOOL SOFTENER) 100 MG TABS Take by mouth as needed Chewable tablet (Patient not taking: Reported on 2/8/2022)      warfarin (COUMADIN) 7.5 MG tablet Take 7.5 mg by mouth daily       docusate sodium (COLACE) 100 MG capsule Take 1 capsule by mouth daily as needed for Constipation (Patient not taking: Reported on 2/8/2022) 30 capsule 0    Multiple Vitamins-Minerals (THERAPEUTIC MULTIVITAMIN-MINERALS) tablet Take 1 tablet by mouth daily  2-22-19       No current facility-administered medications for this visit. Allergies:  No Known Allergies    Problem List:    Patient Active Problem List   Diagnosis Code    S/P MVR (mitral valve replacement) Z95.2    S/P AVR (aortic valve replacement) and aortoplasty Z95.2    Hypertension I10    Hyperlipidemia E78.5    Infective endocarditis I33.0    Atrial flutter (HCC) I48.92    TBI (traumatic brain injury) ([facility] S06. 9X9A    Seizure-like activity ([facility] R56.9    Subdural hematoma ([facility] S06.5X9A    History of CVA (cerebrovascular accident) Z80.78    Other constipation K59.09    History of intracranial hemorrhage Z86.79    Chronic atrial fibrillation (HCC) I48.20    At high risk for bleeding Z91.89       Past Medical History:        Diagnosis Date    AF (atrial fibrillation) ([facility]     Chest pain 02/21/2013    Hypertension     Intracranial hemorrhage ([facility] 12/07/2021    LONG TERM ANTICOAGULENT USE     Memory loss     Psoriasis     Seizure ([facility] 12/07/2021    only one pt has had    Stroke [facility]) 08/2020       Past Surgical History:        Procedure Laterality Date    AORTIC VALVE REPLACEMENT  01/01/2006    Dr Berry Cole   [address]  09/03/2020    Commando procedure(Aortic root replacement and AVR with Homograft #23, reconstrcution of the Inter-valvular fibrosa with bovine pericardial patch) at [address] [place] [address]  01/01/2006    Dr Berry Cole  [address] [place] [address]  09/03/2020    Biocor#31 [facility]       Social History:    Social History     Tobacco Use    Smoking status: Never Smoker    Smokeless tobacco: Never Used   Substance Use Topics    Alcohol use: Yes     Comment: 1-2 beers per month                                Counseling given: Not Answered      Vital Signs (Current): There were no vitals filed for this visit.                                            BP Readings from Last 3 Encounters:   02/08/22 126/80   01/13/22 128/86   01/04/22 128/84       NPO Status:  Enteral feeding D/C'd at midnight per nursing staff                                                                               BMI:   Wt Readings from Last 3 Encounters:   02/08/22 244 lb 9.6 oz (110.9 kg)   01/04/22 238 lb (108 kg)   12/16/21 238 lb (108 kg)     There is no height or weight on file to calculate BMI.    CBC:   Lab Results   Component Value Date    WBC 8.8 12/10/2021    RBC 5.42 12/10/2021    HGB 15.6 12/10/2021    HCT 46.6 12/10/2021    MCV 86.0 12/10/2021    RDW 14.2 12/10/2021     12/10/2021 CMP:   Lab Results   Component Value Date     12/10/2021    K 4.5 12/10/2021     12/10/2021    CO2 21 12/10/2021    BUN 12 12/10/2021    CREATININE 0.8 12/10/2021    GFRAA >60 12/10/2021    LABGLOM >60 12/10/2021    GLUCOSE 115 12/10/2021    GLUCOSE 105 02/26/2011    PROT 7.7 12/10/2021    CALCIUM 9.2 12/10/2021    BILITOT 1.0 12/10/2021    ALKPHOS 64 12/10/2021    AST 45 12/10/2021    ALT 32 12/10/2021       POC Tests:   No results for input(s): POCGLU, POCNA, POCK, POCCL, POCBUN, POCHEMO, POCHCT in the last 72 hours.     Coags:   Lab Results   Component Value Date    PROTIME 12.8 12/10/2021    PROTIME 34.7 05/01/2020    INR 1.60 12/23/2021    APTT 51.0 08/25/2020       HCG (If Applicable): No results found for: PREGTESTUR, PREGSERUM, HCG, HCGQUANT     ABGs: No results found for: PHART, PO2ART, ERG3AZX, UOY1YVS, BEART, W3EMNSIG     Type & Screen (If Applicable):  No results found for: LABABO, LABRH    Drug/Infectious Status (If Applicable):  No results found for: HIV, HEPCAB    COVID-19 Screening (If Applicable):   Lab Results   Component Value Date    COVID19 Not Detected 12/07/2021        EKG 8/21/2020  Component  Value  Ref Range & Units  Status  Collected  Lab    Ventricular Rate  88  BPM  Final  08/21/2020 11:36 PM  [facility]    Atrial Rate  88  BPM  Final  08/21/2020 11:36 PM  [facility]    P-R Interval  200  ms  Final  08/21/2020 11:36 PM  [facility]    QRS Duration  92  ms  Final  08/21/2020 11:36 PM  [facility]    Q-T Interval  378  ms  Final  08/21/2020 11:36 PM  [facility]    QTc Calculation (Bazett)  457  ms  Final  08/21/2020 11:36 PM  [facility]    P Axis  63  degrees  Final  08/21/2020 11:36 PM  [facility]    R Axis  59  degrees  Final  08/21/2020 11:36 PM  [facility]    T Axis  81  degrees  Final  08/21/2020 11:36 PM  [facility]      Normal sinus rhythm  Possible Left atrial enlargement  Cannot rule out Inferior infarct , age undetermined  Abnormal ECG  When compared with ECG of 17-DEC-2017 02:18,  Significant changes have occurred           Anesthesia Evaluation  Patient summary reviewed no history of anesthetic complications:   Airway: Mallampati: III  TM distance: >3 FB     Mouth opening: > = 3 FB Dental:      Comment: Pt denies loose/missing/chipped teeth     Pulmonary: breath sounds clear to auscultation                            ROS comment: Hx of aspiration pneumonitis   Cardiovascular:  Exercise tolerance: poor (<4 METS),   (+) hypertension:, valvular problems/murmurs (Hx of mechanical MVR & AVR for infective endocarditis x16 years ago ):, murmur ( Grade 1/6 murmur), hyperlipidemia      ECG reviewed  Rhythm: regular  Rate: normal                    Neuro/Psych:   (+) CVA (Left ALIYAH stroke per [facility] on admission; right-sided hemiplegia, dysphagia, expressive aphasia ):,             GI/Hepatic/Renal:   (+) renal disease:,           Endo/Other:    (+) blood dyscrasia (Coumadin ): anticoagulation therapy:., .                  ROS comment: MSSA Bacteremia   Denies IVDA Abdominal:   (+) obese,           Vascular: Other Findings:             Anesthesia Plan      MAC     ASA 3       Induction: intravenous. Anesthetic plan and risks discussed with patient (No surrogate available at time of exam ). Plan discussed with attending.                   Ángel Barth MD   [birth year]

## 2022-02-24 NOTE — PROGRESS NOTES
[address] [facility]                                                                                                                    PRE OP INSTRUCTIONS FOR  Ed Whitman        Date: 2/24/2022    Date of surgery: 2/25/2022   Arrival Time: Hospital will call you between 5pm and 7pm with your final arrival time for surgery    1. Do not eat or drink anything after  Midnight  prior to surgery. This includes no water, chewing gum, mints or ice chips. 2. Take the following medications with a small sip of water on the morning of Surgery:  Take coumadin  keppra and metoprolol dos with sip water    3. Diabetics may take evening dose of insulin but none after midnight. If you feel symptomatic or low blood sugar morning of surgery drink 1-2 ounces of apple juice only. 4. Aspirin, Ibuprofen, Advil, Naproxen, Vitamin E and other Anti-inflammatory products should be stopped  before surgery  as directed by your physician. Take Tylenol only unless instructed otherwise by your surgeon. 5. Check with your Doctor regarding stopping Plavix, Coumadin, Lovenox, Eliquis, Effient, or other blood thinners. 6. Do not smoke,use illicit drugs and do not drink any alcoholic beverages 24 hours prior to surgery. 7. You may brush your teeth the morning of surgery. DO NOT SWALLOW WATER    8. You MUST make arrangements for a responsible adult to take you home after your surgery. You will not be allowed to leave alone or drive yourself home. It is strongly suggested someone stay with you the first 24 hrs. Your surgery will be cancelled if you do not have a ride home. 9. PEDIATRIC PATIENTS ONLY:  A parent/legal guardian must accompany a child scheduled for surgery and plan to stay at the hospital until the child is discharged. Please do not bring other children with you.     10. Please wear simple, loose fitting clothing to the hospital.  Do not bring valuables (money, credit cards, checkbooks, etc.) Do not wear any makeup (including no eye makeup) or nail polish on your fingers or toes. 11. DO NOT wear any jewelry or piercings on day of surgery. All body piercing jewelry must be removed. 12. Shower the night before surgery with _x__Antibacterial soap /RAJESH WIPES________    13. TOTAL JOINT REPLACEMENT/HYSTERECTOMY PATIENTS ONLY---Remember to bring Blood Bank bracelet to the hospital on the day of surgery. 14. If you have a Living Will and Durable Power of  for Healthcare, please bring in a copy. 15. If appropriate bring crutches, inspirex, WALKER, CANE etc... 12. Notify your Surgeon if you develop any illness between now and surgery time, cough, cold, fever, sore throat, nausea, vomiting, etc.  Please notify your surgeon if you experience dizziness, shortness of breath or blurred vision between now & the time of your surgery. 17. If you have ___dentures, they will be removed before going to the OR; we will provide you a container. If you wear ___contact lenses or ___glasses, they will be removed; please bring a case for them. 18. To provide excellent care visitors will be limited to 2 in the room at any given time. 19. Please bring picture ID and insurance card. 20. Sleep apnea patients need to bring CPAP AND SETTINGS to hospital on day of surgery. 21. During flu season no children under the age of 15 are permitted in the hospital for the safety of all patients. 22. Other                  Please call AMBULATORY CARE if you have any further questions.    [address] [facility]     [address]

## 2022-02-25 ENCOUNTER — HOSPITAL ENCOUNTER (OUTPATIENT)
Age: 52
Setting detail: OUTPATIENT SURGERY
Discharge: HOME | End: 2022-02-25
Attending: INTERNAL MEDICINE
Payer: MEDICAID

## 2022-02-25 ENCOUNTER — ANESTHESIA (OUTPATIENT)
Dept: ENDOSCOPY | Age: 52
End: 2022-02-25
Payer: MEDICAID

## 2022-02-25 VITALS
BODY MASS INDEX: 32.91 KG/M2 | HEART RATE: 78 BPM | TEMPERATURE: 97.2 F | SYSTOLIC BLOOD PRESSURE: 139 MMHG | WEIGHT: 243 LBS | OXYGEN SATURATION: 95 % | DIASTOLIC BLOOD PRESSURE: 88 MMHG | RESPIRATION RATE: 18 BRPM | HEIGHT: 72 IN

## 2022-02-25 VITALS
DIASTOLIC BLOOD PRESSURE: 104 MMHG | OXYGEN SATURATION: 98 % | RESPIRATION RATE: 18 BRPM | SYSTOLIC BLOOD PRESSURE: 137 MMHG

## 2022-02-25 LAB
REASON FOR REJECTION: NORMAL
REJECTED TEST: NORMAL

## 2022-02-25 PROCEDURE — 93312 ECHO TRANSESOPHAGEAL: CPT

## 2022-02-25 PROCEDURE — 3700000001 HC ADD 15 MINUTES (ANESTHESIA): Performed by: INTERNAL MEDICINE

## 2022-02-25 PROCEDURE — 93312 ECHO TRANSESOPHAGEAL: CPT | Performed by: INTERNAL MEDICINE

## 2022-02-25 PROCEDURE — 6360000002 HC RX W HCPCS: Performed by: NURSE ANESTHETIST, CERTIFIED REGISTERED

## 2022-02-25 PROCEDURE — 3700000000 HC ANESTHESIA ATTENDED CARE: Performed by: INTERNAL MEDICINE

## 2022-02-25 PROCEDURE — 2580000003 HC RX 258: Performed by: ANESTHESIOLOGY

## 2022-02-25 PROCEDURE — 2580000003 HC RX 258: Performed by: NURSE ANESTHETIST, CERTIFIED REGISTERED

## 2022-02-25 PROCEDURE — 2709999900 HC NON-CHARGEABLE SUPPLY: Performed by: INTERNAL MEDICINE

## 2022-02-25 PROCEDURE — 93325 DOPPLER ECHO COLOR FLOW MAPG: CPT

## 2022-02-25 PROCEDURE — 7100000010 HC PHASE II RECOVERY - FIRST 15 MIN: Performed by: INTERNAL MEDICINE

## 2022-02-25 PROCEDURE — 7100000011 HC PHASE II RECOVERY - ADDTL 15 MIN: Performed by: INTERNAL MEDICINE

## 2022-02-25 RX ORDER — SODIUM CHLORIDE 0.9 % (FLUSH) 0.9 %
5-40 SYRINGE (ML) INJECTION PRN
Status: DISCONTINUED | OUTPATIENT
Start: 2022-02-25 | End: 2022-02-25 | Stop reason: HOSPADM

## 2022-02-25 RX ORDER — SODIUM CHLORIDE 9 MG/ML
INJECTION, SOLUTION INTRAVENOUS CONTINUOUS PRN
Status: DISCONTINUED | OUTPATIENT
Start: 2022-02-25 | End: 2022-02-25 | Stop reason: SDUPTHER

## 2022-02-25 RX ORDER — SODIUM CHLORIDE 0.9 % (FLUSH) 0.9 %
5-40 SYRINGE (ML) INJECTION EVERY 12 HOURS SCHEDULED
Status: DISCONTINUED | OUTPATIENT
Start: 2022-02-25 | End: 2022-02-25 | Stop reason: HOSPADM

## 2022-02-25 RX ORDER — SODIUM CHLORIDE, SODIUM LACTATE, POTASSIUM CHLORIDE, CALCIUM CHLORIDE 600; 310; 30; 20 MG/100ML; MG/100ML; MG/100ML; MG/100ML
INJECTION, SOLUTION INTRAVENOUS CONTINUOUS
Status: DISCONTINUED | OUTPATIENT
Start: 2022-02-25 | End: 2022-02-25 | Stop reason: HOSPADM

## 2022-02-25 RX ORDER — PROPOFOL 10 MG/ML
INJECTION, EMULSION INTRAVENOUS PRN
Status: DISCONTINUED | OUTPATIENT
Start: 2022-02-25 | End: 2022-02-25 | Stop reason: SDUPTHER

## 2022-02-25 RX ORDER — SODIUM CHLORIDE 9 MG/ML
25 INJECTION, SOLUTION INTRAVENOUS PRN
Status: DISCONTINUED | OUTPATIENT
Start: 2022-02-25 | End: 2022-02-25 | Stop reason: HOSPADM

## 2022-02-25 RX ADMIN — SODIUM CHLORIDE: 9 INJECTION, SOLUTION INTRAVENOUS at 11:33

## 2022-02-25 RX ADMIN — SODIUM CHLORIDE, POTASSIUM CHLORIDE, SODIUM LACTATE AND CALCIUM CHLORIDE: 600; 310; 30; 20 INJECTION, SOLUTION INTRAVENOUS at 11:00

## 2022-02-25 RX ADMIN — PROPOFOL 140 MG: 10 INJECTION, EMULSION INTRAVENOUS at 11:48

## 2022-02-25 RX ADMIN — PROPOFOL 40 MG: 10 INJECTION, EMULSION INTRAVENOUS at 11:56

## 2022-02-25 RX ADMIN — PROPOFOL 40 MG: 10 INJECTION, EMULSION INTRAVENOUS at 11:53

## 2022-02-25 RX ADMIN — PROPOFOL 30 MG: 10 INJECTION, EMULSION INTRAVENOUS at 11:50

## 2022-02-25 ASSESSMENT — PAIN SCALES - GENERAL: PAINLEVEL_OUTOF10: 0

## 2022-02-25 ASSESSMENT — PAIN - FUNCTIONAL ASSESSMENT: PAIN_FUNCTIONAL_ASSESSMENT: 0-10

## 2022-02-25 NOTE — PROGRESS NOTES
Endo notified Pt/Ptt not obtained. ( blood return with IV , but unable to get enough blood for specimen) Will check with Dr prior to procedure.   Gaurav Clarke RN  2/25/2022  11:18 AM

## 2022-02-25 NOTE — ANESTHESIA POSTPROCEDURE EVALUATION
Department of Anesthesiology  Postprocedure Note    Patient: Dana Young  MRN: [id number]  YOB: 1970  Date of evaluation: 2/25/2022  Time:  1:13 PM     Procedure Summary     Date: 02/25/22 Room / Location: [address] [place] [address] 01 / [address] [facility] [place], operated by [facility]    Anesthesia Start: 4924 Anesthesia Stop: 1261    Procedure: TRANSESOPHAGEAL ECHOCARDIOGRAM (N/A ) Diagnosis: (A-FIB)    Surgeons: Noa Lugo MD Responsible Provider: Srinath Ricardo MD    Anesthesia Type: MAC ASA Status: 3          Anesthesia Type: MAC    Mami Phase I: Mami Score: 10    Mami Phase II: Mami Score: 10    Last vitals: Reviewed and per EMR flowsheets.        Anesthesia Post Evaluation    Patient location during evaluation: PACU  Patient participation: complete - patient participated  Level of consciousness: awake and alert  Pain score: 0  Airway patency: patent  Nausea & Vomiting: no nausea and no vomiting  Complications: no  Cardiovascular status: hemodynamically stable  Respiratory status: acceptable  Hydration status: euvolemic

## 2022-02-25 NOTE — OP NOTE
Operative Note      Patient: Yogi Wong  YOB: 1970  MRN: [id number]    Date of Procedure: 2/25/2022    Pre-Op Diagnosis: A-FIB    VANESA performed. Pt tolerated procedure well. No complications. Full report to follow. EBL: 0ml.    Electronically signed by Connie De La Fuente MD on 2/25/2022 at 12:32 PM

## 2022-03-11 RX ORDER — WARFARIN SODIUM 10 MG/1
TABLET ORAL
Qty: 30 TABLET | Refills: 0 | Status: SHIPPED
Start: 2022-03-11 | End: 2022-03-17 | Stop reason: SDUPTHER

## 2022-03-17 ENCOUNTER — OFFICE VISIT (OUTPATIENT)
Dept: PRIMARY CARE CLINIC | Age: 52
End: 2022-03-17
Payer: MEDICAID

## 2022-03-17 VITALS
TEMPERATURE: 97.9 F | HEIGHT: 72 IN | HEART RATE: 57 BPM | BODY MASS INDEX: 33.32 KG/M2 | SYSTOLIC BLOOD PRESSURE: 126 MMHG | DIASTOLIC BLOOD PRESSURE: 76 MMHG | OXYGEN SATURATION: 96 % | WEIGHT: 246 LBS

## 2022-03-17 DIAGNOSIS — E78.5 HYPERLIPIDEMIA, UNSPECIFIED HYPERLIPIDEMIA TYPE: ICD-10-CM

## 2022-03-17 DIAGNOSIS — Z91.89 AT HIGH RISK FOR BLEEDING: ICD-10-CM

## 2022-03-17 DIAGNOSIS — K59.09 OTHER CONSTIPATION: ICD-10-CM

## 2022-03-17 DIAGNOSIS — I48.20 CHRONIC ATRIAL FIBRILLATION (HCC): Primary | ICD-10-CM

## 2022-03-17 DIAGNOSIS — I10 PRIMARY HYPERTENSION: ICD-10-CM

## 2022-03-17 DIAGNOSIS — Z95.2 S/P AVR (AORTIC VALVE REPLACEMENT) AND AORTOPLASTY: ICD-10-CM

## 2022-03-17 DIAGNOSIS — Z86.73 HISTORY OF CVA (CEREBROVASCULAR ACCIDENT): ICD-10-CM

## 2022-03-17 DIAGNOSIS — S06.9X1S TRAUMATIC BRAIN INJURY, WITH LOSS OF CONSCIOUSNESS OF 30 MINUTES OR LESS, SEQUELA (HCC): ICD-10-CM

## 2022-03-17 DIAGNOSIS — S06.5XAA SUBDURAL HEMATOMA: ICD-10-CM

## 2022-03-17 PROBLEM — I33.0 INFECTIVE ENDOCARDITIS: Status: RESOLVED | Noted: 2020-08-26 | Resolved: 2022-03-17

## 2022-03-17 PROBLEM — I48.92 ATRIAL FLUTTER (HCC): Status: RESOLVED | Noted: 2020-09-05 | Resolved: 2022-03-17

## 2022-03-17 PROCEDURE — G8417 CALC BMI ABV UP PARAM F/U: HCPCS | Performed by: FAMILY MEDICINE

## 2022-03-17 PROCEDURE — G8427 DOCREV CUR MEDS BY ELIG CLIN: HCPCS | Performed by: FAMILY MEDICINE

## 2022-03-17 PROCEDURE — 1036F TOBACCO NON-USER: CPT | Performed by: FAMILY MEDICINE

## 2022-03-17 PROCEDURE — 99214 OFFICE O/P EST MOD 30 MIN: CPT | Performed by: FAMILY MEDICINE

## 2022-03-17 PROCEDURE — 3017F COLORECTAL CA SCREEN DOC REV: CPT | Performed by: FAMILY MEDICINE

## 2022-03-17 PROCEDURE — G8482 FLU IMMUNIZE ORDER/ADMIN: HCPCS | Performed by: FAMILY MEDICINE

## 2022-03-17 RX ORDER — WARFARIN SODIUM 10 MG/1
TABLET ORAL
Qty: 30 TABLET | Refills: 3 | Status: SHIPPED
Start: 2022-03-17 | End: 2022-06-11 | Stop reason: SDUPTHER

## 2022-03-17 RX ORDER — DOCUSATE SODIUM 100 MG/1
100 CAPSULE, LIQUID FILLED ORAL DAILY PRN
Qty: 30 CAPSULE | Refills: 0 | Status: SHIPPED
Start: 2022-03-17 | End: 2022-10-10 | Stop reason: ALTCHOICE

## 2022-03-17 ASSESSMENT — ENCOUNTER SYMPTOMS
VOMITING: 0
COLOR CHANGE: 0
NAUSEA: 0
CHANGE IN BOWEL HABIT: 0
SORE THROAT: 0
EYE PAIN: 0
ABDOMINAL PAIN: 0
APNEA: 0
WHEEZING: 0
CONSTIPATION: 0
EYE ITCHING: 0
SINUS PRESSURE: 0
COUGH: 0
SHORTNESS OF BREATH: 0
RHINORRHEA: 0
DIARRHEA: 0
BACK PAIN: 0
CHEST TIGHTNESS: 0
EYE REDNESS: 0
BLOOD IN STOOL: 0

## 2022-03-17 NOTE — PROGRESS NOTES
Chief Complaint:     Chief Complaint   Patient presents with    3 Month Follow-Up    Heart Problem    Neurologic Problem    Hypertension         Heart Problem  This is a recurrent (s/p valve replacement) problem. The current episode started 1 to 4 weeks ago. The problem occurs daily. The problem has been unchanged. Associated symptoms include fatigue, myalgias and weakness. Pertinent negatives include no abdominal pain, arthralgias, change in bowel habit, chest pain, chills, congestion, coughing, diaphoresis, fever, headaches, joint swelling, nausea, neck pain, numbness, rash, sore throat or vomiting. Nothing aggravates the symptoms. He has tried nothing for the symptoms. The treatment provided no relief. Neurologic Problem  The patient's primary symptoms include weakness. Associated symptoms include fatigue. Pertinent negatives include no abdominal pain, back pain, chest pain, diaphoresis, dizziness, fever, headaches, light-headedness, nausea, neck pain, palpitations, shortness of breath or vomiting. Hypertension  Pertinent negatives include no chest pain, headaches, neck pain, palpitations or shortness of breath. There is no history of chronic renal disease. Other  Associated symptoms include fatigue, myalgias and weakness. Pertinent negatives include no abdominal pain, arthralgias, change in bowel habit, chest pain, chills, congestion, coughing, diaphoresis, fever, headaches, joint swelling, nausea, neck pain, numbness, rash, sore throat or vomiting. Cerebrovascular Accident  This is a new problem. The current episode started more than 1 month ago. The problem has been gradually improving. Associated symptoms include fatigue, myalgias and weakness. Pertinent negatives include no abdominal pain, arthralgias, change in bowel habit, chest pain, chills, congestion, coughing, diaphoresis, fever, headaches, joint swelling, nausea, neck pain, numbness, rash, sore throat or vomiting.  Nothing aggravates the symptoms. He has tried nothing for the symptoms. The treatment provided no relief. Hyperlipidemia  This is a chronic problem. The current episode started more than 1 year ago. The problem is controlled. He has no history of chronic renal disease, diabetes, hypothyroidism or obesity. Associated symptoms include leg pain and myalgias. Pertinent negatives include no chest pain or shortness of breath. Current antihyperlipidemic treatment includes statins. The current treatment provides significant improvement of lipids. There are no compliance problems. Risk factors for coronary artery disease include dyslipidemia and male sex. 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  Per report, he was found down 8/21 with noted aphasia, R sided weakness. Presented to ED where he was found to have L ALIYAH stroke with therapeutic INR, + BC for MSSA. Subsequent workup showed normal EF, prosthetic MV with two mobile masses consistent with vegetations. Was evaluated by ID at OSH.  He was then transferred to [facility] for further evalua    Intracranial hemorrhage ([address] 12/07/2021    LONG TERM ANTICOAGULENT USE     Memory loss     dont remember seizure    Seizure ([address] 12/07/2021    only one pt has had    Stroke ([address] 08/2020    2 left fingers 20% numb       Past Surgical History:   Procedure Laterality Date    AORTIC VALVE REPLACEMENT  01/01/2006 None    Number of children: None    Years of education: None    Highest education level: None   Occupational History     Comment: [employer]   Tobacco Use    Smoking status: Never Smoker    Smokeless tobacco: Never Used   Vaping Use    Vaping Use: Never used   Substance and Sexual Activity    Alcohol use: Yes     Comment: 1-2 beers per week not since dec    Drug use: Never    Sexual activity: None   Other Topics Concern    None   Social History Narrative    None     Social Determinants of Health     Financial Resource Strain:     Difficulty of Paying Living Expenses: Not on file   Food Insecurity:     Worried About Running Out of Food in the Last Year: Not on file    Nica of Food in the Last Year: Not on file   Transportation Needs:     Lack of Transportation (Medical): Not on file    Lack of Transportation (Non-Medical): Not on file   Physical Activity:     Days of Exercise per Week: Not on file    Minutes of Exercise per Session: Not on file   Stress:     Feeling of Stress : Not on file   Social Connections:     Frequency of Communication with Friends and Family: Not on file    Frequency of Social Gatherings with Friends and Family: Not on file    Attends [employer] Services: Not on file    Active Member of [address] [place] [address] or Organizations: Not on file    Attends Club or Organization Meetings: Not on file    Marital Status: Not on file   Intimate Partner Violence:     Fear of Current or Ex-Partner: Not on file    Emotionally Abused: Not on file    Physically Abused: Not on file    Sexually Abused: Not on file   Housing Stability:     Unable to Pay for Housing in the Last Year: Not on file    Number of [place] in the Last Year: Not on file    Unstable Housing in the Last Year: Not on file       History reviewed. No pertinent family history. Review of Systems   Constitutional: Positive for fatigue. Negative for activity change, appetite change, chills, diaphoresis and fever.    HENT: Negative for congestion, ear pain, hearing loss, nosebleeds, rhinorrhea, sinus pressure and sore throat. Eyes: Negative for pain, redness, itching and visual disturbance. Respiratory: Negative for apnea, cough, chest tightness, shortness of breath and wheezing. Cardiovascular: Negative for chest pain, palpitations and leg swelling. Gastrointestinal: Negative for abdominal pain, blood in stool, change in bowel habit, constipation, diarrhea, nausea and vomiting. Endocrine: Negative. Genitourinary: Negative for decreased urine volume, difficulty urinating, dysuria, frequency, hematuria and urgency. Musculoskeletal: Positive for myalgias. Negative for arthralgias, back pain, gait problem, joint swelling and neck pain. Skin: Negative for color change and rash. Allergic/Immunologic: Negative for environmental allergies and food allergies. Neurological: Positive for weakness. Negative for dizziness, light-headedness, numbness and headaches. Hematological: Negative for adenopathy. Does not bruise/bleed easily. Psychiatric/Behavioral: Negative for behavioral problems, dysphoric mood and sleep disturbance. The patient is not nervous/anxious and is not hyperactive. All other systems reviewed and are negative. /76   Pulse 57   Temp 97.9 °F (36.6 °C)   Ht 6' (1.829 m)   Wt 246 lb (111.6 kg)   SpO2 96%   BMI 33.36 kg/m²     Physical Exam  Vitals and nursing note reviewed. Constitutional:       General: He is not in acute distress. Appearance: Normal appearance. He is well-developed. HENT:      Head: Normocephalic and atraumatic. Right Ear: Hearing, tympanic membrane and external ear normal. No tenderness. No middle ear effusion. Left Ear: Hearing, tympanic membrane and external ear normal. No tenderness. No middle ear effusion. Nose: Nose normal. No congestion or rhinorrhea. Right Turbinates: Not enlarged. Left Turbinates: Not enlarged. Mouth/Throat:      Mouth: Mucous membranes are moist.      Tongue: No lesions. Pharynx: Oropharynx is clear. No oropharyngeal exudate or posterior oropharyngeal erythema. Eyes:      General: No scleral icterus. Conjunctiva/sclera: Conjunctivae normal.      Pupils: Pupils are equal, round, and reactive to light. Neck:      Thyroid: No thyromegaly. Cardiovascular:      Rate and Rhythm: Normal rate and regular rhythm. Heart sounds: Normal heart sounds. No murmur heard. Pulmonary:      Effort: Pulmonary effort is normal. No respiratory distress. Breath sounds: Normal breath sounds. No wheezing or rales. Abdominal:      General: Bowel sounds are normal. There is no distension. Palpations: Abdomen is soft. Tenderness: There is no abdominal tenderness. Musculoskeletal:         General: No tenderness. Normal range of motion. Cervical back: Normal range of motion and neck supple. No rigidity. No muscular tenderness. Lymphadenopathy:      Cervical: No cervical adenopathy. Skin:     General: Skin is warm and dry. Findings: No erythema or rash. Neurological:      General: No focal deficit present. Mental Status: He is alert and oriented to person, place, and time. Cranial Nerves: No cranial nerve deficit. Deep Tendon Reflexes: Reflexes are normal and symmetric.  Reflexes normal.   Psychiatric:         Mood and Affect: Mood normal.                                 ASSESSMENT/PLAN:    Patient Active Problem List   Diagnosis    S/P MVR (mitral valve replacement)    S/P AVR (aortic valve replacement) and aortoplasty    Hypertension    Hyperlipidemia    TBI (traumatic brain injury) ([address]    Seizure-like activity ([address]    Subdural hematoma ([address]    History of CVA (cerebrovascular accident)    Other constipation    History of intracranial hemorrhage    Chronic atrial fibrillation ([address]    At high risk for bleeding       Jamila Guevara was seen today for 3 month follow-up, heart problem, neurologic problem and hypertension. Diagnoses and all orders for this visit:    Chronic atrial fibrillation [facility]    Primary hypertension    S/P AVR (aortic valve replacement) and aortoplasty    Subdural hematoma ([facility])    Traumatic brain injury, with loss of consciousness of 30 minutes or less, sequela (HCC)    At high risk for bleeding    History of CVA (cerebrovascular accident)    Other constipation    Hyperlipidemia, unspecified hyperlipidemia type    Other orders  -     docusate sodium (COLACE) 100 MG capsule; Take 1 capsule by mouth daily as needed for Constipation  -     warfarin (COUMADIN) 10 MG tablet; TAKE ONE TABLET BY MOUTH DAILY or as directed by the doctor      Counseled on his extensive medical history  I agree with applying for disability at this time  I do not feel he is employable    Return in about 3 months (around 6/17/2022) for [employer] [employer]. I spent 30 minutes with this patient. I spent greater than 50% of the time counseling this patient.         Brodie Flores DO  3/17/2022  10:27 AM

## 2022-03-28 RX ORDER — LISINOPRIL 20 MG/1
TABLET ORAL
Qty: 90 TABLET | Refills: 1 | OUTPATIENT
Start: 2022-03-28

## 2022-03-31 ENCOUNTER — TELEPHONE (OUTPATIENT)
Dept: PRIMARY CARE CLINIC | Age: 52
End: 2022-03-31

## 2022-03-31 NOTE — TELEPHONE ENCOUNTER
----- Message from Ramos Ari sent at 3/31/2022 10:33 AM EDT -----  Subject: Message to Provider    QUESTIONS  Information for Provider? Patient needs his PCP to write a letter for him   stating he can return to work and the letter needs to be dated for March 1rst 2022. The letter needs to say he can work and drive because everyone   at his job thinks he had a seizure and can't drive but patient stated he   can drive and the patient is taking his medication Shaina Likes). Patient needs   the letter before noon tomorrow. ---------------------------------------------------------------------------  --------------  Unknown Dura INFO  What is the best way for the office to contact you? OK to leave message on   voicemail  Preferred Call Back Phone Number? [phone]  ---------------------------------------------------------------------------  --------------  SCRIPT ANSWERS  Relationship to Patient?  Self

## 2022-04-29 ENCOUNTER — TELEPHONE (OUTPATIENT)
Dept: ADMINISTRATIVE | Age: 52
End: 2022-04-29

## 2022-05-09 NOTE — PROGRESS NOTES
[facility] Cardiology Progress Note  Dr. Xu Hull      Referring Physician: Rayne Raza DO  CHIEF COMPLAINT:   Chief Complaint   Patient presents with    Atrial Flutter     3 month ov- pt has complaints of palpitations, dizziness, and swelling in feet and legs       HISTORY OF PRESENT ILLNESS:   46year old male with history of atrial fibrillation, aortic and mitral valve replacement, hypertension and CVA is here for a follow up visit. Occasional pedal edema, occasional palpitations, patient denies any chest pain, no shortness of breath, no lightheadedness, no dizziness, no PND, no orthopnea, no syncope, no presyncopal episodes. 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  Per report, he was found down 8/21 with noted aphasia, R sided weakness. Presented to ED where he was found to have L ALIYAH stroke with therapeutic INR, + BC for MSSA. Subsequent workup showed normal EF, prosthetic MV with two mobile masses consistent with vegetations. Was evaluated by ID at OSH.  He was then transferred to [facility] for further evalua    Intracranial hemorrhage ([facility] [address] 12/07/2021    LONG TERM ANTICOAGULENT USE     Memory loss     dont remember seizure    Seizure ([facility] [address] 12/07/2021    only one pt has had    Stroke ([facility] [address] 08/2020    2 left fingers 20% numb         Past Surgical History:   Procedure Laterality Date    AORTIC VALVE REPLACEMENT  01/01/2006    Dr Abena Caballeror. 72 VALVE REPLACEMENT  09/03/2020    Commando procedure(Aortic root replacement and AVR with Homograft #23, reconstrcution of the Inter-valvular fibrosa with bovine pericardial patch) at [address] [place] [address]  01/01/2006    Dr Abena Artis  [address] [place] [address]  09/03/2020    Biocor#31 at [facility]    TRANSESOPHAGEAL ECHOCARDIOGRAM N/A 2/25/2022    TRANSESOPHAGEAL ECHOCARDIOGRAM performed by Redd Leonard MD at [facility]         Current Outpatient Medications   Medication Sig Dispense Refill    levETIRAcetam (KEPPRA) 750 MG tablet Take 1 tablet by mouth 2 times daily 180 tablet 3    docusate sodium (COLACE) 100 MG capsule Take 1 capsule by mouth daily as needed for Constipation (Patient not taking: Reported on 6/21/2022) 30 capsule 0    Multiple Vitamin (THERA/BETA-CAROTENE) TABS Take 1 tablet by mouth daily 30 tablet 3    Acetaminophen (TYLENOL PO) Take by mouth      metoprolol tartrate (LOPRESSOR) 25 MG tablet Take 1.5 tablets by mouth every 8 hours 405 tablet 1    lisinopril (PRINIVIL;ZESTRIL) 20 MG tablet TAKE ONE TABLET BY MOUTH DAILY 90 tablet 1    spironolactone (ALDACTONE) 25 MG tablet Take 1 tablet by mouth daily 90 tablet 1    warfarin (COUMADIN) 10 MG tablet TAKE ONE TABLET BY MOUTH DAILY or as directed by the doctor 30 tablet 3    potassium chloride (KLOR-CON M) 20 MEQ extended release tablet Take 1 tablet by mouth 2 times daily 180 tablet 1     No current facility-administered medications for this visit.          Allergies as of 05/10/2022    (No Known Allergies)       Social History     Socioeconomic History    Marital status:      Spouse name: Not on file    Number of children: Not on file    Years of education: Not on file    Highest education level: Not on file   Occupational History     Comment: [employer]   Tobacco Use    Smoking status: Never Smoker    Smokeless tobacco: Never Used   Vaping Use    Vaping Use: Never used   Substance and Sexual Activity    Alcohol use: Yes     Comment: 1-2 beers per week not since dec    Drug use: Never    Sexual activity: Not on file   Other Topics Concern    Not on file   Social History Narrative    Not on file     Social Determinants of Health     Financial Resource Strain:     Difficulty of Paying Living Expenses: Not on file   Food Insecurity:     Worried About Running Out of Food in the Last Year: Not on file    [address] in the Last Year: Not on file   Transportation Needs:     Lack of Transportation (Medical): Not on file    Lack of Transportation (Non-Medical):  Not on file   Physical Activity:     Days of Exercise per Week: Not on file    Minutes of Exercise per Session: Not on file   Stress:     Feeling of Stress : Not on file   Social Connections:     Frequency of Communication with Friends and Family: Not on file    Frequency of Social Gatherings with Friends and Family: Not on file    Attends Confucianist Services: Not on file    Active Member of [address] [place] [address] or Organizations: Not on file    Attends Club or Organization Meetings: Not on file    Marital Status: Not on file   Intimate Partner Violence:     Fear of Current or Ex-Partner: Not on file    Emotionally Abused: Not on file    Physically Abused: Not on file    Sexually Abused: Not on file   Housing Stability:     Unable to Pay for Housing in the Last Year: Not on file    Number of [place] in the Last Year: Not on file    Unstable Housing in the Last Year: Not on file       No family history for early CAD    REVIEW OF SYSTEMS:     CONSTITUTIONAL:  negative for  fevers, chills, sweats and fatigue  HEENT:  negative for  tinnitus, earaches, nasal congestion and epistaxis  RESPIRATORY:  negative for  dry cough, cough with sputum, dyspnea, wheezing and hemoptysis  GASTROINTESTINAL:  negative for nausea, vomiting, diarrhea, constipation, pruritus and jaundice  HEMATOLOGIC/LYMPHATIC:  negative for easy bruising, bleeding, lymphadenopathy and petechiae  ENDOCRINE:  negative for heat intolerance, cold intolerance, tremor, hair loss and diabetic symptoms including neither polyuria nor polydipsia nor blurred vision  MUSCULOSKELETAL:  negative for  myalgias, arthralgias, joint swelling, stiff joints and decreased range of motion  NEUROLOGICAL:  negative for memory problems, speech problems, visual disturbance, dysphagia, weakness and numbness      PHYSICAL EXAM: CONSTITUTIONAL:  awake, alert, cooperative, no apparent distress, and appears stated age  HEAD:  normocepalic, without obvious abnormality, atraumatic  NECK:  Supple, symmetrical, trachea midline, no adenopathy, thyroid symmetric, not enlarged and no tenderness, skin normal  LUNGS:  No increased work of breathing, No accessory muscle use or intercostal retractions, good air exchange, clear to auscultation bilaterally, no crackles or wheezing  CARDIOVASCULAR:  Normal apical impulse, irregular, normal S1 and S2, no S3 or S4, and no murmur noted, no edema, no JVD, no carotid bruit. ABDOMEN:  Soft, nontender, no masses, no hepatomegaly, no splenomegaly, BS+  MUSCULOSKELETAL:  No clubbing no cyanosis. there is no redness, warmth, or swelling of the joints  full range of motion noted  NEUROLOGIC:  Alert, awake,oriented x3  SKIN:  no bruising or bleeding, normal skin color, texture, turgor and no redness, warmth, or swelling    /80   Pulse 58   Resp 18   Ht 6' (1.829 m)   Wt 249 lb 3.2 oz (113 kg)   SpO2 99%   BMI 33.80 kg/m²     DATA:   I personally reviewed the visit EKG with the following interpretation: Atrial fibrillation, slow ventricular response, right axis deviation    EKG 2/8/22 Atrial fibrillation, controlled ventricular response, LVH, right axis deviation    ECHO: 2/25/22 VANESA    Summary   Left ventricle was not well visualized. Right ventricle global systolic function is low normal .   No evidence of thrombus or mass in the right atrium. There is a bioprosthetic mitral valve in place that appears well seated   with acceptable function. There is a bioprosthetic aortic valve in place with acceptable function. Mild to moderate tricuspid regurgitation. No evidence of thrombus within left atrium/ left atrial appendage. Dimensions of CAROL:   0 degrees: 1.7 x 2.5 cms. 45 degrees: 1.6 x 2.2 cms.    90 degrees: 1.8 x 2.4 cms.   135 degrees: 2.1x 2.6 cms.     11/25/2020, CONCLUSIONS:   - Exam indication: Pre Cardioversion, Pre AF Ablation   - The left ventricle is normal in size. Left ventricular systolic function is   normal. EF = 55 ± 5% (visual est.) Assessment of left ventricular function is   limited from transesophageal imaging, due to acoustic shadowing from the mitral   prosthesis. Assessment of ventricular function is performed from transgastric   imaging.   - The right ventricle is normal in size. Right ventricular systolic function is   normal.   - The left atrial cavity is dilated. Smoke is evident in the left atrial appendage    with significantly reduced maximal emptying velocity (12 cm/sec). No discrete   left atrial or left atrial appendage thrombus. - The right atrial cavity is dilated. - Biocor prosthetic mitral valve (size #31). There is trace mitral valve   regurgitation. The peak gradient is 11 mmHg and the mean gradient is 4 mmHg. Transmitral gradients obtained at 103 bpm. Prior transmitral gradients: 12/7 mmHg. - Assessment of tricuspid valve leaflets is somewhat limited by acoustic shadowing    from the mitral prosthesis. - Homograft prosthetic aortic valve (size #23). There is no aortic valve   regurgitation. The peak gradient is 7 mmHg, the mean gradient is 3 mmHg and the   dimensionless valve index is 0.67. Thickening noted around the homograft aortic   root (Images 53-55). This may represent postoperative changes. - There is no patent foramen ovale as detected by Doppler and agitated saline   contrast.   - Exam was compared with the [address] [place] [address] echocardiographic exam performed on   10/21/2020. No discrete left atrial or left atrial appendage thrombus on VANESA   imaging. Electronically signed by Marshall Richards MD on 11/25/2020 at 2:56:24 PM      8/24/20 Summary   Mechanical valve at the mitral position with no significant stenosis or   regurgitation. Two mobile masses detected on the prosthetic mitral valve   consistent with vegetations.  They are measured at 0.9 cm and 0.7 cm.   Mechanical valve in the aortic position with no evidence of regurgitation. The maximum transvalvular velocity is 2.7 m/sec. The leaflets are not well   visualized due to shadowing from the mechanical mitral valve. Grossly no   evidence of vegetation on the aortic valve. Normal left ventricular chamber size. Normal left ventricular systolic function. Visually estimated LVEF is 60-65 %. No wall motion abnormalities. Normal right ventricle structure and function. Stress Test:   Angiography:   Cardiology Labs: BMP:    Lab Results   Component Value Date     06/16/2022    K 4.0 06/16/2022    K 4.5 12/10/2021     06/16/2022    CO2 19 06/16/2022    BUN 19 06/16/2022    CREATININE 1.1 06/16/2022     CMP:    Lab Results   Component Value Date     06/16/2022    K 4.0 06/16/2022    K 4.5 12/10/2021     06/16/2022    CO2 19 06/16/2022    BUN 19 06/16/2022    CREATININE 1.1 06/16/2022    PROT 7.5 06/16/2022     CBC:    Lab Results   Component Value Date    WBC 7.0 06/16/2022    RBC 5.11 06/16/2022    HGB 14.9 06/16/2022    HCT 45.7 06/16/2022    MCV 89.4 06/16/2022    RDW 13.2 06/16/2022     06/16/2022     PT/INR:  No results found for: PTINR  PT/INR Warfarin:  No components found for: PTPATWAR, PTINRWAR  PTT:    Lab Results   Component Value Date    APTT 51.0 08/25/2020     PTT Heparin:  No components found for: APTTHEP  Magnesium:    Lab Results   Component Value Date    MG 2.3 12/09/2021     TSH:    Lab Results   Component Value Date    TSH 0.750 06/16/2022     TROPONIN:  No components found for: TROP  BNP:  No results found for: BNP  FASTING LIPID PANEL:    Lab Results   Component Value Date    CHOL 151 06/16/2022    HDL 47 06/16/2022    TRIG 157 06/16/2022     No orders to display     I have personally reviewed the laboratory, cardiac diagnostic and radiographic testing as outlined above:      IMPRESSION:  1.  Atrial flutter/atrial fibrillation: Controlled ventricular response, will continue current treatment  2. S/p aortic root replacement with aortic and mitral valve replacement  3. S/p aortic valve replacement #23   4. S/p mitral valve replacement: Biocor #31  5. Hypertension: Controlled  6. Intracranial bleed: Patient is on chronic anticoagulation for atrial flutter, anticoagulation is on hold due to intracerebral bleed  7. Seizures  8.  History of CVA    RECOMMENDATIONS:   1. Continue current treatment  2. Follow-up with neurosurgery to evaluate for resolution of intracranial hemorrhage  3. Referral to structural heart clinic for evaluation for WATCHMAN procedure  4. Follow-up with Dr. Devon Rodriguez as scheduled  5. Follow-up with Dr. Wally Angeles in 6 months, sooner if symptomatic for any reason    I have reviewed my findings and recommendations with patient    Electronically signed by Gene Ho MD on 6/22/2022 at 9:18 PM    NOTE: This report was transcribed using voice recognition software.  Every effort was made to ensure accuracy; however, inadvertent computerized transcription errors may be present

## 2022-05-10 ENCOUNTER — OFFICE VISIT (OUTPATIENT)
Dept: CARDIOLOGY CLINIC | Age: 52
End: 2022-05-10
Payer: MEDICAID

## 2022-05-10 VITALS
OXYGEN SATURATION: 99 % | WEIGHT: 249.2 LBS | DIASTOLIC BLOOD PRESSURE: 80 MMHG | BODY MASS INDEX: 33.75 KG/M2 | SYSTOLIC BLOOD PRESSURE: 124 MMHG | RESPIRATION RATE: 18 BRPM | HEART RATE: 58 BPM | HEIGHT: 72 IN

## 2022-05-10 DIAGNOSIS — I48.92 ATRIAL FLUTTER, UNSPECIFIED TYPE (HCC): Primary | ICD-10-CM

## 2022-05-10 PROCEDURE — 3017F COLORECTAL CA SCREEN DOC REV: CPT | Performed by: INTERNAL MEDICINE

## 2022-05-10 PROCEDURE — G8417 CALC BMI ABV UP PARAM F/U: HCPCS | Performed by: INTERNAL MEDICINE

## 2022-05-10 PROCEDURE — 1036F TOBACCO NON-USER: CPT | Performed by: INTERNAL MEDICINE

## 2022-05-10 PROCEDURE — 99214 OFFICE O/P EST MOD 30 MIN: CPT | Performed by: INTERNAL MEDICINE

## 2022-05-10 PROCEDURE — 93000 ELECTROCARDIOGRAM COMPLETE: CPT | Performed by: INTERNAL MEDICINE

## 2022-05-10 PROCEDURE — G8427 DOCREV CUR MEDS BY ELIG CLIN: HCPCS | Performed by: INTERNAL MEDICINE

## 2022-05-16 ENCOUNTER — TELEPHONE (OUTPATIENT)
Dept: ADMINISTRATIVE | Age: 52
End: 2022-05-16

## 2022-05-16 NOTE — TELEPHONE ENCOUNTER
I called Sunshine Gonzalez regarding moving forward in scheduling the Watchman.   He said that he has decided to have it done at the [facility]

## 2022-05-19 ENCOUNTER — EVALUATION (OUTPATIENT)
Dept: SPEECH THERAPY | Age: 52
End: 2022-05-19
Payer: MEDICAID

## 2022-05-19 DIAGNOSIS — R41.841 COGNITIVE COMMUNICATION DEFICIT: Primary | ICD-10-CM

## 2022-05-19 PROCEDURE — 96125 COGNITIVE TEST BY HC PRO: CPT | Performed by: SPEECH-LANGUAGE PATHOLOGIST

## 2022-05-23 NOTE — PROGRESS NOTES
[address] [facility]  Outpatient Speech Therapy  Phone: [phone]   Fax:  [phone]        SPEECH/LANGUAGE PATHOLOGY  ROSS INFORMATION PROCESSING ASSESSMENT-2 (RIPA-2)   EVALUATION RESULTS and PLAN OF CARE    PATIENT NAME:  Chalo Gordon  (male)     MRN:  [id number]    :  1970  (46 y.o.)  STATUS:  Outpatient clinic   TODAY'S DATE:  2022  REFERRING PROVIDER:    Dr. Kassie Ledesma       PROVIDER NPI:  [id number]  SPECIFIC PROVIDER ORDER: [facility]-Speech Therapy  Date of order:  2022  EVALUATING THERAPIST: IVAN Sanders    CERTIFICATION/RECERTIFICATION PERIOD: 2022 to 22  INSURANCE PROVIDER:  Payor: Momo Mon / Plan: Momo Aw / Product Type: *No Product type* /    INSURANCE ID:  [id number] - (Medicaid Managed)    CPT Codes  EVALUATION: 03319  standardized cognitive performance testing (per hour)    TREATMENT:  Requesting treatment authorization for  12 visits over 12 weeks focusing on the following CPT codes:      95114  therapeutic interventions that focus on cognitive function , initial  15 min  22081  therapeutic interventions that focus on cognitive function, each additional 15 min  61492  speech/language tx     REFERRING/TREATMENT DIAGNOSIS:  Cognitive communication deficit    SPEECH THERAPY  PLAN OF CARE   The speech therapy  POC is established based on physician order, speech pathology diagnosis and results of clinical assessment     SPEECH PATHOLOGY DIAGNOSIS:    Mild cognitive communication deficit/stuttering    Outpatient Speech Pathology intervention is recommended 1 time  per week for the above certification period. Conditions Requiring Skilled Therapeutic Intervention for speech, language and/or cognition    Stuttering  Decreased short term memory  Decreased thought organization    Specific Speech Therapy Interventions to Include:    Therapeutic tasks for Cognition  Stuttering rehabilitation    Specific instructions for next treatment: To initiate POC  To initiate memory tasks  To initiate thought organization tasks  To initiate verbal fluency tasks    SHORT/LONG TERM GOALS  Pt will improve immediate, short term, recent memory during structured and unstructured tasks with 90% accuracy   Pt will improve thought organization during structured and unstructured tasks with 90% accuracy   Pt will improve motor programming to reduce dysfluent speech via compensatory strategies with 90% accuracy    Patient goals: Patient/family involved in developing goals and treatment plan:   Treatment goals discussed with Patient    The Patient understand(s) the diagnosis, prognosis and plan of care   The patient/family Agreed with above,     This plan may be re-evaluated and revised as warranted. Rehabilitation Potential/Prognosis: susana Dumont reports that he suffered a stroke in August 2020 during heart surgery. He states that he also had a seizure in December 2021 that resulted in hospitalization and decline in cognitive and fluency skills. Emeli Dumont reports that he had a stuttering problem in his youth but that he has been fluent as an adult until the seizure. He reports that he lives by himself with assistance from his girlfriend. He reports several issues with losing items but denies any issues with remembering to take medications. Stimulus questions were obtained from the RIPA-2.   There are 30 possible points for each subtest.   Severity ratings for each subtest were determined as follows:     RAW SCORE  SEVERITY    28-30  Within functional limits [facility])   25-27  Mild deficit    22-24  Moderate deficit    19-21  Marked deficit    16-18 Severe deficit   Below 16 Profound deficit         Subtest  Raw Score /Severity    Immediate Memory  26/30 MILD   Recent Memory   27/30 MILD   Temporal Orientation   (Recent Memory)   30/30 [facility]   Temporal Orientation   (Remote Memory)   30/30 WFL   Spatial Orientation   30/30 WFL   Orientation to Environment   30/30 WFL   Recall of General Information   30/30 WFL   Problem Solving & Abstract Reasoning   28/30 WFL   Organization   24/30 MODERATE   Auditory Processing   & Retention   27/30 MILD     VARIANT OBSERVATIONS PRESENT DURING THE EVALATION:   Errors  Partially correct  Self corrected      Fluency issues were noted during conversational speech only. They were minimal and not accompanied by any concomitant behaviors. He states that he was 100% fluent prior to the seizure. EDUCATION:   The Speech Language Pathologist (SLP) completed education regarding results of evaluation and that intervention is warranted at this time. Learner: Patient  Education: Reviewed results and recommendations of this evaluation  Evaluation of Education:  Gregg Palma understanding    This plan may be re-evaluated and revised as warranted. Treatment goals discussed with Patient   The Patient understand(s) the diagnosis, prognosis and plan of care     Evaluation Time includes thorough review of current medical information, gathering information on past medical history/social history and prior level of function, completion of standardized testing/informal observation of tasks, assessment of data and education on plan of care and goals. The admitting diagnosis and active problem list, as listed below have been reviewed prior to initiation of this evaluation.         ACTIVE PROBLEM LIST:   Patient Active Problem List   Diagnosis    S/P MVR (mitral valve replacement)    S/P AVR (aortic valve replacement) and aortoplasty    Hypertension    Hyperlipidemia    TBI (traumatic brain injury) ([address]    Seizure-like activity ([address]    Subdural hematoma (HCC)    History of CVA (cerebrovascular accident)    Other constipation    History of intracranial hemorrhage    Chronic atrial fibrillation ([address]    At high risk for bleeding       River Yost MA/LISANDRO-SLP  [address]           Phone: [phone]       If you have any questions or concerns, please don't hesitate to call. Thank you for your referral.    Physician/Provider Signature:________________________________Date:__________________  By signing above, the therapists plan is approved by the physician/provider. All patients under [employer] must be signed by physician/provider.

## 2022-06-10 ENCOUNTER — TREATMENT (OUTPATIENT)
Dept: SPEECH THERAPY | Age: 52
End: 2022-06-10
Payer: MEDICAID

## 2022-06-10 DIAGNOSIS — R41.841 COGNITIVE COMMUNICATION DEFICIT: Primary | ICD-10-CM

## 2022-06-10 PROCEDURE — 97129 THER IVNTJ 1ST 15 MIN: CPT | Performed by: SPEECH-LANGUAGE PATHOLOGIST

## 2022-06-10 PROCEDURE — 97130 THER IVNTJ EA ADDL 15 MIN: CPT | Performed by: SPEECH-LANGUAGE PATHOLOGIST

## 2022-06-13 ENCOUNTER — TELEPHONE (OUTPATIENT)
Dept: PRIMARY CARE CLINIC | Age: 52
End: 2022-06-13

## 2022-06-13 RX ORDER — LISINOPRIL 20 MG/1
TABLET ORAL
Qty: 90 TABLET | Refills: 1 | Status: SHIPPED | OUTPATIENT
Start: 2022-06-13

## 2022-06-13 RX ORDER — SPIRONOLACTONE 25 MG/1
25 TABLET ORAL DAILY
Qty: 90 TABLET | Refills: 1 | Status: SHIPPED | OUTPATIENT
Start: 2022-06-13

## 2022-06-13 RX ORDER — LISINOPRIL 20 MG/1
TABLET ORAL
Qty: 90 TABLET | Refills: 1 | Status: SHIPPED
Start: 2022-06-13 | End: 2022-06-16

## 2022-06-13 RX ORDER — WARFARIN SODIUM 10 MG/1
TABLET ORAL
Qty: 30 TABLET | Refills: 3 | Status: SHIPPED
Start: 2022-06-13 | End: 2022-09-27

## 2022-06-13 NOTE — TELEPHONE ENCOUNTER
Pharm calling questioning the metoprolol that was sent in for 25mg 1.5tab q8hr.   Per pharm he was taking the 50mg XR qd questioning the script they received and wanting to verify the dose was changed to 25 and to q8hr

## 2022-06-14 NOTE — PROGRESS NOTES
Patient seen for 45 minute in person session this date. Patient reports he is doing ok. Today, we worked on working memory tasks. He needed mod cues to achieve fair/fair+ performance with mod cues. He is quick to say that he doesn't know the answer but when cued to relax and 'take a minute to think about it' , he is able to recall 75% of the time with min cues. We discussed completing a daily journal/planner as well as establishing routines of where to put items to aid in recall. His fluency was much improved during conversational tasks today with min cues to slow rate needed. Homework activities encouraged. Will continue. Audra Sena.  Chastity Brooks MA/LISANDRO-SLP  [id number]    [facility] [id number]

## 2022-06-16 ENCOUNTER — OFFICE VISIT (OUTPATIENT)
Dept: PRIMARY CARE CLINIC | Age: 52
End: 2022-06-16
Payer: MEDICAID

## 2022-06-16 VITALS
OXYGEN SATURATION: 97 % | DIASTOLIC BLOOD PRESSURE: 80 MMHG | HEIGHT: 72 IN | RESPIRATION RATE: 16 BRPM | BODY MASS INDEX: 34.27 KG/M2 | HEART RATE: 75 BPM | SYSTOLIC BLOOD PRESSURE: 134 MMHG | WEIGHT: 253 LBS

## 2022-06-16 DIAGNOSIS — Z86.73 HISTORY OF CVA (CEREBROVASCULAR ACCIDENT): ICD-10-CM

## 2022-06-16 DIAGNOSIS — Z95.2 S/P AVR (AORTIC VALVE REPLACEMENT) AND AORTOPLASTY: ICD-10-CM

## 2022-06-16 DIAGNOSIS — G56.01 CARPAL TUNNEL SYNDROME OF RIGHT WRIST: ICD-10-CM

## 2022-06-16 DIAGNOSIS — I10 PRIMARY HYPERTENSION: ICD-10-CM

## 2022-06-16 DIAGNOSIS — I48.20 CHRONIC ATRIAL FIBRILLATION (HCC): ICD-10-CM

## 2022-06-16 DIAGNOSIS — I48.20 CHRONIC ATRIAL FIBRILLATION (HCC): Primary | ICD-10-CM

## 2022-06-16 DIAGNOSIS — E78.5 HYPERLIPIDEMIA, UNSPECIFIED HYPERLIPIDEMIA TYPE: ICD-10-CM

## 2022-06-16 PROBLEM — S06.9XAA TBI (TRAUMATIC BRAIN INJURY): Status: RESOLVED | Noted: 2021-12-08 | Resolved: 2022-06-16

## 2022-06-16 LAB
ALBUMIN SERPL-MCNC: 4.5 G/DL (ref 3.5–5.2)
ALP BLD-CCNC: 66 U/L (ref 40–129)
ALT SERPL-CCNC: 39 U/L (ref 0–40)
ANION GAP SERPL CALCULATED.3IONS-SCNC: 17 MMOL/L (ref 7–16)
AST SERPL-CCNC: 29 U/L (ref 0–39)
BILIRUB SERPL-MCNC: 0.8 MG/DL (ref 0–1.2)
BUN BLDV-MCNC: 19 MG/DL (ref 6–20)
CALCIUM SERPL-MCNC: 9.1 MG/DL (ref 8.6–10.2)
CHLORIDE BLD-SCNC: 103 MMOL/L (ref 98–107)
CHOLESTEROL, TOTAL: 151 MG/DL (ref 0–199)
CO2: 19 MMOL/L (ref 22–29)
CREAT SERPL-MCNC: 1.1 MG/DL (ref 0.7–1.2)
GFR AFRICAN AMERICAN: >60
GFR NON-AFRICAN AMERICAN: >60 ML/MIN/1.73
GLUCOSE BLD-MCNC: 97 MG/DL (ref 74–99)
HCT VFR BLD CALC: 45.7 % (ref 37–54)
HDLC SERPL-MCNC: 47 MG/DL
HEMOGLOBIN: 14.9 G/DL (ref 12.5–16.5)
INR BLD: 4.4
LDL CHOLESTEROL CALCULATED: 73 MG/DL (ref 0–99)
MCH RBC QN AUTO: 29.2 PG (ref 26–35)
MCHC RBC AUTO-ENTMCNC: 32.6 % (ref 32–34.5)
MCV RBC AUTO: 89.4 FL (ref 80–99.9)
PDW BLD-RTO: 13.2 FL (ref 11.5–15)
PLATELET # BLD: 204 E9/L (ref 130–450)
PMV BLD AUTO: 10.5 FL (ref 7–12)
POTASSIUM SERPL-SCNC: 4 MMOL/L (ref 3.5–5)
PROTHROMBIN TIME: 47.6 SEC (ref 9.3–12.4)
RBC # BLD: 5.11 E12/L (ref 3.8–5.8)
SODIUM BLD-SCNC: 139 MMOL/L (ref 132–146)
TOTAL PROTEIN: 7.5 G/DL (ref 6.4–8.3)
TRIGL SERPL-MCNC: 157 MG/DL (ref 0–149)
TSH SERPL DL<=0.05 MIU/L-ACNC: 0.75 UIU/ML (ref 0.27–4.2)
VLDLC SERPL CALC-MCNC: 31 MG/DL
WBC # BLD: 7 E9/L (ref 4.5–11.5)

## 2022-06-16 PROCEDURE — G8417 CALC BMI ABV UP PARAM F/U: HCPCS | Performed by: FAMILY MEDICINE

## 2022-06-16 PROCEDURE — G8427 DOCREV CUR MEDS BY ELIG CLIN: HCPCS | Performed by: FAMILY MEDICINE

## 2022-06-16 PROCEDURE — 99214 OFFICE O/P EST MOD 30 MIN: CPT | Performed by: FAMILY MEDICINE

## 2022-06-16 PROCEDURE — 1036F TOBACCO NON-USER: CPT | Performed by: FAMILY MEDICINE

## 2022-06-16 PROCEDURE — 3017F COLORECTAL CA SCREEN DOC REV: CPT | Performed by: FAMILY MEDICINE

## 2022-06-16 ASSESSMENT — ENCOUNTER SYMPTOMS
COLOR CHANGE: 0
EYE REDNESS: 0
CHANGE IN BOWEL HABIT: 0
COUGH: 0
SHORTNESS OF BREATH: 0
CONSTIPATION: 0
BACK PAIN: 0
CHEST TIGHTNESS: 0
APNEA: 0
RHINORRHEA: 0
EYE PAIN: 0
VOMITING: 0
NAUSEA: 0
WHEEZING: 0
DIARRHEA: 0
SORE THROAT: 0
SINUS PRESSURE: 0
ABDOMINAL PAIN: 0
EYE ITCHING: 0
BLOOD IN STOOL: 0

## 2022-06-16 ASSESSMENT — PATIENT HEALTH QUESTIONNAIRE - PHQ9
2. FEELING DOWN, DEPRESSED OR HOPELESS: 0
SUM OF ALL RESPONSES TO PHQ QUESTIONS 1-9: 0
SUM OF ALL RESPONSES TO PHQ9 QUESTIONS 1 & 2: 0
1. LITTLE INTEREST OR PLEASURE IN DOING THINGS: 0

## 2022-06-16 NOTE — PROGRESS NOTES
Chief Complaint:     Chief Complaint   Patient presents with    Hypertension    Hyperlipidemia    Numbness     right hand,          Hypertension  Pertinent negatives include no chest pain, headaches, neck pain, palpitations or shortness of breath. There is no history of chronic renal disease. Hyperlipidemia  This is a chronic problem. The current episode started more than 1 year ago. The problem is controlled. He has no history of chronic renal disease, diabetes, hypothyroidism or obesity. Associated symptoms include leg pain and myalgias. Pertinent negatives include no chest pain or shortness of breath. Current antihyperlipidemic treatment includes statins. The current treatment provides significant improvement of lipids. There are no compliance problems. Risk factors for coronary artery disease include dyslipidemia and male sex. Heart Problem  This is a recurrent (s/p valve replacement) problem. The current episode started 1 to 4 weeks ago. The problem occurs daily. The problem has been unchanged. Associated symptoms include fatigue, myalgias, numbness and weakness. Pertinent negatives include no abdominal pain, arthralgias, change in bowel habit, chest pain, chills, congestion, coughing, diaphoresis, fever, headaches, joint swelling, nausea, neck pain, rash, sore throat or vomiting. Nothing aggravates the symptoms. He has tried nothing for the symptoms. The treatment provided no relief. Neurologic Problem  The patient's primary symptoms include weakness. Associated symptoms include fatigue. Pertinent negatives include no abdominal pain, back pain, chest pain, diaphoresis, dizziness, fever, headaches, light-headedness, nausea, neck pain, palpitations, shortness of breath or vomiting. Cerebrovascular Accident  This is a new problem. The current episode started more than 1 month ago. The problem has been gradually improving. Associated symptoms include fatigue, myalgias, numbness and weakness.  Pertinent negatives include no abdominal pain, arthralgias, change in bowel habit, chest pain, chills, congestion, coughing, diaphoresis, fever, headaches, joint swelling, nausea, neck pain, rash, sore throat or vomiting. Nothing aggravates the symptoms. He has tried nothing for the symptoms. The treatment provided no relief. Hand Pain   The incident occurred more than 1 week ago. The incident occurred at home. The injury mechanism is unknown. The pain is present in the right wrist and right hand. The quality of the pain is described as aching and shooting. The pain has been intermittent since the incident. Associated symptoms include muscle weakness, numbness and tingling. Pertinent negatives include no chest pain. The symptoms are aggravated by movement, palpation and lifting. He has tried nothing for the symptoms. The treatment provided no relief. 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  Per report, he was found down 8/21 with noted aphasia, R sided weakness. Presented to ED where he was found to have L ALIYAH stroke with therapeutic INR, + BC for MSSA. Subsequent workup showed normal EF, prosthetic MV with two mobile masses consistent with vegetations. Was evaluated by ID at OSH.  He was then transferred to [facility] for further evalua    Intracranial hemorrhage ([address] 12/07/2021    LONG TERM ANTICOAGULENT USE     Memory loss     dont remember seizure    Seizure ([address] 12/07/2021    only one pt has had    Stroke [facility]) 08/2020    2 left fingers 20% numb       Past Surgical History:   Procedure Laterality Date    AORTIC VALVE REPLACEMENT  01/01/2006    Dr Jaye Tapia   [address] AORTIC VALVE REPLACEMENT  09/03/2020    [facility]ando procedure(Aortic root replacement and AVR with Homograft #[id number], reconstrcution of the Inter-valvular fibrosa with bovine pericardial patch) at [address] [place] [address]  01/01/2006    Dr Jaye Tapia  [address] [place] [address]  09/03/2020    Biocor#31 at [facility]    TRANSESOPHAGEAL ECHOCARDIOGRAM N/A 2/25/2022    TRANSESOPHAGEAL ECHOCARDIOGRAM performed by Caitie Meeks MD at [facility] ENDOSCOPY       Current Outpatient Medications   Medication Sig Dispense Refill    Acetaminophen (TYLENOL PO) Take by mouth      metoprolol tartrate (LOPRESSOR) 25 MG tablet Take 1.5 tablets by mouth every 8 hours 405 tablet 1    lisinopril (PRINIVIL;ZESTRIL) 20 MG tablet TAKE ONE TABLET BY MOUTH DAILY 90 tablet 1    spironolactone (ALDACTONE) 25 MG tablet Take 1 tablet by mouth daily 90 tablet 1    warfarin (COUMADIN) 10 MG tablet TAKE ONE TABLET BY MOUTH DAILY or as directed by the doctor 30 tablet 3    levETIRAcetam (KEPPRA) 750 MG tablet Take 1 tablet by mouth 2 times daily 180 tablet 3    docusate sodium (COLACE) 100 MG capsule Take 1 capsule by mouth daily as needed for Constipation 30 capsule 0    potassium chloride (KLOR-CON M) 20 MEQ extended release tablet Take 1 tablet by mouth 2 times daily 180 tablet 1    Multiple Vitamin (THERA/BETA-CAROTENE) TABS Take 1 tablet by mouth daily 30 tablet 3     No current facility-administered medications for this visit.        No Known Allergies    Social History     Socioeconomic History    Marital status:      Spouse name: None    Number of children: None    Years of education: None    Highest education level: None   Occupational History     Comment: [employer]   Tobacco Use    Smoking status: Never Smoker    Smokeless tobacco: Never Used   Vaping Use    Vaping Use: Never used   Substance and Sexual Activity    Alcohol use: Yes     Comment: 1-2 beers per week not since dec    Drug use: Never    Sexual activity: None   Other Topics Concern    None   Social History Narrative    None     Social Determinants of Health     Financial Resource Strain:     Difficulty of Paying Living Expenses: Not on file   Food Insecurity:     Worried About Running Out of Food in the Last Year: Not on file    Nica of Food in the Last Year: Not on file   Transportation Needs:     Lack of Transportation (Medical): Not on file    Lack of Transportation (Non-Medical): Not on file   Physical Activity:     Days of Exercise per Week: Not on file    Minutes of Exercise per Session: Not on file   Stress:     Feeling of Stress : Not on file   Social Connections:     Frequency of Communication with Friends and Family: Not on file    Frequency of Social Gatherings with Friends and Family: Not on file    Attends Protestant Services: Not on file    Active Member of [address] [place] [address] or Organizations: Not on file    Attends Club or Organization Meetings: Not on file    Marital Status: Not on file   Intimate Partner Violence:     Fear of Current or Ex-Partner: Not on file    Emotionally Abused: Not on file    Physically Abused: Not on file    Sexually Abused: Not on file   Housing Stability:     Unable to Pay for Housing in the Last Year: Not on file    Number of [place] in the Last Year: Not on file    Unstable Housing in the Last Year: Not on file       History reviewed. No pertinent family history. Review of Systems   Constitutional: Positive for fatigue. Negative for activity change, appetite change, chills, diaphoresis and fever. HENT: Negative for congestion, ear pain, hearing loss, nosebleeds, rhinorrhea, sinus pressure and sore throat. Eyes: Negative for pain, redness, itching and visual disturbance.    Respiratory: Negative for apnea, cough, chest tightness, shortness of breath and wheezing. Cardiovascular: Negative for chest pain, palpitations and leg swelling. Gastrointestinal: Negative for abdominal pain, blood in stool, change in bowel habit, constipation, diarrhea, nausea and vomiting. Endocrine: Negative. Genitourinary: Negative for decreased urine volume, difficulty urinating, dysuria, frequency, hematuria and urgency. Musculoskeletal: Positive for myalgias. Negative for arthralgias, back pain, gait problem, joint swelling and neck pain. Skin: Negative for color change and rash. Allergic/Immunologic: Negative for environmental allergies and food allergies. Neurological: Positive for tingling, weakness and numbness. Negative for dizziness, light-headedness and headaches. Hematological: Negative for adenopathy. Does not bruise/bleed easily. Psychiatric/Behavioral: Negative for behavioral problems, dysphoric mood and sleep disturbance. The patient is not nervous/anxious and is not hyperactive. All other systems reviewed and are negative. /80   Pulse 75   Resp 16   Ht 6' (1.829 m)   Wt 253 lb (114.8 kg)   SpO2 97%   BMI 34.31 kg/m²     Physical Exam  Vitals and nursing note reviewed. Constitutional:       General: He is not in acute distress. Appearance: Normal appearance. He is well-developed. HENT:      Head: Normocephalic and atraumatic. Right Ear: Hearing, tympanic membrane and external ear normal. No tenderness. No middle ear effusion. Left Ear: Hearing, tympanic membrane and external ear normal. No tenderness. No middle ear effusion. Nose: Nose normal. No congestion or rhinorrhea. Right Turbinates: Not enlarged. Left Turbinates: Not enlarged. Mouth/Throat:      Mouth: Mucous membranes are moist.      Tongue: No lesions. Pharynx: Oropharynx is clear. No oropharyngeal exudate or posterior oropharyngeal erythema.    Eyes:      General: No scleral icterus. Conjunctiva/sclera: Conjunctivae normal.      Pupils: Pupils are equal, round, and reactive to light. Neck:      Thyroid: No thyromegaly. Cardiovascular:      Rate and Rhythm: Normal rate and regular rhythm. Heart sounds: Normal heart sounds. No murmur heard. Pulmonary:      Effort: Pulmonary effort is normal. No respiratory distress. Breath sounds: Normal breath sounds. No wheezing or rales. Abdominal:      General: Bowel sounds are normal. There is no distension. Palpations: Abdomen is soft. Tenderness: There is no abdominal tenderness. Musculoskeletal:         General: No tenderness. Normal range of motion. Cervical back: Normal range of motion and neck supple. No rigidity. No muscular tenderness. Lymphadenopathy:      Cervical: No cervical adenopathy. Skin:     General: Skin is warm and dry. Findings: No erythema or rash. Neurological:      General: No focal deficit present. Mental Status: He is alert and oriented to person, place, and time. Cranial Nerves: No cranial nerve deficit. Deep Tendon Reflexes: Reflexes are normal and symmetric. Reflexes normal.   Psychiatric:         Mood and Affect: Mood normal.                                 ASSESSMENT/PLAN:    Patient Active Problem List   Diagnosis    S/P MVR (mitral valve replacement)    S/P AVR (aortic valve replacement) and aortoplasty    Hypertension    Hyperlipidemia    Seizure-like activity ([address]    History of CVA (cerebrovascular accident)    Other constipation    History of intracranial hemorrhage    Chronic atrial fibrillation (HCC)    At high risk for bleeding    Carpal tunnel syndrome of right wrist       Lorna Erlinda was seen today for hypertension, hyperlipidemia and numbness. Diagnoses and all orders for this visit:    Chronic atrial fibrillation ([address]  -     Protime-INR; Future    Primary hypertension  -     CBC;  Future  -     Comprehensive Metabolic Panel; Future  -     Lipid Panel; Future  -     TSH; Future    S/P AVR (aortic valve replacement) and aortoplasty  -     Protime-INR; Future    History of CVA (cerebrovascular accident)    Hyperlipidemia, unspecified hyperlipidemia type  -     CBC; Future  -     Comprehensive Metabolic Panel; Future  -     Lipid Panel; Future  -     TSH; Future    Carpal tunnel syndrome of right wrist  -     XR WRIST RIGHT (MIN 3 VIEWS); Future  -     XR HAND RIGHT (MIN 3 VIEWS); Future  -     EMG; Future          Return if symptoms worsen or fail to improve. I spent 30 minutes with this patient. I spent greater than 50% of the time counseling this patient.         Carmen Contreras DO  6/16/2022  10:15 AM

## 2022-06-17 ENCOUNTER — TREATMENT (OUTPATIENT)
Dept: SPEECH THERAPY | Age: 52
End: 2022-06-17
Payer: MEDICAID

## 2022-06-17 DIAGNOSIS — R41.841 COGNITIVE COMMUNICATION DEFICIT: Primary | ICD-10-CM

## 2022-06-17 PROCEDURE — 97129 THER IVNTJ 1ST 15 MIN: CPT | Performed by: SPEECH-LANGUAGE PATHOLOGIST

## 2022-06-17 PROCEDURE — 97130 THER IVNTJ EA ADDL 15 MIN: CPT | Performed by: SPEECH-LANGUAGE PATHOLOGIST

## 2022-06-24 ENCOUNTER — TELEPHONE (OUTPATIENT)
Dept: SPEECH THERAPY | Age: 52
End: 2022-06-24

## 2022-06-24 NOTE — PROGRESS NOTES
Patient seen for 45 minute in person session this date. Patient reports doing well. He states that he is having a 'heart procedure' in mid July and is starting a part time job at [employer] on 06/27/22. He states he feels he is ready to return to work. We discussed and he was instructed on strategies to implement to stay focused on his work tasks and to recall instructions that he may receive from superiors. He appeared open to using the strategies. We worked today with the [address] where he was able to achieve 90% acc with recall of words presented visually and 92% with focus/speed task. Speech fluency during structured conversation was good with minimal issues noted. We agreed to continue x1 session next week and then work within his work schedule a few sessions to address any issues that may occur when he starts working. Homework activities encouraged. Will continue. Darius Agustin.  Samantha Paniagua MA/CCC-SLP  [id number]    [facility] [id number]

## 2022-06-28 ENCOUNTER — ANTI-COAG VISIT (OUTPATIENT)
Dept: PRIMARY CARE CLINIC | Age: 52
End: 2022-06-28
Payer: MEDICAID

## 2022-06-28 ENCOUNTER — NURSE ONLY (OUTPATIENT)
Dept: PRIMARY CARE CLINIC | Age: 52
End: 2022-06-28

## 2022-06-28 DIAGNOSIS — Z95.2 S/P AVR (AORTIC VALVE REPLACEMENT) AND AORTOPLASTY: Primary | ICD-10-CM

## 2022-06-28 DIAGNOSIS — Z95.2 S/P MVR (MITRAL VALVE REPLACEMENT): ICD-10-CM

## 2022-06-28 DIAGNOSIS — Z95.2 S/P MVR (MITRAL VALVE REPLACEMENT): Primary | ICD-10-CM

## 2022-06-28 DIAGNOSIS — Z95.2 S/P AVR (AORTIC VALVE REPLACEMENT) AND AORTOPLASTY: ICD-10-CM

## 2022-06-28 LAB — INTERNATIONAL NORMALIZATION RATIO, POC: 2.7

## 2022-06-28 PROCEDURE — 85610 PROTHROMBIN TIME: CPT | Performed by: FAMILY MEDICINE

## 2022-07-05 ENCOUNTER — NURSE ONLY (OUTPATIENT)
Dept: PRIMARY CARE CLINIC | Age: 52
End: 2022-07-05
Payer: MEDICAID

## 2022-07-05 DIAGNOSIS — Z95.2 S/P AVR (AORTIC VALVE REPLACEMENT) AND AORTOPLASTY: ICD-10-CM

## 2022-07-05 DIAGNOSIS — Z95.2 S/P MVR (MITRAL VALVE REPLACEMENT): ICD-10-CM

## 2022-07-05 DIAGNOSIS — I48.20 CHRONIC ATRIAL FIBRILLATION (HCC): Primary | ICD-10-CM

## 2022-07-05 LAB
INTERNATIONAL NORMALIZATION RATIO, POC: 1.3
PROTHROMBIN TIME, POC: 15.4

## 2022-07-05 PROCEDURE — 85610 PROTHROMBIN TIME: CPT | Performed by: FAMILY MEDICINE

## 2022-07-12 ENCOUNTER — NURSE ONLY (OUTPATIENT)
Dept: PRIMARY CARE CLINIC | Age: 52
End: 2022-07-12
Payer: MEDICAID

## 2022-07-12 DIAGNOSIS — Z95.2 S/P AVR (AORTIC VALVE REPLACEMENT) AND AORTOPLASTY: Primary | ICD-10-CM

## 2022-07-12 DIAGNOSIS — I48.20 CHRONIC ATRIAL FIBRILLATION (HCC): ICD-10-CM

## 2022-07-12 DIAGNOSIS — Z95.2 S/P MVR (MITRAL VALVE REPLACEMENT): ICD-10-CM

## 2022-07-12 LAB
INTERNATIONAL NORMALIZATION RATIO, POC: 1.3
PROTHROMBIN TIME, POC: 15.4

## 2022-07-12 PROCEDURE — 85610 PROTHROMBIN TIME: CPT | Performed by: FAMILY MEDICINE

## 2022-08-11 ENCOUNTER — HOSPITAL ENCOUNTER (OUTPATIENT)
Dept: NEUROLOGY | Age: 52
Discharge: HOME | End: 2022-08-11
Payer: MEDICAID

## 2022-08-11 VITALS — BODY MASS INDEX: 33.86 KG/M2 | HEIGHT: 72 IN | WEIGHT: 250 LBS

## 2022-08-11 DIAGNOSIS — G56.01 CARPAL TUNNEL SYNDROME OF RIGHT WRIST: ICD-10-CM

## 2022-08-11 PROCEDURE — 95886 MUSC TEST DONE W/N TEST COMP: CPT

## 2022-08-11 PROCEDURE — 95909 NRV CNDJ TST 5-6 STUDIES: CPT

## 2022-08-11 NOTE — PROCEDURES
[address]   Electrodiagnostic Laboratory  [facility]        Full Name: Norma Langley Gender: Male  MRN: [id number] YOB: 1970  Location: Outpt. Visit Date: 8/11/2022 10:21  Age: 45 Years 3 Months Old  Examining Physician: Dr. Román Renee  Referring Physician: Dr. Nii uRcker  Technician: Radames Toscano   Height: 6 feet 0 inch  Weight: 250 lbs  BMI: 34  Notes: Carpal tunnel synd. rt. wrist G56.01        Motor NCS      Nerve / Sites Lat. Amplitude Distance Lat Diff Velocity Temp. Amp. 1-2    ms mV cm ms m/s °C %   R Median - APB      Wrist 5.73 9.9 8   32 100      Elbow 10.16 9.0 22 4.43 50 32 90.7   R Ulnar - ADM      Wrist 3.07 11.9 8   32 100      B. Elbow 7.24 11.3 21 4.17 50 32 95.5      A. Elbow 9.38 11.2 10 2.14 47 32 94       Sensory NCS      Nerve / Sites Onset Lat Peak Lat PP Amp Distance Velocity Temp.    ms ms µV cm m/s °C   R Median - Digit II (Antidromic)      Mid Palm 1.41 2.19 58.2 7 50 32      Wrist 4.38 5.99 48.4 14 32 32   R Ulnar - Digit V (Antidromic)      Wrist 2.19 3.39 14.7 14 64 32   R Radial - Anatomical snuff box (Forearm)      Forearm 2.03 2.71 35.2 10 49 32       F  Wave      Nerve F Lat M Lat F-M Lat    ms ms ms   R Median - APB 32.0 5.9 26.0   R Ulnar - ADM 32.6 3.3 29.3         EMG         EMG Summary Table     Spontaneous MUAP Recruitment   Muscle IA Fib PSW Fasc H.F. Amp Dur. PPP Pattern   R. First dorsal interosseous N None None None None N N N N   R. Abductor pollicis brevis N None None None None N N N N   R. Flexor pollicis longus N None None None None N N N N   R. Flexor digitorum profundus (Ulnar) N None None None None N N N N   R. Extensor indicis proprius N None None None None N N N N   R. Brachioradialis N None None None None N N N N   R. Biceps brachii N None None None None N N N N   R. Triceps brachii N None None None None N N N N   R.  Deltoid N None None None None N N N N   R. Cervical paraspinals (low) N None None None None N N N N   R. Cervical paraspinals (mid) N None None None None N N N N         Nerve conduction studies in the right arm found moderate delay in the distal latency of the right median nerve at the wrist.  The right median palmar latencies were slightly delayed, with decreased antidromic conduction velocities. The right median nerve F wave response was also prolonged. These findings were compared to the referential values in this laboratory, available upon request.    Monopolar needle examination of the right arm and paraspinals was unrevealing. Electrodiagnostic examination of the right arm disclosed evidence diagnostic of a median neuropathy at/or distal to the wrist, moderate severity. These findings were consistent with carpal tunnel syndrome. There were no other peripheral neuropathies. There were no motor radiculopathies or intracanalicular lesions. Sensory radiculopathies cannot be evaluated by electrodiagnostic means. Clinically, the patient presented with tingling sensations in the right hand, especially at night. On brief neurological examination, I found no motor or sensory compromise. His difficulties are related to the right carpal tunnel syndrome. Clinical correlation was highly advised.

## 2022-09-27 ENCOUNTER — OFFICE VISIT (OUTPATIENT)
Dept: NEUROLOGY | Age: 52
End: 2022-09-27
Payer: MEDICAID

## 2022-09-27 VITALS
TEMPERATURE: 97.8 F | HEART RATE: 63 BPM | OXYGEN SATURATION: 97 % | WEIGHT: 250 LBS | DIASTOLIC BLOOD PRESSURE: 82 MMHG | SYSTOLIC BLOOD PRESSURE: 132 MMHG | HEIGHT: 72 IN | BODY MASS INDEX: 33.86 KG/M2

## 2022-09-27 DIAGNOSIS — R56.9 SEIZURE-LIKE ACTIVITY (HCC): Primary | ICD-10-CM

## 2022-09-27 DIAGNOSIS — G56.01 CARPAL TUNNEL SYNDROME OF RIGHT WRIST: ICD-10-CM

## 2022-09-27 DIAGNOSIS — I62.9 INTRACRANIAL HEMORRHAGE (HCC): ICD-10-CM

## 2022-09-27 PROCEDURE — 3017F COLORECTAL CA SCREEN DOC REV: CPT | Performed by: PSYCHIATRY & NEUROLOGY

## 2022-09-27 PROCEDURE — 1036F TOBACCO NON-USER: CPT | Performed by: PSYCHIATRY & NEUROLOGY

## 2022-09-27 PROCEDURE — G8417 CALC BMI ABV UP PARAM F/U: HCPCS | Performed by: PSYCHIATRY & NEUROLOGY

## 2022-09-27 PROCEDURE — G8427 DOCREV CUR MEDS BY ELIG CLIN: HCPCS | Performed by: PSYCHIATRY & NEUROLOGY

## 2022-09-27 PROCEDURE — 99215 OFFICE O/P EST HI 40 MIN: CPT | Performed by: PSYCHIATRY & NEUROLOGY

## 2022-09-27 RX ORDER — WARFARIN SODIUM 10 MG/1
TABLET ORAL
Qty: 30 TABLET | Refills: 0 | Status: SHIPPED | OUTPATIENT
Start: 2022-09-27

## 2022-09-27 NOTE — PROGRESS NOTES
This 49-year-old right-handed man was referred for evaluation and management of neurological deficits resulting from a right intracranial hemorrhage. He remained a good historian. He was alone for this interview. His medications continued as warfarin, metoprolol, spironolactone, lisinopril, levetiracetam, docusate and multivitamins. His medical history was remarkable for hypertension and severe aortic and mitral valvular disease from presumed bacterial endocarditis. He will be undergoing a Watchman implantation next month in [place]. He again denied diabetes mellitus, other heart disorders, lung disease, gastrointestinal issues, skin abnormalities or depression. His surgeries included multiple open heart interventions, consisting of initial medical prostatic repair of the aortic and mitral valves and subsequent porcine valve replacements 2 years ago. He was on anticoagulation for years; hopefully to be discontinued after the above procedure. He slept moderately well. He ate well. He received his COVID-19 vaccinations and boosters. His neurological problems 1 year ago, when he was found down at home by his girlfriend. He did not recall any preceding events before losing consciousness. A seizure was presumed. He was diagnosed with a right parafalcine subdural hematoma and right anterior cerebral artery infarction, from the anticoagulation. Admission INR was 3.6. He did not require surgical evacuation. Upon awakening, he experienced weakness in his left arm and leg as well as sensory loss on that side. His thinking was also slower, without slurring of his words or speech arrest.  He gradually resolved, except for persistent numbness of the right side and minimal memory deficits. Speech therapy identified these deficits; he felt he did not require memory agents. He still reported tingling sensations in his right hand, especially at night.   Electrodiagnostic examination revealed moderate right carpal syndrome. He denied other right-sided involvement, headaches, visual abnormalities, speech difficulties, swallowing or chewing issues, incontinence or additional loss of consciousness. There were no recurrent seizures. He tolerated levetiracetam well. There were no other neurological or medical issues. Review of systems was otherwise unremarkable. Physical examination displayed stable vital signs. Blood pressure was 132/82. He was a middle-aged man in no acute distress, who was alert, cooperative and oriented. He was very pleasant, not quickly responding to questioning. His skin was unremarkable. Head examination was unrevealing. There were +1 carotid upstrokes without bruits. His lungs were clear to auscultation. Cardiac testing produced an irregularly irregular rhythm, without murmurs, gallops or rubs. There were +1 peripheral pulses without bruits. His limbs again displayed moderate brawny induration in both ankles and lower calves. Neurological examination produced the above mental status. His responses were slow but no longer inaccurate. There continued minimal remote memory deficits. I found no dysarthria or aphasia. Cranial nerve testing was unrevealing, without visual field deficits or sensory loss. There were normal tone and bulk with 5/5 strength throughout. Reflexes were +1 throughout. There was no arm drift. All sensory modalities were decreased on the left but intact on the right. Finger-to-nose and heel-to-shin testings were performed well. Rapid alternating movements and fine finger movements were slightly decreased on the left. He walked with a minimal left foot drop. Laboratory data included a recent unremarkable CMP and CBC with differential.  His current INR was 2.5. A remote MRI displayed encephalomalacia of the right medial frontal lobes, from remote hemorrhaging. CT angiography were unrevealing.   With the rest  This individual suffered a right anterior cerebral artery hemorrhagic infarction with an associated subdural hemorrhage, due to excessive warfarin administration and a possible fall. Fortunately, he recovered well from that insult, left with only minimal cognitive issues, left hemisensory loss, decreased dexterity in the left hand and minimal left foot drop. He was also afflicted with a possible isolated seizure. I agree with continuing levetiracetam at 750 mg twice daily for now. He does not require memory agents. He does not require AFO bracing at this time. I believe he deserves disability determination. He is stable medically, despite his multiple comorbidities at a young age. Hopefully, anticoagulation can be discontinued with the placement of a Watchman device. He will return in 6 months, to see Dr. Desmond Membreno. Levetiracetam was maintained at 750 mg twice daily. We will use right wrist splinting for his carpal tunnel syndrome. If that disorder remains problematic, carpal tunnel release will be considered. He will report back after the Watchman device was implanted. He will call anytime if problems arise. I spent 40 minutes with the patient with over 50 % spent in counseling and disease management. All patient issues were addressed and all questions were answered.

## 2022-10-10 ENCOUNTER — OFFICE VISIT (OUTPATIENT)
Dept: PRIMARY CARE CLINIC | Age: 52
End: 2022-10-10
Payer: MEDICAID

## 2022-10-10 VITALS
BODY MASS INDEX: 34.54 KG/M2 | OXYGEN SATURATION: 95 % | SYSTOLIC BLOOD PRESSURE: 130 MMHG | WEIGHT: 255 LBS | TEMPERATURE: 97.6 F | HEART RATE: 59 BPM | DIASTOLIC BLOOD PRESSURE: 80 MMHG | HEIGHT: 72 IN

## 2022-10-10 DIAGNOSIS — I10 PRIMARY HYPERTENSION: ICD-10-CM

## 2022-10-10 DIAGNOSIS — Z95.2 S/P MVR (MITRAL VALVE REPLACEMENT): ICD-10-CM

## 2022-10-10 DIAGNOSIS — Z00.01 ENCOUNTER FOR GENERAL ADULT MEDICAL EXAMINATION WITH ABNORMAL FINDINGS: Primary | ICD-10-CM

## 2022-10-10 DIAGNOSIS — Z86.73 HISTORY OF CVA (CEREBROVASCULAR ACCIDENT): ICD-10-CM

## 2022-10-10 DIAGNOSIS — E78.5 HYPERLIPIDEMIA, UNSPECIFIED HYPERLIPIDEMIA TYPE: ICD-10-CM

## 2022-10-10 DIAGNOSIS — Z95.2 S/P AVR (AORTIC VALVE REPLACEMENT) AND AORTOPLASTY: ICD-10-CM

## 2022-10-10 DIAGNOSIS — I48.20 CHRONIC ATRIAL FIBRILLATION (HCC): ICD-10-CM

## 2022-10-10 DIAGNOSIS — Z12.11 SCREENING FOR MALIGNANT NEOPLASM OF COLON: ICD-10-CM

## 2022-10-10 DIAGNOSIS — Z00.01 ENCOUNTER FOR GENERAL ADULT MEDICAL EXAMINATION WITH ABNORMAL FINDINGS: ICD-10-CM

## 2022-10-10 LAB
ALBUMIN SERPL-MCNC: 4.5 G/DL (ref 3.5–5.2)
ALP BLD-CCNC: 58 U/L (ref 40–129)
ALT SERPL-CCNC: 26 U/L (ref 0–40)
ANION GAP SERPL CALCULATED.3IONS-SCNC: 12 MMOL/L (ref 7–16)
AST SERPL-CCNC: 29 U/L (ref 0–39)
BILIRUB SERPL-MCNC: 0.4 MG/DL (ref 0–1.2)
BUN BLDV-MCNC: 20 MG/DL (ref 6–20)
CALCIUM SERPL-MCNC: 9.6 MG/DL (ref 8.6–10.2)
CHLORIDE BLD-SCNC: 102 MMOL/L (ref 98–107)
CHOLESTEROL, TOTAL: 226 MG/DL (ref 0–199)
CO2: 24 MMOL/L (ref 22–29)
CREAT SERPL-MCNC: 1.1 MG/DL (ref 0.7–1.2)
GFR AFRICAN AMERICAN: >60
GFR NON-AFRICAN AMERICAN: >60 ML/MIN/1.73
GLUCOSE BLD-MCNC: 90 MG/DL (ref 74–99)
HCT VFR BLD CALC: 49 % (ref 37–54)
HDLC SERPL-MCNC: 49 MG/DL
HEMOGLOBIN: 15.6 G/DL (ref 12.5–16.5)
INR BLD: 3.7
LDL CHOLESTEROL CALCULATED: 151 MG/DL (ref 0–99)
MCH RBC QN AUTO: 29 PG (ref 26–35)
MCHC RBC AUTO-ENTMCNC: 31.8 % (ref 32–34.5)
MCV RBC AUTO: 91.1 FL (ref 80–99.9)
PDW BLD-RTO: 13.2 FL (ref 11.5–15)
PLATELET # BLD: 254 E9/L (ref 130–450)
PMV BLD AUTO: 10.6 FL (ref 7–12)
POTASSIUM SERPL-SCNC: 4.7 MMOL/L (ref 3.5–5)
PROTHROMBIN TIME: 39.8 SEC (ref 9.3–12.4)
RBC # BLD: 5.38 E12/L (ref 3.8–5.8)
SODIUM BLD-SCNC: 138 MMOL/L (ref 132–146)
TOTAL PROTEIN: 7.9 G/DL (ref 6.4–8.3)
TRIGL SERPL-MCNC: 131 MG/DL (ref 0–149)
TSH SERPL DL<=0.05 MIU/L-ACNC: 0.55 UIU/ML (ref 0.27–4.2)
VLDLC SERPL CALC-MCNC: 26 MG/DL
WBC # BLD: 7.1 E9/L (ref 4.5–11.5)

## 2022-10-10 PROCEDURE — 90674 CCIIV4 VAC NO PRSV 0.5 ML IM: CPT | Performed by: FAMILY MEDICINE

## 2022-10-10 PROCEDURE — G8482 FLU IMMUNIZE ORDER/ADMIN: HCPCS | Performed by: FAMILY MEDICINE

## 2022-10-10 PROCEDURE — 90471 IMMUNIZATION ADMIN: CPT | Performed by: FAMILY MEDICINE

## 2022-10-10 PROCEDURE — 99396 PREV VISIT EST AGE 40-64: CPT | Performed by: FAMILY MEDICINE

## 2022-10-10 RX ORDER — RUXOLITINIB 15 MG/G
CREAM TOPICAL
COMMUNITY
Start: 2022-10-07

## 2022-10-10 ASSESSMENT — ENCOUNTER SYMPTOMS
NAUSEA: 0
DIARRHEA: 0
APNEA: 0
RHINORRHEA: 0
CHANGE IN BOWEL HABIT: 0
SINUS PRESSURE: 0
EYE REDNESS: 0
BLOOD IN STOOL: 0
COLOR CHANGE: 0
CHEST TIGHTNESS: 0
ABDOMINAL PAIN: 0
SORE THROAT: 0
EYE ITCHING: 0
WHEEZING: 0
BACK PAIN: 0
COUGH: 0
SHORTNESS OF BREATH: 0
VOMITING: 0
EYE PAIN: 0
CONSTIPATION: 0

## 2022-10-10 ASSESSMENT — PATIENT HEALTH QUESTIONNAIRE - PHQ9
SUM OF ALL RESPONSES TO PHQ QUESTIONS 1-9: 0
SUM OF ALL RESPONSES TO PHQ QUESTIONS 1-9: 0
2. FEELING DOWN, DEPRESSED OR HOPELESS: 0
SUM OF ALL RESPONSES TO PHQ9 QUESTIONS 1 & 2: 0
SUM OF ALL RESPONSES TO PHQ QUESTIONS 1-9: 0
SUM OF ALL RESPONSES TO PHQ QUESTIONS 1-9: 0
1. LITTLE INTEREST OR PLEASURE IN DOING THINGS: 0

## 2022-10-10 NOTE — PROGRESS NOTES
Chief Complaint:     Chief Complaint   Patient presents with    Check-Up         Hypertension  Pertinent negatives include no chest pain, headaches, neck pain, palpitations or shortness of breath. There is no history of chronic renal disease. Hyperlipidemia  This is a chronic problem. The current episode started more than 1 year ago. The problem is controlled. He has no history of chronic renal disease, diabetes, hypothyroidism or obesity. Associated symptoms include leg pain and myalgias. Pertinent negatives include no chest pain or shortness of breath. Current antihyperlipidemic treatment includes statins. The current treatment provides significant improvement of lipids. There are no compliance problems. Risk factors for coronary artery disease include dyslipidemia and male sex. Heart Problem  This is a recurrent (s/p valve replacement) problem. The current episode started 1 to 4 weeks ago. The problem occurs daily. The problem has been unchanged. Associated symptoms include fatigue, myalgias, numbness and weakness. Pertinent negatives include no abdominal pain, arthralgias, change in bowel habit, chest pain, chills, congestion, coughing, diaphoresis, fever, headaches, joint swelling, nausea, neck pain, rash, sore throat or vomiting. Nothing aggravates the symptoms. He has tried nothing for the symptoms. The treatment provided no relief. Neurologic Problem  The patient's primary symptoms include weakness. Associated symptoms include fatigue. Pertinent negatives include no abdominal pain, back pain, chest pain, diaphoresis, dizziness, fever, headaches, light-headedness, nausea, neck pain, palpitations, shortness of breath or vomiting. Cerebrovascular Accident  This is a new problem. The current episode started more than 1 month ago. The problem has been gradually improving. Associated symptoms include fatigue, myalgias, numbness and weakness.  Pertinent negatives include no abdominal pain, arthralgias, change in bowel habit, chest pain, chills, congestion, coughing, diaphoresis, fever, headaches, joint swelling, nausea, neck pain, rash, sore throat or vomiting. Nothing aggravates the symptoms. He has tried nothing for the symptoms. The treatment provided no relief. Hand Pain   The incident occurred more than 1 week ago. The incident occurred at home. The injury mechanism is unknown. The pain is present in the right wrist and right hand. The quality of the pain is described as aching and shooting. The pain has been Intermittent since the incident. Associated symptoms include muscle weakness, numbness and tingling. Pertinent negatives include no chest pain. The symptoms are aggravated by movement, palpation and lifting. He has tried nothing for the symptoms. The treatment provided no relief. Patient Active Problem List   Diagnosis    S/P MVR (mitral valve replacement)    S/P AVR (aortic valve replacement) and aortoplasty    Hypertension    Hyperlipidemia    Seizure-like activity ([facility]    History of CVA (cerebrovascular accident)    Other constipation    History of intracranial hemorrhage    Chronic atrial fibrillation (HCC)    At high risk for bleeding    Carpal tunnel syndrome of right wrist       Past Medical History:   Diagnosis Date    AF (atrial fibrillation) (HCC)     Hypertension     Infective endocarditis 8/26/2020    History  MVR/AVR (mechanical valves, coumadin, 2006). Per report, he was found down 8/21 with noted aphasia, R sided weakness. Presented to ED where he was found to have L ALIYAH stroke with therapeutic INR, + BC for MSSA. Subsequent workup showed normal EF, prosthetic MV with two mobile masses consistent with vegetations. Was evaluated by ID at OSH.  He was then transferred to [facility] for further evalua    Intracranial hemorrhage ([facility] 12/07/2021    LONG TERM ANTICOAGULENT USE     Memory loss     dont remember seizure    Seizure ([facility] 12/07/2021    only one pt has had    Stroke [facility]) 08/2020 2 left fingers 20% numb       Past Surgical History:   Procedure Laterality Date    AORTIC VALVE REPLACEMENT  01/01/2006    Dr Tania Calderon   [facility]    AORTIC VALVE REPLACEMENT  09/03/2020    [facility] procedure(Aortic root replacement and AVR with Homograft #[id number], reconstrcution of the Inter-valvular fibrosa with bovine pericardial patch) at [address]  01/01/2006    Dr Tania Calderon  [address]  09/03/2020    Biocor#31 at [facility]    TRANSESOPHAGEAL ECHOCARDIOGRAM N/A 2/25/2022    TRANSESOPHAGEAL ECHOCARDIOGRAM performed by Lucianaline Gottlieb MD at [address] [facility] ENDOSCOPY       Current Outpatient Medications   Medication Sig Dispense Refill    OPZELURA 1.5 % CREA       warfarin (COUMADIN) 10 MG tablet TAKE ONE TABLET BY MOUTH DAILY OR AS DIRECTED BY THE DOCTOR 30 tablet 0    Acetaminophen (TYLENOL PO) Take by mouth as needed      metoprolol tartrate (LOPRESSOR) 25 MG tablet Take 1.5 tablets by mouth every 8 hours 405 tablet 1    lisinopril (PRINIVIL;ZESTRIL) 20 MG tablet TAKE ONE TABLET BY MOUTH DAILY 90 tablet 1    spironolactone (ALDACTONE) 25 MG tablet Take 1 tablet by mouth daily 90 tablet 1    levETIRAcetam (KEPPRA) 750 MG tablet Take 1 tablet by mouth 2 times daily 180 tablet 3    potassium chloride (KLOR-CON M) 20 MEQ extended release tablet Take 1 tablet by mouth 2 times daily 180 tablet 1    Multiple Vitamin (THERA/BETA-CAROTENE) TABS Take 1 tablet by mouth daily 30 tablet 3     No current facility-administered medications for this visit.        No Known Allergies    Social History     Socioeconomic History    Marital status:      Spouse name: None    Number of children: None    Years of education: None    Highest education level: None   Occupational History     Comment: [email or web address]   Tobacco Use    Smoking status: Never    Smokeless tobacco: Never   Vaping Use    Vaping Use: Never used   Substance and Sexual Activity    Alcohol use: Yes     Comment: 1-2 beers per week not since dec    Drug use: Never History reviewed. No pertinent family history. Review of Systems   Constitutional:  Positive for fatigue. Negative for activity change, appetite change, chills, diaphoresis and fever. HENT:  Negative for congestion, ear pain, hearing loss, nosebleeds, rhinorrhea, sinus pressure and sore throat. Eyes:  Negative for pain, redness, itching and visual disturbance. Respiratory:  Negative for apnea, cough, chest tightness, shortness of breath and wheezing. Cardiovascular:  Negative for chest pain, palpitations and leg swelling. Gastrointestinal:  Negative for abdominal pain, blood in stool, change in bowel habit, constipation, diarrhea, nausea and vomiting. Endocrine: Negative. Genitourinary:  Negative for decreased urine volume, difficulty urinating, dysuria, frequency, hematuria and urgency. Musculoskeletal:  Positive for myalgias. Negative for arthralgias, back pain, gait problem, joint swelling and neck pain. Skin:  Negative for color change and rash. Allergic/Immunologic: Negative for environmental allergies and food allergies. Neurological:  Positive for tingling, weakness and numbness. Negative for dizziness, light-headedness and headaches. Hematological:  Negative for adenopathy. Does not bruise/bleed easily. Psychiatric/Behavioral:  Negative for behavioral problems, dysphoric mood and sleep disturbance. The patient is not nervous/anxious and is not hyperactive. All other systems reviewed and are negative. /80   Pulse 59   Temp 97.6 °F (36.4 °C)   Ht 6' (1.829 m)   Wt 255 lb (115.7 kg)   SpO2 95%   BMI 34.58 kg/m²     Physical Exam  Vitals and nursing note reviewed. Constitutional:       General: He is not in acute distress. Appearance: Normal appearance. He is well-developed. HENT:      Head: Normocephalic and atraumatic. Right Ear: Hearing, tympanic membrane and external ear normal. No tenderness. No middle ear effusion.       Left Ear: Hearing, tympanic membrane and external ear normal. No tenderness. No middle ear effusion. Nose: Nose normal. No congestion or rhinorrhea. Right Turbinates: Not enlarged. Left Turbinates: Not enlarged. Mouth/Throat:      Mouth: Mucous membranes are moist.      Tongue: No lesions. Pharynx: Oropharynx is clear. No oropharyngeal exudate or posterior oropharyngeal erythema. Eyes:      General: No scleral icterus. Conjunctiva/sclera: Conjunctivae normal.      Pupils: Pupils are equal, round, and reactive to light. Neck:      Thyroid: No thyromegaly. Cardiovascular:      Rate and Rhythm: Normal rate and regular rhythm. Heart sounds: Normal heart sounds. No murmur heard. Pulmonary:      Effort: Pulmonary effort is normal. No respiratory distress. Breath sounds: Normal breath sounds. No wheezing or rales. Abdominal:      General: Bowel sounds are normal. There is no distension. Palpations: Abdomen is soft. Tenderness: There is no abdominal tenderness. Musculoskeletal:         General: No tenderness. Normal range of motion. Cervical back: Normal range of motion and neck supple. No rigidity. No muscular tenderness. Lymphadenopathy:      Cervical: No cervical adenopathy. Skin:     General: Skin is warm and dry. Findings: No erythema or rash. Neurological:      General: No focal deficit present. Mental Status: He is alert and oriented to person, place, and time. Cranial Nerves: No cranial nerve deficit. Deep Tendon Reflexes: Reflexes are normal and symmetric.  Reflexes normal.   Psychiatric:         Mood and Affect: Mood normal.                               ASSESSMENT/PLAN:    Patient Active Problem List   Diagnosis    S/P MVR (mitral valve replacement)    S/P AVR (aortic valve replacement) and aortoplasty    Hypertension    Hyperlipidemia    Seizure-like activity ([facility]    History of CVA (cerebrovascular accident)    Other constipation History of intracranial hemorrhage    Chronic atrial fibrillation (HCC)    At high risk for bleeding    Carpal tunnel syndrome of right wrist       Teodora Dickson was seen today for check-up. Diagnoses and all orders for this visit:    Encounter for general adult medical examination with abnormal findings  -     Comprehensive Metabolic Panel; Future  -     Lipid Panel; Future    Primary hypertension  -     CBC; Future  -     Comprehensive Metabolic Panel; Future  -     Lipid Panel; Future  -     TSH; Future    Chronic atrial fibrillation ([address]  -     Protime-INR; Future    S/P MVR (mitral valve replacement)    S/P AVR (aortic valve replacement) and aortoplasty    Hyperlipidemia, unspecified hyperlipidemia type    History of CVA (cerebrovascular accident)    Screening for malignant neoplasm of colon  -     Mary Simmons MD, General Surgery, [place]    Other orders  -     Influenza, FLUCELVAX, (age 10 mo+), IM, PF, 0.5 mL        Return in about 6 months (around 4/10/2023) for Follow up HTN, Follow up Lipids, Recheck labs, Recheck Meds. I spent 30 minutes with this patient. I spent greater than 50% of the time counseling this patient.         Selina Grubbs DO  10/10/2022  10:59 AM

## 2022-11-17 RX ORDER — WARFARIN SODIUM 10 MG/1
TABLET ORAL
Qty: 30 TABLET | Refills: 0 | Status: SHIPPED | OUTPATIENT
Start: 2022-11-17

## 2022-11-17 RX ORDER — SPIRONOLACTONE 25 MG/1
TABLET ORAL
Qty: 90 TABLET | Refills: 0 | Status: SHIPPED | OUTPATIENT
Start: 2022-11-17

## 2022-11-30 ENCOUNTER — TELEPHONE (OUTPATIENT)
Dept: PRIMARY CARE CLINIC | Age: 52
End: 2022-11-30

## 2022-11-30 NOTE — TELEPHONE ENCOUNTER
----- Message from mAPPn sent at 11/30/2022 12:07 PM EST -----  Subject: Referral Request    Reason for referral request? Patient called requesting labs for watchman   procedure. Pt stated he need to be scheduled for December 20th, December 27th, and January 3rd in the mornings but if not anytime on those days. Provider patient wants to be referred to(if known):     Provider Phone Number(if known):     Additional Information for Provider?   ---------------------------------------------------------------------------  --------------  [id number] [employer]    [phone]; OK to leave message on voicemail, OK to respond with   electronic message via [employer] portal (only for patients who have   registered [employer] account)  ---------------------------------------------------------------------------  --------------

## 2022-12-20 ENCOUNTER — NURSE ONLY (OUTPATIENT)
Dept: PRIMARY CARE CLINIC | Age: 52
End: 2022-12-20
Payer: MEDICAID

## 2022-12-20 DIAGNOSIS — Z95.2 S/P AVR (AORTIC VALVE REPLACEMENT) AND AORTOPLASTY: Primary | ICD-10-CM

## 2022-12-20 DIAGNOSIS — Z95.2 S/P MVR (MITRAL VALVE REPLACEMENT): ICD-10-CM

## 2022-12-20 DIAGNOSIS — I48.20 CHRONIC ATRIAL FIBRILLATION (HCC): ICD-10-CM

## 2022-12-20 LAB
INTERNATIONAL NORMALIZATION RATIO, POC: 3.9
PROTHROMBIN TIME, POC: 46.4

## 2022-12-20 PROCEDURE — 85610 PROTHROMBIN TIME: CPT | Performed by: FAMILY MEDICINE

## 2022-12-20 PROCEDURE — 93793 ANTICOAG MGMT PT WARFARIN: CPT | Performed by: FAMILY MEDICINE

## 2022-12-27 ENCOUNTER — NURSE ONLY (OUTPATIENT)
Dept: PRIMARY CARE CLINIC | Age: 52
End: 2022-12-27
Payer: MEDICAID

## 2022-12-27 DIAGNOSIS — Z95.2 S/P MVR (MITRAL VALVE REPLACEMENT): ICD-10-CM

## 2022-12-27 DIAGNOSIS — I48.20 CHRONIC ATRIAL FIBRILLATION (HCC): ICD-10-CM

## 2022-12-27 DIAGNOSIS — Z95.2 S/P AVR (AORTIC VALVE REPLACEMENT) AND AORTOPLASTY: Primary | ICD-10-CM

## 2022-12-27 LAB
INTERNATIONAL NORMALIZATION RATIO, POC: 1.4
PROTHROMBIN TIME, POC: 17.4

## 2022-12-27 PROCEDURE — 85610 PROTHROMBIN TIME: CPT | Performed by: FAMILY MEDICINE

## 2022-12-27 PROCEDURE — 93793 ANTICOAG MGMT PT WARFARIN: CPT | Performed by: FAMILY MEDICINE

## 2023-01-03 ENCOUNTER — NURSE ONLY (OUTPATIENT)
Dept: PRIMARY CARE CLINIC | Age: 53
End: 2023-01-03
Payer: MEDICAID

## 2023-01-03 DIAGNOSIS — Z95.2 S/P MVR (MITRAL VALVE REPLACEMENT): ICD-10-CM

## 2023-01-03 DIAGNOSIS — I48.20 CHRONIC ATRIAL FIBRILLATION (HCC): ICD-10-CM

## 2023-01-03 DIAGNOSIS — Z95.2 S/P AVR (AORTIC VALVE REPLACEMENT) AND AORTOPLASTY: Primary | ICD-10-CM

## 2023-01-03 LAB
INTERNATIONAL NORMALIZATION RATIO, POC: 3.1
PROTHROMBIN TIME, POC: 36.8

## 2023-01-03 PROCEDURE — 93793 ANTICOAG MGMT PT WARFARIN: CPT | Performed by: FAMILY MEDICINE

## 2023-01-03 PROCEDURE — 85610 PROTHROMBIN TIME: CPT | Performed by: FAMILY MEDICINE

## 2023-01-03 RX ORDER — WARFARIN SODIUM 10 MG/1
TABLET ORAL
Qty: 30 TABLET | Refills: 5 | Status: SHIPPED | OUTPATIENT
Start: 2023-01-03

## 2023-01-03 RX ORDER — WARFARIN SODIUM 10 MG/1
TABLET ORAL
Qty: 30 TABLET | Refills: 0 | OUTPATIENT
Start: 2023-01-03

## 2023-03-08 RX ORDER — SPIRONOLACTONE 25 MG/1
TABLET ORAL
Qty: 90 TABLET | Refills: 0 | Status: SHIPPED | OUTPATIENT
Start: 2023-03-08

## 2023-06-28 RX ORDER — SPIRONOLACTONE 25 MG/1
TABLET ORAL
Qty: 90 TABLET | Refills: 0 | Status: SHIPPED | OUTPATIENT
Start: 2023-06-28

## 2023-06-28 RX ORDER — LISINOPRIL 20 MG/1
TABLET ORAL
Qty: 90 TABLET | Refills: 0 | Status: SHIPPED | OUTPATIENT
Start: 2023-06-28

## 2023-06-28 RX ORDER — WARFARIN SODIUM 10 MG/1
TABLET ORAL
Qty: 30 TABLET | Refills: 0 | Status: SHIPPED | OUTPATIENT
Start: 2023-06-28

## 2023-07-21 RX ORDER — LISINOPRIL 20 MG/1
20 TABLET ORAL DAILY
Qty: 90 TABLET | Refills: 0 | Status: SHIPPED | OUTPATIENT
Start: 2023-07-21

## 2023-07-24 DIAGNOSIS — I62.9 INTRACRANIAL HEMORRHAGE (HCC): ICD-10-CM

## 2023-07-24 RX ORDER — LEVETIRACETAM 750 MG/1
750 TABLET ORAL 2 TIMES DAILY
Qty: 60 TABLET | Refills: 1 | Status: SHIPPED | OUTPATIENT
Start: 2023-07-24

## 2023-07-24 NOTE — TELEPHONE ENCOUNTER
Pharmacy requesting refill on Keppra. Called and left message for patient to call the office and schedule OVE.

## 2023-08-01 RX ORDER — WARFARIN SODIUM 10 MG/1
TABLET ORAL
Qty: 30 TABLET | Refills: 0 | Status: SHIPPED | OUTPATIENT
Start: 2023-08-01

## 2023-09-06 RX ORDER — WARFARIN SODIUM 10 MG/1
TABLET ORAL
Qty: 30 TABLET | Refills: 0 | Status: SHIPPED | OUTPATIENT
Start: 2023-09-06

## 2023-10-03 RX ORDER — WARFARIN SODIUM 10 MG/1
TABLET ORAL
Qty: 30 TABLET | Refills: 0 | Status: SHIPPED | OUTPATIENT
Start: 2023-10-03

## 2023-11-03 RX ORDER — WARFARIN SODIUM 10 MG/1
TABLET ORAL
Qty: 30 TABLET | Refills: 0 | Status: SHIPPED | OUTPATIENT
Start: 2023-11-03

## 2023-11-04 RX ORDER — SPIRONOLACTONE 25 MG/1
TABLET ORAL
Qty: 90 TABLET | Refills: 0 | Status: SHIPPED | OUTPATIENT
Start: 2023-11-04

## 2023-11-04 RX ORDER — LISINOPRIL 20 MG/1
20 TABLET ORAL DAILY
Qty: 90 TABLET | Refills: 0 | Status: SHIPPED | OUTPATIENT
Start: 2023-11-04

## 2023-11-21 ENCOUNTER — OFFICE VISIT (OUTPATIENT)
Dept: PRIMARY CARE CLINIC | Age: 53
End: 2023-11-21
Payer: COMMERCIAL

## 2023-11-21 VITALS
OXYGEN SATURATION: 95 % | HEIGHT: 72 IN | HEART RATE: 102 BPM | BODY MASS INDEX: 33.46 KG/M2 | WEIGHT: 247 LBS | TEMPERATURE: 97.2 F | SYSTOLIC BLOOD PRESSURE: 128 MMHG | DIASTOLIC BLOOD PRESSURE: 72 MMHG

## 2023-11-21 DIAGNOSIS — Z23 NEED FOR PROPHYLACTIC VACCINATION AND INOCULATION AGAINST INFLUENZA: ICD-10-CM

## 2023-11-21 DIAGNOSIS — Z95.2 S/P AVR (AORTIC VALVE REPLACEMENT) AND AORTOPLASTY: ICD-10-CM

## 2023-11-21 DIAGNOSIS — Z95.2 S/P MVR (MITRAL VALVE REPLACEMENT): ICD-10-CM

## 2023-11-21 DIAGNOSIS — Z12.5 SCREENING FOR MALIGNANT NEOPLASM OF PROSTATE: ICD-10-CM

## 2023-11-21 DIAGNOSIS — I48.20 CHRONIC ATRIAL FIBRILLATION (HCC): ICD-10-CM

## 2023-11-21 DIAGNOSIS — Z12.11 SCREENING FOR MALIGNANT NEOPLASM OF COLON: ICD-10-CM

## 2023-11-21 DIAGNOSIS — Z00.01 ENCOUNTER FOR WELL ADULT EXAM WITH ABNORMAL FINDINGS: Primary | ICD-10-CM

## 2023-11-21 DIAGNOSIS — E78.5 HYPERLIPIDEMIA, UNSPECIFIED HYPERLIPIDEMIA TYPE: ICD-10-CM

## 2023-11-21 DIAGNOSIS — I62.9 INTRACRANIAL HEMORRHAGE (HCC): ICD-10-CM

## 2023-11-21 DIAGNOSIS — I10 PRIMARY HYPERTENSION: ICD-10-CM

## 2023-11-21 LAB
INTERNATIONAL NORMALIZATION RATIO, POC: 4.7
PROTHROMBIN TIME, POC: 56.6

## 2023-11-21 PROCEDURE — 3078F DIAST BP <80 MM HG: CPT | Performed by: FAMILY MEDICINE

## 2023-11-21 PROCEDURE — 85610 PROTHROMBIN TIME: CPT | Performed by: FAMILY MEDICINE

## 2023-11-21 PROCEDURE — 3074F SYST BP LT 130 MM HG: CPT | Performed by: FAMILY MEDICINE

## 2023-11-21 PROCEDURE — 99396 PREV VISIT EST AGE 40-64: CPT | Performed by: FAMILY MEDICINE

## 2023-11-21 PROCEDURE — 90674 CCIIV4 VAC NO PRSV 0.5 ML IM: CPT | Performed by: FAMILY MEDICINE

## 2023-11-21 PROCEDURE — 90471 IMMUNIZATION ADMIN: CPT | Performed by: FAMILY MEDICINE

## 2023-11-21 RX ORDER — PREDNISOLONE SODIUM PHOSPHATE 15 MG/5ML
30 SOLUTION ORAL DAILY
Qty: 50 ML | Refills: 0 | Status: SHIPPED | OUTPATIENT
Start: 2023-11-21 | End: 2023-11-26

## 2023-11-21 RX ORDER — LEVETIRACETAM 750 MG/1
750 TABLET ORAL 2 TIMES DAILY
Qty: 60 TABLET | Refills: 1 | Status: SHIPPED | OUTPATIENT
Start: 2023-11-21

## 2023-11-21 SDOH — ECONOMIC STABILITY: FOOD INSECURITY: WITHIN THE PAST 12 MONTHS, YOU WORRIED THAT YOUR FOOD WOULD RUN OUT BEFORE YOU GOT MONEY TO BUY MORE.: NEVER TRUE

## 2023-11-21 SDOH — ECONOMIC STABILITY: HOUSING INSECURITY
IN THE LAST 12 MONTHS, WAS THERE A TIME WHEN YOU DID NOT HAVE A STEADY PLACE TO SLEEP OR SLEPT IN A SHELTER (INCLUDING NOW)?: NO

## 2023-11-21 SDOH — ECONOMIC STABILITY: INCOME INSECURITY: HOW HARD IS IT FOR YOU TO PAY FOR THE VERY BASICS LIKE FOOD, HOUSING, MEDICAL CARE, AND HEATING?: NOT HARD AT ALL

## 2023-11-21 SDOH — ECONOMIC STABILITY: FOOD INSECURITY: WITHIN THE PAST 12 MONTHS, THE FOOD YOU BOUGHT JUST DIDN'T LAST AND YOU DIDN'T HAVE MONEY TO GET MORE.: NEVER TRUE

## 2023-11-21 ASSESSMENT — ENCOUNTER SYMPTOMS
APNEA: 0
ABDOMINAL PAIN: 0
SINUS PRESSURE: 0
EYE ITCHING: 0
RHINORRHEA: 0
BLOOD IN STOOL: 0
VOMITING: 0
SHORTNESS OF BREATH: 0
WHEEZING: 0
EYE REDNESS: 0
SORE THROAT: 0
CONSTIPATION: 0
COLOR CHANGE: 0
COUGH: 0
BACK PAIN: 0
NAUSEA: 0
CHEST TIGHTNESS: 0
DIARRHEA: 0
EYE PAIN: 0

## 2023-11-21 ASSESSMENT — PATIENT HEALTH QUESTIONNAIRE - PHQ9
1. LITTLE INTEREST OR PLEASURE IN DOING THINGS: 0
SUM OF ALL RESPONSES TO PHQ QUESTIONS 1-9: 0
2. FEELING DOWN, DEPRESSED OR HOPELESS: 0
SUM OF ALL RESPONSES TO PHQ9 QUESTIONS 1 & 2: 0

## 2023-11-21 NOTE — PROGRESS NOTES
Well Adult Note  Name: Katty Tsai Date: 2023   MRN: [id number] Sex: Male   Age: 52 y.o. Ethnicity: Unavailable / Unknown   : 1970 Race: White (non-)      Tram Arvizu is here for well adult exam.  History:  Rash on face/neck----->Psoriasis  Heart issue stable  No new issues  Feels well  Appetite good  No change in bowel or bladder function  Vaccines UTD     Review of Systems   Constitutional:  Negative for activity change, appetite change, fatigue and fever. HENT:  Negative for congestion, ear pain, hearing loss, nosebleeds, rhinorrhea, sinus pressure and sore throat. Eyes:  Negative for pain, redness, itching and visual disturbance. Respiratory:  Negative for apnea, cough, chest tightness, shortness of breath and wheezing. Cardiovascular:  Negative for chest pain, palpitations and leg swelling. Gastrointestinal:  Negative for abdominal pain, blood in stool, constipation, diarrhea, nausea and vomiting. Endocrine: Negative. Genitourinary:  Negative for decreased urine volume, difficulty urinating, dysuria, frequency, hematuria and urgency. Musculoskeletal:  Negative for arthralgias, back pain, gait problem, myalgias and neck pain. Skin:  Negative for color change and rash. Allergic/Immunologic: Negative for environmental allergies and food allergies. Neurological:  Negative for dizziness, weakness, light-headedness, numbness and headaches. Hematological:  Negative for adenopathy. Does not bruise/bleed easily. Psychiatric/Behavioral:  Negative for behavioral problems, dysphoric mood and sleep disturbance. The patient is not nervous/anxious and is not hyperactive. All other systems reviewed and are negative. No Known Allergies      Prior to Visit Medications    Medication Sig Taking?  Authorizing Provider   levETIRAcetam (KEPPRA) 750 MG tablet Take 1 tablet by mouth 2 times daily Yes Emeterio Roman, DO   metoprolol tartrate (LOPRESSOR) 25 MG

## 2023-11-22 NOTE — PROGRESS NOTES
SBAR form completed and placed on chart. Chart with patient in transit. Cimetidine Counseling:  I discussed with the patient the risks of Cimetidine including but not limited to gynecomastia, headache, diarrhea, nausea, drowsiness, arrhythmias, pancreatitis, skin rashes, psychosis, bone marrow suppression and kidney toxicity.

## 2023-12-04 RX ORDER — WARFARIN SODIUM 10 MG/1
TABLET ORAL
Qty: 30 TABLET | Refills: 0 | Status: SHIPPED | OUTPATIENT
Start: 2023-12-04

## 2023-12-05 ENCOUNTER — NURSE ONLY (OUTPATIENT)
Dept: PRIMARY CARE CLINIC | Age: 53
End: 2023-12-05
Payer: COMMERCIAL

## 2023-12-05 DIAGNOSIS — I10 PRIMARY HYPERTENSION: ICD-10-CM

## 2023-12-05 DIAGNOSIS — E78.5 HYPERLIPIDEMIA, UNSPECIFIED HYPERLIPIDEMIA TYPE: ICD-10-CM

## 2023-12-05 DIAGNOSIS — Z95.2 S/P AVR (AORTIC VALVE REPLACEMENT) AND AORTOPLASTY: Primary | ICD-10-CM

## 2023-12-05 DIAGNOSIS — Z12.5 SCREENING FOR MALIGNANT NEOPLASM OF PROSTATE: ICD-10-CM

## 2023-12-05 DIAGNOSIS — I48.20 CHRONIC ATRIAL FIBRILLATION (HCC): ICD-10-CM

## 2023-12-05 DIAGNOSIS — Z95.2 S/P MVR (MITRAL VALVE REPLACEMENT): ICD-10-CM

## 2023-12-05 LAB
ALBUMIN SERPL-MCNC: 4.2 G/DL (ref 3.5–5.2)
ALP BLD-CCNC: 57 U/L (ref 40–129)
ALT SERPL-CCNC: 20 U/L (ref 0–40)
ANION GAP SERPL CALCULATED.3IONS-SCNC: 19 MMOL/L (ref 7–16)
AST SERPL-CCNC: 18 U/L (ref 0–39)
BILIRUB SERPL-MCNC: 0.3 MG/DL (ref 0–1.2)
BUN BLDV-MCNC: 19 MG/DL (ref 6–20)
CALCIUM SERPL-MCNC: 9.3 MG/DL (ref 8.6–10.2)
CHLORIDE BLD-SCNC: 102 MMOL/L (ref 98–107)
CHOLESTEROL: 204 MG/DL
CO2: 21 MMOL/L (ref 22–29)
CREAT SERPL-MCNC: 1 MG/DL (ref 0.7–1.2)
GFR SERPL CREATININE-BSD FRML MDRD: >60 ML/MIN/1.73M2
GLUCOSE BLD-MCNC: 86 MG/DL (ref 74–99)
HCT VFR BLD CALC: 46.8 % (ref 37–54)
HDLC SERPL-MCNC: 46 MG/DL
HEMOGLOBIN: 15 G/DL (ref 12.5–16.5)
INTERNATIONAL NORMALIZATION RATIO, POC: 3.5
LDL CHOLESTEROL: 135 MG/DL
MCH RBC QN AUTO: 29.1 PG (ref 26–35)
MCHC RBC AUTO-ENTMCNC: 32.1 G/DL (ref 32–34.5)
MCV RBC AUTO: 90.9 FL (ref 80–99.9)
PDW BLD-RTO: 13.2 % (ref 11.5–15)
PLATELET # BLD: 271 K/UL (ref 130–450)
PMV BLD AUTO: 10.3 FL (ref 7–12)
POTASSIUM SERPL-SCNC: 4.8 MMOL/L (ref 3.5–5)
PROSTATE SPECIFIC ANTIGEN: 1.26 NG/ML (ref 0–4)
PROTHROMBIN TIME, POC: 42.3
RBC # BLD: 5.15 M/UL (ref 3.8–5.8)
SODIUM BLD-SCNC: 142 MMOL/L (ref 132–146)
TOTAL PROTEIN: 7.9 G/DL (ref 6.4–8.3)
TRIGL SERPL-MCNC: 116 MG/DL
TSH SERPL DL<=0.05 MIU/L-ACNC: 0.68 UIU/ML (ref 0.27–4.2)
VLDLC SERPL CALC-MCNC: 23 MG/DL
WBC # BLD: 9 K/UL (ref 4.5–11.5)

## 2023-12-05 PROCEDURE — 93793 ANTICOAG MGMT PT WARFARIN: CPT | Performed by: FAMILY MEDICINE

## 2023-12-05 PROCEDURE — 85610 PROTHROMBIN TIME: CPT | Performed by: FAMILY MEDICINE

## 2023-12-05 RX ORDER — PREDNISOLONE SODIUM PHOSPHATE 15 MG/5ML
30 SOLUTION ORAL DAILY
Qty: 50 ML | Refills: 0 | Status: SHIPPED | OUTPATIENT
Start: 2023-12-05 | End: 2023-12-10

## 2023-12-12 PROBLEM — Z12.11 ENCOUNTER FOR SCREENING COLONOSCOPY: Status: ACTIVE | Noted: 2018-12-18

## 2023-12-13 ENCOUNTER — TELEPHONE (OUTPATIENT)
Dept: SURGERY | Age: 53
End: 2023-12-13

## 2023-12-13 ENCOUNTER — INITIAL CONSULT (OUTPATIENT)
Dept: SURGERY | Age: 53
End: 2023-12-13
Payer: COMMERCIAL

## 2023-12-13 VITALS
TEMPERATURE: 97.4 F | HEART RATE: 107 BPM | DIASTOLIC BLOOD PRESSURE: 80 MMHG | WEIGHT: 243 LBS | HEIGHT: 72 IN | SYSTOLIC BLOOD PRESSURE: 111 MMHG | BODY MASS INDEX: 32.91 KG/M2

## 2023-12-13 DIAGNOSIS — Z12.11 SCREEN FOR COLON CANCER: ICD-10-CM

## 2023-12-13 DIAGNOSIS — Z12.11 ENCOUNTER FOR SCREENING COLONOSCOPY: Primary | ICD-10-CM

## 2023-12-13 PROCEDURE — 99203 OFFICE O/P NEW LOW 30 MIN: CPT | Performed by: SURGERY

## 2023-12-13 PROCEDURE — 3074F SYST BP LT 130 MM HG: CPT | Performed by: SURGERY

## 2023-12-13 PROCEDURE — 3079F DIAST BP 80-89 MM HG: CPT | Performed by: SURGERY

## 2023-12-13 RX ORDER — POLYETHYLENE GLYCOL 3350, SODIUM SULFATE ANHYDROUS, SODIUM BICARBONATE, SODIUM CHLORIDE, POTASSIUM CHLORIDE 236; 22.74; 6.74; 5.86; 2.97 G/4L; G/4L; G/4L; G/4L; G/4L
4 POWDER, FOR SOLUTION ORAL ONCE
Qty: 4000 ML | Refills: 0 | Status: SHIPPED | OUTPATIENT
Start: 2023-12-13 | End: 2023-12-13

## 2023-12-13 NOTE — TELEPHONE ENCOUNTER
Per the order of Dr. Dannielle Potter, patient has been scheduled for Colonoscopy on 1.15.2024. Patient provided with procedure information during office visit and scheduled for follow up appointment. Patient instructed to please contact our office with any questions. Golytely instructions provided to patient. Procedure scheduled through [facility]. Dr. Dannielle Potter to enter orders. Prior Authorization Form:      DEMOGRAPHICS:                     Patient Name:  Prentis Lesches  Patient :  1970            Insurance:  Payor: Livan Farah / Plan: Piedad Plaster ALIYAH / Product Type: *No Product type* /   Insurance ID Number:    Payer/Plan Subscr  Sex Relation Sub.  Ins. ID Effective Group Num   1. MELANIELEATHAOPAL - ROBERTO* KENNYLALA NG 1970 Male Self [id number]                                    [address]         DIAGNOSIS & PROCEDURE:                       Procedure/Operation: Colonoscopy           CPT Code: 22034    Diagnosis:  Screening colonoscopy    ICD10 Code: Z12.11    Location:  [address] [place] [address]    Surgeon:  Nicky Payor INFORMATION:                          Date: 1.15.2024    Time: TBD              Anesthesia:  MAC/TIVA                                                       Status:  Outpatient        Special Comments:         Electronically signed by Lisa Lopez on 2023 at 2:04 PM

## 2023-12-13 NOTE — PROGRESS NOTES
General Surgery History and Physical  Mami Montemayor MD    Patient's Name/Date of Birth: Kelvin Devine / 1970    Date: December 13, 2023     Consulting Surgeon: Joselo Wright MD    PCP: Katiana Truong DO     Chief Complaint: Screening colonoscopy    HPI:   Kelvin Devine is a 52 y.o. male who presents for evaluation of screening age appropriate colonoscopy. Denies abdominal pain, nausea, vomiting, previous history of polyps, bloody or dark stools. Denies history of colon or GI cancer in first degree relatives. H/o open heart surgery and stroke and is now on warfarin. Patient Active Problem List   Diagnosis    S/P MVR (mitral valve replacement)    S/P AVR (aortic valve replacement) and aortoplasty    Hypertension    Hyperlipidemia    Encounter for screening colonoscopy    Seizure-like activity ([address])    History of CVA (cerebrovascular accident)    Other constipation    History of intracranial hemorrhage    Chronic atrial fibrillation (HCC)    At high risk for bleeding    Carpal tunnel syndrome of right wrist       No Known Allergies    Past Medical History:   Diagnosis Date    AF (atrial fibrillation) ([address])     Hypertension     Infective endocarditis 8/26/2020    History  MVR/AVR (mechanical valves, coumadin, 2006). Per report, he was found down 8/21 with noted aphasia, R sided weakness. Presented to ED where he was found to have L ALIYAH stroke with therapeutic INR, + BC for MSSA. Subsequent workup showed normal EF, prosthetic MV with two mobile masses consistent with vegetations. Was evaluated by ID at OSH.  He was then transferred to [facility] for further evalua    Intracranial hemorrhage ([address]) 12/07/2021    LONG TERM ANTICOAGULENT USE     Memory loss     dont remember seizure    Seizure ([address]) 12/07/2021    only one pt has had    Stroke ([address]) 08/2020    2 left fingers 20% numb       Past Surgical History:   Procedure Laterality Date    AORTIC VALVE REPLACEMENT  01/01/2006

## 2024-01-02 RX ORDER — WARFARIN SODIUM 10 MG/1
TABLET ORAL
Qty: 30 TABLET | Refills: 0 | Status: SHIPPED | OUTPATIENT
Start: 2024-01-02

## 2024-01-09 ENCOUNTER — NURSE ONLY (OUTPATIENT)
Dept: PRIMARY CARE CLINIC | Age: 54
End: 2024-01-09
Payer: COMMERCIAL

## 2024-01-09 DIAGNOSIS — Z95.2 S/P AVR (AORTIC VALVE REPLACEMENT) AND AORTOPLASTY: Primary | ICD-10-CM

## 2024-01-09 DIAGNOSIS — Z95.2 S/P MVR (MITRAL VALVE REPLACEMENT): ICD-10-CM

## 2024-01-09 LAB — INTERNATIONAL NORMALIZATION RATIO, POC: 3.7

## 2024-01-09 PROCEDURE — 93792 PT/CAREGIVER TRAING HOME INR: CPT | Performed by: FAMILY MEDICINE

## 2024-01-11 PROBLEM — Z12.11 ENCOUNTER FOR SCREENING COLONOSCOPY: Status: RESOLVED | Noted: 2018-12-18 | Resolved: 2024-01-11

## 2024-01-12 PROBLEM — Z12.11 SCREEN FOR COLON CANCER: Status: RESOLVED | Noted: 2023-12-13 | Resolved: 2024-01-12

## 2024-01-18 ENCOUNTER — OFFICE VISIT (OUTPATIENT)
Dept: PRIMARY CARE CLINIC | Age: 54
End: 2024-01-18
Payer: COMMERCIAL

## 2024-01-18 VITALS
RESPIRATION RATE: 16 BRPM | SYSTOLIC BLOOD PRESSURE: 130 MMHG | DIASTOLIC BLOOD PRESSURE: 74 MMHG | OXYGEN SATURATION: 94 % | HEIGHT: 72 IN | BODY MASS INDEX: 33.05 KG/M2 | HEART RATE: 87 BPM | WEIGHT: 244 LBS

## 2024-01-18 DIAGNOSIS — R42 DIZZINESS: ICD-10-CM

## 2024-01-18 DIAGNOSIS — L30.9 DERMATITIS: ICD-10-CM

## 2024-01-18 DIAGNOSIS — I48.20 CHRONIC ATRIAL FIBRILLATION (HCC): Primary | ICD-10-CM

## 2024-01-18 DIAGNOSIS — I48.20 CHRONIC ATRIAL FIBRILLATION (HCC): ICD-10-CM

## 2024-01-18 DIAGNOSIS — Z86.73 HISTORY OF CVA (CEREBROVASCULAR ACCIDENT): ICD-10-CM

## 2024-01-18 DIAGNOSIS — E78.5 HYPERLIPIDEMIA, UNSPECIFIED HYPERLIPIDEMIA TYPE: ICD-10-CM

## 2024-01-18 DIAGNOSIS — Z95.2 S/P MVR (MITRAL VALVE REPLACEMENT): ICD-10-CM

## 2024-01-18 DIAGNOSIS — I10 PRIMARY HYPERTENSION: ICD-10-CM

## 2024-01-18 DIAGNOSIS — R56.9 SEIZURE-LIKE ACTIVITY (HCC): ICD-10-CM

## 2024-01-18 DIAGNOSIS — Z95.2 S/P AVR (AORTIC VALVE REPLACEMENT) AND AORTOPLASTY: ICD-10-CM

## 2024-01-18 LAB
ALBUMIN SERPL-MCNC: 4.4 G/DL (ref 3.5–5.2)
ALP BLD-CCNC: 61 U/L (ref 40–129)
ALT SERPL-CCNC: 22 U/L (ref 0–40)
ANION GAP SERPL CALCULATED.3IONS-SCNC: 13 MMOL/L (ref 7–16)
AST SERPL-CCNC: 25 U/L (ref 0–39)
BILIRUB SERPL-MCNC: 0.4 MG/DL (ref 0–1.2)
BUN BLDV-MCNC: 23 MG/DL (ref 6–20)
CALCIUM SERPL-MCNC: 9.6 MG/DL (ref 8.6–10.2)
CHLORIDE BLD-SCNC: 105 MMOL/L (ref 98–107)
CO2: 23 MMOL/L (ref 22–29)
CREAT SERPL-MCNC: 1.1 MG/DL (ref 0.7–1.2)
GFR SERPL CREATININE-BSD FRML MDRD: >60 ML/MIN/1.73M2
GLUCOSE BLD-MCNC: 92 MG/DL (ref 74–99)
HCT VFR BLD CALC: 48.8 % (ref 37–54)
HEMOGLOBIN: 15.4 G/DL (ref 12.5–16.5)
MCH RBC QN AUTO: 29 PG (ref 26–35)
MCHC RBC AUTO-ENTMCNC: 31.6 G/DL (ref 32–34.5)
MCV RBC AUTO: 91.9 FL (ref 80–99.9)
PDW BLD-RTO: 13.8 % (ref 11.5–15)
PLATELET # BLD: 239 K/UL (ref 130–450)
PMV BLD AUTO: 10.8 FL (ref 7–12)
POTASSIUM SERPL-SCNC: 4.5 MMOL/L (ref 3.5–5)
RBC # BLD: 5.31 M/UL (ref 3.8–5.8)
SODIUM BLD-SCNC: 141 MMOL/L (ref 132–146)
T4 FREE: 1.2 NG/DL (ref 0.9–1.7)
TOTAL PROTEIN: 7.9 G/DL (ref 6.4–8.3)
TSH SERPL DL<=0.05 MIU/L-ACNC: 0.67 UIU/ML (ref 0.27–4.2)
WBC # BLD: 9.3 K/UL (ref 4.5–11.5)

## 2024-01-18 PROCEDURE — 3075F SYST BP GE 130 - 139MM HG: CPT | Performed by: FAMILY MEDICINE

## 2024-01-18 PROCEDURE — 3078F DIAST BP <80 MM HG: CPT | Performed by: FAMILY MEDICINE

## 2024-01-18 PROCEDURE — 99214 OFFICE O/P EST MOD 30 MIN: CPT | Performed by: FAMILY MEDICINE

## 2024-01-18 RX ORDER — PREDNISOLONE SODIUM PHOSPHATE 15 MG/5ML
30 SOLUTION ORAL DAILY
Qty: 50 ML | Refills: 0 | Status: SHIPPED | OUTPATIENT
Start: 2024-01-18 | End: 2024-01-23

## 2024-01-18 ASSESSMENT — ENCOUNTER SYMPTOMS
COUGH: 0
VOMITING: 0
NAUSEA: 0
EYE REDNESS: 0
RHINORRHEA: 0
CONSTIPATION: 0
EYE ITCHING: 0
SORE THROAT: 0
WHEEZING: 0
CHEST TIGHTNESS: 0
BACK PAIN: 0
CHANGE IN BOWEL HABIT: 0
DIARRHEA: 0
SINUS PRESSURE: 0
EYE PAIN: 0
SHORTNESS OF BREATH: 0
ABDOMINAL PAIN: 0
APNEA: 0
BLOOD IN STOOL: 0
COLOR CHANGE: 0

## 2024-01-18 ASSESSMENT — PATIENT HEALTH QUESTIONNAIRE - PHQ9
SUM OF ALL RESPONSES TO PHQ QUESTIONS 1-9: 0
SUM OF ALL RESPONSES TO PHQ9 QUESTIONS 1 & 2: 0
SUM OF ALL RESPONSES TO PHQ QUESTIONS 1-9: 0
2. FEELING DOWN, DEPRESSED OR HOPELESS: 0
1. LITTLE INTEREST OR PLEASURE IN DOING THINGS: 0

## 2024-01-18 NOTE — PROGRESS NOTES
and vaccinations.  Advised patient regarding importance of keeping up with recommended health maintenance and to schedule as soon as possible if overdue, as this is important in assessing for undiagnosed pathology, especially cancer, as well as protecting against potentially harmful/life threatening disease.        Return if symptoms worsen or fail to improve.      I spent 30 minutes with this patient.  I spent greater than 50% of the time counseling this patient.        Emeterio Souza DO  1/18/2024  12:04 PM

## 2024-01-26 ENCOUNTER — HOSPITAL ENCOUNTER (OUTPATIENT)
Age: 54
Discharge: HOME | End: 2024-01-26
Payer: COMMERCIAL

## 2024-01-26 ENCOUNTER — TELEPHONE (OUTPATIENT)
Dept: SURGERY | Age: 54
End: 2024-01-26

## 2024-01-26 LAB
ALBUMIN SERPL-MCNC: 4.3 G/DL (ref 3.5–5.2)
ALP SERPL-CCNC: 57 U/L (ref 40–129)
ALT SERPL-CCNC: 24 U/L (ref 0–40)
ANION GAP SERPL CALCULATED.3IONS-SCNC: 12 MMOL/L (ref 7–16)
AST SERPL-CCNC: 18 U/L (ref 0–39)
BILIRUB SERPL-MCNC: 0.3 MG/DL (ref 0–1.2)
BUN SERPL-MCNC: 27 MG/DL (ref 6–20)
CALCIUM SERPL-MCNC: 9.3 MG/DL (ref 8.6–10.2)
CHLORIDE SERPL-SCNC: 104 MMOL/L (ref 98–107)
CO2 SERPL-SCNC: 20 MMOL/L (ref 22–29)
CREAT SERPL-MCNC: 1.6 MG/DL (ref 0.7–1.2)
ERYTHROCYTE [DISTWIDTH] IN BLOOD BY AUTOMATED COUNT: 13.7 % (ref 11.5–15)
GFR SERPL CREATININE-BSD FRML MDRD: 52 ML/MIN/1.73M2
GLUCOSE SERPL-MCNC: 126 MG/DL (ref 74–99)
HCT VFR BLD AUTO: 46 % (ref 37–54)
HGB BLD-MCNC: 15.3 G/DL (ref 12.5–16.5)
MCH RBC QN AUTO: 29 PG (ref 26–35)
MCHC RBC AUTO-ENTMCNC: 33.3 G/DL (ref 32–34.5)
MCV RBC AUTO: 87.3 FL (ref 80–99.9)
PLATELET # BLD AUTO: 268 K/UL (ref 130–450)
PMV BLD AUTO: 10 FL (ref 7–12)
POTASSIUM SERPL-SCNC: 4.5 MMOL/L (ref 3.5–5)
PROT SERPL-MCNC: 7.4 G/DL (ref 6.4–8.3)
RBC # BLD AUTO: 5.27 M/UL (ref 3.8–5.8)
SODIUM SERPL-SCNC: 136 MMOL/L (ref 132–146)
WBC OTHER # BLD: 11.6 K/UL (ref 4.5–11.5)

## 2024-01-26 PROCEDURE — 36415 COLL VENOUS BLD VENIPUNCTURE: CPT

## 2024-01-26 PROCEDURE — 80053 COMPREHEN METABOLIC PANEL: CPT

## 2024-01-26 PROCEDURE — 85027 COMPLETE CBC AUTOMATED: CPT

## 2024-01-26 NOTE — TELEPHONE ENCOUNTER
Call placed to discuss rescheduling colonoscopy.  Patient would like to wait until he is on Medical in April to have procedure.  Patient will hold on to bowel prep and reschedule in April.  Patient will call us with insurance information and will reschedule   Electronically signed by Floresita Ureña on 1/26/24 at 9:43 AM EST

## 2024-02-05 LAB
ALBUMIN SERPL-MCNC: 4.4 G/DL (ref 3.5–5.2)
ALP SERPL-CCNC: 52 U/L (ref 40–129)
ALT SERPL-CCNC: 20 U/L (ref 0–40)
ANION GAP SERPL CALCULATED.3IONS-SCNC: 11 MMOL/L (ref 7–16)
AST SERPL-CCNC: 21 U/L (ref 0–39)
BASOPHILS # BLD: 0.02 K/UL (ref 0–0.2)
BASOPHILS NFR BLD: 0 % (ref 0–2)
BILIRUB SERPL-MCNC: 0.6 MG/DL (ref 0–1.2)
BUN SERPL-MCNC: 22 MG/DL (ref 6–20)
CALCIUM SERPL-MCNC: 9.2 MG/DL (ref 8.6–10.2)
CHLORIDE SERPL-SCNC: 104 MMOL/L (ref 98–107)
CO2 SERPL-SCNC: 24 MMOL/L (ref 22–29)
CREAT SERPL-MCNC: 1.2 MG/DL (ref 0.7–1.2)
EOSINOPHIL # BLD: 0.16 K/UL (ref 0.05–0.5)
EOSINOPHILS RELATIVE PERCENT: 2 % (ref 0–6)
ERYTHROCYTE [DISTWIDTH] IN BLOOD BY AUTOMATED COUNT: 13.9 % (ref 11.5–15)
GFR SERPL CREATININE-BSD FRML MDRD: >60 ML/MIN/1.73M2
GLUCOSE SERPL-MCNC: 94 MG/DL (ref 74–99)
HCT VFR BLD AUTO: 48.8 % (ref 37–54)
HGB BLD-MCNC: 15.8 G/DL (ref 12.5–16.5)
IMM GRANULOCYTES # BLD AUTO: <0.03 K/UL (ref 0–0.58)
IMM GRANULOCYTES NFR BLD: 0 % (ref 0–5)
LYMPHOCYTES NFR BLD: 1.36 K/UL (ref 1.5–4)
LYMPHOCYTES RELATIVE PERCENT: 19 % (ref 20–42)
MCH RBC QN AUTO: 29 PG (ref 26–35)
MCHC RBC AUTO-ENTMCNC: 32.4 G/DL (ref 32–34.5)
MCV RBC AUTO: 89.7 FL (ref 80–99.9)
MONOCYTES NFR BLD: 0.62 K/UL (ref 0.1–0.95)
MONOCYTES NFR BLD: 9 % (ref 2–12)
NEUTROPHILS NFR BLD: 70 % (ref 43–80)
NEUTS SEG NFR BLD: 5.14 K/UL (ref 1.8–7.3)
PLATELET # BLD AUTO: 219 K/UL (ref 130–450)
PMV BLD AUTO: 10.8 FL (ref 7–12)
POTASSIUM SERPL-SCNC: 4.4 MMOL/L (ref 3.5–5)
PROT SERPL-MCNC: 7.7 G/DL (ref 6.4–8.3)
RBC # BLD AUTO: 5.44 M/UL (ref 3.8–5.8)
SODIUM SERPL-SCNC: 139 MMOL/L (ref 132–146)
WBC OTHER # BLD: 7.3 K/UL (ref 4.5–11.5)

## 2024-02-06 RX ORDER — WARFARIN SODIUM 10 MG/1
TABLET ORAL
Qty: 30 TABLET | Refills: 0 | Status: SHIPPED | OUTPATIENT
Start: 2024-02-06

## 2024-02-08 LAB — T SPOT TB TEST: NORMAL

## 2024-03-08 RX ORDER — WARFARIN SODIUM 10 MG/1
TABLET ORAL
Qty: 30 TABLET | Refills: 0 | Status: SHIPPED | OUTPATIENT
Start: 2024-03-08

## 2024-03-25 RX ORDER — LISINOPRIL 20 MG/1
20 TABLET ORAL DAILY
Qty: 90 TABLET | Refills: 0 | Status: SHIPPED | OUTPATIENT
Start: 2024-03-25

## 2024-04-02 ENCOUNTER — TELEPHONE (OUTPATIENT)
Dept: PRIMARY CARE CLINIC | Age: 54
End: 2024-04-02

## 2024-04-02 RX ORDER — PREDNISOLONE SODIUM PHOSPHATE 15 MG/5ML
30 SOLUTION ORAL DAILY
Qty: 50 ML | Refills: 0 | Status: SHIPPED | OUTPATIENT
Start: 2024-04-02 | End: 2024-04-07

## 2024-04-03 ENCOUNTER — PATIENT MESSAGE (OUTPATIENT)
Dept: PRIMARY CARE CLINIC | Age: 54
End: 2024-04-03

## 2024-04-03 DIAGNOSIS — M25.569 PAIN AND SWELLING OF KNEE, UNSPECIFIED LATERALITY: Primary | ICD-10-CM

## 2024-04-03 DIAGNOSIS — M25.469 PAIN AND SWELLING OF KNEE, UNSPECIFIED LATERALITY: Primary | ICD-10-CM

## 2024-04-03 NOTE — TELEPHONE ENCOUNTER
From: Moise Ward  To: Dr. Emeterio Souza  Sent: 4/3/2024 3:38 PM EDT  Subject: Referral    Dr Souza I would like a referral to see a doctor that handles knee swelling.   My knee is swollen and need it drained or looked at as soon as possible. What names are in your network for the knee and joints?    Let me know as soon as possible.     Thanks

## 2024-04-07 SDOH — HEALTH STABILITY: PHYSICAL HEALTH: ON AVERAGE, HOW MANY MINUTES DO YOU ENGAGE IN EXERCISE AT THIS LEVEL?: 90 MIN

## 2024-04-07 SDOH — HEALTH STABILITY: PHYSICAL HEALTH: ON AVERAGE, HOW MANY DAYS PER WEEK DO YOU ENGAGE IN MODERATE TO STRENUOUS EXERCISE (LIKE A BRISK WALK)?: 3 DAYS

## 2024-04-08 ENCOUNTER — OFFICE VISIT (OUTPATIENT)
Dept: ORTHOPEDIC SURGERY | Age: 54
End: 2024-04-08
Payer: COMMERCIAL

## 2024-04-08 VITALS — BODY MASS INDEX: 32.51 KG/M2 | HEIGHT: 72 IN | WEIGHT: 240 LBS

## 2024-04-08 DIAGNOSIS — M25.562 LEFT KNEE PAIN, UNSPECIFIED CHRONICITY: Primary | ICD-10-CM

## 2024-04-08 PROCEDURE — 99203 OFFICE O/P NEW LOW 30 MIN: CPT | Performed by: NURSE PRACTITIONER

## 2024-04-08 PROCEDURE — 20610 DRAIN/INJ JOINT/BURSA W/O US: CPT | Performed by: NURSE PRACTITIONER

## 2024-04-08 RX ORDER — TRIAMCINOLONE ACETONIDE 40 MG/ML
40 INJECTION, SUSPENSION INTRA-ARTICULAR; INTRAMUSCULAR ONCE
Status: COMPLETED | OUTPATIENT
Start: 2024-04-08 | End: 2024-04-08

## 2024-04-08 RX ORDER — RISANKIZUMAB-RZAA 150 MG/ML
150 INJECTION SUBCUTANEOUS
COMMUNITY
Start: 2024-03-18

## 2024-04-08 RX ORDER — BUPIVACAINE HYDROCHLORIDE 2.5 MG/ML
2 INJECTION, SOLUTION INFILTRATION; PERINEURAL ONCE
Status: COMPLETED | OUTPATIENT
Start: 2024-04-08 | End: 2024-04-08

## 2024-04-08 RX ADMIN — BUPIVACAINE HYDROCHLORIDE 5 MG: 2.5 INJECTION, SOLUTION INFILTRATION; PERINEURAL at 13:11

## 2024-04-08 RX ADMIN — TRIAMCINOLONE ACETONIDE 40 MG: 40 INJECTION, SUSPENSION INTRA-ARTICULAR; INTRAMUSCULAR at 13:13

## 2024-04-08 NOTE — PROGRESS NOTES
[facility]  ORTHOPAEDICS AND SPORTS MEDICINE  DATE OF VISIT: 04/08/24  New Knee Patient     Referring Provider:   Uvaldo Tadeo DO  [address] [place] [address]    CHIEF COMPLAINT:   Chief Complaint   Patient presents with    Knee Pain     Pt is here today for left knee.  Pt states he was seen previously sen by Dr. Burnett. He is requesting a cortisone injection at today's visit.          HPI:      Moise Ward is a 53 y.o. year old male who is seen today  for evaluation of left knee pain.  Patient reports that he was a previous patient of Dr. Yannick Burnett.  Patient states that he has a history of psoriatic arthritis.  Reports that recently he has began experiencing increased swelling along with pain.  He does not recall an injury to the left knee.  Denies mechanical symptoms or feelings of instability.  He has increased tightness with knee flexion.  He is not working, patient is on disability.  Patient states that previous treatment had included knee aspiration and corticosteroid injections.  States he has not had to have his knee drained or injected within the last 5 years.      PAST MEDICAL HISTORY  Past Medical History:   Diagnosis Date    AF (atrial fibrillation) ([facility])     Hypertension     Infective endocarditis 08/26/2020    History  MVR/AVR (mechanical valves, coumadin, 2006).  Per report, he was found down 8/21 with noted aphasia, R sided weakness. Presented to ED where he was found to have L ALIYAH stroke with therapeutic INR, + BC for MSSA. Subsequent workup showed normal EF, prosthetic MV with two mobile masses consistent with vegetations. Was evaluated by ID at OSH. He was then transferred to [facility] for further evalua    Intracranial hemorrhage (HCC) 12/07/2021    LONG TERM ANTICOAGULENT USE     Memory loss     dont remember seizure    Seizure (HCC) 12/07/2021    only one pt has had    Stroke ([facility]) 08/2020    2 left fingers 20% numb       PAST SURGICAL HISTORY  Past Surgical

## 2024-04-09 RX ORDER — WARFARIN SODIUM 10 MG/1
TABLET ORAL
Qty: 30 TABLET | Refills: 0 | Status: SHIPPED | OUTPATIENT
Start: 2024-04-09

## 2024-04-10 RX ORDER — SPIRONOLACTONE 25 MG/1
25 TABLET ORAL DAILY
Qty: 90 TABLET | Refills: 0 | Status: SHIPPED | OUTPATIENT
Start: 2024-04-10

## 2024-04-22 ENCOUNTER — OFFICE VISIT (OUTPATIENT)
Dept: ORTHOPEDIC SURGERY | Age: 54
End: 2024-04-22
Payer: COMMERCIAL

## 2024-04-22 VITALS — HEIGHT: 72 IN | BODY MASS INDEX: 32.51 KG/M2 | WEIGHT: 240 LBS

## 2024-04-22 DIAGNOSIS — M25.562 CHRONIC PAIN OF LEFT KNEE: ICD-10-CM

## 2024-04-22 DIAGNOSIS — G89.29 CHRONIC PAIN OF LEFT KNEE: ICD-10-CM

## 2024-04-22 DIAGNOSIS — M25.461 EFFUSION OF RIGHT KNEE: Primary | ICD-10-CM

## 2024-04-22 DIAGNOSIS — L40.50 PSORIATIC ARTHRITIS (HCC): ICD-10-CM

## 2024-04-22 PROCEDURE — 99214 OFFICE O/P EST MOD 30 MIN: CPT | Performed by: NURSE PRACTITIONER

## 2024-04-22 NOTE — PROGRESS NOTES
[facility]   ORTHOPAEDIC SURGERY AND SPORTS MEDICINE  DATE OF VISIT: 04/22/24  Follow Up Knee Visit     CHIEF COMPLAINT:   Chief Complaint   Patient presents with    Knee Pain     Pt is here today for left knee clicking  and pain. Pt was last seen on 4/8/24 were he received a cortisone injection. Pt states it was helpful for a few days.        HPI:    Moise Ward is a 53 y.o. year old male who presented to the office today for follow up of left knee pain, previously evaluated on 4/8/24. Previous treatment has included joint aspiration and corticosteroid injection for knee effusion. He reports symptoms were improved for several days however reports pain and swelling has returned.. The patient has not responded to the treatment.      REVIEW OF SYSTEMS:     General: Negative Fever, chills, fatigue   Cardiovascular: Negative chest pain, palpitations  Respiratory: Equal chest expansion, negative shortness of breath   Skin: Negative rash, open wounds  Psych: Normal affect, mood stable  Neurologic: sensation grossly intact in extremities   Musculoskeletal: See HPI      Physical Exam:     Height: 1.829 m (6'), Weight - Scale: 108.9 kg (240 lb)    General: Alert and oriented x3, no acute distress  Cardiovascular/pulmonary: No labored breathing, peripheral perfusion intact  Musculoskeletal:    Left Knee:    negative swelling/deformity/effusion  Range of motion is 0 to 130.     Patella is stable tracking midline, positive patellofemoral crepitus  There is no laxity with varus/valgus stress at 0 and 30 degrees of flexion.    There is tenderness to palpation along the medial joint line.    There is no tenderness to palpation along the lateral joint line        Controlled Substances Monitoring:      Imaging:  Previous imaging reviewed showing degenerative changes primarily to the patellofemoral compartment      Assessment: Left knee pain      Plan:   Today we discussed his left knee.  Patient was recently seen and evaluated

## 2024-05-04 ENCOUNTER — HOSPITAL ENCOUNTER (OUTPATIENT)
Dept: MRI IMAGING | Age: 54
End: 2024-05-04
Payer: COMMERCIAL

## 2024-05-04 DIAGNOSIS — M25.562 CHRONIC PAIN OF LEFT KNEE: ICD-10-CM

## 2024-05-04 DIAGNOSIS — G89.29 CHRONIC PAIN OF LEFT KNEE: ICD-10-CM

## 2024-05-04 PROCEDURE — 73721 MRI JNT OF LWR EXTRE W/O DYE: CPT

## 2024-05-09 ENCOUNTER — TELEPHONE (OUTPATIENT)
Dept: ORTHOPEDIC SURGERY | Age: 54
End: 2024-05-09

## 2024-05-09 DIAGNOSIS — M23.322 MEDIAL MENISCUS, POSTERIOR HORN DERANGEMENT, LEFT: ICD-10-CM

## 2024-05-09 DIAGNOSIS — G89.29 CHRONIC PAIN OF LEFT KNEE: Primary | ICD-10-CM

## 2024-05-09 DIAGNOSIS — M25.562 CHRONIC PAIN OF LEFT KNEE: Primary | ICD-10-CM

## 2024-05-09 NOTE — TELEPHONE ENCOUNTER
----- Message from CELSO Flores - CNP sent at 5/8/2024 12:50 PM EDT -----  Discussed MRI findings with patient.  I would like to get viscosupplement injections approved for his left knee.  Would also consider  brace to offload the medial compartment

## 2024-05-09 NOTE — TELEPHONE ENCOUNTER
Patient was contacted by NP on 5/8/2024, he was last seen in office on 4/22/2024, they would like to proceed with right knee visco supplementation authorization.     Form filled out / process started for right knee Euflexxa authorization

## 2024-05-14 RX ORDER — WARFARIN SODIUM 10 MG/1
TABLET ORAL
Qty: 30 TABLET | Refills: 0 | Status: SHIPPED | OUTPATIENT
Start: 2024-05-14

## 2024-05-20 RX ORDER — WARFARIN SODIUM 10 MG/1
TABLET ORAL
Qty: 30 TABLET | Refills: 0 | Status: SHIPPED
Start: 2024-05-20 | End: 2024-05-21 | Stop reason: SDUPTHER

## 2024-05-20 NOTE — TELEPHONE ENCOUNTER
Patient misplaced his Coumadin, thinks he left it in the shopping cart on Friday. Called the store and they cannot find it. Asking if you can send over another 30 days for him to pharmacy. Has been without it and says he has been taking Aspirin.

## 2024-05-21 ENCOUNTER — OFFICE VISIT (OUTPATIENT)
Dept: PRIMARY CARE CLINIC | Age: 54
End: 2024-05-21
Payer: MEDICARE

## 2024-05-21 VITALS
DIASTOLIC BLOOD PRESSURE: 72 MMHG | RESPIRATION RATE: 16 BRPM | BODY MASS INDEX: 33.18 KG/M2 | WEIGHT: 245 LBS | HEART RATE: 72 BPM | SYSTOLIC BLOOD PRESSURE: 130 MMHG | OXYGEN SATURATION: 98 % | HEIGHT: 72 IN

## 2024-05-21 DIAGNOSIS — Z95.2 S/P AVR (AORTIC VALVE REPLACEMENT) AND AORTOPLASTY: ICD-10-CM

## 2024-05-21 DIAGNOSIS — I10 PRIMARY HYPERTENSION: ICD-10-CM

## 2024-05-21 DIAGNOSIS — E78.5 HYPERLIPIDEMIA, UNSPECIFIED HYPERLIPIDEMIA TYPE: ICD-10-CM

## 2024-05-21 DIAGNOSIS — Z95.2 S/P MVR (MITRAL VALVE REPLACEMENT): ICD-10-CM

## 2024-05-21 DIAGNOSIS — I48.20 CHRONIC ATRIAL FIBRILLATION (HCC): Primary | ICD-10-CM

## 2024-05-21 DIAGNOSIS — I62.9 INTRACRANIAL HEMORRHAGE (HCC): ICD-10-CM

## 2024-05-21 DIAGNOSIS — Z12.11 SCREENING FOR MALIGNANT NEOPLASM OF COLON: ICD-10-CM

## 2024-05-21 DIAGNOSIS — Z86.79 HISTORY OF INTRACRANIAL HEMORRHAGE: ICD-10-CM

## 2024-05-21 DIAGNOSIS — Z86.73 HISTORY OF CVA (CEREBROVASCULAR ACCIDENT): ICD-10-CM

## 2024-05-21 DIAGNOSIS — R56.9 SEIZURE-LIKE ACTIVITY (HCC): ICD-10-CM

## 2024-05-21 LAB
ALBUMIN: 4.5 G/DL (ref 3.5–5.2)
ALP BLD-CCNC: 58 U/L (ref 40–129)
ALT SERPL-CCNC: 30 U/L (ref 0–40)
ANION GAP SERPL CALCULATED.3IONS-SCNC: 13 MMOL/L (ref 7–16)
AST SERPL-CCNC: 25 U/L (ref 0–39)
BILIRUB SERPL-MCNC: 0.4 MG/DL (ref 0–1.2)
BUN BLDV-MCNC: 26 MG/DL (ref 6–20)
CALCIUM SERPL-MCNC: 9.3 MG/DL (ref 8.6–10.2)
CHLORIDE BLD-SCNC: 104 MMOL/L (ref 98–107)
CO2: 23 MMOL/L (ref 22–29)
CREAT SERPL-MCNC: 1.2 MG/DL (ref 0.7–1.2)
GFR, ESTIMATED: 72 ML/MIN/1.73M2
GLUCOSE BLD-MCNC: 86 MG/DL (ref 74–99)
HCT VFR BLD CALC: 48.7 % (ref 37–54)
HEMOGLOBIN: 15.3 G/DL (ref 12.5–16.5)
MCH RBC QN AUTO: 28.4 PG (ref 26–35)
MCHC RBC AUTO-ENTMCNC: 31.4 G/DL (ref 32–34.5)
MCV RBC AUTO: 90.4 FL (ref 80–99.9)
PDW BLD-RTO: 13.5 % (ref 11.5–15)
PLATELET # BLD: 213 K/UL (ref 130–450)
PMV BLD AUTO: 10.6 FL (ref 7–12)
POTASSIUM SERPL-SCNC: 4.5 MMOL/L (ref 3.5–5)
RBC # BLD: 5.39 M/UL (ref 3.8–5.8)
SODIUM BLD-SCNC: 140 MMOL/L (ref 132–146)
TOTAL PROTEIN: 7.7 G/DL (ref 6.4–8.3)
TSH SERPL DL<=0.05 MIU/L-ACNC: 0.68 UIU/ML (ref 0.27–4.2)
WBC # BLD: 6.9 K/UL (ref 4.5–11.5)

## 2024-05-21 PROCEDURE — 3075F SYST BP GE 130 - 139MM HG: CPT | Performed by: FAMILY MEDICINE

## 2024-05-21 PROCEDURE — 3078F DIAST BP <80 MM HG: CPT | Performed by: FAMILY MEDICINE

## 2024-05-21 PROCEDURE — 99214 OFFICE O/P EST MOD 30 MIN: CPT | Performed by: FAMILY MEDICINE

## 2024-05-21 RX ORDER — LEVETIRACETAM 750 MG/1
750 TABLET ORAL 2 TIMES DAILY
Qty: 60 TABLET | Refills: 5 | Status: SHIPPED | OUTPATIENT
Start: 2024-05-21

## 2024-05-21 RX ORDER — METOPROLOL SUCCINATE 25 MG/1
25 TABLET, EXTENDED RELEASE ORAL DAILY
Qty: 90 TABLET | Refills: 3 | Status: SHIPPED | OUTPATIENT
Start: 2024-05-21

## 2024-05-21 RX ORDER — LISINOPRIL 20 MG/1
20 TABLET ORAL DAILY
Qty: 90 TABLET | Refills: 3 | Status: SHIPPED | OUTPATIENT
Start: 2024-05-21

## 2024-05-21 RX ORDER — SPIRONOLACTONE 25 MG/1
25 TABLET ORAL DAILY
Qty: 90 TABLET | Refills: 3 | Status: SHIPPED | OUTPATIENT
Start: 2024-05-21

## 2024-05-21 RX ORDER — WARFARIN SODIUM 10 MG/1
TABLET ORAL
Qty: 30 TABLET | Refills: 5 | Status: SHIPPED | OUTPATIENT
Start: 2024-05-21

## 2024-05-21 RX ORDER — LEVETIRACETAM 750 MG/1
750 TABLET ORAL 2 TIMES DAILY
Qty: 60 TABLET | Refills: 1 | Status: SHIPPED
Start: 2024-05-21 | End: 2024-05-21 | Stop reason: SDUPTHER

## 2024-05-21 ASSESSMENT — ENCOUNTER SYMPTOMS
DIARRHEA: 0
ABDOMINAL PAIN: 0
COLOR CHANGE: 0
APNEA: 0
CONSTIPATION: 0
NAUSEA: 0
CHEST TIGHTNESS: 0
BLOOD IN STOOL: 0
SHORTNESS OF BREATH: 0
CHANGE IN BOWEL HABIT: 0
EYE ITCHING: 0
VOMITING: 0
SORE THROAT: 0
SINUS PRESSURE: 0
BACK PAIN: 0
WHEEZING: 0
EYE PAIN: 0
COUGH: 0
EYE REDNESS: 0
RHINORRHEA: 0

## 2024-05-21 NOTE — PROGRESS NOTES
History of intracranial hemorrhage    Chronic atrial fibrillation (HCC)    At high risk for bleeding    Carpal tunnel syndrome of right wrist       Chronic atrial fibrillation (HCC)  History of intracranial hemorrhage  Seizure-like activity (HCC)  History of CVA (cerebrovascular accident)  Primary hypertension  -     CBC; Future  -     Comprehensive Metabolic Panel; Future  -     Lipid Panel; Future  -     TSH; Future  Hyperlipidemia, unspecified hyperlipidemia type  -     CBC; Future  -     Comprehensive Metabolic Panel; Future  -     Lipid Panel; Future  -     TSH; Future  S/P MVR (mitral valve replacement)  S/P AVR (aortic valve replacement) and aortoplasty  Screening for malignant neoplasm of colon  -     Cologuard (Fecal DNA Colorectal Cancer Screening)      Orders Placed This Encounter    Cologuard (Fecal DNA Colorectal Cancer Screening)    CBC     Standing Status:   Future     Standing Expiration Date:   5/21/2025    Comprehensive Metabolic Panel     Standing Status:   Future     Standing Expiration Date:   5/21/2025    Lipid Panel     Standing Status:   Future     Standing Expiration Date:   5/21/2025     Order Specific Question:   Is Patient Fasting?/# of Hours     Answer:   10    TSH     Standing Status:   Future     Standing Expiration Date:   5/21/2025    levETIRAcetam (KEPPRA) 750 MG tablet     Sig: Take 1 tablet by mouth 2 times daily     Dispense:  60 tablet     Refill:  5    warfarin (COUMADIN) 10 MG tablet     Sig: TAKE ONE TABLET BY MOUTH EVERY DAY or as directed by doctor     Dispense:  30 tablet     Refill:  5     PATIENT MISPLACED MEDICATION    spironolactone (ALDACTONE) 25 MG tablet     Sig: Take 1 tablet by mouth daily     Dispense:  90 tablet     Refill:  3    lisinopril (PRINIVIL;ZESTRIL) 20 MG tablet     Sig: Take 1 tablet by mouth daily     Dispense:  90 tablet     Refill:  3    metoprolol succinate (TOPROL XL) 25 MG extended release tablet     Sig: Take 1 tablet by mouth daily

## 2024-05-21 NOTE — TELEPHONE ENCOUNTER
Denial letter received.  Spoke to patient concerning denial, states needs to do formal PT. Patient wanted to discuss self pay visco supplementation if insurance does not authorize.    Gelsyn ~ 3 - $296.35 (series of three due Date of service for 40 % discount )    Physical therapy order was placed

## 2024-05-31 ENCOUNTER — EVALUATION (OUTPATIENT)
Dept: PHYSICAL THERAPY | Age: 54
End: 2024-05-31
Payer: MEDICARE

## 2024-05-31 DIAGNOSIS — G89.29 CHRONIC PAIN OF LEFT KNEE: Primary | ICD-10-CM

## 2024-05-31 DIAGNOSIS — M23.322 DERANGEMENT OF POSTERIOR HORN OF MEDIAL MENISCUS, LEFT: ICD-10-CM

## 2024-05-31 DIAGNOSIS — M25.562 CHRONIC PAIN OF LEFT KNEE: Primary | ICD-10-CM

## 2024-05-31 PROCEDURE — 97110 THERAPEUTIC EXERCISES: CPT | Performed by: PHYSICAL THERAPIST

## 2024-05-31 PROCEDURE — 97162 PT EVAL MOD COMPLEX 30 MIN: CPT | Performed by: PHYSICAL THERAPIST

## 2024-06-03 PROBLEM — G89.29 CHRONIC PAIN OF LEFT KNEE: Status: ACTIVE | Noted: 2024-06-03

## 2024-06-03 PROBLEM — M23.322 DERANGEMENT OF POSTERIOR HORN OF MEDIAL MENISCUS, LEFT: Status: ACTIVE | Noted: 2024-06-03

## 2024-06-03 PROBLEM — M25.562 CHRONIC PAIN OF LEFT KNEE: Status: ACTIVE | Noted: 2024-06-03

## 2024-06-03 NOTE — PROGRESS NOTES
Physical Therapy Daily Treatment Note    Date: 6/3/2024  Patient Name: Moise Ward  : 1970   MRN: [id number]  DOInjury: 2024   DOSx: -  Referring Provider: Horace Gutierrez, CELSO - CNP  [address] [facility]  [address] [place] [address]     Medical Diagnosis:     M25.562, G89.29 (ICD-10-CM) - Chronic pain of left knee  M23.322 (ICD-10-CM) - Medial meniscus, posterior horn derangement, left          X = TO BE PERFORMED NEXT VISIT  > = PROGRESS TO THIS    S: See eval  O: Discussed anatomy, physiology, body mechanics, principles of loading, and progressive loading/activity.  Reviewed home exercise program extensively; written copy provided.     Access Code: [email or web address]  URL: [email or web address]  Date: 2024  Prepared by: Dale Calderón    Exercises  - Supine Heel Slides  - 2-3 x daily - 7 x weekly - 2 sets - 20 reps  - Small Range Straight Leg Raise (Mirrored)  - 2-3 x daily - 7 x weekly - 2 sets - 10 reps  - Seated Long Arc Quad  - 2-3 x daily - 7 x weekly - 2 sets - 15 reps - 3 sec hold    Time 2541-4515     Visit 1 Repeat outcome measure at mid point and end.    Pain Pain with activity 7/10     ROM      Modalities         MO   Manual            Stretch      Towel / strap DF, INV, EV   TE      TE   Exercise      Ball / can rolling   TE   Toe curls      Heel slides 2 x 20  TE   LAQ 2 x 15 w/ 3 sec hold  TE   SLR 2 x 10  TE   SAQ   TE   [] TG  [] Leg Press 2-leg   TE   [] TG  [] Leg Press 1-leg   TE   Hamstring Curl Machine   TE   Knee Extension Machine   TE   Step-ups - FWD   TA   Step-ups - LAT   TA   Step-ups - BWD   TA   Calf Raises   TA      TA      TA               A:  Tolerated well.    P: Continue with rehab plan  Dale Calderón PT    Treatment Charges: Mins Units   Initial Evaluation 30 1   Re-Evaluation     Ther Exercise         TE 15 1   Manual Therapy     MT     Ther Activities        TA     Gait Training          GT     Neuro Re-education NR     Modalities     Non-Billable Service Time     Other  
Neuromuscular Re-Education   02251 Therapeutic Activities   28476 Manual Therapy   01263 Vasopneumatic Device     Suggested Professional Referral: [x] No  [] Yes:     Patient Education:  [x] Plans / Goals, Risks / Benefits discussed  [x] Home exercise program  Method of Education: [x] Verbal  [x] Demo  [x] Written  Comprehension of Education:  [x] Verbalizes understanding.  [x] Demonstrates understanding.  [] Needs Review.  [] Demonstrates / verbalizes understanding of HEP / Ed previously given.    Frequency: 1-2 days per week for 4-6 weeks    Patient understands diagnosis/prognosis and consents to treatment, plan and goals: [x] Yes    [] No     Thank you for the opportunity to work with your patient.  If you have questions or comments, please contact me at [phone]; fax: [phone].    Electronically signed by: Dale Calderón PT         Please sign Physician's Certification and return to:  [facility] PHYSICAL THERAPY  [facility] [address]  Dept: [phone]  Dept Fax: [phone]  Loc: [phone]    Physician's Certification / Comments     Frequency/Duration 1-2 days per week for 4-6 weeks.   Certification period from 5/31/2024  to 8/22/2024.    I have reviewed the Plan of Care established for skilled therapy services and certify that the services are required and that they will be provided while the patient is under my care.    Physician's Comments/Revisions:               Physician's Printed Name:                                           Physician's Signature:                                                               Date:

## 2024-06-04 ENCOUNTER — TREATMENT (OUTPATIENT)
Dept: PHYSICAL THERAPY | Age: 54
End: 2024-06-04
Payer: MEDICARE

## 2024-06-04 DIAGNOSIS — G89.29 CHRONIC PAIN OF LEFT KNEE: Primary | ICD-10-CM

## 2024-06-04 DIAGNOSIS — M25.562 CHRONIC PAIN OF LEFT KNEE: Primary | ICD-10-CM

## 2024-06-04 PROCEDURE — 97110 THERAPEUTIC EXERCISES: CPT

## 2024-06-04 NOTE — PROGRESS NOTES
Physical Therapy Daily Treatment Note    Date: 2024  Patient Name: Moise Ward  : 1970   MRN: [id number]  DOInjury: 2024   DOSx: -  Referring Provider: Horace Gutierrez, CELSO - CNP  [address] [place] [address]     Medical Diagnosis:     M25.562, G89.29 (ICD-10-CM) - Chronic pain of left knee  M23.322 (ICD-10-CM) - Medial meniscus, posterior horn derangement, left          X = TO BE PERFORMED NEXT VISIT  > = PROGRESS TO THIS    S: no changes since eval.  On  going to see . pt. States that the pain in his left knee gets worse when he sits for prolonged periods. pt. stated no pain before treatment, besides when descending stairs.   O: Discussed anatomy, physiology, body mechanics, principles of loading, and progressive loading/activity.  Reviewed home exercise program extensively; written copy provided.     Access Code: [email or web address]  URL: [email or web address]  Date: 2024  Prepared by: Dale Calderón    Exercises  - Supine Heel Slides  - 2-3 x daily - 7 x weekly - 2 sets - 20 reps  - Small Range Straight Leg Raise (Mirrored)  - 2-3 x daily - 7 x weekly - 2 sets - 10 reps  - Seated Long Arc Quad  - 2-3 x daily - 7 x weekly - 2 sets - 15 reps - 3 sec hold    Time 9310-0437     Visit 2 Repeat outcome measure at mid point and end.    Pain Pain with activity 7/10     ROM      Modalities         MO   Manual            Stretch      Towel / strap DF, INV, EV   TE      TE   Exercise      nustep L5 / 7 min     Ball / can rolling   TE   Toe curls      Heel slides 2 x 20  TE   LAQ 3 x 15 w/ 2 sec hold and 2# cuff weight   TE   SLR 3 x 10  TE   SAQ   TE   [x] TG  [x] Leg Press 2-leg 2 x 20  level 15 and 15 with 65# and 125# 15 x   TE   [] TG  [] Leg Press 1-leg   TE   Hamstring Curl Machine   TE   Knee Extension Machine   TE   Step-ups - FWD Next?  TA   Step-ups - LAT Next?  TA   Step-ups - BWD Next?  TA   Calf Raises   TA      TA      TA               A:  Tolerated well

## 2024-06-07 ENCOUNTER — TREATMENT (OUTPATIENT)
Dept: PHYSICAL THERAPY | Age: 54
End: 2024-06-07

## 2024-06-07 DIAGNOSIS — M23.322 DERANGEMENT OF POSTERIOR HORN OF MEDIAL MENISCUS, LEFT: ICD-10-CM

## 2024-06-07 DIAGNOSIS — M25.562 CHRONIC PAIN OF LEFT KNEE: Primary | ICD-10-CM

## 2024-06-07 DIAGNOSIS — G89.29 CHRONIC PAIN OF LEFT KNEE: Primary | ICD-10-CM

## 2024-06-07 NOTE — PROGRESS NOTES
Physical Therapy Daily Treatment Note    Date: 2024  Patient Name: Moise Ward  : 1970   MRN: [id number]  DOInjury: 2024   DOSx: -  Referring Provider: Horace Gutierrez, CELSO - CNP  [address] [place] [address]     Medical Diagnosis:     M25.562, G89.29 (ICD-10-CM) - Chronic pain of left knee  M23.322 (ICD-10-CM) - Medial meniscus, posterior horn derangement, left          X = TO BE PERFORMED NEXT VISIT  > = PROGRESS TO THIS    S: no changes since eval.  On  going to see . pt. States that the pain in his left knee gets worse when he sits for prolonged periods. pt. stated no pain before treatment, besides when descending stairs.   O: Discussed anatomy, physiology, body mechanics, principles of loading, and progressive loading/activity.  Reviewed home exercise program extensively; written copy provided.     Access Code: [email or web address]  URL: [email or web address]  Date: 2024  Prepared by: Dale Calderón    Exercises  - Supine Heel Slides  - 2-3 x daily - 7 x weekly - 2 sets - 20 reps  - Small Range Straight Leg Raise (Mirrored)  - 2-3 x daily - 7 x weekly - 2 sets - 10 reps  - Seated Long Arc Quad  - 2-3 x daily - 7 x weekly - 2 sets - 15 reps - 3 sec hold    Time 3014-5566     Visit 3 /12 visits  Repeat outcome measure at mid point and end.    Pain Pain with activity 7/10     ROM      Modalities         MO   Manual            Stretch      Towel / strap DF, INV, EV   TE      TE   Exercise      nustep L5 / 8 min     Ball / can rolling   TE   Toe curls      Heel slides  TE   LAQ  TE   SLR  TE   SAQ   TE   [x] TG  [x] Leg Press 2-leg 2 x 20   65# and 125#   TE   [] TG  [] Leg Press 1-leg   TE   Hamstring Curl Machine   TE   Knee Extension Machine 25# x 30   TE   Step-ups - FWD 6\" x 30 Next  TA   Step-ups - LAT 6\" x 30 Next  TA   Step-ups - BWD 6\" X 30 Next  TA   Calf Raises   TA      TA      TA               A:  Tolerated well . P: Continue with rehab plan . No

## 2024-06-11 ENCOUNTER — OFFICE VISIT (OUTPATIENT)
Dept: ORTHOPEDIC SURGERY | Age: 54
End: 2024-06-11
Payer: MEDICARE

## 2024-06-11 VITALS — BODY MASS INDEX: 33.18 KG/M2 | WEIGHT: 245 LBS | HEIGHT: 72 IN

## 2024-06-11 DIAGNOSIS — M25.562 CHRONIC PAIN OF LEFT KNEE: Primary | ICD-10-CM

## 2024-06-11 DIAGNOSIS — G89.29 CHRONIC PAIN OF LEFT KNEE: Primary | ICD-10-CM

## 2024-06-11 PROCEDURE — G8417 CALC BMI ABV UP PARAM F/U: HCPCS | Performed by: NURSE PRACTITIONER

## 2024-06-11 PROCEDURE — 99213 OFFICE O/P EST LOW 20 MIN: CPT | Performed by: NURSE PRACTITIONER

## 2024-06-11 PROCEDURE — 3017F COLORECTAL CA SCREEN DOC REV: CPT | Performed by: NURSE PRACTITIONER

## 2024-06-11 PROCEDURE — 20610 DRAIN/INJ JOINT/BURSA W/O US: CPT | Performed by: NURSE PRACTITIONER

## 2024-06-11 PROCEDURE — 1036F TOBACCO NON-USER: CPT | Performed by: NURSE PRACTITIONER

## 2024-06-11 PROCEDURE — G8427 DOCREV CUR MEDS BY ELIG CLIN: HCPCS | Performed by: NURSE PRACTITIONER

## 2024-06-11 NOTE — PROGRESS NOTES
[facility]  ORTHOPAEDICS AND SPORTS MEDICINE  DATE OF VISIT: 06/12/24  Follow Up Visit for Visco Injection    CHIEF COMPLAINT:   Chief Complaint   Patient presents with    Knee Pain     Pt is here today for his left knee Gelsyn~3 injection # 1 of 3.          Moise Ward returns for follow up visit for visco supplement injection 1.  He reports symptoms are unchanged    Physical Exam:   General: Alert and oriented x3, no acute distress  Cardiovascular/pulmonary: No labored breathing, peripheral perfusion intact  Musculoskeletal:    Left knee:    Range of motion is functional   Patella is stable tracking midline  There is no laxity with varus/valgus stress at 0 and 30 degrees of flexion.    There is no tenderness to palpation along the medial joint line.    There is no tenderness to palpation along the lateral joint line       Imaging: Reviewed     Procedure Note: Knee viscosupplementation injection     The Left knee was identified as the injection site. The risk and benefits of injection were explained and the patient consented to the injection. Under sterile conditions, the knee was injected with a full dose of Gelsyn-3. A sterile bandage was applied.    Administrations This Visit       sodium hyaluronate (Viscosup) injection SOSY 16.8 mg       Admin Date  06/11/2024 Action  Given Dose  16.8 mg Route  Intra-artICUlar Administered By  Hermelinda Ewing RN                      Assessment/Plan: S/p Left knee Gelsyn-3 injection #1 for osteoarthritis  -Continue activity modification/NSAIDS/ICE prn  -f/u next week for second injection      CELSO Middleton-CNP  Orthopedic Surgery   06/12/24  2:21 PM

## 2024-06-18 ENCOUNTER — NURSE ONLY (OUTPATIENT)
Dept: ORTHOPEDIC SURGERY | Age: 54
End: 2024-06-18
Payer: MEDICARE

## 2024-06-18 ENCOUNTER — OFFICE VISIT (OUTPATIENT)
Dept: SURGERY | Age: 54
End: 2024-06-18

## 2024-06-18 ENCOUNTER — TELEPHONE (OUTPATIENT)
Dept: SURGERY | Age: 54
End: 2024-06-18

## 2024-06-18 VITALS
HEIGHT: 72 IN | TEMPERATURE: 97.8 F | HEART RATE: 58 BPM | BODY MASS INDEX: 32.51 KG/M2 | SYSTOLIC BLOOD PRESSURE: 133 MMHG | WEIGHT: 240 LBS | DIASTOLIC BLOOD PRESSURE: 83 MMHG

## 2024-06-18 VITALS — HEIGHT: 72 IN | WEIGHT: 240 LBS | BODY MASS INDEX: 32.51 KG/M2

## 2024-06-18 DIAGNOSIS — M25.562 CHRONIC PAIN OF LEFT KNEE: Primary | ICD-10-CM

## 2024-06-18 DIAGNOSIS — G89.29 CHRONIC PAIN OF LEFT KNEE: Primary | ICD-10-CM

## 2024-06-18 DIAGNOSIS — K42.0 UMBILICAL HERNIA WITH OBSTRUCTION, WITHOUT GANGRENE: Primary | ICD-10-CM

## 2024-06-18 PROBLEM — K42.9 UMBILICAL HERNIA: Status: ACTIVE | Noted: 2024-06-18

## 2024-06-18 PROCEDURE — 20610 DRAIN/INJ JOINT/BURSA W/O US: CPT | Performed by: NURSE PRACTITIONER

## 2024-06-18 NOTE — PROGRESS NOTES
Number of children: Not on file    Years of education: Not on file    Highest education level: Not on file   Occupational History     Comment: [employer]   Tobacco Use    Smoking status: Never    Smokeless tobacco: Never   Vaping Use    Vaping Use: Never used   Substance and Sexual Activity    Alcohol use: Yes     Comment: 1-2 beers per week not since dec    Drug use: Never    Sexual activity: Not on file   Other Topics Concern    Not on file   Social History Narrative    Not on file     Social Determinants of Health     Financial Resource Strain: Low Risk  (11/21/2023)    Overall Financial Resource Strain (CARDIA)     Difficulty of Paying Living Expenses: Not hard at all   Food Insecurity: Not on file (11/21/2023)   Transportation Needs: Unknown (11/21/2023)    PRAPARE - Transportation     Lack of Transportation (Medical): Not on file     Lack of Transportation (Non-Medical): No   Physical Activity: Sufficiently Active (4/7/2024)    Exercise Vital Sign     Days of Exercise per Week: 3 days     Minutes of Exercise per Session: 90 min   Stress: Not on file   Social Connections: Not on file   Intimate Partner Violence: Not on file   Housing Stability: Unknown (11/21/2023)    Housing Stability Vital Sign     Unable to Pay for Housing in the Last Year: Not on file     Number of Places Lived in the Last Year: Not on file     Unstable Housing in the Last Year: No       A complete 10 system review was performed and are otherwise negative unless mentioned in the above HPI. Specific negatives are listed below but may not include all those reviewed.    General ROS: negative obtundation, AMS  ENT ROS: negative rhinorrhea, epistaxis  Allergy and Immunology ROS: negative itchy/watery eyes or nasal congestion  Hematological and Lymphatic ROS: negative spontaneous bleeding or bruising  Endocrine ROS: negative  lethargy, mood swings, palpitations or polydipsia/polyuria  Respiratory ROS: negative sputum changes, stridor, tachypnea or

## 2024-06-18 NOTE — PROGRESS NOTES
[facility]  ORTHOPAEDICS AND SPORTS MEDICINE  DATE OF VISIT: 06/18/24  Follow Up Visit for Visco Injection    CHIEF COMPLAINT:   Chief Complaint   Patient presents with    Injections     Pt is here today for his left knee Gelsyn~3 injection # 2 of 3.          Moise Ward returns for follow up visit for visco supplement injection 2.  He reports symptoms are mildly improved    Physical Exam:   General: Alert and oriented x3, no acute distress  Cardiovascular/pulmonary: No labored breathing, peripheral perfusion intact  Musculoskeletal:    Left knee:    Range of motion is functional   Patella is stable tracking midline  There is no laxity with varus/valgus stress at 0 and 30 degrees of flexion.    There is no tenderness to palpation along the medial joint line.    There is no tenderness to palpation along the lateral joint line       Imaging: Reviewed     Procedure Note: Knee viscosupplementation injection     The Left knee was identified as the injection site. The risk and benefits of injection were explained and the patient consented to the injection. Under sterile conditions, the knee was injected with a full dose of Gelsyn-3. A sterile bandage was applied.    Administrations This Visit       sodium hyaluronate (Viscosup) injection SOSY 16.8 mg       Admin Date  06/18/2024 Action  Given Dose  16.8 mg Route  Intra-artICUlar Administered By  Hermelinda Ewing RN                      Assessment/Plan: S/p Left knee gelsyn-3 injection #2 for osteoarthritis  -Continue activity modification/NSAIDS/ICE prn  -f/u next week for final injection      CELSO Middleton-CNP  Orthopedic Surgery   06/18/24  3:52 PM

## 2024-06-18 NOTE — TELEPHONE ENCOUNTER
Per the order of Dr. Moreno, patient has been scheduled for Laparoscopic robotic umbilical hernia repair on 2024.  Patient provided with procedure information during office visit and scheduled for post op follow up appointment.  Patient instructed to please contact our office with any questions.    Patient will need medical clearance and lovenox bridge from PCP.  Patient will also need cardiac clearance for surgery.  Requests sent to both offices, patient encouraged to contact both providers to discuss need for clearances.    Procedure scheduled through [facility].  Dr. Moreno to enter orders.        Prior Authorization Form:      DEMOGRAPHICS:                     Patient Name:  Moise Ward  Patient :  1970            Insurance:  Payor: MEDICARE / Plan: MEDICARE PART A AND B / Product Type: *No Product type* /   Insurance ID Number:    Payer/Plan Subscr  Sex Relation Sub. Ins. ID Effective Group Num   1. MEDICARE - MELUL BRASWELLNOVA NG 1970 Male Self [id number]                                    [address] [address]   2. [place] [facility] KENNY[facility] 1970 Male Self [id number]                                    [address]         DIAGNOSIS & PROCEDURE:                       Procedure/Operation: Laparoscopic robotic umbilical hernia repair            CPT Code: 91814    Diagnosis:  Umbilical hernia     ICD10 Code:     Location:  [facility]    Surgeon:  Josh    SCHEDULING INFORMATION:                          Date: 2024    Time: TBD              Anesthesia:  General                                                       Status:  Outpatient        Special Comments:         Electronically signed by Floresita Ureña on 2024 at 10:24 AM

## 2024-06-19 ENCOUNTER — TELEPHONE (OUTPATIENT)
Dept: ADMINISTRATIVE | Age: 54
End: 2024-06-19

## 2024-06-19 NOTE — TELEPHONE ENCOUNTER
Pt calling to move up his cardiac clearance apt with Dr. Suarez from Sept with Dr. Suarez - Hernia surgery is now schedule for: 7/22/24 by Dr. Moreno.  Scheduling advise please.

## 2024-06-25 ENCOUNTER — NURSE ONLY (OUTPATIENT)
Dept: ORTHOPEDIC SURGERY | Age: 54
End: 2024-06-25
Payer: MEDICARE

## 2024-06-25 VITALS — BODY MASS INDEX: 32.51 KG/M2 | WEIGHT: 240 LBS | HEIGHT: 72 IN

## 2024-06-25 DIAGNOSIS — M17.12 PRIMARY OSTEOARTHRITIS OF LEFT KNEE: Primary | ICD-10-CM

## 2024-06-25 PROCEDURE — 20610 DRAIN/INJ JOINT/BURSA W/O US: CPT | Performed by: NURSE PRACTITIONER

## 2024-06-25 NOTE — PROGRESS NOTES
[facility]  ORTHOPAEDICS AND SPORTS MEDICINE  DATE OF VISIT: 06/25/24  Follow Up Visit for Visco Injection    CHIEF COMPLAINT:   Chief Complaint   Patient presents with    Injections     Pt is here today for his left knee Gelsyn~3 injection # 3 of 3.          Moise Ward returns for follow up visit for visco supplement injection 3.  He reports symptoms are unchanged    Physical Exam:   General: Alert and oriented x3, no acute distress  Cardiovascular/pulmonary: No labored breathing, peripheral perfusion intact  Musculoskeletal:    Left knee:    Range of motion is functional   Patella is stable tracking midline  There is no laxity with varus/valgus stress at 0 and 30 degrees of flexion.    There is no tenderness to palpation along the medial joint line.    There is no tenderness to palpation along the lateral joint line       Imaging: Reviewed     Procedure Note: Knee viscosupplementation injection     The Left knee was identified as the injection site. The risk and benefits of injection were explained and the patient consented to the injection. Under sterile conditions, the knee was injected with a full dose of Gelsyn-3. A sterile bandage was applied.    Administrations This Visit       sodium hyaluronate (Viscosup) injection SOSY 16.8 mg       Admin Date  06/25/2024 Action  Given Dose  16.8 mg Route  Intra-artICUlar Administered By  Hermelinda Ewing RN                      Assessment/Plan: S/p Left knee Gelsyn-3 injection # 3 for osteoarthritis  -Continue activity modification/NSAIDS/ICE prn  -f/u 3-month or as needed      CELSO Middleton-CNP  Orthopedic Surgery   06/25/24  3:34 PM

## 2024-06-26 DIAGNOSIS — Z76.89 ENCOUNTER TO ESTABLISH CARE: Primary | ICD-10-CM

## 2024-06-27 ENCOUNTER — OFFICE VISIT (OUTPATIENT)
Dept: PRIMARY CARE CLINIC | Age: 54
End: 2024-06-27
Payer: MEDICARE

## 2024-06-27 VITALS
HEIGHT: 72 IN | SYSTOLIC BLOOD PRESSURE: 132 MMHG | HEART RATE: 60 BPM | TEMPERATURE: 97.6 F | WEIGHT: 248 LBS | DIASTOLIC BLOOD PRESSURE: 82 MMHG | OXYGEN SATURATION: 97 % | BODY MASS INDEX: 33.59 KG/M2

## 2024-06-27 DIAGNOSIS — I48.20 CHRONIC ATRIAL FIBRILLATION (HCC): ICD-10-CM

## 2024-06-27 DIAGNOSIS — Z86.79 HISTORY OF INTRACRANIAL HEMORRHAGE: ICD-10-CM

## 2024-06-27 DIAGNOSIS — E78.5 HYPERLIPIDEMIA, UNSPECIFIED HYPERLIPIDEMIA TYPE: ICD-10-CM

## 2024-06-27 DIAGNOSIS — Z01.818 PRE-OP EXAM: Primary | ICD-10-CM

## 2024-06-27 DIAGNOSIS — Z86.73 HISTORY OF CVA (CEREBROVASCULAR ACCIDENT): ICD-10-CM

## 2024-06-27 DIAGNOSIS — I10 PRIMARY HYPERTENSION: ICD-10-CM

## 2024-06-27 DIAGNOSIS — K42.9 UMBILICAL HERNIA WITHOUT OBSTRUCTION AND WITHOUT GANGRENE: ICD-10-CM

## 2024-06-27 DIAGNOSIS — Z95.2 S/P MVR (MITRAL VALVE REPLACEMENT): ICD-10-CM

## 2024-06-27 DIAGNOSIS — R56.9 SEIZURE-LIKE ACTIVITY (HCC): ICD-10-CM

## 2024-06-27 DIAGNOSIS — Z95.2 S/P AVR (AORTIC VALVE REPLACEMENT) AND AORTOPLASTY: ICD-10-CM

## 2024-06-27 DIAGNOSIS — Z01.818 PRE-OP EXAM: ICD-10-CM

## 2024-06-27 LAB
ALBUMIN: 4.3 G/DL (ref 3.5–5.2)
ALP BLD-CCNC: 56 U/L (ref 40–129)
ALT SERPL-CCNC: 35 U/L (ref 0–40)
ANION GAP SERPL CALCULATED.3IONS-SCNC: 14 MMOL/L (ref 7–16)
AST SERPL-CCNC: 26 U/L (ref 0–39)
BILIRUB SERPL-MCNC: 0.3 MG/DL (ref 0–1.2)
BUN BLDV-MCNC: 26 MG/DL (ref 6–20)
CALCIUM SERPL-MCNC: 9.1 MG/DL (ref 8.6–10.2)
CHLORIDE BLD-SCNC: 106 MMOL/L (ref 98–107)
CHOLESTEROL, TOTAL: 237 MG/DL
CO2: 20 MMOL/L (ref 22–29)
CREAT SERPL-MCNC: 1 MG/DL (ref 0.7–1.2)
GFR, ESTIMATED: 89 ML/MIN/1.73M2
GLUCOSE BLD-MCNC: 98 MG/DL (ref 74–99)
HCT VFR BLD CALC: 45.2 % (ref 37–54)
HDLC SERPL-MCNC: 48 MG/DL
HEMOGLOBIN: 14.8 G/DL (ref 12.5–16.5)
LDL CHOLESTEROL: 155 MG/DL
MCH RBC QN AUTO: 28.5 PG (ref 26–35)
MCHC RBC AUTO-ENTMCNC: 32.7 G/DL (ref 32–34.5)
MCV RBC AUTO: 87.1 FL (ref 80–99.9)
PDW BLD-RTO: 14.1 % (ref 11.5–15)
PLATELET # BLD: 240 K/UL (ref 130–450)
PMV BLD AUTO: 10.4 FL (ref 7–12)
POTASSIUM SERPL-SCNC: 4.4 MMOL/L (ref 3.5–5)
RBC # BLD: 5.19 M/UL (ref 3.8–5.8)
SODIUM BLD-SCNC: 140 MMOL/L (ref 132–146)
TOTAL PROTEIN: 7.4 G/DL (ref 6.4–8.3)
TRIGL SERPL-MCNC: 168 MG/DL
VLDLC SERPL CALC-MCNC: 34 MG/DL
WBC # BLD: 6.2 K/UL (ref 4.5–11.5)

## 2024-06-27 PROCEDURE — 99214 OFFICE O/P EST MOD 30 MIN: CPT | Performed by: FAMILY MEDICINE

## 2024-06-27 PROCEDURE — 3079F DIAST BP 80-89 MM HG: CPT | Performed by: FAMILY MEDICINE

## 2024-06-27 PROCEDURE — 3075F SYST BP GE 130 - 139MM HG: CPT | Performed by: FAMILY MEDICINE

## 2024-06-27 PROCEDURE — 93000 ELECTROCARDIOGRAM COMPLETE: CPT | Performed by: FAMILY MEDICINE

## 2024-06-27 PROCEDURE — 1036F TOBACCO NON-USER: CPT | Performed by: FAMILY MEDICINE

## 2024-06-27 PROCEDURE — 3017F COLORECTAL CA SCREEN DOC REV: CPT | Performed by: FAMILY MEDICINE

## 2024-06-27 PROCEDURE — G8417 CALC BMI ABV UP PARAM F/U: HCPCS | Performed by: FAMILY MEDICINE

## 2024-06-27 PROCEDURE — G8427 DOCREV CUR MEDS BY ELIG CLIN: HCPCS | Performed by: FAMILY MEDICINE

## 2024-06-27 RX ORDER — CLOBETASOL PROPIONATE 0.5 MG/G
CREAM TOPICAL
COMMUNITY
Start: 2024-06-18

## 2024-06-27 ASSESSMENT — ENCOUNTER SYMPTOMS
RHINORRHEA: 0
SORE THROAT: 0
EYE ITCHING: 0
CHEST TIGHTNESS: 0
EYE REDNESS: 0
VOMITING: 0
CHANGE IN BOWEL HABIT: 0
EYE PAIN: 0
ABDOMINAL PAIN: 1
COUGH: 0
BACK PAIN: 0
NAUSEA: 0
SINUS PRESSURE: 0
WHEEZING: 0
CONSTIPATION: 0
BLOOD IN STOOL: 0
COLOR CHANGE: 0
SHORTNESS OF BREATH: 0
APNEA: 0
DIARRHEA: 0

## 2024-06-27 NOTE — PROGRESS NOTES
Chief Complaint:     Chief Complaint   Patient presents with    Pre-op Exam     07/22/2024 [facility] Robbi Moreno MD Umbilical hernia        Abdominal Pain    Hypertension    Hyperlipidemia    Heart Problem         Abdominal Pain  This is a recurrent problem. The current episode started more than 1 month ago. The problem occurs intermittently. The problem has been gradually worsening. The pain is located in the periumbilical region. The pain is moderate. The quality of the pain is colicky and dull. The abdominal pain does not radiate. Associated symptoms include myalgias. Pertinent negatives include no arthralgias, constipation, diarrhea, dysuria, fever, frequency, headaches, hematuria, nausea or vomiting. Nothing aggravates the pain. The pain is relieved by Nothing. He has tried nothing for the symptoms. The treatment provided no relief.   Hypertension  Pertinent negatives include no chest pain, headaches, neck pain, palpitations or shortness of breath. There is no history of chronic renal disease.   Hyperlipidemia  This is a chronic problem. The current episode started more than 1 year ago. The problem is controlled. He has no history of chronic renal disease, diabetes, hypothyroidism or obesity. Associated symptoms include leg pain and myalgias. Pertinent negatives include no chest pain or shortness of breath. Current antihyperlipidemic treatment includes statins. The current treatment provides significant improvement of lipids. There are no compliance problems.  Risk factors for coronary artery disease include dyslipidemia and male sex.   Heart Problem  This is a recurrent (s/p valve replacement) problem. The current episode started 1 to 4 weeks ago. The problem occurs daily. The problem has been unchanged. Associated symptoms include abdominal pain, fatigue, myalgias, numbness and weakness. Pertinent negatives include no arthralgias, change in bowel habit, chest pain, chills,

## 2024-07-01 ENCOUNTER — TELEPHONE (OUTPATIENT)
Dept: PRIMARY CARE CLINIC | Age: 54
End: 2024-07-01

## 2024-07-01 RX ORDER — ENOXAPARIN SODIUM 100 MG/ML
120 INJECTION SUBCUTANEOUS 2 TIMES DAILY
Qty: 16 ML | Refills: 0 | Status: SHIPPED
Start: 2024-07-01 | End: 2024-07-01

## 2024-07-01 RX ORDER — ENOXAPARIN SODIUM 150 MG/ML
120 INJECTION SUBCUTANEOUS EVERY 12 HOURS
Qty: 8 ML | Refills: 0 | Status: SHIPPED | OUTPATIENT
Start: 2024-07-01 | End: 2024-07-06

## 2024-07-01 NOTE — TELEPHONE ENCOUNTER
Lovenox inj written for 80mg/0.8ml, does Dr want 120mg administered? They can give it in one prefilled shot bid.

## 2024-07-02 ENCOUNTER — TELEPHONE (OUTPATIENT)
Dept: PRIMARY CARE CLINIC | Age: 54
End: 2024-07-02

## 2024-07-02 NOTE — TELEPHONE ENCOUNTER
Tried to call pt twice and had to leave .  Dr sebastian advised pt will need to stop coumadin 5 days prior to procedure and start lovenox injections.

## 2024-07-15 RX ORDER — SODIUM CHLORIDE, SODIUM LACTATE, POTASSIUM CHLORIDE, CALCIUM CHLORIDE 600; 310; 30; 20 MG/100ML; MG/100ML; MG/100ML; MG/100ML
INJECTION, SOLUTION INTRAVENOUS CONTINUOUS
OUTPATIENT
Start: 2024-07-22

## 2024-07-16 ENCOUNTER — HOSPITAL ENCOUNTER (OUTPATIENT)
Dept: PREADMISSION TESTING | Age: 54
Discharge: HOME | End: 2024-07-16
Payer: MEDICARE

## 2024-07-16 VITALS
TEMPERATURE: 97.3 F | SYSTOLIC BLOOD PRESSURE: 135 MMHG | WEIGHT: 245 LBS | OXYGEN SATURATION: 96 % | HEIGHT: 72 IN | HEART RATE: 54 BPM | RESPIRATION RATE: 16 BRPM | DIASTOLIC BLOOD PRESSURE: 83 MMHG | BODY MASS INDEX: 33.18 KG/M2

## 2024-07-16 DIAGNOSIS — K42.0 UMBILICAL HERNIA WITH OBSTRUCTION, WITHOUT GANGRENE: Primary | ICD-10-CM

## 2024-07-16 LAB
ANION GAP SERPL CALCULATED.3IONS-SCNC: 11 MMOL/L (ref 7–16)
BUN SERPL-MCNC: 21 MG/DL (ref 6–20)
CALCIUM SERPL-MCNC: 9.3 MG/DL (ref 8.6–10.2)
CHLORIDE SERPL-SCNC: 104 MMOL/L (ref 98–107)
CO2 SERPL-SCNC: 26 MMOL/L (ref 22–29)
CREAT SERPL-MCNC: 1 MG/DL (ref 0.7–1.2)
ERYTHROCYTE [DISTWIDTH] IN BLOOD BY AUTOMATED COUNT: 13.6 % (ref 11.5–15)
GFR, ESTIMATED: 87 ML/MIN/1.73M2
GLUCOSE SERPL-MCNC: 95 MG/DL (ref 74–99)
HCT VFR BLD AUTO: 47.1 % (ref 37–54)
HGB BLD-MCNC: 15.8 G/DL (ref 12.5–16.5)
INR PPP: 3.4
MCH RBC QN AUTO: 28.8 PG (ref 26–35)
MCHC RBC AUTO-ENTMCNC: 33.5 G/DL (ref 32–34.5)
MCV RBC AUTO: 85.9 FL (ref 80–99.9)
PARTIAL THROMBOPLASTIN TIME: 43.2 SEC (ref 24.5–35.1)
PLATELET, FLUORESCENCE: 228 K/UL (ref 130–450)
PMV BLD AUTO: 9.9 FL (ref 7–12)
POTASSIUM SERPL-SCNC: 4.1 MMOL/L (ref 3.5–5)
PROTHROMBIN TIME: 37.5 SEC (ref 9.3–12.4)
RBC # BLD AUTO: 5.48 M/UL (ref 3.8–5.8)
SODIUM SERPL-SCNC: 141 MMOL/L (ref 132–146)
WBC OTHER # BLD: 6.8 K/UL (ref 4.5–11.5)

## 2024-07-16 PROCEDURE — 85610 PROTHROMBIN TIME: CPT

## 2024-07-16 PROCEDURE — 85730 THROMBOPLASTIN TIME PARTIAL: CPT

## 2024-07-16 PROCEDURE — 80048 BASIC METABOLIC PNL TOTAL CA: CPT

## 2024-07-16 PROCEDURE — 85027 COMPLETE CBC AUTOMATED: CPT

## 2024-07-16 ASSESSMENT — PAIN SCALES - GENERAL: PAINLEVEL_OUTOF10: 3

## 2024-07-16 ASSESSMENT — PAIN DESCRIPTION - LOCATION: LOCATION: ABDOMEN

## 2024-07-16 NOTE — PROGRESS NOTES
[facility]                                                                                                                    PRE OP INSTRUCTIONS FOR  Moise Ward        Date: 7/16/2024    Date of surgery: 07/22/24   Arrival Time: Hospital will call you between 5pm and 7pm with your final arrival time for surgery    You may drink clear liquids up until 2 hours before your procedure. Clear liquids include water, black coffee, and apple juice. No solid foods for 8 hours pre procedure.     Take the following medications with a small sip of water on the morning of Surgery: Keppra, metoprolol       Aspirin, Ibuprofen, Advil, Naproxen, Vitamin E and other Anti-inflammatory products should be stopped  before surgery  as directed by your physician.  Take Tylenol only unless instructed otherwise by your surgeon. Stop all herbal supplements 5 days pre op.     Check with your Doctor regarding stopping Plavix, Coumadin, Lovenox, Eliquis, Effient, Xarelto, Pradaxa, Savaysa, Lixiana, or other blood thinners. Coumadin last dose 7/16    Do not smoke,use illicit drugs and do not drink any alcoholic beverages 24 hours prior to surgery.    You may brush your teeth the morning of surgery.  DO NOT SWALLOW WATER    You MUST make arrangements for a responsible adult to take you home after your surgery. You will not be allowed to leave alone or drive yourself home.  It is strongly suggested someone stay with you the first 24 hrs. Your surgery will be cancelled if you do not have a ride home.      Please wear simple, loose fitting clothing to the hospital.  Do not bring valuables (money, credit cards, checkbooks, etc.) Do not wear any makeup (including no eye makeup) or nail polish on your fingers or toes.    DO NOT wear any jewelry or piercings on day of surgery.  All body piercing jewelry must be removed.    Shower the night before surgery with _x__Antibacterial soap /Antiseptic wipes___x_____    If you have

## 2024-07-19 NOTE — PROGRESS NOTES
Received notes from pt visit to Dr. Stone. States that pt should have Echo before surgery but does not give clearance. Dr Jordan office notified and stated they will reach out to Dr Stone.

## 2024-07-21 NOTE — DISCHARGE INSTRUCTIONS
Patient Discharge Instructions Hernia Repair  [facility]  Robbi Moreno MD  [phone] (o)  [phone] (f)  Discharge Date:  7/22/2024    Discharged To:  Home    RESUME ACTIVITY:     WOUND CARE: The day of surgery be sure to place ice to site of operation for 15min intervals. No need to sleep with ice in place. Keep protective cloth barrier between ice bag and skin to prevent frostbite.     Bruising is normal after surgery. Ice will help reduce swelling and bruising. A bulge at the hernia site is normal after surgery and may persist for a few months. This is the normal healing process.    BATHING:  May shower 24hrs after surgery, remove dressings after 24hrs if in place, leave steristrips in place as they will fall of independently. You may have adhesive glue covering your incisions which will dissolve on it own.  May bathe or swim 5 days after surgery    DRIVING: No driving while on pain medications    RETURN TO WORK: after follow up appointment    WALKING:  Yes    SEXUAL ACTIVITY: Yes    STAIRS:  Yes    LIFTING: Less than 15 pounds for 4 weeks    DIET: General adult    SPECIAL INSTRUCTIONS:     Call physician if they or any other problems occur:  Fever over 101°    Redness, swelling, hardness or warmth at the operative site  Unrelieved nausea    Foul smelling or cloudy drainage at the operative site   Unrelieved pain    Blood soaked dressing. (Some oozing may be normal)    Call office for follow up appointment with Dr Moreno in 2 weeks.    Call the office at [phone] if you have a fever > 100 F, or if your incision becomes red, tender, or drains more than a small amount of clear fluid.    BOWELS: constipation is a side effect of your pain meds, take a daily laxative (miralax, dulcolax, etc.) as needed to keep your bowels moving as they normally do, do not go 2-3 days without having a bowel movement.     Pain medications;   Percocet- take at least 1/2 pill every 6 hours the first 36

## 2024-07-21 NOTE — H&P
General Surgery History and Physical  [facility]    Patient's Name/Date of Birth: Moise Ward / 1970    Date: July 22, 2024     Surgeon: Robbi Moreno MD    PCP: Emeterio Souza DO     Chief Complaint: Abdominal hernia    HPI:   Moise Ward is a 54 y.o. male who presents for evaluation of umbilical hernia with pain. Timing is constant, radiation to mid abdomen, alleviated by rest and started several months ago and severity is 8/10. he has no history of abdominal surgeries. he has no history of previous repairs of the hernia. Denies nausea, vomiting, fever, chills, SOB, chest pain, diarrheal constipation. No history of abnormal colonoscopy.     Patient Active Problem List   Diagnosis    S/P MVR (mitral valve replacement)    S/P AVR (aortic valve replacement) and aortoplasty    Hypertension    Hyperlipidemia    History of CVA (cerebrovascular accident)    Other constipation    History of intracranial hemorrhage    Chronic atrial fibrillation (HCC)    At high risk for bleeding    Carpal tunnel syndrome of right wrist    Chronic pain of left knee    Derangement of posterior horn of medial meniscus, left    Umbilical hernia       Past Medical History:   Diagnosis Date    AF (atrial fibrillation) (HCC)     Hypertension     Infective endocarditis 08/26/2020    History  MVR/AVR (mechanical valves, coumadin, 2006).  Per report, he was found down 8/21 with noted aphasia, R sided weakness. Presented to ED where he was found to have L ALIYAH stroke with therapeutic INR, + BC for MSSA. Subsequent workup showed normal EF, prosthetic MV with two mobile masses consistent with vegetations. Was evaluated by ID at OSH. He was then transferred to [facility] for further evalua    Intracranial hemorrhage (HCC) 12/07/2021    LONG TERM ANTICOAGULENT USE     Memory loss     dont remember seizure    Seizure (HCC) 12/07/2021    only one pt has had    Stroke (HCC) 08/2020    2 left fingers 20% numb       Past Surgical

## 2024-07-21 NOTE — OP NOTE
Operative Note      Patient: Moise Ward  YOB: 1970  MRN: [id number]    Date of Procedure: 7/22/2024    Pre-Op Diagnosis Codes:     * Umbilical hernia [K42.9]    Post-Op Diagnosis: Same       Procedure(s):  HERNIA UMBILICAL REPAIR LAPAROSCOPIC ROBOTIC XI    Surgeon(s):  Robbi Moreno MD    Assistant:   First Assistant: (Unknown)    Anesthesia: General    Estimated Blood Loss (mL): less than 50     Complications: None    Specimens:   * No specimens in log *    Implants:  * No surgical log found *      Drains: * No LDAs found *    Findings:  Infection Present At Time Of Surgery (PATOS) (choose all levels that have infection present):  No infection present  Other Findings: see below    Detailed Description of Procedure:     The patient was identified and the procedure was confirmed.  He was taken to the operating room and laid supine on the operating room table.  He underwent general anesthesia by the anesthesia team and was intubated.  His abdomen was then clipped, prepped, draped in a sterile fashion.  He was given IV antibiotics prior to skin incision     An 8 mm incision was made at Calderon's point in the left upper quadrant.  A Veress needle was passed into the abdominal cavity which was insufflated to 15 mmHg.  An 8 mm trocar was then placed through that incision.  Of note, all skin incisions were made after anesthetizing the skin with quarter percent Marcaine.  An Optiview trocar was introduced.  This revealed a 3 cm hernia at the umbilicus.  2 additional 5 mm trocars were then placed in the left lateral abdomen under direct visualization.     The robot was docked on the patient's left side.  We proceeded to use graspers and robotic scissors to create a preperitoneal plane for placement of the mesh.  The peritoneum was incised over the patient's left posterior rectus sheath.  Dissection was then carried out through the preperitoneal space with care taken not to injure the posterior rectus

## 2024-07-22 ENCOUNTER — ANESTHESIA EVENT (OUTPATIENT)
Dept: OPERATING ROOM | Age: 54
End: 2024-07-22
Payer: MEDICARE

## 2024-07-22 ENCOUNTER — HOSPITAL ENCOUNTER (OUTPATIENT)
Age: 54
Setting detail: OUTPATIENT SURGERY
Discharge: HOME | End: 2024-07-22
Attending: SURGERY | Admitting: SURGERY
Payer: MEDICARE

## 2024-07-22 ENCOUNTER — ANESTHESIA (OUTPATIENT)
Dept: OPERATING ROOM | Age: 54
End: 2024-07-22
Payer: MEDICARE

## 2024-07-22 VITALS
HEART RATE: 75 BPM | DIASTOLIC BLOOD PRESSURE: 85 MMHG | OXYGEN SATURATION: 94 % | WEIGHT: 245 LBS | RESPIRATION RATE: 20 BRPM | TEMPERATURE: 97.4 F | BODY MASS INDEX: 33.18 KG/M2 | SYSTOLIC BLOOD PRESSURE: 151 MMHG | HEIGHT: 72 IN

## 2024-07-22 DIAGNOSIS — K42.0 UMBILICAL HERNIA WITH OBSTRUCTION, WITHOUT GANGRENE: Primary | ICD-10-CM

## 2024-07-22 LAB
INR PPP: 1
PARTIAL THROMBOPLASTIN TIME: 29 SEC (ref 24.5–35.1)
PROTHROMBIN TIME: 10.5 SEC (ref 9.3–12.4)

## 2024-07-22 PROCEDURE — 2580000003 HC RX 258: Performed by: ANESTHESIOLOGY

## 2024-07-22 PROCEDURE — 7100000010 HC PHASE II RECOVERY - FIRST 15 MIN: Performed by: SURGERY

## 2024-07-22 PROCEDURE — 6360000002 HC RX W HCPCS: Performed by: ANESTHESIOLOGIST ASSISTANT

## 2024-07-22 PROCEDURE — 2500000003 HC RX 250 WO HCPCS: Performed by: ANESTHESIOLOGIST ASSISTANT

## 2024-07-22 PROCEDURE — 6370000000 HC RX 637 (ALT 250 FOR IP): Performed by: ANESTHESIOLOGY

## 2024-07-22 PROCEDURE — 3600000009 HC SURGERY ROBOT BASE: Performed by: SURGERY

## 2024-07-22 PROCEDURE — 7100000001 HC PACU RECOVERY - ADDTL 15 MIN: Performed by: SURGERY

## 2024-07-22 PROCEDURE — C1781 MESH (IMPLANTABLE): HCPCS | Performed by: SURGERY

## 2024-07-22 PROCEDURE — 85610 PROTHROMBIN TIME: CPT

## 2024-07-22 PROCEDURE — 49594 RPR AA HRN 1ST 3-10 NCR/STRN: CPT | Performed by: SURGERY

## 2024-07-22 PROCEDURE — 6360000002 HC RX W HCPCS

## 2024-07-22 PROCEDURE — 6360000002 HC RX W HCPCS: Performed by: ANESTHESIOLOGY

## 2024-07-22 PROCEDURE — 3600000019 HC SURGERY ROBOT ADDTL 15MIN: Performed by: SURGERY

## 2024-07-22 PROCEDURE — S2900 ROBOTIC SURGICAL SYSTEM: HCPCS | Performed by: SURGERY

## 2024-07-22 PROCEDURE — 85730 THROMBOPLASTIN TIME PARTIAL: CPT

## 2024-07-22 PROCEDURE — 2709999900 HC NON-CHARGEABLE SUPPLY: Performed by: SURGERY

## 2024-07-22 PROCEDURE — 3700000001 HC ADD 15 MINUTES (ANESTHESIA): Performed by: SURGERY

## 2024-07-22 PROCEDURE — 3700000000 HC ANESTHESIA ATTENDED CARE: Performed by: SURGERY

## 2024-07-22 PROCEDURE — 7100000011 HC PHASE II RECOVERY - ADDTL 15 MIN: Performed by: SURGERY

## 2024-07-22 PROCEDURE — 2500000003 HC RX 250 WO HCPCS: Performed by: SURGERY

## 2024-07-22 PROCEDURE — 7100000000 HC PACU RECOVERY - FIRST 15 MIN: Performed by: SURGERY

## 2024-07-22 DEVICE — MESH HERN W10XL15CM POLY POLYLACTIC ACID 70% CLLGN 30% GLYC: Type: IMPLANTABLE DEVICE | Site: ABDOMEN | Status: FUNCTIONAL

## 2024-07-22 RX ORDER — SODIUM CHLORIDE 0.9 % (FLUSH) 0.9 %
5-40 SYRINGE (ML) INJECTION EVERY 12 HOURS SCHEDULED
Status: DISCONTINUED | OUTPATIENT
Start: 2024-07-22 | End: 2024-07-22 | Stop reason: HOSPADM

## 2024-07-22 RX ORDER — OXYCODONE HYDROCHLORIDE AND ACETAMINOPHEN 5; 325 MG/1; MG/1
1 TABLET ORAL EVERY 6 HOURS PRN
Qty: 12 TABLET | Refills: 0 | Status: SHIPPED | OUTPATIENT
Start: 2024-07-22 | End: 2024-07-25

## 2024-07-22 RX ORDER — DEXAMETHASONE SODIUM PHOSPHATE 10 MG/ML
INJECTION, SOLUTION INTRAMUSCULAR; INTRAVENOUS PRN
Status: DISCONTINUED | OUTPATIENT
Start: 2024-07-22 | End: 2024-07-22 | Stop reason: SDUPTHER

## 2024-07-22 RX ORDER — MEPERIDINE HYDROCHLORIDE 25 MG/ML
12.5 INJECTION INTRAMUSCULAR; INTRAVENOUS; SUBCUTANEOUS EVERY 5 MIN PRN
Status: DISCONTINUED | OUTPATIENT
Start: 2024-07-22 | End: 2024-07-22 | Stop reason: HOSPADM

## 2024-07-22 RX ORDER — CEFAZOLIN SODIUM 1 G/3ML
INJECTION, POWDER, FOR SOLUTION INTRAMUSCULAR; INTRAVENOUS PRN
Status: DISCONTINUED | OUTPATIENT
Start: 2024-07-22 | End: 2024-07-22 | Stop reason: SDUPTHER

## 2024-07-22 RX ORDER — SODIUM CHLORIDE 0.9 % (FLUSH) 0.9 %
5-40 SYRINGE (ML) INJECTION PRN
Status: DISCONTINUED | OUTPATIENT
Start: 2024-07-22 | End: 2024-07-22 | Stop reason: HOSPADM

## 2024-07-22 RX ORDER — FENTANYL CITRATE 50 UG/ML
INJECTION, SOLUTION INTRAMUSCULAR; INTRAVENOUS PRN
Status: DISCONTINUED | OUTPATIENT
Start: 2024-07-22 | End: 2024-07-22 | Stop reason: SDUPTHER

## 2024-07-22 RX ORDER — ONDANSETRON 2 MG/ML
4 INJECTION INTRAMUSCULAR; INTRAVENOUS
Status: DISCONTINUED | OUTPATIENT
Start: 2024-07-22 | End: 2024-07-22 | Stop reason: HOSPADM

## 2024-07-22 RX ORDER — SODIUM CHLORIDE, SODIUM LACTATE, POTASSIUM CHLORIDE, CALCIUM CHLORIDE 600; 310; 30; 20 MG/100ML; MG/100ML; MG/100ML; MG/100ML
INJECTION, SOLUTION INTRAVENOUS CONTINUOUS
Status: DISCONTINUED | OUTPATIENT
Start: 2024-07-22 | End: 2024-07-22 | Stop reason: HOSPADM

## 2024-07-22 RX ORDER — SODIUM CHLORIDE 9 MG/ML
INJECTION, SOLUTION INTRAVENOUS PRN
Status: DISCONTINUED | OUTPATIENT
Start: 2024-07-22 | End: 2024-07-22 | Stop reason: HOSPADM

## 2024-07-22 RX ORDER — MIDAZOLAM HYDROCHLORIDE 1 MG/ML
INJECTION INTRAMUSCULAR; INTRAVENOUS PRN
Status: DISCONTINUED | OUTPATIENT
Start: 2024-07-22 | End: 2024-07-22 | Stop reason: SDUPTHER

## 2024-07-22 RX ORDER — NALOXONE HYDROCHLORIDE 0.4 MG/ML
INJECTION, SOLUTION INTRAMUSCULAR; INTRAVENOUS; SUBCUTANEOUS PRN
Status: DISCONTINUED | OUTPATIENT
Start: 2024-07-22 | End: 2024-07-22 | Stop reason: HOSPADM

## 2024-07-22 RX ORDER — FENTANYL CITRATE 0.05 MG/ML
50 INJECTION, SOLUTION INTRAMUSCULAR; INTRAVENOUS EVERY 5 MIN PRN
Status: DISCONTINUED | OUTPATIENT
Start: 2024-07-22 | End: 2024-07-22 | Stop reason: HOSPADM

## 2024-07-22 RX ORDER — METHOCARBAMOL 750 MG/1
750 TABLET, FILM COATED ORAL 4 TIMES DAILY
Qty: 40 TABLET | Refills: 0 | Status: SHIPPED | OUTPATIENT
Start: 2024-07-22 | End: 2024-08-01

## 2024-07-22 RX ORDER — PROPOFOL 10 MG/ML
INJECTION, EMULSION INTRAVENOUS PRN
Status: DISCONTINUED | OUTPATIENT
Start: 2024-07-22 | End: 2024-07-22 | Stop reason: SDUPTHER

## 2024-07-22 RX ORDER — ROCURONIUM BROMIDE 10 MG/ML
INJECTION, SOLUTION INTRAVENOUS PRN
Status: DISCONTINUED | OUTPATIENT
Start: 2024-07-22 | End: 2024-07-22 | Stop reason: SDUPTHER

## 2024-07-22 RX ORDER — ONDANSETRON 2 MG/ML
INJECTION INTRAMUSCULAR; INTRAVENOUS PRN
Status: DISCONTINUED | OUTPATIENT
Start: 2024-07-22 | End: 2024-07-22 | Stop reason: SDUPTHER

## 2024-07-22 RX ORDER — BUPIVACAINE HYDROCHLORIDE AND EPINEPHRINE 2.5; 5 MG/ML; UG/ML
INJECTION, SOLUTION EPIDURAL; INFILTRATION; INTRACAUDAL; PERINEURAL PRN
Status: DISCONTINUED | OUTPATIENT
Start: 2024-07-22 | End: 2024-07-22 | Stop reason: ALTCHOICE

## 2024-07-22 RX ORDER — OXYCODONE HYDROCHLORIDE AND ACETAMINOPHEN 5; 325 MG/1; MG/1
1 TABLET ORAL
Status: COMPLETED | OUTPATIENT
Start: 2024-07-22 | End: 2024-07-22

## 2024-07-22 RX ORDER — LIDOCAINE HYDROCHLORIDE 20 MG/ML
INJECTION, SOLUTION INTRAVENOUS PRN
Status: DISCONTINUED | OUTPATIENT
Start: 2024-07-22 | End: 2024-07-22 | Stop reason: SDUPTHER

## 2024-07-22 RX ORDER — METHOCARBAMOL 100 MG/ML
INJECTION, SOLUTION INTRAMUSCULAR; INTRAVENOUS
Status: COMPLETED
Start: 2024-07-22 | End: 2024-07-22

## 2024-07-22 RX ORDER — METHOCARBAMOL 100 MG/ML
1000 INJECTION, SOLUTION INTRAMUSCULAR; INTRAVENOUS
Status: COMPLETED | OUTPATIENT
Start: 2024-07-22 | End: 2024-07-22

## 2024-07-22 RX ORDER — LORAZEPAM 2 MG/ML
0.5 INJECTION INTRAMUSCULAR
Status: COMPLETED | OUTPATIENT
Start: 2024-07-22 | End: 2024-07-22

## 2024-07-22 RX ORDER — LABETALOL HYDROCHLORIDE 5 MG/ML
10 INJECTION, SOLUTION INTRAVENOUS
Status: DISCONTINUED | OUTPATIENT
Start: 2024-07-22 | End: 2024-07-22 | Stop reason: HOSPADM

## 2024-07-22 RX ADMIN — FENTANYL CITRATE 50 MCG: 50 INJECTION, SOLUTION INTRAMUSCULAR; INTRAVENOUS at 11:00

## 2024-07-22 RX ADMIN — ONDANSETRON 4 MG: 2 INJECTION INTRAMUSCULAR; INTRAVENOUS at 11:53

## 2024-07-22 RX ADMIN — ROCURONIUM BROMIDE 30 MG: 10 SOLUTION INTRAVENOUS at 11:01

## 2024-07-22 RX ADMIN — METHOCARBAMOL 1000 MG: 100 INJECTION INTRAMUSCULAR; INTRAVENOUS at 13:03

## 2024-07-22 RX ADMIN — LORAZEPAM 0.5 MG: 2 INJECTION INTRAMUSCULAR; INTRAVENOUS at 13:43

## 2024-07-22 RX ADMIN — SUGAMMADEX 200 MG: 100 INJECTION, SOLUTION INTRAVENOUS at 12:06

## 2024-07-22 RX ADMIN — MIDAZOLAM 2 MG: 1 INJECTION INTRAMUSCULAR; INTRAVENOUS at 10:55

## 2024-07-22 RX ADMIN — SODIUM CHLORIDE, POTASSIUM CHLORIDE, SODIUM LACTATE AND CALCIUM CHLORIDE: 600; 310; 30; 20 INJECTION, SOLUTION INTRAVENOUS at 10:50

## 2024-07-22 RX ADMIN — CEFAZOLIN 1 G: 1 INJECTION, POWDER, FOR SOLUTION INTRAMUSCULAR; INTRAVENOUS at 11:03

## 2024-07-22 RX ADMIN — PROPOFOL 150 MG: 10 INJECTION, EMULSION INTRAVENOUS at 11:00

## 2024-07-22 RX ADMIN — SODIUM CHLORIDE, POTASSIUM CHLORIDE, SODIUM LACTATE AND CALCIUM CHLORIDE: 600; 310; 30; 20 INJECTION, SOLUTION INTRAVENOUS at 09:44

## 2024-07-22 RX ADMIN — SODIUM CHLORIDE, POTASSIUM CHLORIDE, SODIUM LACTATE AND CALCIUM CHLORIDE: 600; 310; 30; 20 INJECTION, SOLUTION INTRAVENOUS at 11:53

## 2024-07-22 RX ADMIN — FENTANYL CITRATE 50 MCG: 50 INJECTION, SOLUTION INTRAMUSCULAR; INTRAVENOUS at 11:14

## 2024-07-22 RX ADMIN — LIDOCAINE HYDROCHLORIDE 50 MG: 20 INJECTION, SOLUTION INTRAVENOUS at 11:00

## 2024-07-22 RX ADMIN — METHOCARBAMOL 1000 MG: 100 INJECTION, SOLUTION INTRAMUSCULAR; INTRAVENOUS at 13:03

## 2024-07-22 RX ADMIN — HYDROMORPHONE HYDROCHLORIDE 0.25 MG: 1 INJECTION, SOLUTION INTRAMUSCULAR; INTRAVENOUS; SUBCUTANEOUS at 13:04

## 2024-07-22 RX ADMIN — DEXAMETHASONE SODIUM PHOSPHATE 10 MG: 10 INJECTION, SOLUTION INTRAMUSCULAR; INTRAVENOUS at 11:01

## 2024-07-22 RX ADMIN — FENTANYL CITRATE 50 MCG: 50 INJECTION, SOLUTION INTRAMUSCULAR; INTRAVENOUS at 11:16

## 2024-07-22 RX ADMIN — FENTANYL CITRATE 50 MCG: 50 INJECTION, SOLUTION INTRAMUSCULAR; INTRAVENOUS at 12:14

## 2024-07-22 RX ADMIN — HYDROMORPHONE HYDROCHLORIDE 0.25 MG: 1 INJECTION, SOLUTION INTRAMUSCULAR; INTRAVENOUS; SUBCUTANEOUS at 13:28

## 2024-07-22 RX ADMIN — OXYCODONE HYDROCHLORIDE AND ACETAMINOPHEN 1 TABLET: 5; 325 TABLET ORAL at 14:46

## 2024-07-22 RX ADMIN — ROCURONIUM BROMIDE 10 MG: 10 SOLUTION INTRAVENOUS at 11:52

## 2024-07-22 RX ADMIN — CEFAZOLIN 2 G: 1 INJECTION, POWDER, FOR SOLUTION INTRAMUSCULAR; INTRAVENOUS at 11:07

## 2024-07-22 NOTE — ANESTHESIA POSTPROCEDURE EVALUATION
Department of Anesthesiology  Postprocedure Note    Patient: Moise Ward  MRN: [id number]  YOB: 1970  Date of evaluation: 7/22/2024    Procedure Summary       Date: 07/22/24 Room / Location: [address]    Anesthesia Start: 1055 Anesthesia Stop: 1220    Procedure: HERNIA UMBILICAL REPAIR WITH MESH LAPAROSCOPIC ROBOTIC XI (Abdomen) Diagnosis:       Umbilical hernia      (Umbilical hernia [K42.9])    Surgeons: Robbi Moreno MD Responsible Provider: Terell Castro MD    Anesthesia Type: general ASA Status: 3            Anesthesia Type: No value filed.    Mami Phase I: Mami Score: 5    Mami Phase II:      Anesthesia Post Evaluation    Patient location during evaluation: PACU  Patient participation: complete - patient participated  Level of consciousness: awake and alert  Pain score: 2  Airway patency: patent  Nausea & Vomiting: no nausea  Respiratory status: acceptable  Hydration status: euvolemic  Pain management: adequate    No notable events documented.

## 2024-07-22 NOTE — ANESTHESIA PRE PROCEDURE
Department of Anesthesiology  Preprocedure Note       Name:  Moise Ward   Age:  54 y.o.  :  1970                                          MRN:  [id number]         Date:  2024      Surgeon: Surgeon(s):  Robbi Moreno MD    Procedure: Procedure(s):  HERNIA UMBILICAL REPAIR LAPAROSCOPIC ROBOTIC XI    Medications prior to admission:   Prior to Admission medications    Medication Sig Start Date End Date Taking? Authorizing Provider   methocarbamol (ROBAXIN-750) 750 MG tablet Take 1 tablet by mouth 4 times daily for 10 days 24 Yes Robbi Moreno MD   oxyCODONE-acetaminophen (PERCOCET) 5-325 MG per tablet Take 1 tablet by mouth every 6 hours as needed for Pain for up to 3 days. Intended supply: 3 days. Take lowest dose possible to manage pain Max Daily Amount: 4 tablets 24 Yes Robbi Moreno MD   levETIRAcetam (KEPPRA) 750 MG tablet Take 1 tablet by mouth 2 times daily 24   Emeterio Souza DO   warfarin (COUMADIN) 10 MG tablet TAKE ONE TABLET BY MOUTH EVERY DAY or as directed by doctor 24   Emeterio Souza DO   spironolactone (ALDACTONE) 25 MG tablet Take 1 tablet by mouth daily 24   Emeterio Souza DO   lisinopril (PRINIVIL;ZESTRIL) 20 MG tablet Take 1 tablet by mouth daily 24   Emeterio Souza DO   metoprolol succinate (TOPROL XL) 25 MG extended release tablet Take 1 tablet by mouth daily 24   Emeterio Souza DO SKYRIZI  MG/ML SOAJ Inject 150 mg into the skin every 3 months 3/18/24   Provider, MD Mich   Multiple Vitamin (THERA/BETA-CAROTENE) TABS Take 1 tablet by mouth daily 2/15/22   Emeterio Souza DO       Current medications:    Current Facility-Administered Medications   Medication Dose Route Frequency Provider Last Rate Last Admin    sodium chloride flush 0.9 % injection 5-40 mL  5-40 mL IntraVENous 2 times per day Robbi Moreno MD        sodium chloride flush 0.9 % injection 5-40 mL  5-40 mL IntraVENous

## 2024-07-29 RX ORDER — METOPROLOL TARTRATE 25 MG/1
TABLET, FILM COATED ORAL
Qty: 405 TABLET | Refills: 0 | OUTPATIENT
Start: 2024-07-29

## 2024-08-06 ENCOUNTER — OFFICE VISIT (OUTPATIENT)
Dept: SURGERY | Age: 54
End: 2024-08-06
Payer: MEDICARE

## 2024-08-06 VITALS — HEART RATE: 69 BPM | DIASTOLIC BLOOD PRESSURE: 80 MMHG | TEMPERATURE: 97.7 F | SYSTOLIC BLOOD PRESSURE: 140 MMHG

## 2024-08-06 DIAGNOSIS — Z87.19 S/P REPAIR OF VENTRAL HERNIA: Primary | ICD-10-CM

## 2024-08-06 DIAGNOSIS — G40.89 OTHER SEIZURES (HCC): ICD-10-CM

## 2024-08-06 DIAGNOSIS — Z98.890 S/P REPAIR OF VENTRAL HERNIA: Primary | ICD-10-CM

## 2024-08-06 PROBLEM — R56.9 UNSPECIFIED CONVULSIONS (HCC): Status: ACTIVE | Noted: 2024-08-06

## 2024-08-06 PROCEDURE — G8427 DOCREV CUR MEDS BY ELIG CLIN: HCPCS | Performed by: SURGERY

## 2024-08-06 PROCEDURE — 3017F COLORECTAL CA SCREEN DOC REV: CPT | Performed by: SURGERY

## 2024-08-06 PROCEDURE — 1036F TOBACCO NON-USER: CPT | Performed by: SURGERY

## 2024-08-06 PROCEDURE — 3079F DIAST BP 80-89 MM HG: CPT | Performed by: SURGERY

## 2024-08-06 PROCEDURE — G8417 CALC BMI ABV UP PARAM F/U: HCPCS | Performed by: SURGERY

## 2024-08-06 PROCEDURE — 3077F SYST BP >= 140 MM HG: CPT | Performed by: SURGERY

## 2024-08-06 PROCEDURE — 99213 OFFICE O/P EST LOW 20 MIN: CPT | Performed by: SURGERY

## 2024-08-06 NOTE — PROGRESS NOTES
General Surgery Office Note  [facility]  Consandfélix Moreno MD    Patient's Name/Date of Birth: Moise Ward / 1970    Date: August 6, 2024     Chief compaint: Postop visit from laparoscopic/robotic ventral hernia repair    Surgeon: MD Josh    Patient Active Problem List   Diagnosis    S/P MVR (mitral valve replacement)    S/P AVR (aortic valve replacement) and aortoplasty    Hypertension    Hyperlipidemia    History of CVA (cerebrovascular accident)    Other constipation    History of intracranial hemorrhage    Chronic atrial fibrillation (HCC)    At high risk for bleeding    Carpal tunnel syndrome of right wrist    Chronic pain of left knee    Derangement of posterior horn of medial meniscus, left    Umbilical hernia       Subjective: Doing well, no new complaints, pain prior to surgery has resolved, tolerating diet, bowel function normal, anxious to return to normal physical activity    Objective:  BP (!) 140/80 (Site: Left Upper Arm, Position: Sitting, Cuff Size: Medium Adult)   Pulse 69   Temp 97.7 °F (36.5 °C)   Labs:  No results for input(s): \"WBC\", \"HGB\", \"HCT\" in the last 72 hours.    Invalid input(s): \"PLR\"  Lab Results   Component Value Date    CREATININE 1.0 07/16/2024    BUN 21 (H) 07/16/2024     07/16/2024    K 4.1 07/16/2024     07/16/2024    CO2 26 07/16/2024     No results for input(s): \"LIPASE\", \"AMYLASE\" in the last 72 hours.    General appearance: AA, NAD  HEENT: NCAT, PERRLA, EOMI  Lungs: Clear, equal rise bilateral  Heart: Reg  Abdomen: soft, nondistended, nontender, incisions well healed, no signs of infection, no cellulitis, no hematoma      Assessment/Plan:  Moise Ward is a 54 y.o. male 2 weeks s/p laparoscopic/robotic ventral hernia repair. Doing well.    Resume activity gradually   No heavy lifting more than 20lbs for 6 weeks total  Follow up as needed    Physician Signature: Electronically signed by Dr. Moreno  8/6/2024  4:49 PM

## 2024-09-24 ENCOUNTER — OFFICE VISIT (OUTPATIENT)
Dept: ORTHOPEDIC SURGERY | Age: 54
End: 2024-09-24
Payer: MEDICARE

## 2024-09-24 VITALS — BODY MASS INDEX: 32.51 KG/M2 | HEIGHT: 72 IN | WEIGHT: 240 LBS

## 2024-09-24 DIAGNOSIS — M17.12 PRIMARY OSTEOARTHRITIS OF LEFT KNEE: Primary | ICD-10-CM

## 2024-09-24 PROCEDURE — G8427 DOCREV CUR MEDS BY ELIG CLIN: HCPCS | Performed by: NURSE PRACTITIONER

## 2024-09-24 PROCEDURE — 20610 DRAIN/INJ JOINT/BURSA W/O US: CPT | Performed by: NURSE PRACTITIONER

## 2024-09-24 PROCEDURE — 3017F COLORECTAL CA SCREEN DOC REV: CPT | Performed by: NURSE PRACTITIONER

## 2024-09-24 PROCEDURE — G8417 CALC BMI ABV UP PARAM F/U: HCPCS | Performed by: NURSE PRACTITIONER

## 2024-09-24 PROCEDURE — 99213 OFFICE O/P EST LOW 20 MIN: CPT | Performed by: NURSE PRACTITIONER

## 2024-09-24 PROCEDURE — 1036F TOBACCO NON-USER: CPT | Performed by: NURSE PRACTITIONER

## 2024-09-24 RX ORDER — BUPIVACAINE HYDROCHLORIDE 2.5 MG/ML
2 INJECTION, SOLUTION INFILTRATION; PERINEURAL ONCE
Status: COMPLETED | OUTPATIENT
Start: 2024-09-24 | End: 2024-09-24

## 2024-09-24 RX ORDER — TRIAMCINOLONE ACETONIDE 40 MG/ML
40 INJECTION, SUSPENSION INTRA-ARTICULAR; INTRAMUSCULAR ONCE
Status: COMPLETED | OUTPATIENT
Start: 2024-09-24 | End: 2024-09-24

## 2024-09-24 RX ADMIN — BUPIVACAINE HYDROCHLORIDE 5 MG: 2.5 INJECTION, SOLUTION INFILTRATION; PERINEURAL at 15:20

## 2024-09-24 RX ADMIN — TRIAMCINOLONE ACETONIDE 40 MG: 40 INJECTION, SUSPENSION INTRA-ARTICULAR; INTRAMUSCULAR at 15:21

## 2024-10-09 ENCOUNTER — NURSE ONLY (OUTPATIENT)
Dept: PRIMARY CARE CLINIC | Age: 54
End: 2024-10-09

## 2024-10-09 DIAGNOSIS — Z23 NEED FOR INFLUENZA VACCINATION: Primary | ICD-10-CM

## 2024-11-22 RX ORDER — WARFARIN SODIUM 10 MG/1
TABLET ORAL
Qty: 30 TABLET | Refills: 0 | Status: SHIPPED | OUTPATIENT
Start: 2024-11-22

## 2024-11-25 RX ORDER — WARFARIN SODIUM 10 MG/1
TABLET ORAL
Qty: 30 TABLET | Refills: 0 | Status: SHIPPED | OUTPATIENT
Start: 2024-11-25

## 2025-01-13 ENCOUNTER — OFFICE VISIT (OUTPATIENT)
Age: 55
End: 2025-01-13
Payer: MEDICARE

## 2025-01-13 VITALS
RESPIRATION RATE: 17 BRPM | BODY MASS INDEX: 32.51 KG/M2 | TEMPERATURE: 98 F | OXYGEN SATURATION: 98 % | HEART RATE: 77 BPM | HEIGHT: 72 IN | WEIGHT: 240 LBS

## 2025-01-13 DIAGNOSIS — M75.102 TEAR OF LEFT SUPRASPINATUS TENDON: Primary | ICD-10-CM

## 2025-01-13 PROCEDURE — G8417 CALC BMI ABV UP PARAM F/U: HCPCS | Performed by: FAMILY MEDICINE

## 2025-01-13 PROCEDURE — 99203 OFFICE O/P NEW LOW 30 MIN: CPT | Performed by: FAMILY MEDICINE

## 2025-01-13 PROCEDURE — G8427 DOCREV CUR MEDS BY ELIG CLIN: HCPCS | Performed by: FAMILY MEDICINE

## 2025-01-13 PROCEDURE — 3017F COLORECTAL CA SCREEN DOC REV: CPT | Performed by: FAMILY MEDICINE

## 2025-01-13 PROCEDURE — 1036F TOBACCO NON-USER: CPT | Performed by: FAMILY MEDICINE

## 2025-01-13 PROCEDURE — 20611 DRAIN/INJ JOINT/BURSA W/US: CPT | Performed by: FAMILY MEDICINE

## 2025-01-13 RX ORDER — BETAMETHASONE SODIUM PHOSPHATE AND BETAMETHASONE ACETATE 3; 3 MG/ML; MG/ML
12 INJECTION, SUSPENSION INTRA-ARTICULAR; INTRALESIONAL; INTRAMUSCULAR; SOFT TISSUE ONCE
Status: COMPLETED | OUTPATIENT
Start: 2025-01-13 | End: 2025-01-13

## 2025-01-13 RX ORDER — LIDOCAINE HYDROCHLORIDE 10 MG/ML
2 INJECTION, SOLUTION INFILTRATION; PERINEURAL ONCE
Status: COMPLETED | OUTPATIENT
Start: 2025-01-13 | End: 2025-01-13

## 2025-01-13 RX ORDER — TRIAMCINOLONE ACETONIDE 40 MG/ML
80 INJECTION, SUSPENSION INTRA-ARTICULAR; INTRAMUSCULAR ONCE
Status: DISCONTINUED | OUTPATIENT
Start: 2025-01-13 | End: 2025-01-13

## 2025-01-13 RX ADMIN — BETAMETHASONE SODIUM PHOSPHATE AND BETAMETHASONE ACETATE 12 MG: 3; 3 INJECTION, SUSPENSION INTRA-ARTICULAR; INTRALESIONAL; INTRAMUSCULAR; SOFT TISSUE at 10:59

## 2025-01-13 RX ADMIN — LIDOCAINE HYDROCHLORIDE 2 ML: 10 INJECTION, SOLUTION INFILTRATION; PERINEURAL at 10:59

## 2025-01-13 ASSESSMENT — ENCOUNTER SYMPTOMS
DIARRHEA: 0
SHORTNESS OF BREATH: 0
ABDOMINAL PAIN: 0
CONSTIPATION: 0
VOMITING: 0
NAUSEA: 0
CHEST TIGHTNESS: 0
COUGH: 0

## 2025-01-13 NOTE — PROGRESS NOTES
Medical History :  Past Medical History:   Diagnosis Date    AF (atrial fibrillation) (HCC)     Hypertension     Infective endocarditis 08/26/2020    History  MVR/AVR (mechanical valves, coumadin, 2006).  Per report, he was found down 8/21 with noted aphasia, R sided weakness. Presented to ED where he was found to have L ALIYAH stroke with therapeutic INR, + BC for MSSA. Subsequent workup showed normal EF, prosthetic MV with two mobile masses consistent with vegetations. Was evaluated by ID at OSH. He was then transferred to [facility] for further evalua    Intracranial hemorrhage (HCC) 12/07/2021    LONG TERM ANTICOAGULENT USE     Memory loss     dont remember seizure    Seizure (HCC) 12/07/2021    only one pt has had    Stroke ([facility]) 08/2020    2 left fingers 20% numb     Past Surgical History:   Procedure Laterality Date    AORTIC VALVE REPLACEMENT  01/01/2006    Dr Butler   [facility]    AORTIC VALVE REPLACEMENT  09/03/2020    Commando procedure(Aortic root replacement and AVR with Homograft #[id number], reconstrcution of the Inter-valvular fibrosa with bovine pericardial patch) at [facility]    MITRAL VALVE REPLACEMENT  01/01/2006    Dr Butler  [facility]    MITRAL VALVE REPLACEMENT  09/03/2020    Biocor#31 at [facility]    TRANSESOPHAGEAL ECHOCARDIOGRAM N/A 2/25/2022    TRANSESOPHAGEAL ECHOCARDIOGRAM performed by Deon Alcantara MD at [facility] ENDOSCOPY    UMBILICAL HERNIA REPAIR N/A 7/22/2024    HERNIA UMBILICAL REPAIR WITH MESH LAPAROSCOPIC ROBOTIC XI performed by Robbi Moreno MD at [facility] OR       Allergies :   No Known Allergies    Medication List :    Current Outpatient Medications   Medication Sig Dispense Refill    warfarin (COUMADIN) 10 MG tablet TAKE 1 TABLET BY MOUTH EVERY DAY OR AS DIRECTED BY DOCTOR 30 tablet 0    levETIRAcetam (KEPPRA) 750 MG tablet Take 1 tablet by mouth 2 times daily 60 tablet 5    spironolactone (ALDACTONE) 25 MG tablet Take 1 tablet by mouth daily 90 tablet 3    lisinopril (PRINIVIL;ZESTRIL) 20 MG tablet Take 1 tablet by mouth

## 2025-01-24 RX ORDER — WARFARIN SODIUM 10 MG/1
TABLET ORAL
Qty: 30 TABLET | Refills: 0 | Status: SHIPPED | OUTPATIENT
Start: 2025-01-24

## 2025-02-24 ENCOUNTER — OFFICE VISIT (OUTPATIENT)
Dept: ORTHOPEDIC SURGERY | Age: 55
End: 2025-02-24
Payer: MEDICARE

## 2025-02-24 VITALS — BODY MASS INDEX: 32.51 KG/M2 | TEMPERATURE: 98.6 F | HEIGHT: 72 IN | WEIGHT: 240 LBS

## 2025-02-24 DIAGNOSIS — G89.29 CHRONIC LEFT SHOULDER PAIN: ICD-10-CM

## 2025-02-24 DIAGNOSIS — M25.512 CHRONIC LEFT SHOULDER PAIN: ICD-10-CM

## 2025-02-24 DIAGNOSIS — M75.102 TEAR OF LEFT SUPRASPINATUS TENDON: Primary | ICD-10-CM

## 2025-02-24 PROCEDURE — 3017F COLORECTAL CA SCREEN DOC REV: CPT | Performed by: FAMILY MEDICINE

## 2025-02-24 PROCEDURE — 1036F TOBACCO NON-USER: CPT | Performed by: FAMILY MEDICINE

## 2025-02-24 PROCEDURE — 99213 OFFICE O/P EST LOW 20 MIN: CPT | Performed by: FAMILY MEDICINE

## 2025-02-24 PROCEDURE — G8427 DOCREV CUR MEDS BY ELIG CLIN: HCPCS | Performed by: FAMILY MEDICINE

## 2025-02-24 PROCEDURE — G8417 CALC BMI ABV UP PARAM F/U: HCPCS | Performed by: FAMILY MEDICINE

## 2025-02-24 RX ORDER — WARFARIN SODIUM 10 MG/1
TABLET ORAL
Qty: 30 TABLET | Refills: 0 | Status: SHIPPED | OUTPATIENT
Start: 2025-02-24

## 2025-02-24 ASSESSMENT — ENCOUNTER SYMPTOMS
VOMITING: 0
CONSTIPATION: 0
SHORTNESS OF BREATH: 0
ABDOMINAL PAIN: 0
COUGH: 0
NAUSEA: 0
CHEST TIGHTNESS: 0
DIARRHEA: 0

## 2025-02-24 NOTE — PROGRESS NOTES
[facility]  PRIMARY CARE SPORTS MEDICINE  DATE OF VISIT : 2025    Patient : Moise Ward  Age : 54 y.o.   : 1970  MRN : [id number]   ______________________________________________________________________    Chief Complaint :   Chief Complaint   Patient presents with    Follow-up     Left shoulder f/u. Patient reports some intermittent pain. Pain described as sharp with movement. Unable to lift arm all the way. Pain is localized to the bicep area. Pain level today 5/10       HPI : Moise Ward is 54 y.o. right hand dominant male who presented to the clinic for evaluation of left shoulder pain.     Today 2025, he presents for follow up of left shoulder pain.  Patient had significant relief from the corticosteroid injection though this only lasted about 1 week.  He has some intermittent pain which she localizes to the lateral portion of the shoulder.  He rates the pain as a 5/10 today in the office.    On 2025, he says the pain started 1 day(s) ago. 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 He localizes the pain to the lateral shoulder and radiating down the arm a bit. He rates the pain 10/10 in the office today. The pain is worse with with movements overhead. He has tried treating it with tylenol with no significant improvement in the pain.     ROS:  All pertinent positive symptoms are included in the history of present illness.    Review of Systems   Constitutional:  Negative for chills and fever.   Respiratory:  Negative for cough, chest tightness and shortness of breath.    Cardiovascular:  Negative for chest pain and palpitations.   Gastrointestinal:  Negative for abdominal pain, constipation, diarrhea, nausea and vomiting.   Genitourinary:  Negative for dysuria and frequency.   Musculoskeletal:  Negative for gait problem and joint swelling.   Skin:  Negative for rash and wound.   Hematological:  Does not bruise/bleed easily.   All other systems

## 2025-02-28 ENCOUNTER — HOSPITAL ENCOUNTER (OUTPATIENT)
Dept: MRI IMAGING | Age: 55
Discharge: HOME | End: 2025-02-28
Attending: FAMILY MEDICINE
Payer: MEDICARE

## 2025-02-28 DIAGNOSIS — G89.29 CHRONIC LEFT SHOULDER PAIN: ICD-10-CM

## 2025-02-28 DIAGNOSIS — M25.512 CHRONIC LEFT SHOULDER PAIN: ICD-10-CM

## 2025-02-28 DIAGNOSIS — M75.102 TEAR OF LEFT SUPRASPINATUS TENDON: ICD-10-CM

## 2025-02-28 PROCEDURE — 73221 MRI JOINT UPR EXTREM W/O DYE: CPT

## 2025-03-03 NOTE — RESULT ENCOUNTER NOTE
Spoke with patient about results of MRI.  Patient has a full-thickness tear of the rotator cuff.  He would like a referral for surgery.  I discussed that he could choose any provider. He would like to go with Dr. Miguel.  We will have the staff reach out to schedule him.  In addition, I suggested a reschedule his therapy appointment scheduled for Friday until after they decide on surgery or not.  All questions were answered and he is agreeable to plan.

## 2025-03-07 ENCOUNTER — EVALUATION (OUTPATIENT)
Dept: PHYSICAL THERAPY | Age: 55
End: 2025-03-07

## 2025-03-07 ENCOUNTER — TELEPHONE (OUTPATIENT)
Dept: ADMINISTRATIVE | Age: 55
End: 2025-03-07

## 2025-03-07 ENCOUNTER — TELEPHONE (OUTPATIENT)
Dept: ORTHOPEDIC SURGERY | Age: 55
End: 2025-03-07

## 2025-03-07 DIAGNOSIS — G89.29 CHRONIC LEFT SHOULDER PAIN: ICD-10-CM

## 2025-03-07 DIAGNOSIS — M75.102 TEAR OF LEFT SUPRASPINATUS TENDON: Primary | ICD-10-CM

## 2025-03-07 DIAGNOSIS — M25.512 CHRONIC LEFT SHOULDER PAIN: ICD-10-CM

## 2025-03-07 NOTE — TELEPHONE ENCOUNTER
Left message for patient stating that internal referral has been placed for Dr Yates. Assured him that the office is great at returning calls and gave him the option of also following up with Dr Miguel for surgical consult. Left office number to return call.

## 2025-03-07 NOTE — PROGRESS NOTES
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  Patient is right handed.    Interventions for current problem: cortisone injection(s)      Chief complaint: pain, inability / limited ability to use arm    Behavior: condition is staying the same    Pain:   Current: 0/10     Best: 0/10     Worst:5/10   Pain frequency: intermittent    Symptom Type / Quality: sharp  Location:: mid-deltoid region        Aggravated by: reaching overhead, reaching out, reaching behind back    Relieved by: rest    Imaging results: MRI SHOULDER LEFT WO CONTRAST    Result Date: 2025  EXAMINATION: MRI OF THE LEFT SHOULDER WITHOUT CONTRAST 2025 1:08 pm TECHNIQUE: Multiplanar multisequence MRI of the left shoulder was performed without the administration of intravenous contrast. COMPARISON: Left shoulder radiographs 2025 is HISTORY: ORDERING SYSTEM PROVIDED HISTORY: Tear of left supraspinatus tendon TECHNOLOGIST PROVIDED HISTORY: Reason for exam:->pain FINDINGS: There is a full-thickness tear at the supraspinatus tendon insertion measuring 1.5 cm AP.  There is differential retraction of torn tendon fibers with bursal sided fibers retracted 7 mm and articular sided fibers retracted up to 2.7 cm.  The infraspinatus, subscapularis, and teres minor tendons appear intact.  There is no significant rotator cuff muscle atrophy. The long head of the biceps tendon is intact. There is mild thinning of the glenohumeral cartilage.  Evaluation

## 2025-03-11 SDOH — HEALTH STABILITY: PHYSICAL HEALTH: ON AVERAGE, HOW MANY DAYS PER WEEK DO YOU ENGAGE IN MODERATE TO STRENUOUS EXERCISE (LIKE A BRISK WALK)?: 3 DAYS

## 2025-03-12 ENCOUNTER — OFFICE VISIT (OUTPATIENT)
Dept: ORTHOPEDIC SURGERY | Age: 55
End: 2025-03-12
Payer: MEDICARE

## 2025-03-12 VITALS
BODY MASS INDEX: 32.51 KG/M2 | RESPIRATION RATE: 20 BRPM | TEMPERATURE: 97.9 F | WEIGHT: 240 LBS | DIASTOLIC BLOOD PRESSURE: 89 MMHG | HEIGHT: 72 IN | OXYGEN SATURATION: 95 % | SYSTOLIC BLOOD PRESSURE: 139 MMHG | HEART RATE: 86 BPM

## 2025-03-12 DIAGNOSIS — M75.102 TEAR OF LEFT SUPRASPINATUS TENDON: Primary | ICD-10-CM

## 2025-03-12 PROBLEM — G89.29 CHRONIC LEFT SHOULDER PAIN: Status: ACTIVE | Noted: 2025-03-12

## 2025-03-12 PROBLEM — M25.512 CHRONIC LEFT SHOULDER PAIN: Status: ACTIVE | Noted: 2025-03-12

## 2025-03-12 PROCEDURE — 3075F SYST BP GE 130 - 139MM HG: CPT | Performed by: ORTHOPAEDIC SURGERY

## 2025-03-12 PROCEDURE — 3079F DIAST BP 80-89 MM HG: CPT | Performed by: ORTHOPAEDIC SURGERY

## 2025-03-12 PROCEDURE — 1036F TOBACCO NON-USER: CPT | Performed by: ORTHOPAEDIC SURGERY

## 2025-03-12 PROCEDURE — 3017F COLORECTAL CA SCREEN DOC REV: CPT | Performed by: ORTHOPAEDIC SURGERY

## 2025-03-12 PROCEDURE — G8427 DOCREV CUR MEDS BY ELIG CLIN: HCPCS | Performed by: ORTHOPAEDIC SURGERY

## 2025-03-12 PROCEDURE — 99214 OFFICE O/P EST MOD 30 MIN: CPT | Performed by: ORTHOPAEDIC SURGERY

## 2025-03-12 PROCEDURE — G8417 CALC BMI ABV UP PARAM F/U: HCPCS | Performed by: ORTHOPAEDIC SURGERY

## 2025-03-12 NOTE — PROGRESS NOTES
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 Posteroinferior labral tear. Degenerative changes.      Pain     Left shoulder denies any pain , shoulder clicks         HPI:      History of Present Illness  The patient is a 54-year-old male who presents for evaluation of his left shoulder.    He sustained an injury to his left shoulder following a fall on ice in January. The onset of pain was noted a few days post-incident. He has been experiencing persistent pain in the shoulder, which he reports as being gradually improving. He has a scheduled physical therapy session for his shoulder tomorrow. He also mentions a history of minor shoulder injuries during his working years. An injection administered by Dr. Vera provided temporary relief for approximately one week before the pain recurred.    He is currently on disability and expresses concern about his ability to drive. He has been practicing using only his right arm but finds it challenging to perform certain tasks such as drinking.    He is under the care of a cardiologist, with his last consultation being six months ago. He has two prosthetic heart valves and is on warfarin therapy. His primary care physician is Dr. Souza    MEDICATIONS  Current: warfarin      PAST MEDICAL HISTORY  Past Medical History:   Diagnosis Date    AF (atrial fibrillation) ([facility])     Hypertension     Infective endocarditis 08/26/2020    History  MVR/AVR (mechanical valves, coumadin, 2006).  Per report, he was found down 8/21 with

## 2025-03-13 ENCOUNTER — TELEPHONE (OUTPATIENT)
Dept: PHYSICAL THERAPY | Age: 55
End: 2025-03-13

## 2025-03-13 ENCOUNTER — TELEPHONE (OUTPATIENT)
Dept: ADMINISTRATIVE | Age: 55
End: 2025-03-13

## 2025-03-13 NOTE — TELEPHONE ENCOUNTER
Date: 3/13/2025       Patient Name: Moise Ward  : 1970      MRN: [id number]    Patient called to cancel all  PT follow-up appointments. He is having surgery and the Dr said PT would not help before hand. He will call in to reschedule after surgery    Ana Zavala PTA

## 2025-03-13 NOTE — TELEPHONE ENCOUNTER
"3/17/2022      RE: Marilyn Ortega  [address]  [facility] [address]       TRANSPLANT NEPHROLOGY EARLY POST TRANSPLANT VISIT    Assessment & Plan   # DDKT (SPK): Trend up to 1.32 today likely 2/2 torsemide which she wants to continue due to edema thoughout. I think she is very sodium avid and \"leaky\". I would like her to stop torsemide and start myra 25mg daily   - Baseline Creatinine: ~ 1.1-1.2   - Proteinuria: Normal (<0.2 grams)   - Date DSA Last Checked: Dec/2021      Latest DSA: No   - BK Viremia: No   - Kidney Tx Biopsy: No    # Pancreas Tx (SPK):    - Pancreatic Exocrine Drainage: Enteric drained     - Blood glucose: Euglycemia      On insulin: No   - HbA1c: Last checked at time of transplant      Latest HbA1c: 7.7%, recheck now   - Pancreatic enzymes: Stable   - Date DSA Last Checked: Dec/2021  Latest DSA: No   - Pancreas Tx Biopsy: No     # Immunosuppression: Tacrolimus extended release (goal 8-10) and Mycophenolic acid (dose 540 mg every 12 hours)   - Induction with Recent Transplant:  Intermediate Intensity   - Continue with intensive monitoring of immunosuppression for efficacy and toxicity.   - Changes: Not at this time    # Infection Prophylaxis:   - PJP: Sulfa/TMP (Bactrim)   - CMV: None, prophylaxis completed     # Blood Pressure: Controlled;  Goal BP: < 130/80   - Volume status: Euvolemic  EDW ~ 120-122lbs   - Changes: Yes, start myra 25mg daily, stop torsemide     # Anemia in Chronic Renal Disease: Hgb: Stable      SYLVAIN: No   - Iron studies: Low iron saturation    -Followed by anemia services    # Mineral Bone Disorder:   - Secondary renal hyperparathyroidism; PTH level: Normal (18-80 pg/ml)        On treatment: None  - Vitamin D; level: High normal        On supplement: Yes  - Calcium; level: Normal        On supplement: Yes  - Phosphorus; level: Normal        On supplement: No    # Electrolytes:   - Potassium; level: Normal        On supplement: No  - Magnesium; level: Normal        " Patient needing shoulder surgery but need Cardiac Clearance prior. Please advise scheduling LOV 2022.     Patient stated okay to leave Voicemail with day and time of appointment    On supplement: Yes, 400mg daily, stop   - Bicarbonate; level: Normal        On supplement: No    # GERD:   -Off protonix. Improved    # Pruritis:   -resolved    # Concern for diabetic gastroparesis:   -GI doc talked about starting reglan. Recommend considering starting this    # Edema:   -Still present, stop torsemide, start spironolactone 25mg daily    # Vision changes:   -Follows with optho. Had MRI brain that showed T2 hyperintensities in the subcortical white matter in the L occipital lobe that are non specific. Differential could include demyelination versus sequela of infectious, inflammatory, or vasculopathic process, or possibly toxic exposure.   -Optho diagnosed macular leakage bilaterally, received Avastin injection in prior appointment   -due to concern for optho of low flow and concern regarding Factor V Leiden heterozygosity she was started on eliquis 5mg BID    # Medical Compliance: Yes    # COVID-19 Virus Review: Discussed COVID-19 virus and the potential medical risks.  Reviewed preventative health recommendations, including wearing a mask where appropriate.  Recommended COVID vaccination should be up to date with either an initial vaccination or booster shot when appropriate.  Asked the patient to inform the transplant center if they are exposed or diagnosed with this virus.    # COVID Vaccination Up To Date: Yes. Due for 4th dose on or after 4/8/22    # Transplant History:  Etiology of Kidney Failure: Diabetes mellitus type 1  Tx: SPK  Transplant: 11/15/2021 (Kidney / Pancreas)  Donor Type: Donation after Brain Death Donor Class:   Crossmatch at time of Tx: negative  DSA at time of Tx: No  Significant changes in immunosuppression: None  CMV IgG Ab High Risk Discordance (D+/R-): No  EBV IgG Ab High Risk Discordance (D+/R-): No  Significant transplant-related complications: None    Transplant Office Phone Number: [phone]    Assessment and plan was discussed with the patient and she voiced her  understanding and agreement.      Return visit: Return in 8 weeks (on 5/13/2022) for 6 month post transplant visit.      Kamran Corona MD    Chief Complaint   Ms. Ortega is a 34 year old here for kidney transplant, pancreas transplant, immunosuppression management and new medical concerns.     History of Present Illness    Marilyn states that she continues to have swelling all over her body, worse as the day goes on and that her weight increases about 5lbs during the day. She continues to drink 3L of water. She has been taking torsemide 10mg daily. She denies SOB, N/V/D, fever, chills. She states that she doesn't eat high sodium foods, canned foods, processed foods, fast foods. She feels that her stomach is full even the morning after eating dinner. She does have a history of gastroparesis.     She says that her vision did initially improve after the avastin injection but now has gotten worse again. She has an upcoming appt with [employer] [facility].       Home BP: 120s systolic      Problem List   Patient Active Problem List   Diagnosis     Type 1 diabetes mellitus (H)     Type 1 diabetes, HbA1c goal < 7% (H)     Osteopenia     Chronic constipation     Irritable bowel syndrome     CKD (chronic kidney disease) stage 4, GFR 15-29 ml/min (H)     HTN, kidney transplant related     Gastroparesis     Heterozygous factor V Leiden mutation (H)     Status post simultaneous kidney and pancreas transplant (H)     Immunosuppression (H)     Kidney replaced by transplant     Pancreas replaced by transplant (H)     Need for pneumocystis prophylaxis     Anemia     Aftercare following organ transplant       Allergies   Allergies   Allergen Reactions     Chlorhexidine Hives, Itching and Rash     PN: Patient had swelling/rash that was very itchy with chlorprep.     Ceclor [Cefaclor Monohydrate]      Cefaclor Rash       Medications   Current Outpatient Medications   Medication Sig     apixaban ANTICOAGULANT (ELIQUIS) 5 MG tablet Take 1 tablet (5  mg) by mouth 2 times daily     aspirin (ASA) 325 MG tablet Take 1 tablet (325 mg) by mouth daily     calcium carbonate-vitamin D (OS-STEPHANIE WITH D) 500-200 MG-UNIT tablet Take 1 tablet by mouth 2 times daily (with meals)     cetirizine (ZYRTEC) 10 MG tablet Take 10 mg by mouth daily     cholecalciferol 25 MCG (1000 UT) TABS Take 2,000 Units by mouth every other day      Continuous Blood Gluc Sensor (DEXCOM G6 SENSOR) MISC Use as directed for continuous glucose monitoring.  Change sensor every 10 days.     Continuous Blood Gluc Transmit (DEXCOM G6 TRANSMITTER) MISC Use as directed for continuous glucose monitoring.  Change every 3 months.     docusate sodium (COLACE) 100 MG capsule Take 200 mg by mouth daily before breakfast     glucose blood VI test strips strip 4 times daily.     mycophenolic acid (GENERIC EQUIVALENT) 180 MG EC tablet Take 1 tablet (180 mg) by mouth 2 times daily Total dose=540mg BID     mycophenolic acid (GENERIC EQUIVALENT) 360 MG EC tablet Take 1 tablet (360 mg) by mouth 2 times daily Total dose=540mg BID     norgestimate-ethinyl estradiol (ORTHO-CYCLEN) 0.25-35 MG-MCG tablet Take 1 tablet by mouth daily     ondansetron (ZOFRAN) 4 MG tablet Take 1 tablet (4 mg) by mouth every 8 hours as needed for nausea     Prucalopride Succinate (MOTEGRITY) 2 MG TABS Take one tablet by mouth daily     sulfamethoxazole-trimethoprim (BACTRIM) 400-80 MG tablet Take 1 tablet by mouth daily     tacrolimus (ENVARSUS XR) 0.75 MG 24 hr tablet Take 1 tablet (0.75 mg) by mouth every morning (before breakfast) On hold for dose change.     tacrolimus (ENVARSUS XR) 1 MG 24 hr tablet HOLD     tacrolimus (ENVARSUS XR) 4 MG 24 hr tablet Take 3 tablets (12 mg) by mouth every morning (before breakfast)     torsemide (DEMADEX) 10 MG tablet Take 1 tablet (10 mg) by mouth daily HOLD per Dr Corona.     No current facility-administered medications for this visit.     There are no discontinued medications.    Physical Exam   Vital  "Signs: /82 (BP Location: Right arm, Patient Position: Sitting, Cuff Size: Adult Regular)   Pulse 84   Temp 97.8  F (36.6  C) (Oral)   Resp 16   Ht 1.676 m (5' 5.98\")   Wt 58 kg (127 lb 14.4 oz)   SpO2 99%   BMI 20.65 kg/m      GENERAL APPEARANCE: alert and no distress  HENT: mouth without ulcers or lesions  HENT: ear canals and TM's normal and TM's pearly grey, normal light reflex bilateral  LYMPHATICS: no cervical or supraclavicular nodes  RESP: lungs clear to auscultation - no rales, rhonchi or wheezes  CV: regular rhythm, normal rate, no rub, no murmur  EDEMA: no LE edema bilaterally  ABDOMEN: soft, nondistended, nontender, bowel sounds normal  MS: extremities normal - no gross deformities noted, no evidence of inflammation in joints, no muscle tenderness  SKIN: no rash  NEURO: normal strength and tone, sensory exam grossly normal, mentation intact and speech normal  PSYCH: mentation appears normal and affect normal/bright  TX KIDNEY: normal    Data     Renal Latest Ref Rng & Units 3/14/2022 3/10/2022 3/7/2022   Na 133 - 144 mmol/L 133 138 138   K 3.4 - 5.3 mmol/L 4.5 4.2 4.4   Cl 94 - 109 mmol/L 103 109 111(H)   CO2 20 - 32 mmol/L 25 25 22   BUN 7 - 30 mg/dL 18 19 24   Cr 0.52 - 1.04 mg/dL 1.16(H) 1.16(H) 1.17(H)   Glucose 70 - 99 mg/dL 102(H) 101(H) 89   Ca  8.5 - 10.1 mg/dL 9.9 9.0 9.3   Mg 1.6 - 2.3 mg/dL 1.7 - -     Bone Health Latest Ref Rng & Units 3/14/2022 2/21/2022 2/7/2022   Phos 2.5 - 4.5 mg/dL 2.6 2.4(L) 2.0(L)   PTHi pg/mL - - -   Vit D Def 20 - 75 ug/L - - -     Heme Latest Ref Rng & Units 3/14/2022 3/10/2022 3/7/2022   WBC 4.0 - 11.0 10e3/uL 7.6 5.1 6.2   Hgb 11.7 - 15.7 g/dL 11.6(L) 10.2(L) 10.3(L)   Plt 150 - 450 10e3/uL 269 288 301   ABSOLUTE NEUTROPHIL 1.6 - 8.3 10e3/uL - - -   ABSOLUTE LYMPHOCYTES 0.8 - 5.3 10e3/uL - - -   ABSOLUTE MONOCYTES 0.0 - 1.3 10e3/uL - - -   ABSOLUTE EOSINOPHILS 0.0 - 0.7 10e3/uL - - -   ABSOLUTE BASOPHILS 0.0 - 0.2 10e9/L - - -     Liver Latest Ref Rng " & Units 11/15/2021 7/21/2021 6/8/2012   AP 40 - 150 U/L 104 94 96   TBili 0.2 - 1.3 mg/dL 0.4 0.4 0.3   ALT 0 - 50 U/L 23 24 13   AST 0 - 45 U/L 16 19 33   Tot Protein 6.8 - 8.8 g/dL 8.4 9.4(H) 7.8   Albumin 3.4 - 5.0 g/dL 4.0 4.7 4.2     Pancreas Latest Ref Rng & Units 3/14/2022 3/10/2022 3/7/2022   A1C 0.0 - 5.6 % - - -   Amylase 30 - 110 U/L 50 56 54   Lipase 73 - 393 U/L 69(L) 85 99     Iron studies Latest Ref Rng & Units 3/3/2022 2/3/2022 12/22/2021   Iron 35 - 180 ug/dL 139 123 20(L)   Iron sat 15 - 46 % 35 37 7(L)   Ferritin 12 - 150 ng/mL 106 185(H) 87     [facility] Txp Virology Latest Ref Rng & Units 1/28/2022 12/13/2021 7/21/2021   CMV QUANT IU/ML Not Detected IU/mL Not Detected Not Detected -   EBV CAPSID ANTIBODY IGG No detectable antibody. - - Positive(A)        Recent Labs   Lab Test 03/07/22  0815 03/10/22  0810 03/14/22  0827   DOSTAC 3/6/2022 3/9/2022 3/13/2022   TACROL 8.2 9.1 9.3     Recent Labs   Lab Test 02/07/22  0915 02/21/22  0822 03/07/22  0815   DOSMPA  --  2/20/2022   8:00 PM 3/6/2022   8:00 PM   MPACID 5.16* 2.23 2.63   MPAG 45.6 32.8 36.5       Early Post Transplant

## 2025-03-25 RX ORDER — WARFARIN SODIUM 10 MG/1
TABLET ORAL
Qty: 30 TABLET | Refills: 0 | Status: SHIPPED
Start: 2025-03-25 | End: 2025-03-25

## 2025-03-25 RX ORDER — WARFARIN SODIUM 10 MG/1
TABLET ORAL
Qty: 30 TABLET | Refills: 0 | Status: SHIPPED | OUTPATIENT
Start: 2025-03-25

## 2025-04-01 ENCOUNTER — PREP FOR PROCEDURE (OUTPATIENT)
Dept: ORTHOPEDIC SURGERY | Age: 55
End: 2025-04-01

## 2025-04-01 ENCOUNTER — TELEPHONE (OUTPATIENT)
Dept: ORTHOPEDIC SURGERY | Age: 55
End: 2025-04-01

## 2025-04-01 DIAGNOSIS — G89.29 CHRONIC LEFT SHOULDER PAIN: ICD-10-CM

## 2025-04-01 DIAGNOSIS — M75.102 TEAR OF LEFT SUPRASPINATUS TENDON: Primary | ICD-10-CM

## 2025-04-01 DIAGNOSIS — M25.512 CHRONIC LEFT SHOULDER PAIN: ICD-10-CM

## 2025-04-01 DIAGNOSIS — M75.102 ROTATOR CUFF SYNDROME OF LEFT SHOULDER: ICD-10-CM

## 2025-04-01 NOTE — TELEPHONE ENCOUNTER
[facility]  ORTHOPAEDIC SURGERY SCHEDULING NOTE    Patient wishes to proceed with surgery after risks and benefits conversation with Dr. Yates.     Surgical education was discussed with the patient and a surgical handout was given. Educated patient that pre-admission testing will be contacting them regarding further surgical instructions. Notified that if they are having a joint replacement, they will be scheduled for a mandatory education class. Pre/post-op appointments were made as needed.     Patient is also aware to obtain any clearances prior to surgery.   Clearances required: Medical and Cardiac      Insurance: Medicare    *Authorization details can be found attached to the referral*     Surgery: Left shoulder arthroscopy, rotator cuff repair, biceps tenodesis vs tenotomy   OR DATE: 05/01/2025  Vendor: [facility]  Block: ISB  CPT: 97486, 72852  DX: M75.102   Special Needs (if applicable): N/A

## 2025-04-04 ENCOUNTER — OFFICE VISIT (OUTPATIENT)
Dept: PRIMARY CARE CLINIC | Age: 55
End: 2025-04-04

## 2025-04-04 VITALS
OXYGEN SATURATION: 95 % | TEMPERATURE: 97.7 F | SYSTOLIC BLOOD PRESSURE: 130 MMHG | HEART RATE: 77 BPM | DIASTOLIC BLOOD PRESSURE: 72 MMHG | RESPIRATION RATE: 16 BRPM | BODY MASS INDEX: 33.46 KG/M2 | WEIGHT: 247 LBS | HEIGHT: 72 IN

## 2025-04-04 DIAGNOSIS — E78.5 HYPERLIPIDEMIA, UNSPECIFIED HYPERLIPIDEMIA TYPE: ICD-10-CM

## 2025-04-04 DIAGNOSIS — Z01.818 PRE-OP EXAM: ICD-10-CM

## 2025-04-04 DIAGNOSIS — I48.20 CHRONIC ATRIAL FIBRILLATION (HCC): ICD-10-CM

## 2025-04-04 DIAGNOSIS — I10 PRIMARY HYPERTENSION: ICD-10-CM

## 2025-04-04 DIAGNOSIS — R73.03 PREDIABETES: ICD-10-CM

## 2025-04-04 DIAGNOSIS — M75.102 NONTRAUMATIC TEAR OF LEFT ROTATOR CUFF, UNSPECIFIED TEAR EXTENT: ICD-10-CM

## 2025-04-04 DIAGNOSIS — Z95.2 S/P AVR (AORTIC VALVE REPLACEMENT) AND AORTOPLASTY: ICD-10-CM

## 2025-04-04 DIAGNOSIS — G40.89 OTHER SEIZURES (HCC): ICD-10-CM

## 2025-04-04 DIAGNOSIS — Z95.2 S/P MVR (MITRAL VALVE REPLACEMENT): ICD-10-CM

## 2025-04-04 DIAGNOSIS — L40.50 PSORIATIC ARTHRITIS (HCC): ICD-10-CM

## 2025-04-04 DIAGNOSIS — Z01.818 PRE-OP EXAM: Primary | ICD-10-CM

## 2025-04-04 LAB
ALBUMIN: 4.3 G/DL (ref 3.5–5.2)
ALP BLD-CCNC: 54 U/L (ref 40–129)
ALT SERPL-CCNC: 26 U/L (ref 0–40)
ANION GAP SERPL CALCULATED.3IONS-SCNC: 13 MMOL/L (ref 7–16)
AST SERPL-CCNC: 25 U/L (ref 0–39)
BILIRUB SERPL-MCNC: 0.3 MG/DL (ref 0–1.2)
BUN BLDV-MCNC: 23 MG/DL (ref 6–20)
CALCIUM SERPL-MCNC: 9.5 MG/DL (ref 8.6–10.2)
CHLORIDE BLD-SCNC: 106 MMOL/L (ref 98–107)
CHOLESTEROL, TOTAL: 248 MG/DL
CO2: 21 MMOL/L (ref 22–29)
CREAT SERPL-MCNC: 1.1 MG/DL (ref 0.7–1.2)
GFR, ESTIMATED: 81 ML/MIN/1.73M2
GLUCOSE BLD-MCNC: 102 MG/DL (ref 74–99)
HBA1C MFR BLD: 5.9 % (ref 4–5.6)
HCT VFR BLD CALC: 46.7 % (ref 37–54)
HDLC SERPL-MCNC: 40 MG/DL
HEMOGLOBIN: 15.2 G/DL (ref 12.5–16.5)
INR BLD: 3.4
LDL CHOLESTEROL: 139 MG/DL
MCH RBC QN AUTO: 29.3 PG (ref 26–35)
MCHC RBC AUTO-ENTMCNC: 32.5 G/DL (ref 32–34.5)
MCV RBC AUTO: 90.2 FL (ref 80–99.9)
PDW BLD-RTO: 13.3 % (ref 11.5–15)
PLATELET # BLD: 264 K/UL (ref 130–450)
PMV BLD AUTO: 10.7 FL (ref 7–12)
POTASSIUM SERPL-SCNC: 4.4 MMOL/L (ref 3.5–5)
PROTHROMBIN TIME: 37.2 SEC (ref 9.3–12.4)
RBC # BLD: 5.18 M/UL (ref 3.8–5.8)
SODIUM BLD-SCNC: 140 MMOL/L (ref 132–146)
TOTAL PROTEIN: 7.4 G/DL (ref 6.4–8.3)
TRIGL SERPL-MCNC: 345 MG/DL
TSH SERPL DL<=0.05 MIU/L-ACNC: 0.55 UIU/ML (ref 0.27–4.2)
VLDLC SERPL CALC-MCNC: 69 MG/DL
WBC # BLD: 7.2 K/UL (ref 4.5–11.5)

## 2025-04-04 SDOH — ECONOMIC STABILITY: FOOD INSECURITY: WITHIN THE PAST 12 MONTHS, THE FOOD YOU BOUGHT JUST DIDN'T LAST AND YOU DIDN'T HAVE MONEY TO GET MORE.: NEVER TRUE

## 2025-04-04 SDOH — ECONOMIC STABILITY: FOOD INSECURITY: WITHIN THE PAST 12 MONTHS, YOU WORRIED THAT YOUR FOOD WOULD RUN OUT BEFORE YOU GOT MONEY TO BUY MORE.: NEVER TRUE

## 2025-04-04 ASSESSMENT — ENCOUNTER SYMPTOMS
VOMITING: 0
SINUS PRESSURE: 0
SORE THROAT: 0
SHORTNESS OF BREATH: 0
WHEEZING: 0
COUGH: 0
EYE PAIN: 0
COLOR CHANGE: 0
BLOOD IN STOOL: 0
RHINORRHEA: 0
DIARRHEA: 0
ABDOMINAL PAIN: 0
BACK PAIN: 0
CONSTIPATION: 0
NAUSEA: 0
EYE REDNESS: 0
EYE ITCHING: 0
APNEA: 0
CHANGE IN BOWEL HABIT: 0
CHEST TIGHTNESS: 0

## 2025-04-04 ASSESSMENT — PATIENT HEALTH QUESTIONNAIRE - PHQ9
SUM OF ALL RESPONSES TO PHQ QUESTIONS 1-9: 0
2. FEELING DOWN, DEPRESSED OR HOPELESS: NOT AT ALL
1. LITTLE INTEREST OR PLEASURE IN DOING THINGS: NOT AT ALL

## 2025-04-04 NOTE — PROGRESS NOTES
Chief Complaint:     Chief Complaint   Patient presents with    Pre-op Exam     Left shoulder, 5/1 dr hubbard     Shoulder Pain    Hypertension    Hyperlipidemia    Heart Problem         Shoulder Pain   The pain is present in the left shoulder. This is a chronic problem. The current episode started more than 1 month ago. The problem occurs daily. The problem has been unchanged. The quality of the pain is described as aching. The pain is severe. Associated symptoms include an inability to bear weight, a limited range of motion, numbness and stiffness. Pertinent negatives include no fever. The symptoms are aggravated by activity. He has tried nothing for the symptoms. The treatment provided no relief. There is no history of diabetes.   Hypertension  Pertinent negatives include no chest pain, headaches, neck pain, palpitations or shortness of breath. There is no history of chronic renal disease.   Hyperlipidemia  This is a chronic problem. The current episode started more than 1 year ago. The problem is controlled. He has no history of chronic renal disease, diabetes, hypothyroidism or obesity. Associated symptoms include leg pain and myalgias. Pertinent negatives include no chest pain or shortness of breath. Current antihyperlipidemic treatment includes statins. The current treatment provides significant improvement of lipids. There are no compliance problems.  Risk factors for coronary artery disease include dyslipidemia and male sex.   Heart Problem  This is a recurrent (s/p valve replacement) problem. The current episode started 1 to 4 weeks ago. The problem occurs daily. The problem has been unchanged. Associated symptoms include fatigue, myalgias, numbness and weakness. Pertinent negatives include no abdominal pain, arthralgias, change in bowel habit, chest pain, chills, congestion, coughing, diaphoresis, fever, headaches, joint swelling, nausea, neck pain, rash, sore throat or vomiting. Nothing aggravates the

## 2025-04-06 ENCOUNTER — RESULTS FOLLOW-UP (OUTPATIENT)
Dept: PRIMARY CARE CLINIC | Age: 55
End: 2025-04-06

## 2025-04-06 RX ORDER — ROSUVASTATIN CALCIUM 10 MG/1
10 TABLET, COATED ORAL DAILY
Qty: 90 TABLET | Refills: 1 | Status: SHIPPED | OUTPATIENT
Start: 2025-04-06

## 2025-04-23 ENCOUNTER — OFFICE VISIT (OUTPATIENT)
Dept: ORTHOPEDIC SURGERY | Age: 55
End: 2025-04-23
Payer: MEDICARE

## 2025-04-23 VITALS
OXYGEN SATURATION: 94 % | BODY MASS INDEX: 32.51 KG/M2 | WEIGHT: 240 LBS | HEART RATE: 77 BPM | SYSTOLIC BLOOD PRESSURE: 122 MMHG | TEMPERATURE: 97.9 F | DIASTOLIC BLOOD PRESSURE: 86 MMHG | HEIGHT: 72 IN | RESPIRATION RATE: 18 BRPM

## 2025-04-23 DIAGNOSIS — Z01.818 PRE-OP EVALUATION: Primary | ICD-10-CM

## 2025-04-23 DIAGNOSIS — M75.102 ROTATOR CUFF SYNDROME OF LEFT SHOULDER: ICD-10-CM

## 2025-04-23 PROCEDURE — 3074F SYST BP LT 130 MM HG: CPT | Performed by: ORTHOPAEDIC SURGERY

## 2025-04-23 PROCEDURE — 1036F TOBACCO NON-USER: CPT | Performed by: ORTHOPAEDIC SURGERY

## 2025-04-23 PROCEDURE — 3017F COLORECTAL CA SCREEN DOC REV: CPT | Performed by: ORTHOPAEDIC SURGERY

## 2025-04-23 PROCEDURE — 3079F DIAST BP 80-89 MM HG: CPT | Performed by: ORTHOPAEDIC SURGERY

## 2025-04-23 PROCEDURE — G8417 CALC BMI ABV UP PARAM F/U: HCPCS | Performed by: ORTHOPAEDIC SURGERY

## 2025-04-23 PROCEDURE — G8427 DOCREV CUR MEDS BY ELIG CLIN: HCPCS | Performed by: ORTHOPAEDIC SURGERY

## 2025-04-23 PROCEDURE — 99214 OFFICE O/P EST MOD 30 MIN: CPT | Performed by: ORTHOPAEDIC SURGERY

## 2025-04-23 NOTE — PROGRESS NOTES
Department of Orthopedic Surgery  Attending Pre-operative History and Physical        DIAGNOSIS: Left shoulder rotator cuff tear    INDICATION:  Failed Conservative Management      PROCEDURE:  Left shoulder arthroscopy, rotator cuff repair, biceps tenodesis vs tenotomy.     CHIEF COMPLAINT: Left shoulder pain    History Obtained From:  patient    HISTORY OF PRESENT ILLNESS:      The patient is a 54 y.o. male with significant past medical history of left shoulder rotator cuff tear refractory to conservative treatment.  He is therefore interested in surgery to include shoulder arthroscopy rotator cuff repair.  Risks of surgery were discussed in detail including but not limited to infection, neurovascular injury, retear, stiffness, night pain, DVT/pulmonary was, death of anesthesia, unforeseen risks.  We also discussed the anticipated rehab protocol.  He understands and would like to proceed    Past Medical History:        Diagnosis Date    AF (atrial fibrillation) ([facility])     Hypertension     Infective endocarditis 08/26/2020    History  MVR/AVR (mechanical valves, coumadin, 2006).  Per report, he was found down 8/21 with noted aphasia, R sided weakness. Presented to ED where he was found to have L ALIYAH stroke with therapeutic INR, + BC for MSSA. Subsequent workup showed normal EF, prosthetic MV with two mobile masses consistent with vegetations. Was evaluated by ID at OSH. He was then transferred to [facility] for further evalua    Intracranial hemorrhage ([facility]) 12/07/2021    LONG TERM ANTICOAGULENT USE     Memory loss     dont remember seizure    Seizure ([facility]) 12/07/2021    only one pt has had    Stroke ([facility]) 08/2020    2 left fingers 20% numb       Past Surgical History:        Procedure Laterality Date    AORTIC VALVE REPLACEMENT  01/01/2006    Dr Butler   [facility]    AORTIC VALVE REPLACEMENT  09/03/2020    Commando procedure(Aortic root replacement and AVR with Homograft #[id number], reconstrcution of the Inter-valvular fibrosa with

## 2025-04-28 ENCOUNTER — ANESTHESIA EVENT (OUTPATIENT)
Dept: OPERATING ROOM | Age: 55
End: 2025-04-28
Payer: MEDICARE

## 2025-04-30 ASSESSMENT — LIFESTYLE VARIABLES: SMOKING_STATUS: 0

## 2025-04-30 NOTE — ANESTHESIA PRE PROCEDURE
Department of Anesthesiology  Preprocedure Note       Name:  Moise Ward   Age:  54 y.o.  :  1970                                          MRN:  [id number]         Date:  2025      Surgeon: Surgeon(s):  Kike Yates MD    Procedure: Procedure(s):  LEFT SHOULDER ARTHROSCOPY ROTATOR CUFF REPAIR POSSIBLE BICEPS TENODESIS VS TENOTOMY     ++[facility]++    ++[facility]++    Medications prior to admission:   Prior to Admission medications    Medication Sig Start Date End Date Taking? Authorizing Provider   rosuvastatin (CRESTOR) 10 MG tablet Take 1 tablet by mouth daily 25   Emeterio Souza DO   warfarin (COUMADIN) 10 MG tablet TAKE ONE TABLET BY MOUTH EVERY DAY OR AS DIRECTED BY DOCTOR 3/25/25   Emeterio Souza DO   levETIRAcetam (KEPPRA) 750 MG tablet Take 1 tablet by mouth 2 times daily 24   Emeterio Souza DO   spironolactone (ALDACTONE) 25 MG tablet Take 1 tablet by mouth daily 24   Emeterio Souza DO   lisinopril (PRINIVIL;ZESTRIL) 20 MG tablet Take 1 tablet by mouth daily 24   Emeterio Souza DO   metoprolol succinate (TOPROL XL) 25 MG extended release tablet Take 1 tablet by mouth daily 24   Emeterio Souza DO SKYRIZI  MG/ML SOAJ Inject 150 mg into the skin every 3 months 3/18/24   Provider, MD [place]   Multiple Vitamin (THERA/BETA-CAROTENE) TABS Take 1 tablet by mouth daily 2/15/22   Emeterio Souza DO       Current medications:    No current facility-administered medications for this encounter.     Current Outpatient Medications   Medication Sig Dispense Refill    rosuvastatin (CRESTOR) 10 MG tablet Take 1 tablet by mouth daily 90 tablet 1    warfarin (COUMADIN) 10 MG tablet TAKE ONE TABLET BY MOUTH EVERY DAY OR AS DIRECTED BY DOCTOR 30 tablet 0    levETIRAcetam (KEPPRA) 750 MG tablet Take 1 tablet by mouth 2 times daily 60 tablet 5    spironolactone (ALDACTONE) 25 MG tablet Take 1 tablet by mouth daily 90 tablet 3    lisinopril (PRINIVIL;ZESTRIL)

## 2025-04-30 NOTE — PROGRESS NOTES
[facility] PRE-ADMISSION TESTING INSTRUCTIONS    The Preadmission Testing patient is instructed accordingly using the following criteria (check applicable):    ARRIVAL INSTRUCTIONS:   Arrival Time: 0500    [x] Parking the day of Surgery is located in the Main Entrance lot.  Upon entering through the main entrance (Entrance A) make an immediate right to the surgery reception desk    [x] Bring photo ID and insurance card    [] Bring in a copy of Living will or Durable Power of  papers.    [x] Please be sure to arrange for a responsible adult to provide transportation to and from the hospital    [x] Please arrange for a responsible adult to be with you for the 24 hour period post procedure, due to having anesthesia    [x] Please notify surgeon if you develop any illness between now and time of surgery (cold, cough, sore throat, fever, nausea, vomiting) or any signs of infections  including skin, wounds, and dental.    [x] If you awake am of surgery not feeling well or have temperature >100 please call [phone].    GENERAL INSTRUCTIONS:    [x] No solid foods after midnight, may have up to 8oz of water until 4 hours prior to surgery. No gum, no candy, no mints.  NPO time: 0300       [x] You may brush your teeth    [x] Take medications as instructed (Read back and verified by patient)   Take keppra and metoprolol    [x] Stop herbal supplements and vitamins 5 days prior to procedure    [x] Follow preop dosing of blood thinners per physician instructions      [x] Shower or bath with soap, lather and rinse well, AM of Surgery, no lotion, powders or creams to surgical site    [x] Please do not wear any nail polish, make up, hair products, body spray, aftershave, cologne or perfume on the day of surgery    [x] Jewelry, body piercings, eyeglasses, contact lenses and dentures are not permitted into surgery (bring cases)    [x] No tobacco products within 24 hours of surgery     [x] No  alcohol or illegal drug use, marijuana included, within 24 hours of surgery.    [x] You can expect a call the business day prior to procedure to notify you if your arrival time changes    [x] If you receive a survey after surgery we would greatly appreciate your comments    [x]  The Outpatient Pharmacy is available to fill your prescription here on your day of surgery, ask your preop nurse for details    All patient questions answered at this time.

## 2025-05-01 ENCOUNTER — ANESTHESIA (OUTPATIENT)
Dept: OPERATING ROOM | Age: 55
End: 2025-05-01
Payer: MEDICARE

## 2025-05-01 ENCOUNTER — HOSPITAL ENCOUNTER (OUTPATIENT)
Age: 55
Setting detail: OUTPATIENT SURGERY
Discharge: HOME | End: 2025-05-01
Attending: ORTHOPAEDIC SURGERY | Admitting: ORTHOPAEDIC SURGERY
Payer: MEDICARE

## 2025-05-01 VITALS
WEIGHT: 240 LBS | HEIGHT: 72 IN | BODY MASS INDEX: 32.51 KG/M2 | RESPIRATION RATE: 20 BRPM | TEMPERATURE: 96.7 F | SYSTOLIC BLOOD PRESSURE: 142 MMHG | HEART RATE: 86 BPM | OXYGEN SATURATION: 92 % | DIASTOLIC BLOOD PRESSURE: 87 MMHG

## 2025-05-01 DIAGNOSIS — Z98.890 STATUS POST ARTHROSCOPY OF LEFT SHOULDER: Primary | ICD-10-CM

## 2025-05-01 LAB
ANION GAP SERPL CALCULATED.3IONS-SCNC: 10 MMOL/L (ref 7–16)
BUN SERPL-MCNC: 19 MG/DL (ref 6–20)
CALCIUM SERPL-MCNC: 8.9 MG/DL (ref 8.6–10.2)
CHLORIDE SERPL-SCNC: 104 MMOL/L (ref 98–107)
CO2 SERPL-SCNC: 25 MMOL/L (ref 22–29)
CREAT SERPL-MCNC: 1 MG/DL (ref 0.7–1.2)
EKG ATRIAL RATE: 90 BPM
EKG Q-T INTERVAL: 412 MS
EKG QRS DURATION: 98 MS
EKG QTC CALCULATION (BAZETT): 435 MS
EKG R AXIS: 76 DEGREES
EKG T AXIS: 56 DEGREES
EKG VENTRICULAR RATE: 67 BPM
ERYTHROCYTE [DISTWIDTH] IN BLOOD BY AUTOMATED COUNT: 13.1 % (ref 11.5–15)
GFR, ESTIMATED: 88 ML/MIN/1.73M2
GLUCOSE SERPL-MCNC: 93 MG/DL (ref 74–99)
HCT VFR BLD AUTO: 45.7 % (ref 37–54)
HGB BLD-MCNC: 15.1 G/DL (ref 12.5–16.5)
INR PPP: 1.1
MCH RBC QN AUTO: 29.5 PG (ref 26–35)
MCHC RBC AUTO-ENTMCNC: 33 G/DL (ref 32–34.5)
MCV RBC AUTO: 89.4 FL (ref 80–99.9)
PARTIAL THROMBOPLASTIN TIME: 26.2 SEC (ref 24.5–35.1)
PLATELET # BLD AUTO: 230 K/UL (ref 130–450)
PMV BLD AUTO: 10 FL (ref 7–12)
POTASSIUM SERPL-SCNC: 3.8 MMOL/L (ref 3.5–5)
PROTHROMBIN TIME: 11.3 SEC (ref 9.3–12.4)
RBC # BLD AUTO: 5.11 M/UL (ref 3.8–5.8)
SODIUM SERPL-SCNC: 139 MMOL/L (ref 132–146)
WBC OTHER # BLD: 9.3 K/UL (ref 4.5–11.5)

## 2025-05-01 PROCEDURE — 2580000003 HC RX 258: Performed by: ANESTHESIOLOGY

## 2025-05-01 PROCEDURE — 6360000002 HC RX W HCPCS

## 2025-05-01 PROCEDURE — 2580000003 HC RX 258: Performed by: NURSE ANESTHETIST, CERTIFIED REGISTERED

## 2025-05-01 PROCEDURE — L3650 SO 8 ABD RESTRAINT PRE OTS: HCPCS | Performed by: ORTHOPAEDIC SURGERY

## 2025-05-01 PROCEDURE — 3600000003 HC SURGERY LEVEL 3 BASE: Performed by: ORTHOPAEDIC SURGERY

## 2025-05-01 PROCEDURE — 85610 PROTHROMBIN TIME: CPT

## 2025-05-01 PROCEDURE — 93005 ELECTROCARDIOGRAM TRACING: CPT | Performed by: ANESTHESIOLOGY

## 2025-05-01 PROCEDURE — 3700000001 HC ADD 15 MINUTES (ANESTHESIA): Performed by: ORTHOPAEDIC SURGERY

## 2025-05-01 PROCEDURE — 7100000010 HC PHASE II RECOVERY - FIRST 15 MIN: Performed by: ORTHOPAEDIC SURGERY

## 2025-05-01 PROCEDURE — 2500000003 HC RX 250 WO HCPCS: Performed by: ORTHOPAEDIC SURGERY

## 2025-05-01 PROCEDURE — 29827 SHO ARTHRS SRG RT8TR CUF RPR: CPT | Performed by: ORTHOPAEDIC SURGERY

## 2025-05-01 PROCEDURE — 80048 BASIC METABOLIC PNL TOTAL CA: CPT

## 2025-05-01 PROCEDURE — 2500000003 HC RX 250 WO HCPCS: Performed by: NURSE ANESTHETIST, CERTIFIED REGISTERED

## 2025-05-01 PROCEDURE — 6360000002 HC RX W HCPCS: Performed by: ANESTHESIOLOGY

## 2025-05-01 PROCEDURE — 6360000002 HC RX W HCPCS: Performed by: ORTHOPAEDIC SURGERY

## 2025-05-01 PROCEDURE — 29826 SHO ARTHRS SRG DECOMPRESSION: CPT | Performed by: ORTHOPAEDIC SURGERY

## 2025-05-01 PROCEDURE — 2709999900 HC NON-CHARGEABLE SUPPLY: Performed by: ORTHOPAEDIC SURGERY

## 2025-05-01 PROCEDURE — 7100000011 HC PHASE II RECOVERY - ADDTL 15 MIN: Performed by: ORTHOPAEDIC SURGERY

## 2025-05-01 PROCEDURE — 7100000001 HC PACU RECOVERY - ADDTL 15 MIN: Performed by: ORTHOPAEDIC SURGERY

## 2025-05-01 PROCEDURE — 85027 COMPLETE CBC AUTOMATED: CPT

## 2025-05-01 PROCEDURE — 29826 SHO ARTHRS SRG DECOMPRESSION: CPT | Performed by: NURSE PRACTITIONER

## 2025-05-01 PROCEDURE — 6360000002 HC RX W HCPCS: Performed by: NURSE ANESTHETIST, CERTIFIED REGISTERED

## 2025-05-01 PROCEDURE — 85730 THROMBOPLASTIN TIME PARTIAL: CPT

## 2025-05-01 PROCEDURE — C1713 ANCHOR/SCREW BN/BN,TIS/BN: HCPCS | Performed by: ORTHOPAEDIC SURGERY

## 2025-05-01 PROCEDURE — 2580000003 HC RX 258: Performed by: ORTHOPAEDIC SURGERY

## 2025-05-01 PROCEDURE — 7100000000 HC PACU RECOVERY - FIRST 15 MIN: Performed by: ORTHOPAEDIC SURGERY

## 2025-05-01 PROCEDURE — 3600000013 HC SURGERY LEVEL 3 ADDTL 15MIN: Performed by: ORTHOPAEDIC SURGERY

## 2025-05-01 PROCEDURE — 64415 NJX AA&/STRD BRCH PLXS IMG: CPT | Performed by: ANESTHESIOLOGY

## 2025-05-01 PROCEDURE — 29827 SHO ARTHRS SRG RT8TR CUF RPR: CPT | Performed by: NURSE PRACTITIONER

## 2025-05-01 PROCEDURE — 93010 ELECTROCARDIOGRAM REPORT: CPT | Performed by: INTERNAL MEDICINE

## 2025-05-01 PROCEDURE — 3700000000 HC ANESTHESIA ATTENDED CARE: Performed by: ORTHOPAEDIC SURGERY

## 2025-05-01 DEVICE — SP FBRTAK RC FBRTPE BLK/BLU & STTPE BLU
Type: IMPLANTABLE DEVICE | Site: SHOULDER | Status: FUNCTIONAL
Brand: ARTHREX®

## 2025-05-01 DEVICE — SWIVELOCK, SP BC KL 4.75MM
Type: IMPLANTABLE DEVICE | Site: SHOULDER | Status: FUNCTIONAL
Brand: ARTHREX®

## 2025-05-01 DEVICE — SP FBRTAK RC TGRTPE WH/BLK & STTPE WH/BL
Type: IMPLANTABLE DEVICE | Site: SHOULDER | Status: FUNCTIONAL
Brand: ARTHREX®

## 2025-05-01 RX ORDER — EPINEPHRINE 1 MG/ML
INJECTION, SOLUTION, CONCENTRATE INTRAVENOUS PRN
Status: DISCONTINUED | OUTPATIENT
Start: 2025-05-01 | End: 2025-05-01 | Stop reason: ALTCHOICE

## 2025-05-01 RX ORDER — HYDROCODONE BITARTRATE AND ACETAMINOPHEN 5; 325 MG/1; MG/1
1 TABLET ORAL EVERY 6 HOURS PRN
Qty: 28 TABLET | Refills: 0 | Status: SHIPPED | OUTPATIENT
Start: 2025-05-01 | End: 2025-05-08

## 2025-05-01 RX ORDER — LABETALOL HYDROCHLORIDE 5 MG/ML
5 INJECTION, SOLUTION INTRAVENOUS
Status: DISCONTINUED | OUTPATIENT
Start: 2025-05-01 | End: 2025-05-01 | Stop reason: HOSPADM

## 2025-05-01 RX ORDER — ROCURONIUM BROMIDE 10 MG/ML
INJECTION, SOLUTION INTRAVENOUS
Status: DISCONTINUED | OUTPATIENT
Start: 2025-05-01 | End: 2025-05-01 | Stop reason: SDUPTHER

## 2025-05-01 RX ORDER — ACETAMINOPHEN 500 MG
1000 TABLET ORAL ONCE
Status: DISCONTINUED | OUTPATIENT
Start: 2025-05-01 | End: 2025-05-01 | Stop reason: HOSPADM

## 2025-05-01 RX ORDER — PHENYLEPHRINE HCL IN 0.9% NACL 1 MG/10 ML
SYRINGE (ML) INTRAVENOUS
Status: DISCONTINUED | OUTPATIENT
Start: 2025-05-01 | End: 2025-05-01 | Stop reason: SDUPTHER

## 2025-05-01 RX ORDER — DIPHENHYDRAMINE HYDROCHLORIDE 50 MG/ML
12.5 INJECTION, SOLUTION INTRAMUSCULAR; INTRAVENOUS
Status: DISCONTINUED | OUTPATIENT
Start: 2025-05-01 | End: 2025-05-01 | Stop reason: HOSPADM

## 2025-05-01 RX ORDER — HYDRALAZINE HYDROCHLORIDE 20 MG/ML
5 INJECTION INTRAMUSCULAR; INTRAVENOUS
Status: DISCONTINUED | OUTPATIENT
Start: 2025-05-01 | End: 2025-05-01 | Stop reason: HOSPADM

## 2025-05-01 RX ORDER — METOCLOPRAMIDE HYDROCHLORIDE 5 MG/ML
10 INJECTION INTRAMUSCULAR; INTRAVENOUS ONCE
Status: COMPLETED | OUTPATIENT
Start: 2025-05-01 | End: 2025-05-01

## 2025-05-01 RX ORDER — MIDAZOLAM HYDROCHLORIDE 1 MG/ML
INJECTION, SOLUTION INTRAMUSCULAR; INTRAVENOUS
Status: COMPLETED
Start: 2025-05-01 | End: 2025-05-01

## 2025-05-01 RX ORDER — SODIUM CHLORIDE 0.9 % (FLUSH) 0.9 %
5-40 SYRINGE (ML) INJECTION PRN
Status: DISCONTINUED | OUTPATIENT
Start: 2025-05-01 | End: 2025-05-01 | Stop reason: HOSPADM

## 2025-05-01 RX ORDER — FENTANYL CITRATE 50 UG/ML
INJECTION, SOLUTION INTRAMUSCULAR; INTRAVENOUS
Status: COMPLETED
Start: 2025-05-01 | End: 2025-05-01

## 2025-05-01 RX ORDER — SODIUM CHLORIDE 9 MG/ML
INJECTION, SOLUTION INTRAVENOUS PRN
Status: DISCONTINUED | OUTPATIENT
Start: 2025-05-01 | End: 2025-05-01 | Stop reason: HOSPADM

## 2025-05-01 RX ORDER — MEPERIDINE HYDROCHLORIDE 50 MG/ML
12.5 INJECTION INTRAMUSCULAR; INTRAVENOUS; SUBCUTANEOUS ONCE
Status: DISCONTINUED | OUTPATIENT
Start: 2025-05-01 | End: 2025-05-01 | Stop reason: HOSPADM

## 2025-05-01 RX ORDER — PROCHLORPERAZINE EDISYLATE 5 MG/ML
5 INJECTION INTRAMUSCULAR; INTRAVENOUS
Status: DISCONTINUED | OUTPATIENT
Start: 2025-05-01 | End: 2025-05-01 | Stop reason: HOSPADM

## 2025-05-01 RX ORDER — SODIUM CHLORIDE, SODIUM LACTATE, POTASSIUM CHLORIDE, CALCIUM CHLORIDE 600; 310; 30; 20 MG/100ML; MG/100ML; MG/100ML; MG/100ML
INJECTION, SOLUTION INTRAVENOUS
Status: DISCONTINUED | OUTPATIENT
Start: 2025-05-01 | End: 2025-05-01 | Stop reason: SDUPTHER

## 2025-05-01 RX ORDER — ROPIVACAINE HYDROCHLORIDE 5 MG/ML
INJECTION, SOLUTION EPIDURAL; INFILTRATION; PERINEURAL
Status: COMPLETED | OUTPATIENT
Start: 2025-05-01 | End: 2025-05-01

## 2025-05-01 RX ORDER — ROPIVACAINE HYDROCHLORIDE 5 MG/ML
30 INJECTION, SOLUTION EPIDURAL; INFILTRATION; PERINEURAL
Status: DISCONTINUED | OUTPATIENT
Start: 2025-05-01 | End: 2025-05-01 | Stop reason: HOSPADM

## 2025-05-01 RX ORDER — ESMOLOL HYDROCHLORIDE 10 MG/ML
INJECTION INTRAVENOUS
Status: DISCONTINUED | OUTPATIENT
Start: 2025-05-01 | End: 2025-05-01 | Stop reason: SDUPTHER

## 2025-05-01 RX ORDER — KETOROLAC TROMETHAMINE 10 MG/1
10 TABLET, FILM COATED ORAL EVERY 6 HOURS
Qty: 8 TABLET | Refills: 0 | Status: SHIPPED | OUTPATIENT
Start: 2025-05-01 | End: 2025-05-03

## 2025-05-01 RX ORDER — PROPOFOL 10 MG/ML
INJECTION, EMULSION INTRAVENOUS
Status: DISCONTINUED | OUTPATIENT
Start: 2025-05-01 | End: 2025-05-01 | Stop reason: SDUPTHER

## 2025-05-01 RX ORDER — FENTANYL CITRATE 50 UG/ML
25 INJECTION, SOLUTION INTRAMUSCULAR; INTRAVENOUS EVERY 5 MIN PRN
Status: DISCONTINUED | OUTPATIENT
Start: 2025-05-01 | End: 2025-05-01 | Stop reason: HOSPADM

## 2025-05-01 RX ORDER — LIDOCAINE HYDROCHLORIDE 20 MG/ML
INJECTION, SOLUTION EPIDURAL; INFILTRATION; INTRACAUDAL; PERINEURAL
Status: DISCONTINUED | OUTPATIENT
Start: 2025-05-01 | End: 2025-05-01 | Stop reason: SDUPTHER

## 2025-05-01 RX ORDER — SODIUM CHLORIDE 0.9 % (FLUSH) 0.9 %
5-40 SYRINGE (ML) INJECTION EVERY 12 HOURS SCHEDULED
Status: DISCONTINUED | OUTPATIENT
Start: 2025-05-01 | End: 2025-05-01 | Stop reason: HOSPADM

## 2025-05-01 RX ORDER — NALOXONE HYDROCHLORIDE 0.4 MG/ML
INJECTION, SOLUTION INTRAMUSCULAR; INTRAVENOUS; SUBCUTANEOUS PRN
Status: DISCONTINUED | OUTPATIENT
Start: 2025-05-01 | End: 2025-05-01 | Stop reason: HOSPADM

## 2025-05-01 RX ORDER — ONDANSETRON 2 MG/ML
INJECTION INTRAMUSCULAR; INTRAVENOUS
Status: DISCONTINUED | OUTPATIENT
Start: 2025-05-01 | End: 2025-05-01 | Stop reason: SDUPTHER

## 2025-05-01 RX ORDER — MIDAZOLAM HYDROCHLORIDE 2 MG/2ML
0.5 INJECTION, SOLUTION INTRAMUSCULAR; INTRAVENOUS PRN
Status: DISCONTINUED | OUTPATIENT
Start: 2025-05-01 | End: 2025-05-01 | Stop reason: HOSPADM

## 2025-05-01 RX ORDER — METHOCARBAMOL 100 MG/ML
1000 INJECTION, SOLUTION INTRAMUSCULAR; INTRAVENOUS ONCE
Status: DISCONTINUED | OUTPATIENT
Start: 2025-05-01 | End: 2025-05-01 | Stop reason: HOSPADM

## 2025-05-01 RX ORDER — FENTANYL CITRATE 50 UG/ML
25 INJECTION, SOLUTION INTRAMUSCULAR; INTRAVENOUS PRN
Status: DISCONTINUED | OUTPATIENT
Start: 2025-05-01 | End: 2025-05-01 | Stop reason: HOSPADM

## 2025-05-01 RX ORDER — ROPIVACAINE HYDROCHLORIDE 5 MG/ML
INJECTION, SOLUTION EPIDURAL; INFILTRATION; PERINEURAL
Status: DISCONTINUED
Start: 2025-05-01 | End: 2025-05-01 | Stop reason: HOSPADM

## 2025-05-01 RX ORDER — KETAMINE HYDROCHLORIDE 10 MG/ML
INJECTION, SOLUTION INTRAMUSCULAR; INTRAVENOUS
Status: DISCONTINUED | OUTPATIENT
Start: 2025-05-01 | End: 2025-05-01 | Stop reason: SDUPTHER

## 2025-05-01 RX ADMIN — ONDANSETRON 4 MG: 2 INJECTION, SOLUTION INTRAMUSCULAR; INTRAVENOUS at 07:08

## 2025-05-01 RX ADMIN — PROPOFOL 150 MG: 10 INJECTION, EMULSION INTRAVENOUS at 07:01

## 2025-05-01 RX ADMIN — Medication 200 MCG: at 07:32

## 2025-05-01 RX ADMIN — MIDAZOLAM HYDROCHLORIDE 1 MG: 1 INJECTION, SOLUTION INTRAMUSCULAR; INTRAVENOUS at 06:10

## 2025-05-01 RX ADMIN — KETAMINE HYDROCHLORIDE 20 MG: 10 INJECTION INTRAMUSCULAR; INTRAVENOUS at 07:01

## 2025-05-01 RX ADMIN — FENTANYL CITRATE 50 MCG: 50 INJECTION, SOLUTION INTRAMUSCULAR; INTRAVENOUS at 06:10

## 2025-05-01 RX ADMIN — LIDOCAINE HYDROCHLORIDE 100 MG: 20 INJECTION, SOLUTION EPIDURAL; INFILTRATION; INTRACAUDAL; PERINEURAL at 07:01

## 2025-05-01 RX ADMIN — FAMOTIDINE 20 MG: 10 INJECTION, SOLUTION INTRAVENOUS at 06:28

## 2025-05-01 RX ADMIN — SODIUM CHLORIDE, POTASSIUM CHLORIDE, SODIUM LACTATE AND CALCIUM CHLORIDE: 600; 310; 30; 20 INJECTION, SOLUTION INTRAVENOUS at 08:03

## 2025-05-01 RX ADMIN — SODIUM CHLORIDE: 9 INJECTION, SOLUTION INTRAVENOUS at 06:51

## 2025-05-01 RX ADMIN — METOCLOPRAMIDE 10 MG: 5 INJECTION, SOLUTION INTRAMUSCULAR; INTRAVENOUS at 06:28

## 2025-05-01 RX ADMIN — ROPIVACAINE HYDROCHLORIDE 30 ML: 5 INJECTION, SOLUTION EPIDURAL; INFILTRATION; PERINEURAL at 06:10

## 2025-05-01 RX ADMIN — SUGAMMADEX 200 MG: 100 INJECTION, SOLUTION INTRAVENOUS at 08:16

## 2025-05-01 RX ADMIN — ROCURONIUM BROMIDE 50 MG: 50 INJECTION INTRAVENOUS at 07:01

## 2025-05-01 RX ADMIN — FENTANYL CITRATE 50 MCG: 50 INJECTION INTRAMUSCULAR; INTRAVENOUS at 06:10

## 2025-05-01 RX ADMIN — ESMOLOL HYDROCHLORIDE 30 MG: 10 INJECTION, SOLUTION INTRAVENOUS at 07:01

## 2025-05-01 RX ADMIN — WATER 2000 MG: 1 INJECTION INTRAMUSCULAR; INTRAVENOUS; SUBCUTANEOUS at 06:51

## 2025-05-01 RX ADMIN — MIDAZOLAM HYDROCHLORIDE 1 MG: 2 INJECTION, SOLUTION INTRAMUSCULAR; INTRAVENOUS at 06:10

## 2025-05-01 RX ADMIN — Medication 200 MCG: at 07:27

## 2025-05-01 ASSESSMENT — PAIN DESCRIPTION - ORIENTATION: ORIENTATION: LEFT

## 2025-05-01 ASSESSMENT — PAIN - FUNCTIONAL ASSESSMENT
PAIN_FUNCTIONAL_ASSESSMENT: 0-10
PAIN_FUNCTIONAL_ASSESSMENT: PREVENTS OR INTERFERES SOME ACTIVE ACTIVITIES AND ADLS
PAIN_FUNCTIONAL_ASSESSMENT: 0-10
PAIN_FUNCTIONAL_ASSESSMENT: PREVENTS OR INTERFERES SOME ACTIVE ACTIVITIES AND ADLS

## 2025-05-01 ASSESSMENT — PAIN SCALES - GENERAL
PAINLEVEL_OUTOF10: 5
PAINLEVEL_OUTOF10: 0

## 2025-05-01 ASSESSMENT — PAIN DESCRIPTION - LOCATION: LOCATION: SHOULDER

## 2025-05-01 ASSESSMENT — PAIN DESCRIPTION - DESCRIPTORS
DESCRIPTORS: DISCOMFORT
DESCRIPTORS: DISCOMFORT

## 2025-05-01 NOTE — OP NOTE
OPERATIVE REPORT    PATIENT:  Moise Ward  [id number]    DATE OF PROCEDURE:  5/1/25    SURGEON: Kike Yates MD    ASSISTANT:  Arvind Jensen DO; Horace Gutierrez CNP.  No qualified resident available to assist.  CNP was necessary for positioning, prepping/draping, intra-operative assistance, and wound closure.  His assistance expedited the procedure and decreased operating room time.      PREOPERATIVE DIAGNOSIS: rotator cuff tear,   Left shoulder.     POSTOPERATIVE DIAGNOSIS: same, subacromial impingement     OPERATION:  Left shoulder arthroscopy, subacromial decompression, rotator cuff repair (4 anchor speed bridge).     ANESTHESIA: . general and interscalene block    OPERATIVE INDICATIONS:  The patient is a 54 year old male with left shoulder pain refractory to conservative treatment.  He underwent an showing full-thickness supraspinatus tear.  He had pain and weakness as well as pain at night and difficulty with activities of daily living.  He was having trouble doing his job.  For these reasons he was interested in surgery.  The patient is thus indicated for shoulder arthroscopy and rotator cuff repair.  The risks and benefits, as well as alternatives were discussed at length with the patient in detail.  These risks include, but are not limited to infection, nerve and/or blood vessel injury, intra or post operative fracture, need for revision surgery, failure of implants, deep vein thrombosis and pulmonary embolism, shoulder stiffness, decreased motion, persistent pain, and the risks of general anesthesia provided by the anesthesiologist.   The patient understood these risks, signed an informed consent, and elected to proceed    OPERATIVE FINDINGS:   There was a full-thickness tear involving the supraspinatus.  There was some fraying of the upper border the subscapularis but no detachment.  Biceps labral complex was normal.  There was osteophyte and downward sloping acromion anteriorly consistent with  withdrawn and fluid removed via suction.  The wounds were closed with buried 4-O moncryl.  The wounds were covered with steri strips, xeroform, 4x4, and tape.  The shoulder was placed in a sling.  The patient was awoken from anesthesia and transferred to the recovery room in stable condition.      IMPLANTS:   Implant Name Type Inv. Item Serial No.  Lot No. LRB No. Used Action   ANCHOR BONE SP FBRTAK RC FBRTPE BLK/EVA  - [id number]  ANCHOR BONE SP FBRTAK RC FBRTPE BLK/EVA   [employer] [id number] Left 1 Implanted   ANCHOR BONE SP FBRTAK RC TGRTPE WH/BLK  - [id number]  ANCHOR BONE SP FBRTAK RC TGRTPE WH/BLK   [employer]WD [id number] Left 1 Implanted   ANCHOR SUTURE L 24.5 MM FLORENTINO 4.75 MM SUTURE SZ 2 B-TCP/ PEEK - [id number]  ANCHOR SUTURE L 24.5 MM FLORENTINO 4.75 MM SUTURE SZ 2 B-TCP/ PEEK  [employer]WD [id number] Left 1 Implanted   ANCHOR SUTURE L 24.5 MM FLORENTINO 4.75 MM SUTURE SZ 2 B-TCP/ PEEK - [id number]  ANCHOR SUTURE L 24.5 MM FLORENTINO 4.75 MM SUTURE SZ 2 B-TCP/ PEEK  [employer] [id number] Left 1 Implanted         ESTIMATED BLOOD LOSS:  5 mL    COMPLICATIONS: none    POST OPERATIVE PLAN: The patient will discharged home from PACU once stable.  Follow up in office will be post operative day 1 or 2.  Dressing will stay on and ice applied until follow up.  The patient will remain in the sling.        Kike Yates MD  Orthopaedic Surgery   5/1/25  8:12 AM

## 2025-05-01 NOTE — ANESTHESIA PROCEDURE NOTES
Peripheral Block    Patient location during procedure: pre-op  Reason for block: post-op pain management and at surgeon's request  Start time: 5/1/2025 6:09 AM  End time: 5/1/2025 6:11 AM  Staffing  Performed: anesthesiologist   Anesthesiologist: Cj Pablo DO  Performed by: Cj Pablo DO  Authorized by: Cj Pablo DO    Preanesthetic Checklist  Completed: patient identified, IV checked, site marked, risks and benefits discussed, surgical/procedural consents, equipment checked, pre-op evaluation, timeout performed, anesthesia consent given, oxygen available and monitors applied/VS acknowledged  Peripheral Block   Patient position: sitting  Prep: ChloraPrep  Provider prep: mask and sterile gloves  Patient monitoring: IV access, frequent blood pressure checks, continuous pulse ox and cardiac monitor  Block type: Brachial plexus  Interscalene  Laterality: left  Injection technique: single-shot  Guidance: ultrasound guided    Needle   Needle type: combined needle/nerve stimulator   Needle gauge: 22 G  Needle localization: anatomical landmarks and ultrasound guidance  Needle length: 10 cm  Assessment   Injection assessment: negative aspiration for heme, no paresthesia on injection and local visualized surrounding nerve on ultrasound  Paresthesia pain: none  Slow fractionated injection: yes  Hemodynamics: stable  Outcomes: uncomplicated and patient tolerated procedure well    Additional Notes  Simple uncomplicated classic L ISB w/ US guidance.  Negative aspiration Q5cc.  Needle completely visualized throughout.  Medications Administered  ropivacaine (NAROPIN) injection 0.5% - Perineural   30 mL - 5/1/2025 6:10:00 AM

## 2025-05-01 NOTE — DISCHARGE INSTRUCTIONS
Orthopaedic Discharge Instructions    Surgery: Shoulder arthroscopy     Activity:   Remain in sling at all times until follow up  You can do elbow, wrist, and hand motion exercises including squeezing ball.  No active or passive shoulder motion.    Apply ICE to the shoulder as much as possible.  It will likely be most comfortable for you to sleep sitting up in a recliner.      Medications:  Take prescribed medications as indicated.  These include pain medication, anti-inflammatory    Dressing care:  Keep your dressing on until you are seen in the office    Follow up:  Your follow up appointment is scheduled for tomorrow.  Please call [phone] with any questions       Kike Yates MD  Orthopaedic Surgery

## 2025-05-01 NOTE — H&P
Department of Orthopedic Surgery  Attending Pre-operative History and Physical        DIAGNOSIS: Left shoulder rotator cuff tear    INDICATION:  Failed Conservative Management      PROCEDURE:  Left shoulder arthroscopy, rotator cuff repair, biceps tenodesis vs tenotomy.     CHIEF COMPLAINT: Left shoulder pain    History Obtained From:  patient    HISTORY OF PRESENT ILLNESS:      The patient is a 54 y.o. male with significant past medical history of left shoulder rotator cuff tear refractory to conservative treatment.  He is therefore interested in surgery to include shoulder arthroscopy rotator cuff repair.  Risks of surgery were discussed in detail including but not limited to infection, neurovascular injury, retear, stiffness, night pain, DVT/pulmonary was, death of anesthesia, unforeseen risks.  We also discussed the anticipated rehab protocol.  He understands and would like to proceed    Past Medical History:        Diagnosis Date    AF (atrial fibrillation) ([facility])     Hypertension     Infective endocarditis 08/26/2020    History  MVR/AVR (mechanical valves, coumadin, 2006).  Per report, he was found down 8/21 with noted aphasia, R sided weakness. Presented to ED where he was found to have L ALIYAH stroke with therapeutic INR, + BC for MSSA. Subsequent workup showed normal EF, prosthetic MV with two mobile masses consistent with vegetations. Was evaluated by ID at OSH. He was then transferred to [facility] for further evalua    Intracranial hemorrhage ([facility]) 12/07/2021    LONG TERM ANTICOAGULENT USE     Memory loss     dont remember seizure    Seizure ([facility]) 12/07/2021    only one pt has had    Shoulder pain     left    Stroke ([facility]) 08/2020    2 left fingers 20% numb       Past Surgical History:        Procedure Laterality Date    AORTIC VALVE REPLACEMENT  01/01/2006    Dr Butler   [facility]    AORTIC VALVE REPLACEMENT  09/03/2020    Commando procedure(Aortic root replacement and AVR with Homograft #[id number], reconstrcution of the  MD Trudy  Orthopaedic Surgery   4/23/25  6:44 AM      Update History & Physical     The patient's History and Physical was reviewed with the patient and there were no significant changes.     I examined the patient and there were no significant changes from the previous History and Physical.     Plan: The risk, benefits, expected outcome, and alternative to the recommended procedure have been discussed with the patient.  Patient understands and wants to proceed with the procedure.       CELSO Middleton-CNP  Orthopedic Surgery   05/01/25  6:44 AM

## 2025-05-01 NOTE — ANESTHESIA POSTPROCEDURE EVALUATION
Department of Anesthesiology  Postprocedure Note    Patient: Moise Ward  MRN: [id number]  YOB: 1970  Date of evaluation: 5/1/2025    Procedure Summary       Date: 05/01/25 Room / Location: [address]    Anesthesia Start: 0651 Anesthesia Stop:     Procedure: LEFT SHOULDER ARTHROSCOPY ROTATOR CUFF REPAIR WITH SUBACROMIAL DECOMPRESSION + ISB (Left: Shoulder) Diagnosis:       Rotator cuff syndrome of left shoulder      (Rotator cuff syndrome of left shoulder [M75.102])    Surgeons: Kike Yates MD Responsible Provider: Cj Pablo DO    Anesthesia Type: General ASA Status: 4            Anesthesia Type: General    Mami Phase I: Mami Score: 10    Mami Phase II:      Anesthesia Post Evaluation    Patient location during evaluation: PACU  Patient participation: complete - patient participated  Level of consciousness: awake and alert  Pain score: 1  Airway patency: patent  Nausea & Vomiting: no nausea and no vomiting  Cardiovascular status: hemodynamically stable  Respiratory status: acceptable  Hydration status: stable  Comments: Patient seen and examined.  Progressing as expected.  No anesthetic related questions or concerns at this time.  Multimodal analgesia pain management approach  Pain management: adequate      No notable events documented.

## 2025-05-02 ENCOUNTER — OFFICE VISIT (OUTPATIENT)
Dept: ORTHOPEDIC SURGERY | Age: 55
End: 2025-05-02

## 2025-05-02 VITALS
BODY MASS INDEX: 32.51 KG/M2 | WEIGHT: 240 LBS | SYSTOLIC BLOOD PRESSURE: 151 MMHG | DIASTOLIC BLOOD PRESSURE: 90 MMHG | HEART RATE: 82 BPM | TEMPERATURE: 98.2 F | HEIGHT: 72 IN

## 2025-05-02 DIAGNOSIS — M75.102 ROTATOR CUFF SYNDROME OF LEFT SHOULDER: ICD-10-CM

## 2025-05-02 DIAGNOSIS — Z98.890 S/P ARTHROSCOPY OF LEFT SHOULDER: Primary | ICD-10-CM

## 2025-05-02 PROCEDURE — 99024 POSTOP FOLLOW-UP VISIT: CPT | Performed by: NURSE PRACTITIONER

## 2025-05-22 RX ORDER — LEVETIRACETAM 750 MG/1
750 TABLET ORAL 2 TIMES DAILY
Qty: 60 TABLET | Refills: 0 | Status: SHIPPED | OUTPATIENT
Start: 2025-05-22

## 2025-05-27 RX ORDER — WARFARIN SODIUM 10 MG/1
TABLET ORAL
Qty: 30 TABLET | Refills: 0 | Status: SHIPPED | OUTPATIENT
Start: 2025-05-27

## 2025-06-16 ENCOUNTER — OFFICE VISIT (OUTPATIENT)
Dept: ORTHOPEDIC SURGERY | Age: 55
End: 2025-06-16

## 2025-06-16 VITALS
OXYGEN SATURATION: 93 % | BODY MASS INDEX: 32.51 KG/M2 | SYSTOLIC BLOOD PRESSURE: 137 MMHG | DIASTOLIC BLOOD PRESSURE: 89 MMHG | HEART RATE: 66 BPM | RESPIRATION RATE: 18 BRPM | HEIGHT: 72 IN | WEIGHT: 240 LBS | TEMPERATURE: 97.7 F

## 2025-06-16 DIAGNOSIS — M75.102 ROTATOR CUFF SYNDROME OF LEFT SHOULDER: ICD-10-CM

## 2025-06-16 DIAGNOSIS — Z98.890 S/P ARTHROSCOPY OF LEFT SHOULDER: Primary | ICD-10-CM

## 2025-06-16 PROCEDURE — 99024 POSTOP FOLLOW-UP VISIT: CPT | Performed by: ORTHOPAEDIC SURGERY

## 2025-06-16 NOTE — PROGRESS NOTES
[facility]  ORTHOPAEDICS AND SPORTS MEDICINE  DATE OF VISIT: 06/16/25  Follow Up Post Operative Visit     Follow Up Post Operative Visit     CHIEF COMPLAINT:   Chief Complaint   Patient presents with    Post-Op Check     Pt presents today post left shoulder arthroscopy, rotator cuff repair, subacromial decompression  Date: 5/1/2025  He states he has increased tightness in the past week in the shoulder into the neck. Reports a \"clicking\" noise and has increased numbness in all five digits.        Surgery: Left shoulder arthroscopy, rotator cuff repair, subacromial decompression  Date: 5/1/2025    Subjective:    Moise Ward is here for follow up visit s/p above procedure.  He  is doing well.  He reports discontinuing his shoulder immobilizer 1 week ago.  Reports symptoms are improving.  States that he has noticed occasional numbness and tingling into his left index and middle fingers worse in the morning and is also affecting his right hand.    Controlled Substances Monitoring:        Physical Exam:    Height: 1.829 m (6'), Weight - Scale: 108.9 kg (240 lb), BP: 137/89    General: Alert and oriented x3, no acute distress  Cardiovascular/pulmonary: No labored breathing, peripheral perfusion intact  Musculoskeletal:    Exam of the left shoulder incision sites are healed mature scars are present.  Neurovascular sensation gross intact.  No reported pain to the hand wrist or elbow.  Can passively elevate him to about 130 degrees without pain or stiffness.      Imaging: Reviewed    Assessment and Plan: Status post left shoulder arthroscopy, rotator cuff repair, subacromial decompression    Patient is 6 weeks out from procedures above doing well.  Shoulder immobilizer was discontinued today.  Patient will now begin out patient physical therapy.  Will continue to avoid lifting, pushing, pulling with the operative extremity.  Patient will focus on stretching and range of motion.  Follow-up in 6 weeks for for reevaluation

## 2025-06-24 RX ORDER — SPIRONOLACTONE 25 MG/1
25 TABLET ORAL DAILY
Qty: 90 TABLET | Refills: 0 | Status: SHIPPED | OUTPATIENT
Start: 2025-06-24

## 2025-06-24 RX ORDER — LISINOPRIL 20 MG/1
20 TABLET ORAL DAILY
Qty: 90 TABLET | Refills: 0 | Status: SHIPPED | OUTPATIENT
Start: 2025-06-24

## 2025-06-30 RX ORDER — LEVETIRACETAM 750 MG/1
750 TABLET ORAL 2 TIMES DAILY
Qty: 60 TABLET | Refills: 0 | Status: SHIPPED | OUTPATIENT
Start: 2025-06-30

## 2025-06-30 RX ORDER — WARFARIN SODIUM 10 MG/1
TABLET ORAL
Qty: 30 TABLET | Refills: 0 | Status: SHIPPED | OUTPATIENT
Start: 2025-06-30

## 2025-07-09 ENCOUNTER — PROCEDURE VISIT (OUTPATIENT)
Dept: PHYSICAL MEDICINE AND REHAB | Age: 55
End: 2025-07-09

## 2025-07-09 ENCOUNTER — TELEPHONE (OUTPATIENT)
Dept: PRIMARY CARE CLINIC | Age: 55
End: 2025-07-09

## 2025-07-09 VITALS — WEIGHT: 250 LBS | HEIGHT: 73 IN | BODY MASS INDEX: 33.13 KG/M2

## 2025-07-09 DIAGNOSIS — R20.2 NUMBNESS AND TINGLING IN BOTH HANDS: ICD-10-CM

## 2025-07-09 DIAGNOSIS — Z79.01 WARFARIN ANTICOAGULATION: Primary | ICD-10-CM

## 2025-07-09 DIAGNOSIS — R20.0 NUMBNESS AND TINGLING IN BOTH HANDS: Primary | ICD-10-CM

## 2025-07-09 DIAGNOSIS — R20.2 NUMBNESS AND TINGLING IN BOTH HANDS: Primary | ICD-10-CM

## 2025-07-09 DIAGNOSIS — R20.0 NUMBNESS AND TINGLING IN BOTH HANDS: ICD-10-CM

## 2025-07-09 LAB
INTERNATIONAL NORMALIZATION RATIO, POC: 0
PROTHROMBIN TIME, POC: 5.5

## 2025-07-09 PROCEDURE — 85610 PROTHROMBIN TIME: CPT | Performed by: PHYSICAL MEDICINE & REHABILITATION

## 2025-07-09 PROCEDURE — 36415 COLL VENOUS BLD VENIPUNCTURE: CPT | Performed by: PHYSICAL MEDICINE & REHABILITATION

## 2025-07-09 NOTE — PROGRESS NOTES
[facility]  Electrodiagnostic Laboratory  *Accredited [facility] the [facility] with exemplary status  [address] [address]  Phone: [phone]  Fax: [phone]    Referring Provider: Horace Gutierrez APRN *  Primary Care Physician: Emeterio Souza DO  Patient Name: Moise Ward  Patient YOB: 1970  Gender: male  BMI: Body mass index is 32.98 kg/m².  Height 1.854 m (6' 1\"), weight 113.4 kg (250 lb).    7/9/2025    Reason for Referral: Numbness and tingling    Description of clinical problem:   Chief Complaint   Patient presents with    Extremity Pain     None    Numbness     Numbness/tingling in the hand and fingers. Right is worse than left. Since Jan after a fall.    Extremity Weakness     Decrease strength in the hands.      Sensory NCS      Nerve / Sites Rec. Site Peak Lat PP Amp Segments Distance Velocity Temp.     ms µV  cm m/s °C   R Median - Digit II (Antidromic)      Palm Dig II 1.88 51.1 Palm - Dig II 7 58 32.5      Wrist Dig II 4.58* 32.2 Wrist - Dig II 14 35 32.4   L Median - Digit II (Antidromic)      Palm Dig II 1.93 46.9 Palm - Dig II 7 56 32.5      Wrist Dig II 3.85 40.3 Wrist - Dig II 14 46 32.5   R Ulnar - Digit V (Antidromic)      Wrist Dig V 3.39 13.0 Wrist - Dig V 14 53 32.4   R Radial - Anatomical snuff box (Forearm)      Forearm Wrist 2.50 17.4 Forearm - Wrist 10 60 32.5       Combined Sensory Index      Nerve / Sites Rec. Site Peak Lat NP Amp PP Amp Segments Dist. Peak Diff Temp.     ms µV µV  cm ms °C   L Median - CSI      Median Thumb 3.28 11.6 14.1 Median - Radial 10 0.47 32.5      Radial Thumb 2.81 10.1 17.0 Median - Ulnar 14 0.52 32.5      Median Ring 3.80 3.5 8.0 Median palm - Ulnar palm 8  32.6      Ulnar Ring 3.28 4.8 2.7          CSI     CSI  0.998        Motor NCS      Nerve / Sites Muscle Onset Amplitude Segments Distance Velocity Temp.     ms mV  cm m/s °C   R Median - APB      Palm APB 2.03 12.7 Palm - APB   32.5      Wrist APB

## 2025-07-09 NOTE — PATIENT INSTRUCTIONS
Electrodiagnotic Laboratory  Accredited [facility] the [facility] with Exemplary status  BUBBA Beth D.O.   [facility]  [address] [place] [address]  Phone: [phone]  Fax: [phone]        Today you had an electrodiagnostic exam which included nerve conduction studies (NCS) and electromyography (EMG). This test evaluated the electrical activity of your nerves and muscles to help determine if you have a nerve or muscle disease.  This test can help determine the location and type of a nerve or muscle problem. This will help your referring doctor diagnose your condition and determine the appropriate next step in your treatment plan.     After your test:    1. There are no long lasting side effects of the test.     2. You may resume your normal activities without restrictions.     3.  Resume any medications that were stopped for the test.     4  If you have sore areas or bruising in your muscles where the needle was placed, apply a cold pack to the sore area for 15-20 minutes three to four times a day as needed for pain.  The soreness should go away in about 1-2 days.     5. Your results were provided  Briefly at the end of your test and the final detailed report will be provided to your referring physician, and/or primary care physician and any other parties you requested within 1-2 days of the examination. You may wish to contact your referring provider after a few days to determine what they would like you to do next.     6.  Please call [phone] with any questions or concerns and if you develop increased body temperature/fever, swelling, tenderness, increased pain and/or drainage from the sites where the needle was placed.     Thank you for choosing us for your health care needs.

## 2025-07-17 ENCOUNTER — EVALUATION (OUTPATIENT)
Dept: PHYSICAL THERAPY | Age: 55
End: 2025-07-17
Payer: MEDICARE

## 2025-07-17 DIAGNOSIS — Z98.890 S/P ARTHROSCOPY OF LEFT SHOULDER: Primary | ICD-10-CM

## 2025-07-17 PROCEDURE — 97110 THERAPEUTIC EXERCISES: CPT | Performed by: PHYSICAL THERAPIST

## 2025-07-17 PROCEDURE — 97162 PT EVAL MOD COMPLEX 30 MIN: CPT | Performed by: PHYSICAL THERAPIST

## 2025-07-17 NOTE — PROGRESS NOTES
Physical Therapy Daily Treatment Note    Date: 2025  Patient Name: Moise Ward  : 1970   MRN: [id number]  DOInjury: 2025  DOSx: 2025   Referring Provider: Horace Gutierrez, APRN - CNP  [address] [place] [address]     Medical Diagnosis:   Z98.890 (ICD-10-CM) - S/P arthroscopy of left shoulder    X = TO BE PERFORMED NEXT VISIT  > = PROGRESS TO THIS FIRST  >> = PROGRESS TO THIS SECOND  >>> = PROGRESS TO THIS THIRD    S: See eval  O: Discussed anatomy, physiology, body mechanics, principles of loading, and progressive loading/activity.  Reviewed home exercise program extensively; written copy provided.   Access Code: [email or web address]  URL: [email or web address]  Date: 2025  Prepared by: Dale Calderón    Exercises  - Standing Shoulder Scaption  - 2 x daily - 7 x weekly - 3 sets - 15 reps  - Sidelying Shoulder External Rotation  - 2 x daily - 7 x weekly - 3 sets - 15 reps      Time 1408-5659     Visit 1 Repeat outcome measure at mid point and end.    Pain Pain with activity 2/10     ROM Right Shoulder:  AROM: 160° Forward elevation,  80° ER,  IR to T11  PROM: 165° Forward elevation,  90° ER,  60° IR     Left Shoulder:  AROM: 150° Forward elevation,  80° ER,  IR to T11  PROM: 145° Forward elevation,  90° ER , 40° IR     Modalities       Use sparingly if at all.  Prefer an active program.  MO   Manual         MT         Stretch      Table slides   TE   Wall Flexion   TE   Wall ER stretch   TE   Towel IR stretch   TE   IR reaching behind back   TE   Exercise      Shoulder scaption  3 x 15     Shoulder ER isometrics       Shrugs AROM   TE   Pendulum Ex   TE   Pulleys - flex   TE   Pulleys-IR   TE   Supine wand chest press   TE   Supine wand flex   TE   Supine wand ER/IR   TE   Standing wand behind back IR   TE   Supine flexion   TE   S-lying ER 3 x 15  TE   Standing wand flex   TE   Standing flexion   TE   ROWS: H   TA   ROWS: M   TA   ROWS: L   TA   ER (towel roll under arm)   TE

## 2025-07-17 NOTE — PROGRESS NOTES
[facility] OUTPATIENT REHABILITATION  PHYSICAL THERAPY INITIAL EVALUATION         Date:  2025   Patient: Moise Ward  : 1970  MRN: [id number]  Referring Provider: Horace Gutierrez, APRN - CNP  [address]  [address]  [place] [address]     Medical Diagnosis:   Z98.890 (ICD-10-CM) - S/P arthroscopy of left shoulder    Physician Order: Eval and Treat      SUBJECTIVE:     Onset date: 2025    Onset: Sudden     History / Mechanism of Injury: fell doordashing.  Patient is right handed.    Interventions for current problem: 2025 Left shoulder arthroscopy, subacromial decompression, rotator cuff repair (4 anchor speed bridge).     Chief complaint: can't work out    Behavior: condition is getting better    Pain:   Current: 0/10     Best: 0/10     Worst:4/10 (occurs with driving).  Pain returns to baseline in moments  Pain frequency: intermittent    Symptom Type / Quality: sharp  Location:: mid-deltoid region        Aggravated by: driving    Relieved by: rest    Imaging results: No results found.    Past Medical History:  Past Medical History:   Diagnosis Date    AF (atrial fibrillation) ([facility])     Hypertension     Infective endocarditis 2020    History  MVR/AVR (mechanical valves, coumadin, ).  Per report, he was found down  with noted aphasia, R sided weakness. Presented to ED where he was found to have L ALIYAH stroke with therapeutic INR, + BC for MSSA. Subsequent workup showed normal EF, prosthetic MV with two mobile masses consistent with vegetations. Was evaluated by ID at OSH. He was then transferred to [facility] for further evalua    Intracranial hemorrhage ([facility]) 2021    LONG TERM ANTICOAGULENT USE     Memory loss     dont remember seizure    Seizure ([facility]) 2021    only one pt has had    Shoulder pain     left    Stroke ([facility]) 2020    2 left fingers 20% numb     Past Surgical History:   Procedure Laterality Date    AORTIC VALVE REPLACEMENT

## 2025-07-22 RX ORDER — METOPROLOL SUCCINATE 25 MG/1
25 TABLET, EXTENDED RELEASE ORAL DAILY
Qty: 90 TABLET | Refills: 0 | Status: SHIPPED | OUTPATIENT
Start: 2025-07-22

## 2025-07-24 ENCOUNTER — TREATMENT (OUTPATIENT)
Dept: PHYSICAL THERAPY | Age: 55
End: 2025-07-24
Payer: MEDICARE

## 2025-07-24 DIAGNOSIS — M75.102 TEAR OF LEFT SUPRASPINATUS TENDON: Primary | ICD-10-CM

## 2025-07-24 PROCEDURE — 97110 THERAPEUTIC EXERCISES: CPT

## 2025-07-24 PROCEDURE — 97530 THERAPEUTIC ACTIVITIES: CPT

## 2025-07-24 NOTE — PROGRESS NOTES
Physical Therapy Daily Treatment Note    Date: 2025  Patient Name: Moise Ward  : 1970   MRN: [id number]  DOInjury: 2025  DOSx: 2025              12 Weeks  25  Referring Provider: Horace Gutierrez, APRN - CNP  [address] [place] [address]     Medical Diagnosis:   Z98.890 (ICD-10-CM) - S/P arthroscopy of left shoulder    Outcome Measure:          X = TO BE PERFORMED NEXT VISIT  > = PROGRESS TO THIS FIRST  >> = PROGRESS TO THIS SECOND  >>> = PROGRESS TO THIS THIRD    S: pt reports overall doing well  O: Discussed anatomy, physiology, body mechanics, principles of loading, and progressive loading/activity.  Reviewed home exercise program extensively; written copy provided.   Access Code: [email or web address]  URL: [email or web address]  Date: 2025  Prepared by: Dale Calderón    Exercises  - Standing Shoulder Scaption  - 2 x daily - 7 x weekly - 3 sets - 15 reps  - Sidelying Shoulder External Rotation  - 2 x daily - 7 x weekly - 3 sets - 15 reps      Time 7844-9504     Visit 2/     Pain 0/10     ROM AROM: 160° Forward elevation,  80° ER,  IR to T11  PROM: 155° Forward elevation,  90° ER , 40° IR     Modalities       Use sparingly if at all.  Prefer an active program.  MO   Manual         MT         Stretch      Table slides   TE   Wall Flexion 3 x 30 sec  TE   Wall ER stretch   TE   Towel IR stretch   TE   IR reaching behind back   TE   Exercise      Shoulder scaption isometrics      Shoulder ER isometrics       Shrugs AROM   TE   Pendulum Ex   TE   Pulleys - flex 3 min  TE   Pulleys-IR   TE   Supine wand chest press   TE   Supine wand flex   TE   Supine wand ER/IR   TE   Standing wand behind back IR Blk 2 x 20  TE   Supine flexion   TE   S-lying ER   TE   Standing wand flex Blk 2 x 20  TE   Standing flexion   TE   ROWS: H Blue 2 x 20  TA   ROWS: M Blue 2 x 20  TA   ROWS: L next  TA   ER (towel roll under arm) Green 2 x 20  TE   IR (towel roll under arm) Green 2 x 20  TE

## 2025-07-25 ENCOUNTER — PROCEDURE VISIT (OUTPATIENT)
Dept: PHYSICAL MEDICINE AND REHAB | Age: 55
End: 2025-07-25

## 2025-07-25 VITALS — HEIGHT: 73 IN | WEIGHT: 250 LBS | BODY MASS INDEX: 33.13 KG/M2

## 2025-07-25 DIAGNOSIS — Z79.01 ANTICOAGULATED ON COUMADIN: Primary | ICD-10-CM

## 2025-07-25 DIAGNOSIS — R20.0 NUMBNESS AND TINGLING IN BOTH HANDS: ICD-10-CM

## 2025-07-25 DIAGNOSIS — R20.2 NUMBNESS AND TINGLING IN BOTH HANDS: ICD-10-CM

## 2025-07-25 LAB
INTERNATIONAL NORMALIZATION RATIO, POC: 1.2
PROTHROMBIN TIME, POC: NORMAL

## 2025-07-25 NOTE — PROGRESS NOTES
EMG      EMG Summary Table     Spontaneous MUAP Recruitment   Muscle Nerve Roots IA Fib PSW Fasc Amp Dur. PPP Pattern   R. Biceps brachii Musculocutaneous C5-C6 N None None None N N N N   R. Triceps brachii Radial C6-C8 N None None None N N N N   R. Pronator teres Median C6-C7 N None None None N N N N   R. First dorsal interosseous Ulnar C8-T1 N None None None N N N N   R. Abductor pollicis brevis Median C8-T1 N None None None N N N N   L. Biceps brachii Musculocutaneous C5-C6 N None None None N N N N   L. Triceps brachii Radial C6-C8 N None None None N N N N   L. Pronator teres Median C6-C7 N None None None N N N N   L. First dorsal interosseous Ulnar C8-T1 N None None None N N N N   L. Abductor pollicis brevis Median C8-T1 N None None None N N N N        
syndrome:  - Constant numbness is reported in the right hand, which is worse than the left. The numbness affects all fingers except the thumb and is alleviated by running hot water over the hand.  - Nerve conduction studies confirm moderate carpal tunnel syndrome on the right and mild on the left.  - Conservative treatments such as bracing, therapy, steroid injections, anti-inflammatory medications, or steroid pills are recommended initially.    - If there is no improvement with conservative treatments, surgical intervention should be considered.  Advised patient to follow up with referring provider.   Report from split visit for NCV and EMG was completed in the 7/9/25 encounter as an addendum.       Thank you for allowing me to participate in the care of your patient.      Sincerely,     Sophy Armas,         The patient (or guardian, if applicable) and other individuals in attendance with the patient were advised that Artificial Intelligence will be utilized during this visit to record, process the conversation to generate a clinical note, and support improvement of the AI technology. The patient (or guardian, if applicable) and other individuals in attendance at the appointment consented to the use of AI, including the recording.

## 2025-07-28 DIAGNOSIS — Z98.890 S/P ARTHROSCOPY OF LEFT SHOULDER: ICD-10-CM

## 2025-07-29 PROBLEM — Z98.890 S/P ARTHROSCOPY OF LEFT SHOULDER: Status: ACTIVE | Noted: 2025-07-29

## 2025-07-29 RX ORDER — LEVETIRACETAM 750 MG/1
750 TABLET ORAL 2 TIMES DAILY
Qty: 60 TABLET | Refills: 0 | Status: SHIPPED | OUTPATIENT
Start: 2025-07-29

## 2025-07-30 ENCOUNTER — TELEPHONE (OUTPATIENT)
Dept: PHYSICAL THERAPY | Age: 55
End: 2025-07-30

## 2025-08-05 ENCOUNTER — TREATMENT (OUTPATIENT)
Dept: PHYSICAL THERAPY | Age: 55
End: 2025-08-05
Payer: MEDICARE

## 2025-08-05 DIAGNOSIS — Z98.890 S/P ARTHROSCOPY OF LEFT SHOULDER: Primary | ICD-10-CM

## 2025-08-05 PROCEDURE — 97530 THERAPEUTIC ACTIVITIES: CPT

## 2025-08-05 PROCEDURE — 97110 THERAPEUTIC EXERCISES: CPT

## 2025-08-13 ENCOUNTER — OFFICE VISIT (OUTPATIENT)
Dept: ORTHOPEDIC SURGERY | Age: 55
End: 2025-08-13
Payer: MEDICARE

## 2025-08-13 VITALS
HEART RATE: 73 BPM | OXYGEN SATURATION: 93 % | TEMPERATURE: 98 F | DIASTOLIC BLOOD PRESSURE: 86 MMHG | SYSTOLIC BLOOD PRESSURE: 132 MMHG | WEIGHT: 252 LBS | RESPIRATION RATE: 18 BRPM | HEIGHT: 73 IN | BODY MASS INDEX: 33.4 KG/M2

## 2025-08-13 DIAGNOSIS — Z98.890 S/P ARTHROSCOPY OF LEFT SHOULDER: Primary | ICD-10-CM

## 2025-08-13 DIAGNOSIS — G56.03 BILATERAL CARPAL TUNNEL SYNDROME: ICD-10-CM

## 2025-08-13 PROCEDURE — 3017F COLORECTAL CA SCREEN DOC REV: CPT | Performed by: ORTHOPAEDIC SURGERY

## 2025-08-13 PROCEDURE — G8427 DOCREV CUR MEDS BY ELIG CLIN: HCPCS | Performed by: ORTHOPAEDIC SURGERY

## 2025-08-13 PROCEDURE — 1036F TOBACCO NON-USER: CPT | Performed by: ORTHOPAEDIC SURGERY

## 2025-08-13 PROCEDURE — 99213 OFFICE O/P EST LOW 20 MIN: CPT | Performed by: ORTHOPAEDIC SURGERY

## 2025-08-13 PROCEDURE — 3075F SYST BP GE 130 - 139MM HG: CPT | Performed by: ORTHOPAEDIC SURGERY

## 2025-08-13 PROCEDURE — G8417 CALC BMI ABV UP PARAM F/U: HCPCS | Performed by: ORTHOPAEDIC SURGERY

## 2025-08-13 PROCEDURE — 3079F DIAST BP 80-89 MM HG: CPT | Performed by: ORTHOPAEDIC SURGERY

## 2025-08-15 RX ORDER — WARFARIN SODIUM 10 MG/1
TABLET ORAL
Qty: 30 TABLET | Refills: 0 | Status: SHIPPED | OUTPATIENT
Start: 2025-08-15

## 2025-08-18 ENCOUNTER — TELEPHONE (OUTPATIENT)
Dept: ORTHOPEDIC SURGERY | Age: 55
End: 2025-08-18

## 2025-08-27 ENCOUNTER — ANESTHESIA EVENT (OUTPATIENT)
Dept: POSTOP/PACU | Age: 55
End: 2025-08-27
Payer: MEDICARE

## 2025-08-27 ENCOUNTER — HOSPITAL ENCOUNTER (OUTPATIENT)
Dept: POSTOP/PACU | Age: 55
Setting detail: OUTPATIENT SURGERY
Discharge: HOME | End: 2025-08-27
Payer: MEDICARE

## 2025-08-27 ENCOUNTER — ANESTHESIA (OUTPATIENT)
Dept: POSTOP/PACU | Age: 55
End: 2025-08-27
Payer: MEDICARE

## 2025-08-27 VITALS
RESPIRATION RATE: 16 BRPM | DIASTOLIC BLOOD PRESSURE: 97 MMHG | OXYGEN SATURATION: 96 % | WEIGHT: 256 LBS | BODY MASS INDEX: 34.67 KG/M2 | SYSTOLIC BLOOD PRESSURE: 143 MMHG | HEART RATE: 65 BPM | TEMPERATURE: 97.6 F | HEIGHT: 72 IN

## 2025-08-27 LAB
ANION GAP SERPL CALCULATED.3IONS-SCNC: 12 MMOL/L (ref 7–16)
BASOPHILS # BLD: 0.02 K/UL (ref 0–0.2)
BASOPHILS NFR BLD: 0 % (ref 0–2)
BUN SERPL-MCNC: 25 MG/DL (ref 6–20)
CALCIUM SERPL-MCNC: 9.1 MG/DL (ref 8.6–10)
CHLORIDE SERPL-SCNC: 104 MMOL/L (ref 98–107)
CO2 SERPL-SCNC: 22 MMOL/L (ref 22–29)
CREAT SERPL-MCNC: 1 MG/DL (ref 0.7–1.2)
EKG ATRIAL RATE: 64 BPM
EKG ATRIAL RATE: 72 BPM
EKG P-R INTERVAL: 392 MS
EKG Q-T INTERVAL: 398 MS
EKG Q-T INTERVAL: 448 MS
EKG QRS DURATION: 88 MS
EKG QRS DURATION: 94 MS
EKG QTC CALCULATION (BAZETT): 435 MS
EKG QTC CALCULATION (BAZETT): 462 MS
EKG R AXIS: 74 DEGREES
EKG R AXIS: 81 DEGREES
EKG T AXIS: 47 DEGREES
EKG T AXIS: 65 DEGREES
EKG VENTRICULAR RATE: 64 BPM
EKG VENTRICULAR RATE: 72 BPM
EOSINOPHIL # BLD: 0.21 K/UL (ref 0.05–0.5)
EOSINOPHILS RELATIVE PERCENT: 3 % (ref 0–6)
ERYTHROCYTE [DISTWIDTH] IN BLOOD BY AUTOMATED COUNT: 13.2 % (ref 11.5–15)
GFR, ESTIMATED: 88 ML/MIN/1.73M2
GLUCOSE SERPL-MCNC: 102 MG/DL (ref 74–99)
HCT VFR BLD AUTO: 46.2 % (ref 37–54)
HGB BLD-MCNC: 15.4 G/DL (ref 12.5–16.5)
IMM GRANULOCYTES # BLD AUTO: <0.03 K/UL (ref 0–0.58)
IMM GRANULOCYTES NFR BLD: 0 % (ref 0–5)
INR PPP: 3.7
LYMPHOCYTES NFR BLD: 1.65 K/UL (ref 1.5–4)
LYMPHOCYTES RELATIVE PERCENT: 25 % (ref 20–42)
MCH RBC QN AUTO: 28.5 PG (ref 26–35)
MCHC RBC AUTO-ENTMCNC: 33.3 G/DL (ref 32–34.5)
MCV RBC AUTO: 85.6 FL (ref 80–99.9)
MONOCYTES NFR BLD: 0.56 K/UL (ref 0.1–0.95)
MONOCYTES NFR BLD: 9 % (ref 2–12)
NEUTROPHILS NFR BLD: 63 % (ref 43–80)
NEUTS SEG NFR BLD: 4.1 K/UL (ref 1.8–7.3)
PLATELET # BLD AUTO: 237 K/UL (ref 130–450)
PMV BLD AUTO: 10.1 FL (ref 7–12)
POTASSIUM SERPL-SCNC: 4.2 MMOL/L (ref 3.5–5.1)
PROTHROMBIN TIME: 40.1 SEC (ref 9.3–12.4)
RBC # BLD AUTO: 5.4 M/UL (ref 3.8–5.8)
SODIUM SERPL-SCNC: 138 MMOL/L (ref 136–145)
WBC OTHER # BLD: 6.6 K/UL (ref 4.5–11.5)

## 2025-08-27 PROCEDURE — 80048 BASIC METABOLIC PNL TOTAL CA: CPT

## 2025-08-27 PROCEDURE — 3700000000 HC ANESTHESIA ATTENDED CARE: Performed by: ANESTHESIOLOGY

## 2025-08-27 PROCEDURE — 85610 PROTHROMBIN TIME: CPT

## 2025-08-27 PROCEDURE — 7100000001 HC PACU RECOVERY - ADDTL 15 MIN: Performed by: ANESTHESIOLOGY

## 2025-08-27 PROCEDURE — 7100000011 HC PHASE II RECOVERY - ADDTL 15 MIN: Performed by: ANESTHESIOLOGY

## 2025-08-27 PROCEDURE — 2580000003 HC RX 258: Performed by: SPECIALIST

## 2025-08-27 PROCEDURE — 92960 CARDIOVERSION ELECTRIC EXT: CPT | Performed by: ANESTHESIOLOGY

## 2025-08-27 PROCEDURE — 7100000000 HC PACU RECOVERY - FIRST 15 MIN: Performed by: ANESTHESIOLOGY

## 2025-08-27 PROCEDURE — 6360000002 HC RX W HCPCS: Performed by: NURSE ANESTHETIST, CERTIFIED REGISTERED

## 2025-08-27 PROCEDURE — 85025 COMPLETE CBC W/AUTO DIFF WBC: CPT

## 2025-08-27 PROCEDURE — 2580000003 HC RX 258: Performed by: NURSE ANESTHETIST, CERTIFIED REGISTERED

## 2025-08-27 PROCEDURE — 7100000010 HC PHASE II RECOVERY - FIRST 15 MIN: Performed by: ANESTHESIOLOGY

## 2025-08-27 PROCEDURE — 3700000001 HC ADD 15 MINUTES (ANESTHESIA): Performed by: ANESTHESIOLOGY

## 2025-08-27 RX ORDER — LIDOCAINE HYDROCHLORIDE 20 MG/ML
INJECTION, SOLUTION EPIDURAL; INFILTRATION; INTRACAUDAL; PERINEURAL
Status: DISCONTINUED | OUTPATIENT
Start: 2025-08-27 | End: 2025-08-27 | Stop reason: SDUPTHER

## 2025-08-27 RX ORDER — SODIUM CHLORIDE 9 MG/ML
INJECTION, SOLUTION INTRAVENOUS CONTINUOUS
Status: DISCONTINUED | OUTPATIENT
Start: 2025-08-27 | End: 2025-08-28 | Stop reason: HOSPADM

## 2025-08-27 RX ORDER — PROPOFOL 10 MG/ML
INJECTION, EMULSION INTRAVENOUS
Status: DISCONTINUED | OUTPATIENT
Start: 2025-08-27 | End: 2025-08-27 | Stop reason: SDUPTHER

## 2025-08-27 RX ORDER — SODIUM CHLORIDE 9 MG/ML
INJECTION, SOLUTION INTRAVENOUS
Status: DISCONTINUED | OUTPATIENT
Start: 2025-08-27 | End: 2025-08-27 | Stop reason: SDUPTHER

## 2025-08-27 RX ADMIN — LIDOCAINE HYDROCHLORIDE 100 MG: 20 INJECTION, SOLUTION EPIDURAL; INFILTRATION; INTRACAUDAL; PERINEURAL at 07:26

## 2025-08-27 RX ADMIN — PROPOFOL 100 MG: 10 INJECTION, EMULSION INTRAVENOUS at 07:26

## 2025-08-27 RX ADMIN — SODIUM CHLORIDE: 0.9 INJECTION, SOLUTION INTRAVENOUS at 06:20

## 2025-08-27 RX ADMIN — SODIUM CHLORIDE: 9 INJECTION, SOLUTION INTRAVENOUS at 07:00

## 2025-08-27 ASSESSMENT — PAIN SCALES - GENERAL
PAINLEVEL_OUTOF10: 0

## 2025-08-27 ASSESSMENT — PAIN - FUNCTIONAL ASSESSMENT: PAIN_FUNCTIONAL_ASSESSMENT: 0-10

## (undated) DEVICE — BASIC DOUBLE BASIN 2-LF: Brand: MEDLINE INDUSTRIES, INC.

## (undated) DEVICE — MEDI-VAC YANKAUER SUCTION HANDLE W/BULBOUS TIP: Brand: CARDINAL HEALTH

## (undated) DEVICE — 3M™ STERI-DRAPE™ U-DRAPE 1015: Brand: STERI-DRAPE™

## (undated) DEVICE — MASK,FACE,MAXFLUIDPROTECT,SHIELD/ERLPS: Brand: MEDLINE

## (undated) DEVICE — TUBING, SUCTION, 9/32" X 10', STRAIGHT: Brand: MEDLINE

## (undated) DEVICE — TIP COVER ACCESSORY

## (undated) DEVICE — TOWEL,OR,DSP,ST,BLUE,STD,6/PK,12PK/CS: Brand: MEDLINE

## (undated) DEVICE — ELECTRODE PT RET AD L9FT HI MOIST COND ADH HYDRGEL CORDED

## (undated) DEVICE — PAD,ABDOMINAL,5"X9",ST,LF,25/BX: Brand: MEDLINE INDUSTRIES, INC.

## (undated) DEVICE — ELECTRO LUBE IS A SINGLE PATIENT USE DEVICE THAT IS INTENDED TO BE USED ON ELECTROSURGICAL ELECTRODES TO REDUCE STICKING.: Brand: KEY SURGICAL ELECTRO LUBE

## (undated) DEVICE — ARM DRAPE

## (undated) DEVICE — NEEDLE HYPO 18GA L1.5IN PNK POLYPR HUB S STL REG BVL STR

## (undated) DEVICE — BLOCK BITE 60FR CAREGUARD

## (undated) DEVICE — COVER,BOOT,FOAM,NON-SKID,HOOK-LOOP,XLG: Brand: MEDLINE INDUSTRIES, INC.

## (undated) DEVICE — APPLICATOR MEDICATED 26 CC SOLUTION HI LT ORNG CHLORAPREP

## (undated) DEVICE — GAUZE,SPONGE,4"X4",8PLY,STRL,LF,10/TRAY: Brand: MEDLINE

## (undated) DEVICE — BLADE,STAINLESS-STEEL,11,STRL,DISPOSABLE: Brand: MEDLINE

## (undated) DEVICE — CANNULA ARTHSCP L5CM ID8MM DBL DAM 1 PC MOLD LO PROF FLNG

## (undated) DEVICE — SHEET,DRAPE,70X100,STERILE: Brand: MEDLINE

## (undated) DEVICE — 3M™ IOBAN™ 2 ANTIMICROBIAL INCISE DRAPE 6640EZ: Brand: IOBAN™ 2

## (undated) DEVICE — BLADE SHV L13CM DIA4MM DBL CUT COOLCUT

## (undated) DEVICE — PACK SURG LAP CHOLE CUSTOM

## (undated) DEVICE — KENDALL 450 SERIES MONITORING FOAM ELECTRODE - RECTANGULAR SHAPE ( 3/PK): Brand: KENDALL

## (undated) DEVICE — WIPES SKIN CLOTH READYPREP 9 X 10.5 IN 2% CHLORHEX GLUCONATE CHG PREOP

## (undated) DEVICE — PACK,SHOULDER,DRAPE,POUCH: Brand: MEDLINE

## (undated) DEVICE — GLOVE SURG SZ 65 THK91MIL LTX FREE SYN POLYISOPRENE

## (undated) DEVICE — GOWN,SIRUS,POLYRNF,BRTHSLV,XLN/XXL,18/CS: Brand: MEDLINE

## (undated) DEVICE — DOUBLE BASIN SET: Brand: MEDLINE INDUSTRIES, INC.

## (undated) DEVICE — 4-PORT MANIFOLD: Brand: NEPTUNE 2

## (undated) DEVICE — ABLATOR ENDOSCOPIC ELECTROCAUTERY 90 DEG RF ASPIRATING STERILE DISPOSABLE APOLLORF I90

## (undated) DEVICE — COLUMN DRAPE

## (undated) DEVICE — SYRINGE MED 30ML STD CLR PLAS LUERLOCK TIP N CTRL DISP

## (undated) DEVICE — SOLUTION IRRIG 3000ML LAC RINGERS ARTHROMTC PLAS CONT

## (undated) DEVICE — PLUMEPORT ACTIV LAPAROSCOPIC SMOKE FILTRATION DEVICE: Brand: PLUMEPORT ACTIVE

## (undated) DEVICE — GOWN ISOLATN REG YEL M WT MULTIPLY SIDETIE LEV 2

## (undated) DEVICE — MARKER,SKIN,WI/RULER AND LABELS: Brand: MEDLINE

## (undated) DEVICE — SEAL

## (undated) DEVICE — 1000 S-DRAPE TOWEL DRAPE 10/BX: Brand: STERI-DRAPE™

## (undated) DEVICE — STRIP,CLOSURE,WOUND,MEDI-STRIP,1/2X4: Brand: MEDLINE

## (undated) DEVICE — GLOVE SURG SZ 8 CRM LTX FREE POLYISOPRENE POLYMER BEAD ANTI

## (undated) DEVICE — SUTURE V-LOC 180 SZ 0 L9IN ABSRB GRN GS-21 L37MM 1/2 CIR VLOCL0346

## (undated) DEVICE — COVER,TABLE,44X90,STERILE: Brand: MEDLINE

## (undated) DEVICE — LIQUIBAND RAPID ADHESIVE 36/CS 0.8ML: Brand: MEDLINE

## (undated) DEVICE — GOWN,SIRUS,POLYRNF,BRTHSLV,XLN/XL,20/CS: Brand: MEDLINE

## (undated) DEVICE — GARMENT,MEDLINE,DVT,INT,CALF,MED, GEN2: Brand: MEDLINE

## (undated) DEVICE — KIT SURG W7XL11IN 2 PKT UNTREATED NA

## (undated) DEVICE — COVER DSG W7 7 8XL11IN WHT POR CLTH PRECUT WTRPRF MEDIPORE

## (undated) DEVICE — GLOVE ORTHO 8   MSG9480

## (undated) DEVICE — ALCOHOL RUBBING ISO 16OZ 70%

## (undated) DEVICE — GLOVE ORANGE PI 7 1/2   MSG9075

## (undated) DEVICE — Device

## (undated) DEVICE — INSUFFLATION NEEDLE TO ESTABLISH PNEUMOPERITONEUM.: Brand: INSUFFLATION NEEDLE

## (undated) DEVICE — IMMOBILIZER SHLDR L10.5-17IN D7IN SLNG W/ 15DEG ABD PLLW

## (undated) DEVICE — SHEET,DRAPE,40X58,STERILE: Brand: MEDLINE

## (undated) DEVICE — CANNULA ARTHSCP L7CM DIA7MM TRNSLUC THRD FLX W/ NO SQUIRT

## (undated) DEVICE — TUBING PMP L16FT MAIN DISP FOR AR-6400 AR-6475 Â€“ ORDER MULTIPLES OF 10 EACH

## (undated) DEVICE — COVER,LIGHT HANDLE,FLX,1/PK: Brand: MEDLINE INDUSTRIES, INC.

## (undated) DEVICE — [HIGH FLOW INSUFFLATOR,  DO NOT USE IF PACKAGE IS DAMAGED,  KEEP DRY,  KEEP AWAY FROM SUNLIGHT,  PROTECT FROM HEAT AND RADIOACTIVE SOURCES.]: Brand: PNEUMOSURE

## (undated) DEVICE — SLEEVE TRAC SPANDEX LAT W/ 4IN COBAN SUPERFICIAL RAD NRV PD

## (undated) DEVICE — GOWN,SIRUS,FABRNF,XL,20/CS: Brand: MEDLINE

## (undated) DEVICE — NEEDLE HYPO 25GA L1.5IN BLU POLYPR HUB S STL REG BVL STR

## (undated) DEVICE — SOLUTION IRRIG 3000ML 0.9% SOD CHL USP UROMATIC PLAS CONT

## (undated) DEVICE — SYRINGE MED 10ML TRNSLUC BRL PLUNG BLK MRK POLYPR CTRL

## (undated) DEVICE — GOWN,SIRUS,NONRNF,SETINSLV,XL,20/CS: Brand: MEDLINE

## (undated) DEVICE — DRAPE,U/ SHT,SPLIT,PLAS,STERIL: Brand: MEDLINE

## (undated) DEVICE — 3M™ COBAN™ NL STERILE NON-LATEX SELF-ADHERENT WRAP, 2084S, 4 IN X 5 YD (10 CM X 4,5 M), 18 ROLLS/CASE: Brand: 3M™ COBAN™

## (undated) DEVICE — MEDI-VAC NON-CONDUCTIVE SUCTION TUBING: Brand: CARDINAL HEALTH

## (undated) DEVICE — DRESSING,GAUZE,XEROFORM,CURAD,1"X8",ST: Brand: CURAD

## (undated) DEVICE — SCISSORS SURG DIA8MM MPLR CRV ENDOWRIST

## (undated) DEVICE — NEEDLE SPNL L3.5IN PNK HUB S STL REG WALL FIT STYL W/ QNCKE

## (undated) DEVICE — STERILE SURGICAL LUBRICANT, METAL TUBE: Brand: SURGILUBE

## (undated) DEVICE — WARMER SCP LAP

## (undated) DEVICE — DRAPE,REIN 53X77,STERILE: Brand: MEDLINE

## (undated) DEVICE — PROGRASP FORCEPS: Brand: ENDOWRIST